# Patient Record
Sex: FEMALE | Race: WHITE | Employment: OTHER | URBAN - METROPOLITAN AREA
[De-identification: names, ages, dates, MRNs, and addresses within clinical notes are randomized per-mention and may not be internally consistent; named-entity substitution may affect disease eponyms.]

---

## 2019-01-24 DIAGNOSIS — R79.89 ELEVATED SERUM CREATININE: ICD-10-CM

## 2019-01-24 RX ORDER — SODIUM CHLORIDE 0.9 % (FLUSH) 0.9 %
10 SYRINGE (ML) INJECTION PRN
Status: CANCELLED
Start: 2019-01-25

## 2019-01-24 RX ORDER — MAGNESIUM SULFATE IN WATER 40 MG/ML
2 INJECTION, SOLUTION INTRAVENOUS ONCE
Status: CANCELLED
Start: 2019-01-25 | End: 2019-01-25

## 2019-01-24 RX ORDER — SODIUM CHLORIDE 9 MG/ML
INJECTION, SOLUTION INTRAVENOUS CONTINUOUS
Status: CANCELLED
Start: 2019-01-25

## 2019-01-25 ENCOUNTER — HOSPITAL ENCOUNTER (OUTPATIENT)
Dept: INFUSION THERAPY | Age: 79
Setting detail: INFUSION SERIES
Discharge: HOME | End: 2019-01-25
Payer: MEDICARE

## 2019-01-25 VITALS
WEIGHT: 185 LBS | HEART RATE: 66 BPM | BODY MASS INDEX: 34.93 KG/M2 | RESPIRATION RATE: 16 BRPM | SYSTOLIC BLOOD PRESSURE: 177 MMHG | OXYGEN SATURATION: 99 % | DIASTOLIC BLOOD PRESSURE: 80 MMHG | HEIGHT: 61 IN | TEMPERATURE: 97.6 F

## 2019-01-25 DIAGNOSIS — R79.89 ELEVATED SERUM CREATININE: ICD-10-CM

## 2019-01-25 DIAGNOSIS — E61.2 MAGNESIUM DEFICIENCY: ICD-10-CM

## 2019-01-25 PROCEDURE — 96367 TX/PROPH/DG ADDL SEQ IV INF: CPT

## 2019-01-25 PROCEDURE — 96365 THER/PROPH/DIAG IV INF INIT: CPT

## 2019-01-25 PROCEDURE — 2580000003 HC RX 258: Performed by: NURSE PRACTITIONER

## 2019-01-25 PROCEDURE — 6360000002 HC RX W HCPCS: Performed by: NURSE PRACTITIONER

## 2019-01-25 PROCEDURE — 96361 HYDRATE IV INFUSION ADD-ON: CPT

## 2019-01-25 RX ORDER — SODIUM CHLORIDE 9 MG/ML
INJECTION, SOLUTION INTRAVENOUS CONTINUOUS
Status: CANCELLED
Start: 2019-01-25

## 2019-01-25 RX ORDER — MAGNESIUM SULFATE IN WATER 40 MG/ML
2 INJECTION, SOLUTION INTRAVENOUS ONCE
Status: CANCELLED
Start: 2019-01-25 | End: 2019-01-25

## 2019-01-25 RX ORDER — SODIUM CHLORIDE 9 MG/ML
INJECTION, SOLUTION INTRAVENOUS CONTINUOUS
Status: DISCONTINUED | OUTPATIENT
Start: 2019-01-25 | End: 2019-01-26 | Stop reason: HOSPADM

## 2019-01-25 RX ORDER — MAGNESIUM SULFATE IN WATER 40 MG/ML
2 INJECTION, SOLUTION INTRAVENOUS ONCE
Status: COMPLETED | OUTPATIENT
Start: 2019-01-25 | End: 2019-01-25

## 2019-01-25 RX ORDER — SODIUM CHLORIDE 0.9 % (FLUSH) 0.9 %
10 SYRINGE (ML) INJECTION PRN
Status: CANCELLED
Start: 2019-01-25

## 2019-01-25 RX ORDER — SODIUM CHLORIDE 0.9 % (FLUSH) 0.9 %
10 SYRINGE (ML) INJECTION PRN
Status: DISCONTINUED | OUTPATIENT
Start: 2019-01-25 | End: 2019-01-26 | Stop reason: HOSPADM

## 2019-01-25 RX ADMIN — Medication 10 ML: at 11:30

## 2019-01-25 RX ADMIN — SODIUM CHLORIDE: 9 INJECTION, SOLUTION INTRAVENOUS at 09:32

## 2019-01-25 RX ADMIN — Medication 10 ML: at 09:28

## 2019-01-25 RX ADMIN — MAGNESIUM SULFATE HEPTAHYDRATE 2 G: 40 INJECTION, SOLUTION INTRAVENOUS at 09:33

## 2019-01-25 NOTE — PROGRESS NOTES
Tolerated infusion well. Therapy plan reviewed with patient. Verbalizes understanding. Reviewed AVS with patient, reviewed medication information, verbalizes good knowledge of current plan, and has no signs or symptoms to report at this time.  Declines copy of AVS. Patient following up with the Dr in [facility], AN AFFILIATE OF [facility]

## 2019-02-08 ENCOUNTER — HOSPITAL ENCOUNTER (OUTPATIENT)
Age: 79
Setting detail: OBSERVATION
Discharge: HOME | End: 2019-02-10
Attending: EMERGENCY MEDICINE | Admitting: INTERNAL MEDICINE
Payer: MEDICARE

## 2019-02-08 ENCOUNTER — APPOINTMENT (OUTPATIENT)
Dept: GENERAL RADIOLOGY | Age: 79
End: 2019-02-08
Payer: MEDICARE

## 2019-02-08 DIAGNOSIS — N17.9 AKI (ACUTE KIDNEY INJURY) (HCC): Primary | ICD-10-CM

## 2019-02-08 DIAGNOSIS — E87.5 HYPERKALEMIA: ICD-10-CM

## 2019-02-08 LAB
ALBUMIN SERPL-MCNC: 3.9 G/DL (ref 3.5–5.2)
ALP BLD-CCNC: 52 U/L (ref 35–104)
ALT SERPL-CCNC: 29 U/L (ref 0–32)
ANION GAP SERPL CALCULATED.3IONS-SCNC: 15 MMOL/L (ref 7–16)
ANISOCYTOSIS: ABNORMAL
AST SERPL-CCNC: 16 U/L (ref 0–31)
BASOPHILS ABSOLUTE: 0 E9/L (ref 0–0.2)
BASOPHILS RELATIVE PERCENT: 0 % (ref 0–2)
BILIRUB SERPL-MCNC: 0.5 MG/DL (ref 0–1.2)
BUN BLDV-MCNC: 68 MG/DL (ref 8–23)
BURR CELLS: ABNORMAL
CALCIUM SERPL-MCNC: 9.1 MG/DL (ref 8.6–10.2)
CHLORIDE BLD-SCNC: 99 MMOL/L (ref 98–107)
CO2: 21 MMOL/L (ref 22–29)
CREAT SERPL-MCNC: 2.1 MG/DL (ref 0.5–1)
EOSINOPHILS ABSOLUTE: 0 E9/L (ref 0.05–0.5)
EOSINOPHILS RELATIVE PERCENT: 0 % (ref 0–6)
GFR AFRICAN AMERICAN: 28
GFR NON-AFRICAN AMERICAN: 23 ML/MIN/1.73
GLUCOSE BLD-MCNC: 147 MG/DL (ref 74–99)
HCT VFR BLD CALC: 31.7 % (ref 34–48)
HEMOGLOBIN: 10.7 G/DL (ref 11.5–15.5)
LACTIC ACID: 2.7 MMOL/L (ref 0.5–2.2)
LYMPHOCYTES ABSOLUTE: 0.42 E9/L (ref 1.5–4)
LYMPHOCYTES RELATIVE PERCENT: 5 % (ref 20–42)
MCH RBC QN AUTO: 32.8 PG (ref 26–35)
MCHC RBC AUTO-ENTMCNC: 33.8 % (ref 32–34.5)
MCV RBC AUTO: 97.2 FL (ref 80–99.9)
METAMYELOCYTES RELATIVE PERCENT: 1 % (ref 0–1)
MONOCYTES ABSOLUTE: 0.25 E9/L (ref 0.1–0.95)
MONOCYTES RELATIVE PERCENT: 3 % (ref 2–12)
NEUTROPHILS ABSOLUTE: 7.64 E9/L (ref 1.8–7.3)
NEUTROPHILS RELATIVE PERCENT: 91 % (ref 43–80)
OVALOCYTES: ABNORMAL
PDW BLD-RTO: 14.3 FL (ref 11.5–15)
PLATELET # BLD: 94 E9/L (ref 130–450)
PLATELET CONFIRMATION: NORMAL
PMV BLD AUTO: 10.3 FL (ref 7–12)
POIKILOCYTES: ABNORMAL
POTASSIUM SERPL-SCNC: 5.6 MMOL/L (ref 3.5–5)
RBC # BLD: 3.26 E12/L (ref 3.5–5.5)
SODIUM BLD-SCNC: 135 MMOL/L (ref 132–146)
TOTAL PROTEIN: 6 G/DL (ref 6.4–8.3)
TROPONIN: <0.01 NG/ML (ref 0–0.03)
WBC # BLD: 8.3 E9/L (ref 4.5–11.5)

## 2019-02-08 PROCEDURE — 6370000000 HC RX 637 (ALT 250 FOR IP): Performed by: EMERGENCY MEDICINE

## 2019-02-08 PROCEDURE — 84484 ASSAY OF TROPONIN QUANT: CPT

## 2019-02-08 PROCEDURE — 85025 COMPLETE CBC W/AUTO DIFF WBC: CPT

## 2019-02-08 PROCEDURE — 99285 EMERGENCY DEPT VISIT HI MDM: CPT

## 2019-02-08 PROCEDURE — 2580000003 HC RX 258: Performed by: INTERNAL MEDICINE

## 2019-02-08 PROCEDURE — 2580000003 HC RX 258: Performed by: STUDENT IN AN ORGANIZED HEALTH CARE EDUCATION/TRAINING PROGRAM

## 2019-02-08 PROCEDURE — 71045 X-RAY EXAM CHEST 1 VIEW: CPT

## 2019-02-08 PROCEDURE — 80053 COMPREHEN METABOLIC PANEL: CPT

## 2019-02-08 PROCEDURE — G0378 HOSPITAL OBSERVATION PER HR: HCPCS

## 2019-02-08 PROCEDURE — 36415 COLL VENOUS BLD VENIPUNCTURE: CPT

## 2019-02-08 PROCEDURE — 83605 ASSAY OF LACTIC ACID: CPT

## 2019-02-08 RX ORDER — HEPARIN SODIUM 10000 [USP'U]/ML
5000 INJECTION, SOLUTION INTRAVENOUS; SUBCUTANEOUS 3 TIMES DAILY
Status: DISCONTINUED | OUTPATIENT
Start: 2019-02-08 | End: 2019-02-10 | Stop reason: HOSPADM

## 2019-02-08 RX ORDER — SODIUM CHLORIDE 0.9 % (FLUSH) 0.9 %
10 SYRINGE (ML) INJECTION 2 TIMES DAILY
Status: DISCONTINUED | OUTPATIENT
Start: 2019-02-08 | End: 2019-02-08 | Stop reason: SDUPTHER

## 2019-02-08 RX ORDER — ONDANSETRON 2 MG/ML
4 INJECTION INTRAMUSCULAR; INTRAVENOUS EVERY 6 HOURS PRN
Status: DISCONTINUED | OUTPATIENT
Start: 2019-02-08 | End: 2019-02-10 | Stop reason: HOSPADM

## 2019-02-08 RX ORDER — LISINOPRIL AND HYDROCHLOROTHIAZIDE 20; 12.5 MG/1; MG/1
1 TABLET ORAL DAILY
Status: ON HOLD | COMMUNITY
End: 2019-02-10 | Stop reason: HOSPADM

## 2019-02-08 RX ORDER — PANTOPRAZOLE SODIUM 40 MG/1
40 TABLET, DELAYED RELEASE ORAL NIGHTLY
COMMUNITY
End: 2020-02-14 | Stop reason: ALTCHOICE

## 2019-02-08 RX ORDER — PREDNISONE 10 MG/1
10 TABLET ORAL SEE ADMIN INSTRUCTIONS
COMMUNITY
Start: 2019-01-22 | End: 2019-02-15 | Stop reason: ALTCHOICE

## 2019-02-08 RX ORDER — PREDNISONE 10 MG/1
30 TABLET ORAL
Status: DISCONTINUED | OUTPATIENT
Start: 2019-02-08 | End: 2019-02-10 | Stop reason: HOSPADM

## 2019-02-08 RX ORDER — LISINOPRIL 20 MG/1
20 TABLET ORAL DAILY
Status: CANCELLED | OUTPATIENT
Start: 2019-02-08

## 2019-02-08 RX ORDER — SODIUM CHLORIDE 9 MG/ML
INJECTION, SOLUTION INTRAVENOUS CONTINUOUS
Status: DISCONTINUED | OUTPATIENT
Start: 2019-02-08 | End: 2019-02-09

## 2019-02-08 RX ORDER — FOLIC ACID 1 MG/1
1 TABLET ORAL DAILY
Status: DISCONTINUED | OUTPATIENT
Start: 2019-02-08 | End: 2019-02-10 | Stop reason: HOSPADM

## 2019-02-08 RX ORDER — SODIUM CHLORIDE 0.9 % (FLUSH) 0.9 %
10 SYRINGE (ML) INJECTION PRN
Status: DISCONTINUED | OUTPATIENT
Start: 2019-02-08 | End: 2019-02-08 | Stop reason: SDUPTHER

## 2019-02-08 RX ORDER — ASPIRIN 81 MG/1
81 TABLET, CHEWABLE ORAL DAILY
Status: DISCONTINUED | OUTPATIENT
Start: 2019-02-08 | End: 2019-02-10 | Stop reason: HOSPADM

## 2019-02-08 RX ORDER — SODIUM CHLORIDE 0.9 % (FLUSH) 0.9 %
10 SYRINGE (ML) INJECTION EVERY 12 HOURS SCHEDULED
Status: DISCONTINUED | OUTPATIENT
Start: 2019-02-08 | End: 2019-02-10 | Stop reason: HOSPADM

## 2019-02-08 RX ORDER — SODIUM POLYSTYRENE SULFONATE 4.1 MEQ/G
15 POWDER, FOR SUSPENSION ORAL; RECTAL ONCE
Status: COMPLETED | OUTPATIENT
Start: 2019-02-08 | End: 2019-02-08

## 2019-02-08 RX ORDER — SODIUM CHLORIDE 0.9 % (FLUSH) 0.9 %
10 SYRINGE (ML) INJECTION PRN
Status: DISCONTINUED | OUTPATIENT
Start: 2019-02-08 | End: 2019-02-10 | Stop reason: HOSPADM

## 2019-02-08 RX ORDER — LANOLIN ALCOHOL/MO/W.PET/CERES
1000 CREAM (GRAM) TOPICAL DAILY
Status: DISCONTINUED | OUTPATIENT
Start: 2019-02-09 | End: 2019-02-10 | Stop reason: HOSPADM

## 2019-02-08 RX ADMIN — SODIUM POLYSTYRENE SULFONATE 15 G: 1 POWDER ORAL; RECTAL at 20:42

## 2019-02-08 RX ADMIN — Medication 10 ML: at 17:21

## 2019-02-08 RX ADMIN — SODIUM CHLORIDE: 9 INJECTION, SOLUTION INTRAVENOUS at 23:16

## 2019-02-08 RX ADMIN — Medication 10 ML: at 23:16

## 2019-02-08 ASSESSMENT — ENCOUNTER SYMPTOMS
ABDOMINAL PAIN: 0
WHEEZING: 0
SHORTNESS OF BREATH: 1
COLOR CHANGE: 0
COUGH: 0
NAUSEA: 0
VOMITING: 0
DIARRHEA: 0
CONSTIPATION: 0

## 2019-02-08 ASSESSMENT — PAIN SCALES - GENERAL: PAINLEVEL_OUTOF10: 0

## 2019-02-09 LAB
ANION GAP SERPL CALCULATED.3IONS-SCNC: 15 MMOL/L (ref 7–16)
BACTERIA: ABNORMAL /HPF
BASOPHILS ABSOLUTE: 0.01 E9/L (ref 0–0.2)
BASOPHILS RELATIVE PERCENT: 0.1 % (ref 0–2)
BILIRUBIN URINE: NEGATIVE
BLOOD, URINE: NEGATIVE
BUN BLDV-MCNC: 58 MG/DL (ref 8–23)
CALCIUM SERPL-MCNC: 8.9 MG/DL (ref 8.6–10.2)
CHLORIDE BLD-SCNC: 104 MMOL/L (ref 98–107)
CHLORIDE URINE RANDOM: 67 MMOL/L
CLARITY: CLEAR
CO2: 21 MMOL/L (ref 22–29)
COLOR: YELLOW
CREAT SERPL-MCNC: 1.8 MG/DL (ref 0.5–1)
CREATININE URINE: 35 MG/DL (ref 29–226)
EOSINOPHILS ABSOLUTE: 0.06 E9/L (ref 0.05–0.5)
EOSINOPHILS RELATIVE PERCENT: 0.7 % (ref 0–6)
GFR AFRICAN AMERICAN: 33
GFR NON-AFRICAN AMERICAN: 27 ML/MIN/1.73
GLUCOSE BLD-MCNC: 106 MG/DL (ref 74–99)
GLUCOSE URINE: 100 MG/DL
HCT VFR BLD CALC: 33.9 % (ref 34–48)
HEMOGLOBIN: 11.3 G/DL (ref 11.5–15.5)
IMMATURE GRANULOCYTES #: 0.21 E9/L
IMMATURE GRANULOCYTES %: 2.6 % (ref 0–5)
KETONES, URINE: NEGATIVE MG/DL
LACTIC ACID: 2.4 MMOL/L (ref 0.5–2.2)
LACTIC ACID: 2.4 MMOL/L (ref 0.5–2.2)
LEUKOCYTE ESTERASE, URINE: NEGATIVE
LYMPHOCYTES ABSOLUTE: 1.68 E9/L (ref 1.5–4)
LYMPHOCYTES RELATIVE PERCENT: 20.9 % (ref 20–42)
MCH RBC QN AUTO: 32.2 PG (ref 26–35)
MCHC RBC AUTO-ENTMCNC: 33.3 % (ref 32–34.5)
MCV RBC AUTO: 96.6 FL (ref 80–99.9)
MONOCYTES ABSOLUTE: 0.43 E9/L (ref 0.1–0.95)
MONOCYTES RELATIVE PERCENT: 5.3 % (ref 2–12)
NEUTROPHILS ABSOLUTE: 5.65 E9/L (ref 1.8–7.3)
NEUTROPHILS RELATIVE PERCENT: 70.4 % (ref 43–80)
NITRITE, URINE: NEGATIVE
OSMOLALITY URINE: 412 MOSM/KG (ref 300–900)
PDW BLD-RTO: 14.3 FL (ref 11.5–15)
PH UA: 5.5 (ref 5–9)
PLATELET # BLD: 102 E9/L (ref 130–450)
PMV BLD AUTO: 10.1 FL (ref 7–12)
POTASSIUM REFLEX MAGNESIUM: 3.9 MMOL/L (ref 3.5–5)
POTASSIUM, UR: 17.5 MMOL/L
PROTEIN UA: NEGATIVE MG/DL
RBC # BLD: 3.51 E12/L (ref 3.5–5.5)
RBC UA: ABNORMAL /HPF (ref 0–2)
SODIUM BLD-SCNC: 140 MMOL/L (ref 132–146)
SODIUM URINE: 90 MMOL/L
SPECIFIC GRAVITY UA: <=1.005 (ref 1–1.03)
UREA NITROGEN, UR: 559 MG/DL (ref 800–1666)
UROBILINOGEN, URINE: 0.2 E.U./DL
WBC # BLD: 8 E9/L (ref 4.5–11.5)
WBC UA: ABNORMAL /HPF (ref 0–5)

## 2019-02-09 PROCEDURE — G0378 HOSPITAL OBSERVATION PER HR: HCPCS

## 2019-02-09 PROCEDURE — 80048 BASIC METABOLIC PNL TOTAL CA: CPT

## 2019-02-09 PROCEDURE — 99219 PR INITIAL OBSERVATION CARE/DAY 50 MINUTES: CPT | Performed by: INTERNAL MEDICINE

## 2019-02-09 PROCEDURE — 2580000003 HC RX 258: Performed by: INTERNAL MEDICINE

## 2019-02-09 PROCEDURE — 96374 THER/PROPH/DIAG INJ IV PUSH: CPT

## 2019-02-09 PROCEDURE — 81001 URINALYSIS AUTO W/SCOPE: CPT

## 2019-02-09 PROCEDURE — 82436 ASSAY OF URINE CHLORIDE: CPT

## 2019-02-09 PROCEDURE — 84300 ASSAY OF URINE SODIUM: CPT

## 2019-02-09 PROCEDURE — 85025 COMPLETE CBC W/AUTO DIFF WBC: CPT

## 2019-02-09 PROCEDURE — 84540 ASSAY OF URINE/UREA-N: CPT

## 2019-02-09 PROCEDURE — 84133 ASSAY OF URINE POTASSIUM: CPT

## 2019-02-09 PROCEDURE — 6360000002 HC RX W HCPCS: Performed by: INTERNAL MEDICINE

## 2019-02-09 PROCEDURE — 96376 TX/PRO/DX INJ SAME DRUG ADON: CPT

## 2019-02-09 PROCEDURE — 6370000000 HC RX 637 (ALT 250 FOR IP): Performed by: INTERNAL MEDICINE

## 2019-02-09 PROCEDURE — 36415 COLL VENOUS BLD VENIPUNCTURE: CPT

## 2019-02-09 PROCEDURE — 83935 ASSAY OF URINE OSMOLALITY: CPT

## 2019-02-09 PROCEDURE — 83605 ASSAY OF LACTIC ACID: CPT

## 2019-02-09 PROCEDURE — 82570 ASSAY OF URINE CREATININE: CPT

## 2019-02-09 RX ADMIN — HEPARIN SODIUM 5000 UNITS: 10000 INJECTION INTRAVENOUS; SUBCUTANEOUS at 02:46

## 2019-02-09 RX ADMIN — HEPARIN SODIUM 5000 UNITS: 10000 INJECTION INTRAVENOUS; SUBCUTANEOUS at 19:19

## 2019-02-09 RX ADMIN — ASPIRIN 81 MG 81 MG: 81 TABLET ORAL at 09:24

## 2019-02-09 RX ADMIN — HEPARIN SODIUM 5000 UNITS: 10000 INJECTION INTRAVENOUS; SUBCUTANEOUS at 12:12

## 2019-02-09 RX ADMIN — SODIUM CHLORIDE: 9 INJECTION, SOLUTION INTRAVENOUS at 09:24

## 2019-02-09 RX ADMIN — Medication 10 ML: at 09:28

## 2019-02-09 ASSESSMENT — PAIN SCALES - GENERAL: PAINLEVEL_OUTOF10: 0

## 2019-02-10 VITALS
OXYGEN SATURATION: 96 % | WEIGHT: 186 LBS | SYSTOLIC BLOOD PRESSURE: 124 MMHG | HEART RATE: 99 BPM | HEIGHT: 61 IN | DIASTOLIC BLOOD PRESSURE: 58 MMHG | TEMPERATURE: 98.3 F | RESPIRATION RATE: 18 BRPM | BODY MASS INDEX: 35.12 KG/M2

## 2019-02-10 LAB
ANION GAP SERPL CALCULATED.3IONS-SCNC: 14 MMOL/L (ref 7–16)
BASOPHILS ABSOLUTE: 0.02 E9/L (ref 0–0.2)
BASOPHILS RELATIVE PERCENT: 0.3 % (ref 0–2)
BUN BLDV-MCNC: 57 MG/DL (ref 8–23)
CALCIUM SERPL-MCNC: 8.5 MG/DL (ref 8.6–10.2)
CHLORIDE BLD-SCNC: 104 MMOL/L (ref 98–107)
CO2: 23 MMOL/L (ref 22–29)
CREAT SERPL-MCNC: 1.9 MG/DL (ref 0.5–1)
EOSINOPHILS ABSOLUTE: 0.04 E9/L (ref 0.05–0.5)
EOSINOPHILS RELATIVE PERCENT: 0.6 % (ref 0–6)
GFR AFRICAN AMERICAN: 31
GFR NON-AFRICAN AMERICAN: 26 ML/MIN/1.73
GLUCOSE BLD-MCNC: 102 MG/DL (ref 74–99)
HCT VFR BLD CALC: 30.7 % (ref 34–48)
HEMOGLOBIN: 10.2 G/DL (ref 11.5–15.5)
IMMATURE GRANULOCYTES #: 0.27 E9/L
IMMATURE GRANULOCYTES %: 3.9 % (ref 0–5)
LACTIC ACID: 1.8 MMOL/L (ref 0.5–2.2)
LYMPHOCYTES ABSOLUTE: 1.18 E9/L (ref 1.5–4)
LYMPHOCYTES RELATIVE PERCENT: 16.9 % (ref 20–42)
MCH RBC QN AUTO: 32.6 PG (ref 26–35)
MCHC RBC AUTO-ENTMCNC: 33.2 % (ref 32–34.5)
MCV RBC AUTO: 98.1 FL (ref 80–99.9)
MONOCYTES ABSOLUTE: 0.44 E9/L (ref 0.1–0.95)
MONOCYTES RELATIVE PERCENT: 6.3 % (ref 2–12)
NEUTROPHILS ABSOLUTE: 5.04 E9/L (ref 1.8–7.3)
NEUTROPHILS RELATIVE PERCENT: 72 % (ref 43–80)
OVALOCYTES: ABNORMAL
PDW BLD-RTO: 14.5 FL (ref 11.5–15)
PLATELET # BLD: 88 E9/L (ref 130–450)
PLATELET CONFIRMATION: NORMAL
PMV BLD AUTO: 10.1 FL (ref 7–12)
POIKILOCYTES: ABNORMAL
POTASSIUM REFLEX MAGNESIUM: 4.2 MMOL/L (ref 3.5–5)
RBC # BLD: 3.13 E12/L (ref 3.5–5.5)
SODIUM BLD-SCNC: 141 MMOL/L (ref 132–146)
WBC # BLD: 7 E9/L (ref 4.5–11.5)

## 2019-02-10 PROCEDURE — 85025 COMPLETE CBC W/AUTO DIFF WBC: CPT

## 2019-02-10 PROCEDURE — 36415 COLL VENOUS BLD VENIPUNCTURE: CPT

## 2019-02-10 PROCEDURE — 83605 ASSAY OF LACTIC ACID: CPT

## 2019-02-10 PROCEDURE — 96376 TX/PRO/DX INJ SAME DRUG ADON: CPT

## 2019-02-10 PROCEDURE — 80048 BASIC METABOLIC PNL TOTAL CA: CPT

## 2019-02-10 PROCEDURE — 6370000000 HC RX 637 (ALT 250 FOR IP): Performed by: INTERNAL MEDICINE

## 2019-02-10 PROCEDURE — 2580000003 HC RX 258: Performed by: INTERNAL MEDICINE

## 2019-02-10 PROCEDURE — 6360000002 HC RX W HCPCS: Performed by: INTERNAL MEDICINE

## 2019-02-10 PROCEDURE — G0378 HOSPITAL OBSERVATION PER HR: HCPCS

## 2019-02-10 PROCEDURE — 99217 PR OBSERVATION CARE DISCHARGE MANAGEMENT: CPT | Performed by: INTERNAL MEDICINE

## 2019-02-10 RX ADMIN — ASPIRIN 81 MG 81 MG: 81 TABLET ORAL at 09:06

## 2019-02-10 RX ADMIN — Medication 1000 MCG: at 09:04

## 2019-02-10 RX ADMIN — FOLIC ACID 1 MG: 1 TABLET ORAL at 09:04

## 2019-02-10 RX ADMIN — Medication 10 ML: at 09:06

## 2019-02-10 RX ADMIN — PREDNISONE 30 MG: 10 TABLET ORAL at 12:18

## 2019-02-10 RX ADMIN — HEPARIN SODIUM 5000 UNITS: 10000 INJECTION INTRAVENOUS; SUBCUTANEOUS at 03:30

## 2019-02-10 ASSESSMENT — PAIN SCALES - GENERAL
PAINLEVEL_OUTOF10: 0

## 2019-02-15 ENCOUNTER — TELEPHONE (OUTPATIENT)
Dept: SURGERY | Age: 79
End: 2019-02-15

## 2019-02-15 RX ORDER — PREDNISONE 10 MG/1
20 TABLET ORAL DAILY
COMMUNITY
Start: 2019-01-24 | End: 2019-09-30

## 2019-02-21 LAB
EKG ATRIAL RATE: 74 BPM
EKG P AXIS: 23 DEGREES
EKG P-R INTERVAL: 162 MS
EKG Q-T INTERVAL: 338 MS
EKG QRS DURATION: 72 MS
EKG QTC CALCULATION (BAZETT): 375 MS
EKG R AXIS: -13 DEGREES
EKG T AXIS: 46 DEGREES
EKG VENTRICULAR RATE: 74 BPM

## 2019-02-22 ENCOUNTER — APPOINTMENT (OUTPATIENT)
Dept: GENERAL RADIOLOGY | Age: 79
End: 2019-02-22
Attending: SURGERY
Payer: MEDICARE

## 2019-02-22 ENCOUNTER — HOSPITAL ENCOUNTER (OUTPATIENT)
Age: 79
Setting detail: OUTPATIENT SURGERY
Discharge: HOME | End: 2019-02-22
Attending: SURGERY | Admitting: SURGERY
Payer: MEDICARE

## 2019-02-22 ENCOUNTER — ANESTHESIA EVENT (OUTPATIENT)
Dept: OPERATING ROOM | Age: 79
End: 2019-02-22
Payer: MEDICARE

## 2019-02-22 ENCOUNTER — ANESTHESIA (OUTPATIENT)
Dept: OPERATING ROOM | Age: 79
End: 2019-02-22
Payer: MEDICARE

## 2019-02-22 VITALS
RESPIRATION RATE: 16 BRPM | HEART RATE: 94 BPM | OXYGEN SATURATION: 98 % | SYSTOLIC BLOOD PRESSURE: 161 MMHG | WEIGHT: 186 LBS | TEMPERATURE: 97.2 F | DIASTOLIC BLOOD PRESSURE: 67 MMHG | BODY MASS INDEX: 35.12 KG/M2 | HEIGHT: 61 IN

## 2019-02-22 VITALS — DIASTOLIC BLOOD PRESSURE: 61 MMHG | SYSTOLIC BLOOD PRESSURE: 165 MMHG | OXYGEN SATURATION: 99 %

## 2019-02-22 DIAGNOSIS — G89.18 POST-OPERATIVE PAIN: Primary | ICD-10-CM

## 2019-02-22 DIAGNOSIS — Z45.2 ENCOUNTER FOR INSERTION OF VENOUS ACCESS PORT: ICD-10-CM

## 2019-02-22 PROCEDURE — 3600000002 HC SURGERY LEVEL 2 BASE: Performed by: SURGERY

## 2019-02-22 PROCEDURE — 3209999900 FLUORO FOR SURGICAL PROCEDURES

## 2019-02-22 PROCEDURE — 2580000003 HC RX 258: Performed by: SURGERY

## 2019-02-22 PROCEDURE — 36561 INSERT TUNNELED CV CATH: CPT | Performed by: SURGERY

## 2019-02-22 PROCEDURE — 2580000003 HC RX 258: Performed by: NURSE ANESTHETIST, CERTIFIED REGISTERED

## 2019-02-22 PROCEDURE — 3700000001 HC ADD 15 MINUTES (ANESTHESIA): Performed by: SURGERY

## 2019-02-22 PROCEDURE — 6360000002 HC RX W HCPCS: Performed by: SURGERY

## 2019-02-22 PROCEDURE — 71045 X-RAY EXAM CHEST 1 VIEW: CPT

## 2019-02-22 PROCEDURE — 3600000012 HC SURGERY LEVEL 2 ADDTL 15MIN: Performed by: SURGERY

## 2019-02-22 PROCEDURE — 3700000000 HC ANESTHESIA ATTENDED CARE: Performed by: SURGERY

## 2019-02-22 PROCEDURE — 2500000003 HC RX 250 WO HCPCS: Performed by: SURGERY

## 2019-02-22 PROCEDURE — C1788 PORT, INDWELLING, IMP: HCPCS | Performed by: SURGERY

## 2019-02-22 PROCEDURE — 7100000010 HC PHASE II RECOVERY - FIRST 15 MIN: Performed by: SURGERY

## 2019-02-22 PROCEDURE — 7100000011 HC PHASE II RECOVERY - ADDTL 15 MIN: Performed by: SURGERY

## 2019-02-22 PROCEDURE — 6360000002 HC RX W HCPCS: Performed by: NURSE ANESTHETIST, CERTIFIED REGISTERED

## 2019-02-22 PROCEDURE — 2709999900 HC NON-CHARGEABLE SUPPLY: Performed by: SURGERY

## 2019-02-22 PROCEDURE — 77001 FLUOROGUIDE FOR VEIN DEVICE: CPT | Performed by: SURGERY

## 2019-02-22 DEVICE — PORT INFUS OD2.7MM ID1.5MM INTRO 8FR TI POLYUR CATH DETACH CT80STPD] ANGIODYNAMICS INC]: Type: IMPLANTABLE DEVICE | Site: CHEST  WALL | Status: FUNCTIONAL

## 2019-02-22 RX ORDER — FENTANYL CITRATE 50 UG/ML
50 INJECTION, SOLUTION INTRAMUSCULAR; INTRAVENOUS EVERY 5 MIN PRN
Status: DISCONTINUED | OUTPATIENT
Start: 2019-02-22 | End: 2019-02-22 | Stop reason: HOSPADM

## 2019-02-22 RX ORDER — HYDROCODONE BITARTRATE AND ACETAMINOPHEN 5; 325 MG/1; MG/1
1 TABLET ORAL EVERY 6 HOURS PRN
Qty: 8 TABLET | Refills: 0 | Status: SHIPPED | OUTPATIENT
Start: 2019-02-22 | End: 2019-02-24

## 2019-02-22 RX ORDER — SODIUM CHLORIDE 9 MG/ML
INJECTION, SOLUTION INTRAVENOUS CONTINUOUS PRN
Status: COMPLETED | OUTPATIENT
Start: 2019-02-22 | End: 2019-02-22

## 2019-02-22 RX ORDER — BUPIVACAINE HYDROCHLORIDE AND EPINEPHRINE 2.5; 5 MG/ML; UG/ML
INJECTION, SOLUTION EPIDURAL; INFILTRATION; INTRACAUDAL; PERINEURAL PRN
Status: DISCONTINUED | OUTPATIENT
Start: 2019-02-22 | End: 2019-02-22 | Stop reason: ALTCHOICE

## 2019-02-22 RX ORDER — HYDROCODONE BITARTRATE AND ACETAMINOPHEN 5; 325 MG/1; MG/1
1 TABLET ORAL PRN
Status: DISCONTINUED | OUTPATIENT
Start: 2019-02-22 | End: 2019-02-22 | Stop reason: HOSPADM

## 2019-02-22 RX ORDER — FENTANYL CITRATE 50 UG/ML
25 INJECTION, SOLUTION INTRAMUSCULAR; INTRAVENOUS EVERY 5 MIN PRN
Status: DISCONTINUED | OUTPATIENT
Start: 2019-02-22 | End: 2019-02-22 | Stop reason: HOSPADM

## 2019-02-22 RX ORDER — CEFAZOLIN SODIUM 2 G/50ML
2 SOLUTION INTRAVENOUS ONCE
Status: COMPLETED | OUTPATIENT
Start: 2019-02-22 | End: 2019-02-22

## 2019-02-22 RX ORDER — PROPOFOL 10 MG/ML
INJECTION, EMULSION INTRAVENOUS CONTINUOUS PRN
Status: DISCONTINUED | OUTPATIENT
Start: 2019-02-22 | End: 2019-02-22 | Stop reason: SDUPTHER

## 2019-02-22 RX ORDER — SODIUM CHLORIDE 9 MG/ML
INJECTION, SOLUTION INTRAVENOUS CONTINUOUS PRN
Status: DISCONTINUED | OUTPATIENT
Start: 2019-02-22 | End: 2019-02-22 | Stop reason: SDUPTHER

## 2019-02-22 RX ORDER — MEPERIDINE HYDROCHLORIDE 50 MG/ML
12.5 INJECTION INTRAMUSCULAR; INTRAVENOUS; SUBCUTANEOUS EVERY 5 MIN PRN
Status: DISCONTINUED | OUTPATIENT
Start: 2019-02-22 | End: 2019-02-22 | Stop reason: HOSPADM

## 2019-02-22 RX ORDER — LIDOCAINE HYDROCHLORIDE 10 MG/ML
INJECTION, SOLUTION EPIDURAL; INFILTRATION; INTRACAUDAL; PERINEURAL PRN
Status: DISCONTINUED | OUTPATIENT
Start: 2019-02-22 | End: 2019-02-22 | Stop reason: ALTCHOICE

## 2019-02-22 RX ORDER — HYDROCODONE BITARTRATE AND ACETAMINOPHEN 5; 325 MG/1; MG/1
2 TABLET ORAL PRN
Status: DISCONTINUED | OUTPATIENT
Start: 2019-02-22 | End: 2019-02-22 | Stop reason: HOSPADM

## 2019-02-22 RX ADMIN — SODIUM CHLORIDE: 9 INJECTION, SOLUTION INTRAVENOUS at 12:40

## 2019-02-22 RX ADMIN — CEFAZOLIN SODIUM 2 G: 2 SOLUTION INTRAVENOUS at 12:41

## 2019-02-22 RX ADMIN — PROPOFOL 50 MCG/KG/MIN: 10 INJECTION, EMULSION INTRAVENOUS at 12:42

## 2019-02-22 ASSESSMENT — PAIN - FUNCTIONAL ASSESSMENT: PAIN_FUNCTIONAL_ASSESSMENT: 0-10

## 2019-02-22 ASSESSMENT — PAIN SCALES - GENERAL
PAINLEVEL_OUTOF10: 0

## 2019-02-22 ASSESSMENT — LIFESTYLE VARIABLES: SMOKING_STATUS: 0

## 2019-04-03 RX ORDER — SODIUM CHLORIDE 0.9 % (FLUSH) 0.9 %
10 SYRINGE (ML) INJECTION PRN
Status: CANCELLED
Start: 2019-04-03

## 2019-04-04 ENCOUNTER — HOSPITAL ENCOUNTER (OUTPATIENT)
Dept: INFUSION THERAPY | Age: 79
Setting detail: INFUSION SERIES
Discharge: HOME | End: 2019-04-04
Payer: MEDICARE

## 2019-04-04 VITALS
DIASTOLIC BLOOD PRESSURE: 67 MMHG | SYSTOLIC BLOOD PRESSURE: 148 MMHG | HEART RATE: 92 BPM | RESPIRATION RATE: 16 BRPM | TEMPERATURE: 97.9 F | BODY MASS INDEX: 35.9 KG/M2 | WEIGHT: 190 LBS

## 2019-04-04 DIAGNOSIS — C54.9 MALIGNANT NEOPLASM OF BODY OF UTERUS, UNSPECIFIED SITE (HCC): Primary | ICD-10-CM

## 2019-04-04 LAB
ALBUMIN SERPL-MCNC: 3.9 G/DL (ref 3.5–5.2)
ALP BLD-CCNC: 72 U/L (ref 35–104)
ALT SERPL-CCNC: 11 U/L (ref 0–32)
ANION GAP SERPL CALCULATED.3IONS-SCNC: 14 MMOL/L (ref 7–16)
AST SERPL-CCNC: 16 U/L (ref 0–31)
BASOPHILS ABSOLUTE: 0.04 E9/L (ref 0–0.2)
BASOPHILS RELATIVE PERCENT: 0.7 % (ref 0–2)
BILIRUB SERPL-MCNC: 0.5 MG/DL (ref 0–1.2)
BUN BLDV-MCNC: 29 MG/DL (ref 8–23)
CALCIUM SERPL-MCNC: 9.7 MG/DL (ref 8.6–10.2)
CHLORIDE BLD-SCNC: 100 MMOL/L (ref 98–107)
CO2: 27 MMOL/L (ref 22–29)
CREAT SERPL-MCNC: 1.3 MG/DL (ref 0.5–1)
EOSINOPHILS ABSOLUTE: 0.08 E9/L (ref 0.05–0.5)
EOSINOPHILS RELATIVE PERCENT: 1.4 % (ref 0–6)
GFR AFRICAN AMERICAN: 48
GFR NON-AFRICAN AMERICAN: 40 ML/MIN/1.73
GLUCOSE BLD-MCNC: 135 MG/DL (ref 74–99)
HCT VFR BLD CALC: 35.1 % (ref 34–48)
HEMOGLOBIN: 11.4 G/DL (ref 11.5–15.5)
IMMATURE GRANULOCYTES #: 0.07 E9/L
IMMATURE GRANULOCYTES %: 1.2 % (ref 0–5)
LYMPHOCYTES ABSOLUTE: 0.56 E9/L (ref 1.5–4)
LYMPHOCYTES RELATIVE PERCENT: 9.8 % (ref 20–42)
MAGNESIUM: 1.3 MG/DL (ref 1.6–2.6)
MCH RBC QN AUTO: 32.6 PG (ref 26–35)
MCHC RBC AUTO-ENTMCNC: 32.5 % (ref 32–34.5)
MCV RBC AUTO: 100.3 FL (ref 80–99.9)
MONOCYTES ABSOLUTE: 0.59 E9/L (ref 0.1–0.95)
MONOCYTES RELATIVE PERCENT: 10.3 % (ref 2–12)
NEUTROPHILS ABSOLUTE: 4.39 E9/L (ref 1.8–7.3)
NEUTROPHILS RELATIVE PERCENT: 76.6 % (ref 43–80)
OVALOCYTES: ABNORMAL
PDW BLD-RTO: 13.3 FL (ref 11.5–15)
PLATELET # BLD: 256 E9/L (ref 130–450)
PMV BLD AUTO: 9.9 FL (ref 7–12)
POIKILOCYTES: ABNORMAL
POLYCHROMASIA: ABNORMAL
POTASSIUM SERPL-SCNC: 3.6 MMOL/L (ref 3.5–5)
RBC # BLD: 3.5 E12/L (ref 3.5–5.5)
SODIUM BLD-SCNC: 141 MMOL/L (ref 132–146)
TEAR DROP CELLS: ABNORMAL
TOTAL PROTEIN: 6.6 G/DL (ref 6.4–8.3)
WBC # BLD: 5.7 E9/L (ref 4.5–11.5)

## 2019-04-04 PROCEDURE — 80053 COMPREHEN METABOLIC PANEL: CPT

## 2019-04-04 PROCEDURE — 36415 COLL VENOUS BLD VENIPUNCTURE: CPT

## 2019-04-04 PROCEDURE — 85025 COMPLETE CBC W/AUTO DIFF WBC: CPT

## 2019-04-04 PROCEDURE — 83735 ASSAY OF MAGNESIUM: CPT

## 2019-04-04 PROCEDURE — 36591 DRAW BLOOD OFF VENOUS DEVICE: CPT

## 2019-04-04 PROCEDURE — 6360000002 HC RX W HCPCS: Performed by: NURSE PRACTITIONER

## 2019-04-04 PROCEDURE — 2580000003 HC RX 258: Performed by: NURSE PRACTITIONER

## 2019-04-04 RX ORDER — SODIUM CHLORIDE 0.9 % (FLUSH) 0.9 %
10 SYRINGE (ML) INJECTION PRN
Status: DISCONTINUED | OUTPATIENT
Start: 2019-04-04 | End: 2019-04-05 | Stop reason: HOSPADM

## 2019-04-04 RX ORDER — SODIUM CHLORIDE 0.9 % (FLUSH) 0.9 %
10 SYRINGE (ML) INJECTION PRN
Status: CANCELLED
Start: 2019-04-04

## 2019-04-04 RX ADMIN — Medication 10 ML: at 10:11

## 2019-04-04 RX ADMIN — Medication 10 ML: at 10:10

## 2019-04-04 RX ADMIN — Medication 500 UNITS: at 10:12

## 2019-04-11 ENCOUNTER — HOSPITAL ENCOUNTER (OUTPATIENT)
Dept: INFUSION THERAPY | Age: 79
Setting detail: INFUSION SERIES
Discharge: HOME | End: 2019-04-11
Payer: MEDICARE

## 2019-04-11 VITALS
DIASTOLIC BLOOD PRESSURE: 62 MMHG | HEART RATE: 92 BPM | WEIGHT: 190 LBS | BODY MASS INDEX: 35.87 KG/M2 | RESPIRATION RATE: 16 BRPM | SYSTOLIC BLOOD PRESSURE: 133 MMHG | HEIGHT: 61 IN | TEMPERATURE: 98.2 F | OXYGEN SATURATION: 97 %

## 2019-04-11 DIAGNOSIS — C54.9 MALIGNANT NEOPLASM OF BODY OF UTERUS, UNSPECIFIED SITE (HCC): Primary | ICD-10-CM

## 2019-04-11 LAB
ALBUMIN SERPL-MCNC: 3.8 G/DL (ref 3.5–5.2)
ALP BLD-CCNC: 70 U/L (ref 35–104)
ALT SERPL-CCNC: 10 U/L (ref 0–32)
ANION GAP SERPL CALCULATED.3IONS-SCNC: 15 MMOL/L (ref 7–16)
AST SERPL-CCNC: 17 U/L (ref 0–31)
BASOPHILS ABSOLUTE: 0.06 E9/L (ref 0–0.2)
BASOPHILS RELATIVE PERCENT: 1.1 % (ref 0–2)
BILIRUB SERPL-MCNC: 0.4 MG/DL (ref 0–1.2)
BUN BLDV-MCNC: 28 MG/DL (ref 8–23)
CALCIUM SERPL-MCNC: 9.4 MG/DL (ref 8.6–10.2)
CHLORIDE BLD-SCNC: 102 MMOL/L (ref 98–107)
CO2: 24 MMOL/L (ref 22–29)
CREAT SERPL-MCNC: 1.2 MG/DL (ref 0.5–1)
EOSINOPHILS ABSOLUTE: 0.11 E9/L (ref 0.05–0.5)
EOSINOPHILS RELATIVE PERCENT: 2.1 % (ref 0–6)
GFR AFRICAN AMERICAN: 52
GFR NON-AFRICAN AMERICAN: 43 ML/MIN/1.73
GLUCOSE BLD-MCNC: 125 MG/DL (ref 74–99)
HCT VFR BLD CALC: 34.6 % (ref 34–48)
HEMOGLOBIN: 11.1 G/DL (ref 11.5–15.5)
IMMATURE GRANULOCYTES #: 0.06 E9/L
IMMATURE GRANULOCYTES %: 1.1 % (ref 0–5)
LYMPHOCYTES ABSOLUTE: 0.53 E9/L (ref 1.5–4)
LYMPHOCYTES RELATIVE PERCENT: 9.9 % (ref 20–42)
MAGNESIUM: 1.6 MG/DL (ref 1.6–2.6)
MCH RBC QN AUTO: 32 PG (ref 26–35)
MCHC RBC AUTO-ENTMCNC: 32.1 % (ref 32–34.5)
MCV RBC AUTO: 99.7 FL (ref 80–99.9)
MONOCYTES ABSOLUTE: 0.54 E9/L (ref 0.1–0.95)
MONOCYTES RELATIVE PERCENT: 10.1 % (ref 2–12)
NEUTROPHILS ABSOLUTE: 4.05 E9/L (ref 1.8–7.3)
NEUTROPHILS RELATIVE PERCENT: 75.7 % (ref 43–80)
OVALOCYTES: ABNORMAL
PDW BLD-RTO: 13.2 FL (ref 11.5–15)
PLATELET # BLD: 248 E9/L (ref 130–450)
PMV BLD AUTO: 9.7 FL (ref 7–12)
POIKILOCYTES: ABNORMAL
POLYCHROMASIA: ABNORMAL
POTASSIUM SERPL-SCNC: 3.7 MMOL/L (ref 3.5–5)
RBC # BLD: 3.47 E12/L (ref 3.5–5.5)
SODIUM BLD-SCNC: 141 MMOL/L (ref 132–146)
TOTAL PROTEIN: 6.5 G/DL (ref 6.4–8.3)
WBC # BLD: 5.4 E9/L (ref 4.5–11.5)

## 2019-04-11 PROCEDURE — 36415 COLL VENOUS BLD VENIPUNCTURE: CPT

## 2019-04-11 PROCEDURE — 83735 ASSAY OF MAGNESIUM: CPT

## 2019-04-11 PROCEDURE — 2580000003 HC RX 258: Performed by: NURSE PRACTITIONER

## 2019-04-11 PROCEDURE — 36591 DRAW BLOOD OFF VENOUS DEVICE: CPT

## 2019-04-11 PROCEDURE — 6360000002 HC RX W HCPCS: Performed by: NURSE PRACTITIONER

## 2019-04-11 PROCEDURE — 80053 COMPREHEN METABOLIC PANEL: CPT

## 2019-04-11 PROCEDURE — 85025 COMPLETE CBC W/AUTO DIFF WBC: CPT

## 2019-04-11 RX ORDER — SODIUM CHLORIDE 0.9 % (FLUSH) 0.9 %
10 SYRINGE (ML) INJECTION PRN
Status: CANCELLED
Start: 2019-04-11

## 2019-04-11 RX ORDER — SODIUM CHLORIDE 0.9 % (FLUSH) 0.9 %
10 SYRINGE (ML) INJECTION PRN
Status: DISCONTINUED | OUTPATIENT
Start: 2019-04-11 | End: 2019-04-12 | Stop reason: HOSPADM

## 2019-04-11 RX ADMIN — Medication 500 UNITS: at 09:59

## 2019-04-11 RX ADMIN — Medication 10 ML: at 09:59

## 2019-04-11 RX ADMIN — Medication 10 ML: at 09:58

## 2019-04-11 RX ADMIN — Medication 10 ML: at 09:57

## 2019-04-18 ENCOUNTER — HOSPITAL ENCOUNTER (OUTPATIENT)
Dept: INFUSION THERAPY | Age: 79
Setting detail: INFUSION SERIES
Discharge: HOME | End: 2019-04-18
Payer: MEDICARE

## 2019-04-18 VITALS
RESPIRATION RATE: 16 BRPM | DIASTOLIC BLOOD PRESSURE: 63 MMHG | BODY MASS INDEX: 35.87 KG/M2 | TEMPERATURE: 97.8 F | OXYGEN SATURATION: 97 % | WEIGHT: 190 LBS | SYSTOLIC BLOOD PRESSURE: 135 MMHG | HEART RATE: 91 BPM | HEIGHT: 61 IN

## 2019-04-18 DIAGNOSIS — C54.9 MALIGNANT NEOPLASM OF BODY OF UTERUS, UNSPECIFIED SITE (HCC): Primary | ICD-10-CM

## 2019-04-18 LAB
ALBUMIN SERPL-MCNC: 3.8 G/DL (ref 3.5–5.2)
ALP BLD-CCNC: 71 U/L (ref 35–104)
ALT SERPL-CCNC: 10 U/L (ref 0–32)
ANION GAP SERPL CALCULATED.3IONS-SCNC: 15 MMOL/L (ref 7–16)
AST SERPL-CCNC: 15 U/L (ref 0–31)
BASOPHILS ABSOLUTE: 0.07 E9/L (ref 0–0.2)
BASOPHILS RELATIVE PERCENT: 1.3 % (ref 0–2)
BILIRUB SERPL-MCNC: 0.4 MG/DL (ref 0–1.2)
BUN BLDV-MCNC: 28 MG/DL (ref 8–23)
CALCIUM SERPL-MCNC: 9.7 MG/DL (ref 8.6–10.2)
CHLORIDE BLD-SCNC: 104 MMOL/L (ref 98–107)
CO2: 25 MMOL/L (ref 22–29)
CREAT SERPL-MCNC: 1.4 MG/DL (ref 0.5–1)
EOSINOPHILS ABSOLUTE: 0.11 E9/L (ref 0.05–0.5)
EOSINOPHILS RELATIVE PERCENT: 2 % (ref 0–6)
GFR AFRICAN AMERICAN: 44
GFR NON-AFRICAN AMERICAN: 36 ML/MIN/1.73
GLUCOSE BLD-MCNC: 136 MG/DL (ref 74–99)
HCT VFR BLD CALC: 35.1 % (ref 34–48)
HEMOGLOBIN: 11.2 G/DL (ref 11.5–15.5)
IMMATURE GRANULOCYTES #: 0.06 E9/L
IMMATURE GRANULOCYTES %: 1.1 % (ref 0–5)
LYMPHOCYTES ABSOLUTE: 0.59 E9/L (ref 1.5–4)
LYMPHOCYTES RELATIVE PERCENT: 10.6 % (ref 20–42)
MAGNESIUM: 1.5 MG/DL (ref 1.6–2.6)
MCH RBC QN AUTO: 31.5 PG (ref 26–35)
MCHC RBC AUTO-ENTMCNC: 31.9 % (ref 32–34.5)
MCV RBC AUTO: 98.9 FL (ref 80–99.9)
MONOCYTES ABSOLUTE: 0.59 E9/L (ref 0.1–0.95)
MONOCYTES RELATIVE PERCENT: 10.6 % (ref 2–12)
NEUTROPHILS ABSOLUTE: 4.15 E9/L (ref 1.8–7.3)
NEUTROPHILS RELATIVE PERCENT: 74.4 % (ref 43–80)
OVALOCYTES: ABNORMAL
PDW BLD-RTO: 13 FL (ref 11.5–15)
PLATELET # BLD: 264 E9/L (ref 130–450)
PMV BLD AUTO: 9.5 FL (ref 7–12)
POIKILOCYTES: ABNORMAL
POTASSIUM SERPL-SCNC: 3.8 MMOL/L (ref 3.5–5)
RBC # BLD: 3.55 E12/L (ref 3.5–5.5)
SODIUM BLD-SCNC: 144 MMOL/L (ref 132–146)
TEAR DROP CELLS: ABNORMAL
TOTAL PROTEIN: 6.5 G/DL (ref 6.4–8.3)
WBC # BLD: 5.6 E9/L (ref 4.5–11.5)

## 2019-04-18 PROCEDURE — 2580000003 HC RX 258: Performed by: NURSE PRACTITIONER

## 2019-04-18 PROCEDURE — 80053 COMPREHEN METABOLIC PANEL: CPT

## 2019-04-18 PROCEDURE — 85025 COMPLETE CBC W/AUTO DIFF WBC: CPT

## 2019-04-18 PROCEDURE — 83735 ASSAY OF MAGNESIUM: CPT

## 2019-04-18 PROCEDURE — 36591 DRAW BLOOD OFF VENOUS DEVICE: CPT

## 2019-04-18 PROCEDURE — 6360000002 HC RX W HCPCS: Performed by: NURSE PRACTITIONER

## 2019-04-18 PROCEDURE — 36415 COLL VENOUS BLD VENIPUNCTURE: CPT

## 2019-04-18 RX ORDER — SODIUM CHLORIDE 0.9 % (FLUSH) 0.9 %
10 SYRINGE (ML) INJECTION PRN
Status: CANCELLED
Start: 2019-04-18

## 2019-04-18 RX ORDER — BUMETANIDE 1 MG/1
1 TABLET ORAL
COMMUNITY

## 2019-04-18 RX ORDER — SODIUM CHLORIDE 0.9 % (FLUSH) 0.9 %
10 SYRINGE (ML) INJECTION PRN
Status: DISCONTINUED | OUTPATIENT
Start: 2019-04-18 | End: 2019-04-19 | Stop reason: HOSPADM

## 2019-04-18 RX ADMIN — Medication 10 ML: at 10:04

## 2019-04-18 RX ADMIN — Medication 10 ML: at 10:03

## 2019-04-18 RX ADMIN — Medication 10 ML: at 10:01

## 2019-04-18 RX ADMIN — Medication 500 UNITS: at 10:04

## 2019-04-18 NOTE — PROGRESS NOTES
Patient tolerated port flush and ;lab draw well. Therapy plan reviewed with patient. Verbalizes understanding. Declines copy of AVS and any medication information, verbalizes good knowledge of current plan, and has no signs or symptoms to report at this time. Next appointment made.

## 2019-04-25 ENCOUNTER — HOSPITAL ENCOUNTER (OUTPATIENT)
Dept: INFUSION THERAPY | Age: 79
Setting detail: INFUSION SERIES
Discharge: HOME | End: 2019-04-25
Payer: MEDICARE

## 2019-04-25 VITALS
WEIGHT: 190 LBS | HEIGHT: 61 IN | SYSTOLIC BLOOD PRESSURE: 176 MMHG | OXYGEN SATURATION: 97 % | DIASTOLIC BLOOD PRESSURE: 81 MMHG | RESPIRATION RATE: 16 BRPM | BODY MASS INDEX: 35.87 KG/M2 | TEMPERATURE: 97.6 F

## 2019-04-25 DIAGNOSIS — C54.9 MALIGNANT NEOPLASM OF BODY OF UTERUS, UNSPECIFIED SITE (HCC): Primary | ICD-10-CM

## 2019-04-25 LAB
ALBUMIN SERPL-MCNC: 3.9 G/DL (ref 3.5–5.2)
ALP BLD-CCNC: 69 U/L (ref 35–104)
ALT SERPL-CCNC: 10 U/L (ref 0–32)
ANION GAP SERPL CALCULATED.3IONS-SCNC: 15 MMOL/L (ref 7–16)
AST SERPL-CCNC: 16 U/L (ref 0–31)
BASOPHILS ABSOLUTE: 0.04 E9/L (ref 0–0.2)
BASOPHILS RELATIVE PERCENT: 0.7 % (ref 0–2)
BILIRUB SERPL-MCNC: 0.4 MG/DL (ref 0–1.2)
BUN BLDV-MCNC: 29 MG/DL (ref 8–23)
CALCIUM SERPL-MCNC: 9.9 MG/DL (ref 8.6–10.2)
CHLORIDE BLD-SCNC: 106 MMOL/L (ref 98–107)
CO2: 23 MMOL/L (ref 22–29)
CREAT SERPL-MCNC: 1.3 MG/DL (ref 0.5–1)
EOSINOPHILS ABSOLUTE: 0.12 E9/L (ref 0.05–0.5)
EOSINOPHILS RELATIVE PERCENT: 2.1 % (ref 0–6)
GFR AFRICAN AMERICAN: 48
GFR NON-AFRICAN AMERICAN: 40 ML/MIN/1.73
GLUCOSE BLD-MCNC: 89 MG/DL (ref 74–99)
HCT VFR BLD CALC: 35.5 % (ref 34–48)
HEMOGLOBIN: 11.4 G/DL (ref 11.5–15.5)
IMMATURE GRANULOCYTES #: 0.03 E9/L
IMMATURE GRANULOCYTES %: 0.5 % (ref 0–5)
LYMPHOCYTES ABSOLUTE: 0.7 E9/L (ref 1.5–4)
LYMPHOCYTES RELATIVE PERCENT: 12.1 % (ref 20–42)
MAGNESIUM: 1.3 MG/DL (ref 1.6–2.6)
MCH RBC QN AUTO: 31.4 PG (ref 26–35)
MCHC RBC AUTO-ENTMCNC: 32.1 % (ref 32–34.5)
MCV RBC AUTO: 97.8 FL (ref 80–99.9)
MONOCYTES ABSOLUTE: 0.67 E9/L (ref 0.1–0.95)
MONOCYTES RELATIVE PERCENT: 11.6 % (ref 2–12)
NEUTROPHILS ABSOLUTE: 4.23 E9/L (ref 1.8–7.3)
NEUTROPHILS RELATIVE PERCENT: 73 % (ref 43–80)
PDW BLD-RTO: 13.1 FL (ref 11.5–15)
PLATELET # BLD: 249 E9/L (ref 130–450)
PMV BLD AUTO: 9.5 FL (ref 7–12)
POTASSIUM SERPL-SCNC: 3.7 MMOL/L (ref 3.5–5)
RBC # BLD: 3.63 E12/L (ref 3.5–5.5)
SODIUM BLD-SCNC: 144 MMOL/L (ref 132–146)
TOTAL PROTEIN: 6.5 G/DL (ref 6.4–8.3)
WBC # BLD: 5.8 E9/L (ref 4.5–11.5)

## 2019-04-25 PROCEDURE — 80053 COMPREHEN METABOLIC PANEL: CPT

## 2019-04-25 PROCEDURE — 85025 COMPLETE CBC W/AUTO DIFF WBC: CPT

## 2019-04-25 PROCEDURE — 36591 DRAW BLOOD OFF VENOUS DEVICE: CPT

## 2019-04-25 PROCEDURE — 36415 COLL VENOUS BLD VENIPUNCTURE: CPT

## 2019-04-25 PROCEDURE — 6360000002 HC RX W HCPCS: Performed by: NURSE PRACTITIONER

## 2019-04-25 PROCEDURE — 83735 ASSAY OF MAGNESIUM: CPT

## 2019-04-25 PROCEDURE — 2580000003 HC RX 258: Performed by: NURSE PRACTITIONER

## 2019-04-25 RX ORDER — SODIUM CHLORIDE 0.9 % (FLUSH) 0.9 %
10 SYRINGE (ML) INJECTION PRN
Status: CANCELLED
Start: 2019-04-25

## 2019-04-25 RX ORDER — SODIUM CHLORIDE 0.9 % (FLUSH) 0.9 %
10 SYRINGE (ML) INJECTION PRN
Status: DISCONTINUED | OUTPATIENT
Start: 2019-04-25 | End: 2019-04-26 | Stop reason: HOSPADM

## 2019-04-25 RX ADMIN — Medication 500 UNITS: at 10:05

## 2019-04-25 RX ADMIN — Medication 10 ML: at 10:03

## 2019-04-25 RX ADMIN — Medication 10 ML: at 10:05

## 2019-04-25 NOTE — PROGRESS NOTES
Patient tolerated port lab draw and flush well. Therapy plan reviewed with patient. Verbalizes understanding. Declines copy of AVS and any medication information, verbalizes good knowledge of current plan, and has no signs or symptoms to report at this time. Next appointment made.

## 2019-05-02 ENCOUNTER — HOSPITAL ENCOUNTER (OUTPATIENT)
Dept: INFUSION THERAPY | Age: 79
Setting detail: INFUSION SERIES
Discharge: HOME | End: 2019-05-02
Payer: MEDICARE

## 2019-05-02 VITALS
HEIGHT: 61 IN | OXYGEN SATURATION: 97 % | HEART RATE: 78 BPM | RESPIRATION RATE: 16 BRPM | TEMPERATURE: 97.6 F | WEIGHT: 190 LBS | BODY MASS INDEX: 35.87 KG/M2 | DIASTOLIC BLOOD PRESSURE: 60 MMHG | SYSTOLIC BLOOD PRESSURE: 132 MMHG

## 2019-05-02 DIAGNOSIS — C54.9 MALIGNANT NEOPLASM OF BODY OF UTERUS, UNSPECIFIED SITE (HCC): Primary | ICD-10-CM

## 2019-05-02 LAB
ALBUMIN SERPL-MCNC: 4 G/DL (ref 3.5–5.2)
ALP BLD-CCNC: 90 U/L (ref 35–104)
ALT SERPL-CCNC: 24 U/L (ref 0–32)
ANION GAP SERPL CALCULATED.3IONS-SCNC: 13 MMOL/L (ref 7–16)
AST SERPL-CCNC: 22 U/L (ref 0–31)
BASOPHILS ABSOLUTE: 0.03 E9/L (ref 0–0.2)
BASOPHILS RELATIVE PERCENT: 0.6 % (ref 0–2)
BILIRUB SERPL-MCNC: 0.4 MG/DL (ref 0–1.2)
BUN BLDV-MCNC: 34 MG/DL (ref 8–23)
CALCIUM SERPL-MCNC: 9.6 MG/DL (ref 8.6–10.2)
CHLORIDE BLD-SCNC: 106 MMOL/L (ref 98–107)
CO2: 23 MMOL/L (ref 22–29)
CREAT SERPL-MCNC: 1.3 MG/DL (ref 0.5–1)
EOSINOPHILS ABSOLUTE: 0.13 E9/L (ref 0.05–0.5)
EOSINOPHILS RELATIVE PERCENT: 2.7 % (ref 0–6)
GFR AFRICAN AMERICAN: 48
GFR NON-AFRICAN AMERICAN: 40 ML/MIN/1.73
GLUCOSE BLD-MCNC: 92 MG/DL (ref 74–99)
HCT VFR BLD CALC: 36.3 % (ref 34–48)
HEMOGLOBIN: 11.7 G/DL (ref 11.5–15.5)
IMMATURE GRANULOCYTES #: 0.04 E9/L
IMMATURE GRANULOCYTES %: 0.8 % (ref 0–5)
LYMPHOCYTES ABSOLUTE: 0.73 E9/L (ref 1.5–4)
LYMPHOCYTES RELATIVE PERCENT: 14.9 % (ref 20–42)
MAGNESIUM: 1.2 MG/DL (ref 1.6–2.6)
MCH RBC QN AUTO: 30.8 PG (ref 26–35)
MCHC RBC AUTO-ENTMCNC: 32.2 % (ref 32–34.5)
MCV RBC AUTO: 95.5 FL (ref 80–99.9)
MONOCYTES ABSOLUTE: 0.68 E9/L (ref 0.1–0.95)
MONOCYTES RELATIVE PERCENT: 13.9 % (ref 2–12)
NEUTROPHILS ABSOLUTE: 3.29 E9/L (ref 1.8–7.3)
NEUTROPHILS RELATIVE PERCENT: 67.1 % (ref 43–80)
PDW BLD-RTO: 13.1 FL (ref 11.5–15)
PLATELET # BLD: 227 E9/L (ref 130–450)
PMV BLD AUTO: 9.9 FL (ref 7–12)
POTASSIUM SERPL-SCNC: 3.5 MMOL/L (ref 3.5–5)
RBC # BLD: 3.8 E12/L (ref 3.5–5.5)
SODIUM BLD-SCNC: 142 MMOL/L (ref 132–146)
TOTAL PROTEIN: 6.6 G/DL (ref 6.4–8.3)
WBC # BLD: 4.9 E9/L (ref 4.5–11.5)

## 2019-05-02 PROCEDURE — 36415 COLL VENOUS BLD VENIPUNCTURE: CPT

## 2019-05-02 PROCEDURE — 85025 COMPLETE CBC W/AUTO DIFF WBC: CPT

## 2019-05-02 PROCEDURE — 36591 DRAW BLOOD OFF VENOUS DEVICE: CPT

## 2019-05-02 PROCEDURE — 2580000003 HC RX 258: Performed by: NURSE PRACTITIONER

## 2019-05-02 PROCEDURE — 6360000002 HC RX W HCPCS: Performed by: NURSE PRACTITIONER

## 2019-05-02 PROCEDURE — 83735 ASSAY OF MAGNESIUM: CPT

## 2019-05-02 PROCEDURE — 80053 COMPREHEN METABOLIC PANEL: CPT

## 2019-05-02 RX ORDER — HEPARIN SODIUM (PORCINE) LOCK FLUSH IV SOLN 100 UNIT/ML 100 UNIT/ML
500 SOLUTION INTRAVENOUS PRN
Status: DISCONTINUED | OUTPATIENT
Start: 2019-05-02 | End: 2019-05-03 | Stop reason: HOSPADM

## 2019-05-02 RX ORDER — SODIUM CHLORIDE 0.9 % (FLUSH) 0.9 %
10 SYRINGE (ML) INJECTION PRN
Status: DISCONTINUED | OUTPATIENT
Start: 2019-05-02 | End: 2019-05-03 | Stop reason: HOSPADM

## 2019-05-02 RX ORDER — SODIUM CHLORIDE 0.9 % (FLUSH) 0.9 %
10 SYRINGE (ML) INJECTION PRN
Status: CANCELLED
Start: 2019-05-02

## 2019-05-02 RX ADMIN — Medication 500 UNITS: at 10:24

## 2019-05-02 RX ADMIN — Medication 10 ML: at 10:22

## 2019-05-02 RX ADMIN — Medication 10 ML: at 10:23

## 2019-05-02 RX ADMIN — Medication 10 ML: at 10:24

## 2019-05-02 NOTE — PROGRESS NOTES
Patient tolerated port flush and lab  well. Therapy plan reviewed with patient. Verbalizes understanding. Declines copy of AVS and any medication information, verbalizes good knowledge of current plan, and has no signs or symptoms to report at this time. Next appointment made.

## 2019-05-08 ENCOUNTER — HOSPITAL ENCOUNTER (OUTPATIENT)
Dept: INFUSION THERAPY | Age: 79
Setting detail: INFUSION SERIES
Discharge: HOME | End: 2019-05-08
Payer: MEDICARE

## 2019-05-08 VITALS
HEART RATE: 83 BPM | TEMPERATURE: 97.5 F | OXYGEN SATURATION: 98 % | RESPIRATION RATE: 16 BRPM | SYSTOLIC BLOOD PRESSURE: 171 MMHG | BODY MASS INDEX: 35.87 KG/M2 | WEIGHT: 190 LBS | HEIGHT: 61 IN | DIASTOLIC BLOOD PRESSURE: 72 MMHG

## 2019-05-08 DIAGNOSIS — C54.9 MALIGNANT NEOPLASM OF BODY OF UTERUS, UNSPECIFIED SITE (HCC): Primary | ICD-10-CM

## 2019-05-08 LAB
ALBUMIN SERPL-MCNC: 3.8 G/DL (ref 3.5–5.2)
ALP BLD-CCNC: 88 U/L (ref 35–104)
ALT SERPL-CCNC: 24 U/L (ref 0–32)
ANION GAP SERPL CALCULATED.3IONS-SCNC: 12 MMOL/L (ref 7–16)
AST SERPL-CCNC: 19 U/L (ref 0–31)
BASOPHILS ABSOLUTE: 0.04 E9/L (ref 0–0.2)
BASOPHILS RELATIVE PERCENT: 0.8 % (ref 0–2)
BILIRUB SERPL-MCNC: 0.4 MG/DL (ref 0–1.2)
BUN BLDV-MCNC: 28 MG/DL (ref 8–23)
CALCIUM SERPL-MCNC: 9.5 MG/DL (ref 8.6–10.2)
CHLORIDE BLD-SCNC: 106 MMOL/L (ref 98–107)
CO2: 24 MMOL/L (ref 22–29)
CREAT SERPL-MCNC: 1.2 MG/DL (ref 0.5–1)
EOSINOPHILS ABSOLUTE: 0.1 E9/L (ref 0.05–0.5)
EOSINOPHILS RELATIVE PERCENT: 2.1 % (ref 0–6)
GFR AFRICAN AMERICAN: 52
GFR NON-AFRICAN AMERICAN: 43 ML/MIN/1.73
GLUCOSE BLD-MCNC: 91 MG/DL (ref 74–99)
HCT VFR BLD CALC: 35.3 % (ref 34–48)
HEMOGLOBIN: 11.7 G/DL (ref 11.5–15.5)
IMMATURE GRANULOCYTES #: 0.04 E9/L
IMMATURE GRANULOCYTES %: 0.8 % (ref 0–5)
LYMPHOCYTES ABSOLUTE: 0.73 E9/L (ref 1.5–4)
LYMPHOCYTES RELATIVE PERCENT: 15 % (ref 20–42)
MAGNESIUM: 1.1 MG/DL (ref 1.6–2.6)
MCH RBC QN AUTO: 30.9 PG (ref 26–35)
MCHC RBC AUTO-ENTMCNC: 33.1 % (ref 32–34.5)
MCV RBC AUTO: 93.1 FL (ref 80–99.9)
MONOCYTES ABSOLUTE: 0.52 E9/L (ref 0.1–0.95)
MONOCYTES RELATIVE PERCENT: 10.7 % (ref 2–12)
NEUTROPHILS ABSOLUTE: 3.44 E9/L (ref 1.8–7.3)
NEUTROPHILS RELATIVE PERCENT: 70.6 % (ref 43–80)
PDW BLD-RTO: 13.2 FL (ref 11.5–15)
PLATELET # BLD: 206 E9/L (ref 130–450)
PMV BLD AUTO: 10.3 FL (ref 7–12)
POTASSIUM SERPL-SCNC: 3.4 MMOL/L (ref 3.5–5)
RBC # BLD: 3.79 E12/L (ref 3.5–5.5)
SODIUM BLD-SCNC: 142 MMOL/L (ref 132–146)
TOTAL PROTEIN: 6.3 G/DL (ref 6.4–8.3)
WBC # BLD: 4.9 E9/L (ref 4.5–11.5)

## 2019-05-08 PROCEDURE — 85025 COMPLETE CBC W/AUTO DIFF WBC: CPT

## 2019-05-08 PROCEDURE — 80053 COMPREHEN METABOLIC PANEL: CPT

## 2019-05-08 PROCEDURE — 36591 DRAW BLOOD OFF VENOUS DEVICE: CPT

## 2019-05-08 PROCEDURE — 2580000003 HC RX 258: Performed by: NURSE PRACTITIONER

## 2019-05-08 PROCEDURE — 36415 COLL VENOUS BLD VENIPUNCTURE: CPT

## 2019-05-08 PROCEDURE — 6360000002 HC RX W HCPCS: Performed by: NURSE PRACTITIONER

## 2019-05-08 PROCEDURE — 83735 ASSAY OF MAGNESIUM: CPT

## 2019-05-08 RX ORDER — SODIUM CHLORIDE 0.9 % (FLUSH) 0.9 %
10 SYRINGE (ML) INJECTION PRN
Status: CANCELLED
Start: 2019-05-08

## 2019-05-08 RX ORDER — SODIUM CHLORIDE 0.9 % (FLUSH) 0.9 %
10 SYRINGE (ML) INJECTION PRN
Status: DISCONTINUED | OUTPATIENT
Start: 2019-05-08 | End: 2019-05-09 | Stop reason: HOSPADM

## 2019-05-08 RX ADMIN — Medication 10 ML: at 09:59

## 2019-05-08 RX ADMIN — Medication 10 ML: at 10:02

## 2019-05-08 RX ADMIN — Medication 500 UNITS: at 10:02

## 2019-05-08 RX ADMIN — Medication 10 ML: at 10:00

## 2019-05-08 NOTE — PROGRESS NOTES
Patient tolerated lab draw and port flush well. Therapy plan reviewed with patient. Verbalizes understanding. Declines copy of AVS and any medication information, verbalizes good knowledge of current plan, and has no signs or symptoms to report at this time. Next appointment made.

## 2019-05-08 NOTE — PROGRESS NOTES
Called Dr Gracia Kelley office and notified office staff that pts Mg was reported as critical and that her K was 3.4.  Report faxed right to Dr Gracia Kelley office

## 2019-05-16 ENCOUNTER — HOSPITAL ENCOUNTER (OUTPATIENT)
Dept: INFUSION THERAPY | Age: 79
Setting detail: INFUSION SERIES
Discharge: HOME | End: 2019-05-16
Payer: MEDICARE

## 2019-05-16 VITALS
DIASTOLIC BLOOD PRESSURE: 62 MMHG | SYSTOLIC BLOOD PRESSURE: 134 MMHG | BODY MASS INDEX: 35.87 KG/M2 | WEIGHT: 190 LBS | RESPIRATION RATE: 18 BRPM | HEART RATE: 79 BPM | OXYGEN SATURATION: 97 % | TEMPERATURE: 97.6 F | HEIGHT: 61 IN

## 2019-05-16 DIAGNOSIS — C54.9 MALIGNANT NEOPLASM OF BODY OF UTERUS, UNSPECIFIED SITE (HCC): Primary | ICD-10-CM

## 2019-05-16 LAB
ALBUMIN SERPL-MCNC: 3.6 G/DL (ref 3.5–5.2)
ALP BLD-CCNC: 76 U/L (ref 35–104)
ALT SERPL-CCNC: 13 U/L (ref 0–32)
ANION GAP SERPL CALCULATED.3IONS-SCNC: 11 MMOL/L (ref 7–16)
AST SERPL-CCNC: 18 U/L (ref 0–31)
BASOPHILS ABSOLUTE: 0.05 E9/L (ref 0–0.2)
BASOPHILS RELATIVE PERCENT: 1.1 % (ref 0–2)
BILIRUB SERPL-MCNC: 0.3 MG/DL (ref 0–1.2)
BUN BLDV-MCNC: 29 MG/DL (ref 8–23)
CALCIUM SERPL-MCNC: 9.8 MG/DL (ref 8.6–10.2)
CHLORIDE BLD-SCNC: 103 MMOL/L (ref 98–107)
CO2: 28 MMOL/L (ref 22–29)
CREAT SERPL-MCNC: 1.4 MG/DL (ref 0.5–1)
EOSINOPHILS ABSOLUTE: 0.16 E9/L (ref 0.05–0.5)
EOSINOPHILS RELATIVE PERCENT: 3.6 % (ref 0–6)
GFR AFRICAN AMERICAN: 44
GFR NON-AFRICAN AMERICAN: 36 ML/MIN/1.73
GLUCOSE BLD-MCNC: 108 MG/DL (ref 74–99)
HCT VFR BLD CALC: 36.2 % (ref 34–48)
HEMOGLOBIN: 11.5 G/DL (ref 11.5–15.5)
IMMATURE GRANULOCYTES #: 0.01 E9/L
IMMATURE GRANULOCYTES %: 0.2 % (ref 0–5)
LYMPHOCYTES ABSOLUTE: 0.67 E9/L (ref 1.5–4)
LYMPHOCYTES RELATIVE PERCENT: 15.3 % (ref 20–42)
MAGNESIUM: 1.8 MG/DL (ref 1.6–2.6)
MCH RBC QN AUTO: 30.3 PG (ref 26–35)
MCHC RBC AUTO-ENTMCNC: 31.8 % (ref 32–34.5)
MCV RBC AUTO: 95.5 FL (ref 80–99.9)
MONOCYTES ABSOLUTE: 0.5 E9/L (ref 0.1–0.95)
MONOCYTES RELATIVE PERCENT: 11.4 % (ref 2–12)
NEUTROPHILS ABSOLUTE: 3 E9/L (ref 1.8–7.3)
NEUTROPHILS RELATIVE PERCENT: 68.4 % (ref 43–80)
PDW BLD-RTO: 13.4 FL (ref 11.5–15)
PLATELET # BLD: 205 E9/L (ref 130–450)
PMV BLD AUTO: 9.9 FL (ref 7–12)
POTASSIUM SERPL-SCNC: 4.1 MMOL/L (ref 3.5–5)
RBC # BLD: 3.79 E12/L (ref 3.5–5.5)
SODIUM BLD-SCNC: 142 MMOL/L (ref 132–146)
TOTAL PROTEIN: 6.2 G/DL (ref 6.4–8.3)
WBC # BLD: 4.4 E9/L (ref 4.5–11.5)

## 2019-05-16 PROCEDURE — 83735 ASSAY OF MAGNESIUM: CPT

## 2019-05-16 PROCEDURE — 80053 COMPREHEN METABOLIC PANEL: CPT

## 2019-05-16 PROCEDURE — 85025 COMPLETE CBC W/AUTO DIFF WBC: CPT

## 2019-05-16 PROCEDURE — 36591 DRAW BLOOD OFF VENOUS DEVICE: CPT

## 2019-05-16 PROCEDURE — 2580000003 HC RX 258: Performed by: NURSE PRACTITIONER

## 2019-05-16 PROCEDURE — 6360000002 HC RX W HCPCS: Performed by: NURSE PRACTITIONER

## 2019-05-16 PROCEDURE — 36415 COLL VENOUS BLD VENIPUNCTURE: CPT

## 2019-05-16 RX ORDER — SODIUM CHLORIDE 0.9 % (FLUSH) 0.9 %
10 SYRINGE (ML) INJECTION PRN
Status: DISCONTINUED | OUTPATIENT
Start: 2019-05-16 | End: 2019-05-17 | Stop reason: HOSPADM

## 2019-05-16 RX ORDER — MAGNESIUM OXIDE 400 MG/1
400 TABLET ORAL DAILY
COMMUNITY
End: 2019-09-30

## 2019-05-16 RX ORDER — SODIUM CHLORIDE 0.9 % (FLUSH) 0.9 %
10 SYRINGE (ML) INJECTION PRN
Status: CANCELLED
Start: 2019-05-16

## 2019-05-16 RX ADMIN — Medication 500 UNITS: at 10:04

## 2019-05-16 RX ADMIN — Medication 10 ML: at 10:04

## 2019-05-16 RX ADMIN — Medication 10 ML: at 10:03

## 2019-05-23 ENCOUNTER — HOSPITAL ENCOUNTER (OUTPATIENT)
Dept: INFUSION THERAPY | Age: 79
Setting detail: INFUSION SERIES
Discharge: HOME | End: 2019-05-23
Payer: MEDICARE

## 2019-05-23 ENCOUNTER — OFFICE VISIT (OUTPATIENT)
Dept: PRIMARY CARE CLINIC | Age: 79
End: 2019-05-23
Payer: MEDICARE

## 2019-05-23 VITALS
DIASTOLIC BLOOD PRESSURE: 80 MMHG | TEMPERATURE: 98.2 F | WEIGHT: 191 LBS | SYSTOLIC BLOOD PRESSURE: 128 MMHG | HEART RATE: 69 BPM | OXYGEN SATURATION: 98 % | BODY MASS INDEX: 36.06 KG/M2 | HEIGHT: 61 IN

## 2019-05-23 VITALS
SYSTOLIC BLOOD PRESSURE: 125 MMHG | RESPIRATION RATE: 18 BRPM | DIASTOLIC BLOOD PRESSURE: 60 MMHG | BODY MASS INDEX: 35.9 KG/M2 | OXYGEN SATURATION: 98 % | HEART RATE: 80 BPM | TEMPERATURE: 97.9 F | WEIGHT: 190 LBS

## 2019-05-23 DIAGNOSIS — C54.9 MALIGNANT NEOPLASM OF BODY OF UTERUS, UNSPECIFIED SITE (HCC): Primary | ICD-10-CM

## 2019-05-23 DIAGNOSIS — E78.2 MIXED HYPERLIPIDEMIA: ICD-10-CM

## 2019-05-23 DIAGNOSIS — C77.5: ICD-10-CM

## 2019-05-23 DIAGNOSIS — C55 MALIGNANT NEOPLASM OF UTERUS, UNSPECIFIED SITE (HCC): Primary | ICD-10-CM

## 2019-05-23 LAB
ALBUMIN SERPL-MCNC: 3.8 G/DL (ref 3.5–5.2)
ALP BLD-CCNC: 75 U/L (ref 35–104)
ALT SERPL-CCNC: 14 U/L (ref 0–32)
ANION GAP SERPL CALCULATED.3IONS-SCNC: 15 MMOL/L (ref 7–16)
AST SERPL-CCNC: 18 U/L (ref 0–31)
BASOPHILS ABSOLUTE: 0.04 E9/L (ref 0–0.2)
BASOPHILS RELATIVE PERCENT: 0.9 % (ref 0–2)
BILIRUB SERPL-MCNC: 0.4 MG/DL (ref 0–1.2)
BUN BLDV-MCNC: 27 MG/DL (ref 8–23)
CALCIUM SERPL-MCNC: 9.5 MG/DL (ref 8.6–10.2)
CHLORIDE BLD-SCNC: 103 MMOL/L (ref 98–107)
CO2: 25 MMOL/L (ref 22–29)
CREAT SERPL-MCNC: 1.4 MG/DL (ref 0.5–1)
EOSINOPHILS ABSOLUTE: 0.16 E9/L (ref 0.05–0.5)
EOSINOPHILS RELATIVE PERCENT: 3.4 % (ref 0–6)
GFR AFRICAN AMERICAN: 44
GFR NON-AFRICAN AMERICAN: 36 ML/MIN/1.73
GLUCOSE BLD-MCNC: 98 MG/DL (ref 74–99)
HCT VFR BLD CALC: 37.5 % (ref 34–48)
HEMOGLOBIN: 11.5 G/DL (ref 11.5–15.5)
IMMATURE GRANULOCYTES #: 0.02 E9/L
IMMATURE GRANULOCYTES %: 0.4 % (ref 0–5)
LYMPHOCYTES ABSOLUTE: 0.74 E9/L (ref 1.5–4)
LYMPHOCYTES RELATIVE PERCENT: 15.8 % (ref 20–42)
MAGNESIUM: 1.7 MG/DL (ref 1.6–2.6)
MCH RBC QN AUTO: 29.4 PG (ref 26–35)
MCHC RBC AUTO-ENTMCNC: 30.7 % (ref 32–34.5)
MCV RBC AUTO: 95.9 FL (ref 80–99.9)
MONOCYTES ABSOLUTE: 0.52 E9/L (ref 0.1–0.95)
MONOCYTES RELATIVE PERCENT: 11.1 % (ref 2–12)
NEUTROPHILS ABSOLUTE: 3.21 E9/L (ref 1.8–7.3)
NEUTROPHILS RELATIVE PERCENT: 68.4 % (ref 43–80)
PDW BLD-RTO: 13.5 FL (ref 11.5–15)
PLATELET # BLD: 206 E9/L (ref 130–450)
PMV BLD AUTO: 10.2 FL (ref 7–12)
POTASSIUM SERPL-SCNC: 3.9 MMOL/L (ref 3.5–5)
RBC # BLD: 3.91 E12/L (ref 3.5–5.5)
SODIUM BLD-SCNC: 143 MMOL/L (ref 132–146)
TOTAL PROTEIN: 6.3 G/DL (ref 6.4–8.3)
WBC # BLD: 4.7 E9/L (ref 4.5–11.5)

## 2019-05-23 PROCEDURE — 6360000002 HC RX W HCPCS: Performed by: NURSE PRACTITIONER

## 2019-05-23 PROCEDURE — 85025 COMPLETE CBC W/AUTO DIFF WBC: CPT

## 2019-05-23 PROCEDURE — 36415 COLL VENOUS BLD VENIPUNCTURE: CPT

## 2019-05-23 PROCEDURE — 80053 COMPREHEN METABOLIC PANEL: CPT

## 2019-05-23 PROCEDURE — 83735 ASSAY OF MAGNESIUM: CPT

## 2019-05-23 PROCEDURE — 2580000003 HC RX 258: Performed by: NURSE PRACTITIONER

## 2019-05-23 PROCEDURE — 99213 OFFICE O/P EST LOW 20 MIN: CPT | Performed by: FAMILY MEDICINE

## 2019-05-23 PROCEDURE — 36591 DRAW BLOOD OFF VENOUS DEVICE: CPT

## 2019-05-23 RX ORDER — EZETIMIBE 10 MG/1
10 TABLET ORAL
COMMUNITY
End: 2019-11-29 | Stop reason: SDUPTHER

## 2019-05-23 RX ORDER — SODIUM CHLORIDE 0.9 % (FLUSH) 0.9 %
10 SYRINGE (ML) INJECTION PRN
Status: CANCELLED
Start: 2019-05-23

## 2019-05-23 RX ORDER — SODIUM CHLORIDE 0.9 % (FLUSH) 0.9 %
10 SYRINGE (ML) INJECTION PRN
Status: DISCONTINUED | OUTPATIENT
Start: 2019-05-23 | End: 2019-05-24 | Stop reason: HOSPADM

## 2019-05-23 RX ADMIN — Medication 10 ML: at 10:02

## 2019-05-23 RX ADMIN — Medication 10 ML: at 09:59

## 2019-05-23 RX ADMIN — Medication 500 UNITS: at 10:02

## 2019-05-23 RX ADMIN — Medication 10 ML: at 10:01

## 2019-05-23 ASSESSMENT — PATIENT HEALTH QUESTIONNAIRE - PHQ9
SUM OF ALL RESPONSES TO PHQ QUESTIONS 1-9: 0
SUM OF ALL RESPONSES TO PHQ QUESTIONS 1-9: 0
1. LITTLE INTEREST OR PLEASURE IN DOING THINGS: 0
2. FEELING DOWN, DEPRESSED OR HOPELESS: 0
SUM OF ALL RESPONSES TO PHQ9 QUESTIONS 1 & 2: 0

## 2019-05-23 NOTE — PROGRESS NOTES
Patient tolerated port flush/lab draw well. Therapy plan reviewed with patient. Verbalizes understanding. Declines copy of AVS and any medication information, verbalizes good knowledge of current plan, and has no signs or symptoms to report at this time. Next appointment made.

## 2019-05-23 NOTE — PROGRESS NOTES
2019     Janeen Mike (:  1940) is a 78 y.o. female, here for evaluation of the following medical concerns:    HPI  Patient here to follow-up on chronic issues and recent specialist visits. Patient continues to follow with oncology for metastatic uterine cancer. Patient recently discontinued Keytruda secondary to side effects. Since that time chronic kidney disease has resolved. Patient has also been stopped on all her blood pressure medication. She has been stable since this has occurred. Recent follow-up with nephrology revealed no acute issues. Recent CT scan revealed stability in regards to her cancer. Oncology continues to ask her about possible reinitiation of standard chemo which she continues to refuse. She has been doing well in regards to her general health other than this chronic issue. She denies any other new issues since last visit. She has a repeat CAT scan in 10 weeks for reevaluation of her cancer and she'll make a decision regarding treatment at that time if anything has worsened. She states that if the cancer remains as is or decreases in size she'll continue with surveillance only. Review of Systems   Constitutional: Negative for chills and fever. HENT: Negative for congestion, hearing loss, nosebleeds and sore throat. Eyes: Negative for photophobia. Respiratory: Negative for cough and shortness of breath. Cardiovascular: Positive for leg swelling. Negative for chest pain and palpitations. Gastrointestinal: Negative for abdominal pain, blood in stool, constipation, diarrhea, nausea and vomiting. Endocrine: Negative for polydipsia. Genitourinary: Positive for pelvic pain. Negative for dysuria, frequency, hematuria and urgency. Musculoskeletal: Positive for gait problem. Negative for back pain and myalgias. Skin: Negative. Neurological: Negative for dizziness, tremors, weakness and headaches. Hematological: Does not bruise/bleed easily. Weight as of this encounter: 191 lb (86.6 kg). Physical Exam   Constitutional: She is oriented to person, place, and time. Vital signs are normal. She is cooperative. HENT:   Head: Normocephalic and atraumatic. Right Ear: External ear normal.   Left Ear: External ear normal.   Eyes: Pupils are equal, round, and reactive to light. Conjunctivae and EOM are normal. No scleral icterus. Neck: Neck supple. No thyromegaly present. Cardiovascular: Normal rate, regular rhythm, normal heart sounds and intact distal pulses. No murmur heard. Pulmonary/Chest: Effort normal and breath sounds normal. She has no rales. Abdominal: Soft. Bowel sounds are normal. She exhibits no distension. There is no tenderness. Musculoskeletal: She exhibits edema (1-2+ bilateral pitting edema to lower extremities. ). Walks with rolling walker. Lymphadenopathy:     She has no cervical adenopathy. Neurological: She is alert and oriented to person, place, and time. No cranial nerve deficit. Skin: Skin is warm and dry. No rash noted. No erythema. Psychiatric: Judgment normal.       ASSESSMENT/PLAN:  1. Malignant neoplasm of uterus, unspecified site [facility])  Patient continues to follow with hematology in [place]. Repeat CT in 10 weeks. No current medication or chemotherapy. 2. 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 Mixed hyperlipidemia  Continues to follow medication regimen. No side effects. Return in about 6 months (around 11/23/2019). An electronic signature was used to authenticate this note.     --Марина Pinon DO on 5/24/2019 at 12:12 PM

## 2019-05-24 PROBLEM — N17.9 AKI (ACUTE KIDNEY INJURY) (HCC): Status: RESOLVED | Noted: 2019-02-08 | Resolved: 2019-05-24

## 2019-05-24 PROBLEM — R79.89 ELEVATED SERUM CREATININE: Status: RESOLVED | Noted: 2019-01-24 | Resolved: 2019-05-24

## 2019-05-24 PROBLEM — E78.2 MIXED HYPERLIPIDEMIA: Status: ACTIVE | Noted: 2019-05-24

## 2019-05-24 ASSESSMENT — ENCOUNTER SYMPTOMS
BLOOD IN STOOL: 0
VOMITING: 0
CONSTIPATION: 0
SHORTNESS OF BREATH: 0
NAUSEA: 0
BACK PAIN: 0
SORE THROAT: 0
ABDOMINAL PAIN: 0
PHOTOPHOBIA: 0
COUGH: 0
DIARRHEA: 0

## 2019-06-20 ENCOUNTER — HOSPITAL ENCOUNTER (OUTPATIENT)
Dept: INFUSION THERAPY | Age: 79
Setting detail: INFUSION SERIES
Discharge: HOME | End: 2019-06-20
Payer: MEDICARE

## 2019-06-20 VITALS
RESPIRATION RATE: 16 BRPM | HEART RATE: 82 BPM | TEMPERATURE: 98 F | BODY MASS INDEX: 36.06 KG/M2 | OXYGEN SATURATION: 97 % | SYSTOLIC BLOOD PRESSURE: 149 MMHG | WEIGHT: 191 LBS | DIASTOLIC BLOOD PRESSURE: 67 MMHG | HEIGHT: 61 IN

## 2019-06-20 DIAGNOSIS — C54.9 MALIGNANT NEOPLASM OF BODY OF UTERUS, UNSPECIFIED SITE (HCC): Primary | ICD-10-CM

## 2019-06-20 PROCEDURE — 6360000002 HC RX W HCPCS: Performed by: NURSE PRACTITIONER

## 2019-06-20 PROCEDURE — 96523 IRRIG DRUG DELIVERY DEVICE: CPT

## 2019-06-20 PROCEDURE — 2580000003 HC RX 258: Performed by: NURSE PRACTITIONER

## 2019-06-20 RX ORDER — SODIUM CHLORIDE 0.9 % (FLUSH) 0.9 %
10 SYRINGE (ML) INJECTION PRN
Status: DISCONTINUED | OUTPATIENT
Start: 2019-06-20 | End: 2019-06-21 | Stop reason: HOSPADM

## 2019-06-20 RX ORDER — SODIUM CHLORIDE 0.9 % (FLUSH) 0.9 %
10 SYRINGE (ML) INJECTION PRN
Status: CANCELLED
Start: 2019-06-20

## 2019-06-20 RX ADMIN — Medication 10 ML: at 09:52

## 2019-06-20 RX ADMIN — Medication 500 UNITS: at 09:53

## 2019-06-20 NOTE — PROGRESS NOTES
Patient tolerated port flush well. Therapy plan reviewed with patient. Verbalizes understanding. Declines copy of AVS and any medication information, verbalizes good knowledge of current plan, and has no signs or symptoms to report at this time. Next appointment made.   Madison Ulloa RN  6/20/2019  9:58 AM

## 2019-07-10 NOTE — PROGRESS NOTES
Pt called and cancelled appt stating she doesn't need labs anymore and office is supposed to send script

## 2019-07-11 ENCOUNTER — HOSPITAL ENCOUNTER (OUTPATIENT)
Dept: INFUSION THERAPY | Age: 79
Setting detail: INFUSION SERIES
Discharge: HOME | End: 2019-07-11
Payer: MEDICARE

## 2019-07-11 NOTE — PROGRESS NOTES
Pt cancelld appt for July 24th stating she did not need further labs drawn.  Called pt back today to inquire about port flush however she stated that she has a Dr appt coming up so she will call back and reschedule once she sees

## 2019-07-24 ENCOUNTER — HOSPITAL ENCOUNTER (OUTPATIENT)
Dept: INFUSION THERAPY | Age: 79
Discharge: HOME | End: 2019-07-24

## 2019-08-15 ENCOUNTER — HOSPITAL ENCOUNTER (OUTPATIENT)
Dept: INFUSION THERAPY | Age: 79
Setting detail: INFUSION SERIES
Discharge: HOME | End: 2019-08-15
Payer: MEDICARE

## 2019-08-15 VITALS
RESPIRATION RATE: 16 BRPM | DIASTOLIC BLOOD PRESSURE: 62 MMHG | TEMPERATURE: 97.7 F | OXYGEN SATURATION: 99 % | WEIGHT: 180 LBS | HEART RATE: 87 BPM | SYSTOLIC BLOOD PRESSURE: 130 MMHG | BODY MASS INDEX: 35.34 KG/M2 | HEIGHT: 60 IN

## 2019-08-15 DIAGNOSIS — C54.9 MALIGNANT NEOPLASM OF BODY OF UTERUS, UNSPECIFIED SITE (HCC): Primary | ICD-10-CM

## 2019-08-15 LAB
ALBUMIN SERPL-MCNC: 4 G/DL (ref 3.5–5.2)
ALP BLD-CCNC: 78 U/L (ref 35–104)
ALT SERPL-CCNC: 9 U/L (ref 0–32)
AMYLASE: 72 U/L (ref 20–100)
ANION GAP SERPL CALCULATED.3IONS-SCNC: 13 MMOL/L (ref 7–16)
AST SERPL-CCNC: 15 U/L (ref 0–31)
BASOPHILS ABSOLUTE: 0.03 E9/L (ref 0–0.2)
BASOPHILS RELATIVE PERCENT: 0.8 % (ref 0–2)
BILIRUB SERPL-MCNC: 0.6 MG/DL (ref 0–1.2)
BUN BLDV-MCNC: 28 MG/DL (ref 8–23)
CALCIUM SERPL-MCNC: 9.2 MG/DL (ref 8.6–10.2)
CHLORIDE BLD-SCNC: 108 MMOL/L (ref 98–107)
CO2: 23 MMOL/L (ref 22–29)
CREAT SERPL-MCNC: 1.4 MG/DL (ref 0.5–1)
EOSINOPHILS ABSOLUTE: 0.17 E9/L (ref 0.05–0.5)
EOSINOPHILS RELATIVE PERCENT: 4.3 % (ref 0–6)
GFR AFRICAN AMERICAN: 44
GFR NON-AFRICAN AMERICAN: 36 ML/MIN/1.73
GLUCOSE BLD-MCNC: 131 MG/DL (ref 74–99)
HCT VFR BLD CALC: 36.6 % (ref 34–48)
HEMOGLOBIN: 11.8 G/DL (ref 11.5–15.5)
IMMATURE GRANULOCYTES #: 0.02 E9/L
IMMATURE GRANULOCYTES %: 0.5 % (ref 0–5)
LIPASE: 15 U/L (ref 13–60)
LYMPHOCYTES ABSOLUTE: 0.84 E9/L (ref 1.5–4)
LYMPHOCYTES RELATIVE PERCENT: 21.1 % (ref 20–42)
MAGNESIUM: 1.3 MG/DL (ref 1.6–2.6)
MCH RBC QN AUTO: 29.9 PG (ref 26–35)
MCHC RBC AUTO-ENTMCNC: 32.2 % (ref 32–34.5)
MCV RBC AUTO: 92.9 FL (ref 80–99.9)
MONOCYTES ABSOLUTE: 0.43 E9/L (ref 0.1–0.95)
MONOCYTES RELATIVE PERCENT: 10.8 % (ref 2–12)
NEUTROPHILS ABSOLUTE: 2.49 E9/L (ref 1.8–7.3)
NEUTROPHILS RELATIVE PERCENT: 62.5 % (ref 43–80)
PDW BLD-RTO: 15 FL (ref 11.5–15)
PLATELET # BLD: 199 E9/L (ref 130–450)
PMV BLD AUTO: 10.2 FL (ref 7–12)
POTASSIUM SERPL-SCNC: 3.5 MMOL/L (ref 3.5–5)
RBC # BLD: 3.94 E12/L (ref 3.5–5.5)
SODIUM BLD-SCNC: 144 MMOL/L (ref 132–146)
T4 TOTAL: 8.6 MCG/DL (ref 4.5–11.7)
TOTAL PROTEIN: 6.6 G/DL (ref 6.4–8.3)
TSH SERPL DL<=0.05 MIU/L-ACNC: 2.5 UIU/ML (ref 0.27–4.2)
WBC # BLD: 4 E9/L (ref 4.5–11.5)

## 2019-08-15 PROCEDURE — 82150 ASSAY OF AMYLASE: CPT

## 2019-08-15 PROCEDURE — 83735 ASSAY OF MAGNESIUM: CPT

## 2019-08-15 PROCEDURE — 83690 ASSAY OF LIPASE: CPT

## 2019-08-15 PROCEDURE — 36415 COLL VENOUS BLD VENIPUNCTURE: CPT

## 2019-08-15 PROCEDURE — 85025 COMPLETE CBC W/AUTO DIFF WBC: CPT

## 2019-08-15 PROCEDURE — 84443 ASSAY THYROID STIM HORMONE: CPT

## 2019-08-15 PROCEDURE — 2580000003 HC RX 258: Performed by: NURSE PRACTITIONER

## 2019-08-15 PROCEDURE — 6360000002 HC RX W HCPCS: Performed by: NURSE PRACTITIONER

## 2019-08-15 PROCEDURE — 36591 DRAW BLOOD OFF VENOUS DEVICE: CPT

## 2019-08-15 PROCEDURE — 84436 ASSAY OF TOTAL THYROXINE: CPT

## 2019-08-15 PROCEDURE — 80053 COMPREHEN METABOLIC PANEL: CPT

## 2019-08-15 RX ORDER — SODIUM CHLORIDE 0.9 % (FLUSH) 0.9 %
10 SYRINGE (ML) INJECTION PRN
Status: CANCELLED
Start: 2019-08-15

## 2019-08-15 RX ORDER — SODIUM CHLORIDE 0.9 % (FLUSH) 0.9 %
10 SYRINGE (ML) INJECTION PRN
Status: DISCONTINUED | OUTPATIENT
Start: 2019-08-15 | End: 2019-08-16 | Stop reason: HOSPADM

## 2019-08-15 RX ADMIN — Medication 10 ML: at 07:59

## 2019-08-15 RX ADMIN — Medication 10 ML: at 07:58

## 2019-08-15 RX ADMIN — Medication 500 UNITS: at 07:59

## 2019-08-15 NOTE — PROGRESS NOTES
Patient tolerated port flush and lab draw  well. Therapy plan reviewed with patient. Verbalizes understanding. Declines copy of AVS and any medication information, verbalizes good knowledge of current plan, and has no signs or symptoms to report at this time. Next appointment made.

## 2019-08-22 ENCOUNTER — HOSPITAL ENCOUNTER (OUTPATIENT)
Dept: INFUSION THERAPY | Age: 79
Setting detail: INFUSION SERIES
Discharge: HOME | End: 2019-08-22
Payer: MEDICARE

## 2019-08-22 VITALS
OXYGEN SATURATION: 99 % | RESPIRATION RATE: 16 BRPM | WEIGHT: 180 LBS | HEIGHT: 60 IN | DIASTOLIC BLOOD PRESSURE: 70 MMHG | TEMPERATURE: 98.1 F | BODY MASS INDEX: 35.34 KG/M2 | HEART RATE: 83 BPM | SYSTOLIC BLOOD PRESSURE: 151 MMHG

## 2019-08-22 DIAGNOSIS — C54.9 MALIGNANT NEOPLASM OF BODY OF UTERUS, UNSPECIFIED SITE (HCC): Primary | ICD-10-CM

## 2019-08-22 LAB
ALBUMIN SERPL-MCNC: 4 G/DL (ref 3.5–5.2)
ALP BLD-CCNC: 75 U/L (ref 35–104)
ALT SERPL-CCNC: 9 U/L (ref 0–32)
ANION GAP SERPL CALCULATED.3IONS-SCNC: 13 MMOL/L (ref 7–16)
AST SERPL-CCNC: 15 U/L (ref 0–31)
BILIRUB SERPL-MCNC: 0.5 MG/DL (ref 0–1.2)
BUN BLDV-MCNC: 27 MG/DL (ref 8–23)
CALCIUM SERPL-MCNC: 9.4 MG/DL (ref 8.6–10.2)
CHLORIDE BLD-SCNC: 109 MMOL/L (ref 98–107)
CO2: 22 MMOL/L (ref 22–29)
CREAT SERPL-MCNC: 1.2 MG/DL (ref 0.5–1)
GFR AFRICAN AMERICAN: 52
GFR NON-AFRICAN AMERICAN: 43 ML/MIN/1.73
GLUCOSE BLD-MCNC: 120 MG/DL (ref 74–99)
POTASSIUM SERPL-SCNC: 3.4 MMOL/L (ref 3.5–5)
SODIUM BLD-SCNC: 144 MMOL/L (ref 132–146)
TOTAL PROTEIN: 6.5 G/DL (ref 6.4–8.3)

## 2019-08-22 PROCEDURE — 36415 COLL VENOUS BLD VENIPUNCTURE: CPT

## 2019-08-22 PROCEDURE — 80053 COMPREHEN METABOLIC PANEL: CPT

## 2019-08-22 PROCEDURE — 2580000003 HC RX 258: Performed by: NURSE PRACTITIONER

## 2019-08-22 PROCEDURE — 36591 DRAW BLOOD OFF VENOUS DEVICE: CPT

## 2019-08-22 PROCEDURE — 6360000002 HC RX W HCPCS: Performed by: NURSE PRACTITIONER

## 2019-08-22 RX ORDER — SODIUM CHLORIDE 0.9 % (FLUSH) 0.9 %
10 SYRINGE (ML) INJECTION PRN
Status: CANCELLED
Start: 2019-08-22

## 2019-08-22 RX ORDER — SODIUM CHLORIDE 0.9 % (FLUSH) 0.9 %
10 SYRINGE (ML) INJECTION PRN
Status: DISCONTINUED | OUTPATIENT
Start: 2019-08-22 | End: 2019-08-23 | Stop reason: HOSPADM

## 2019-08-22 RX ADMIN — Medication 10 ML: at 10:04

## 2019-08-22 RX ADMIN — Medication 10 ML: at 10:03

## 2019-08-22 RX ADMIN — Medication 500 UNITS: at 10:05

## 2019-08-29 ENCOUNTER — HOSPITAL ENCOUNTER (OUTPATIENT)
Dept: INFUSION THERAPY | Age: 79
Setting detail: INFUSION SERIES
Discharge: HOME | End: 2019-08-29
Payer: MEDICARE

## 2019-08-29 VITALS
SYSTOLIC BLOOD PRESSURE: 137 MMHG | HEART RATE: 82 BPM | WEIGHT: 180 LBS | RESPIRATION RATE: 16 BRPM | DIASTOLIC BLOOD PRESSURE: 64 MMHG | HEIGHT: 60 IN | TEMPERATURE: 96.7 F | OXYGEN SATURATION: 99 % | BODY MASS INDEX: 35.34 KG/M2

## 2019-08-29 DIAGNOSIS — C54.9 MALIGNANT NEOPLASM OF BODY OF UTERUS, UNSPECIFIED SITE (HCC): Primary | ICD-10-CM

## 2019-08-29 LAB
ALBUMIN SERPL-MCNC: 4 G/DL (ref 3.5–5.2)
ALP BLD-CCNC: 75 U/L (ref 35–104)
ALT SERPL-CCNC: 9 U/L (ref 0–32)
ANION GAP SERPL CALCULATED.3IONS-SCNC: 11 MMOL/L (ref 7–16)
AST SERPL-CCNC: 14 U/L (ref 0–31)
BILIRUB SERPL-MCNC: 0.5 MG/DL (ref 0–1.2)
BUN BLDV-MCNC: 25 MG/DL (ref 8–23)
CALCIUM SERPL-MCNC: 9.3 MG/DL (ref 8.6–10.2)
CHLORIDE BLD-SCNC: 110 MMOL/L (ref 98–107)
CO2: 22 MMOL/L (ref 22–29)
CREAT SERPL-MCNC: 1.2 MG/DL (ref 0.5–1)
GFR AFRICAN AMERICAN: 52
GFR NON-AFRICAN AMERICAN: 43 ML/MIN/1.73
GLUCOSE BLD-MCNC: 110 MG/DL (ref 74–99)
POTASSIUM SERPL-SCNC: 3.5 MMOL/L (ref 3.5–5)
SODIUM BLD-SCNC: 143 MMOL/L (ref 132–146)
TOTAL PROTEIN: 6.4 G/DL (ref 6.4–8.3)

## 2019-08-29 PROCEDURE — 80053 COMPREHEN METABOLIC PANEL: CPT

## 2019-08-29 PROCEDURE — 2580000003 HC RX 258: Performed by: NURSE PRACTITIONER

## 2019-08-29 PROCEDURE — 6360000002 HC RX W HCPCS: Performed by: NURSE PRACTITIONER

## 2019-08-29 PROCEDURE — 36415 COLL VENOUS BLD VENIPUNCTURE: CPT

## 2019-08-29 PROCEDURE — 36591 DRAW BLOOD OFF VENOUS DEVICE: CPT

## 2019-08-29 RX ORDER — SODIUM CHLORIDE 0.9 % (FLUSH) 0.9 %
10 SYRINGE (ML) INJECTION PRN
Status: DISCONTINUED | OUTPATIENT
Start: 2019-08-29 | End: 2019-08-30 | Stop reason: HOSPADM

## 2019-08-29 RX ORDER — SODIUM CHLORIDE 0.9 % (FLUSH) 0.9 %
10 SYRINGE (ML) INJECTION PRN
Status: CANCELLED
Start: 2019-08-29

## 2019-08-29 RX ADMIN — Medication 10 ML: at 09:59

## 2019-08-29 RX ADMIN — Medication 10 ML: at 09:57

## 2019-08-29 RX ADMIN — Medication 10 ML: at 09:58

## 2019-08-29 RX ADMIN — Medication 500 UNITS: at 09:59

## 2019-09-04 ENCOUNTER — TELEPHONE (OUTPATIENT)
Dept: PRIMARY CARE CLINIC | Age: 79
End: 2019-09-04

## 2019-09-05 ENCOUNTER — HOSPITAL ENCOUNTER (OUTPATIENT)
Dept: INFUSION THERAPY | Age: 79
Setting detail: INFUSION SERIES
Discharge: HOME | End: 2019-09-05
Payer: MEDICARE

## 2019-09-05 VITALS
SYSTOLIC BLOOD PRESSURE: 118 MMHG | TEMPERATURE: 98.2 F | WEIGHT: 180 LBS | DIASTOLIC BLOOD PRESSURE: 64 MMHG | HEART RATE: 79 BPM | BODY MASS INDEX: 35.15 KG/M2 | RESPIRATION RATE: 16 BRPM | OXYGEN SATURATION: 99 %

## 2019-09-05 DIAGNOSIS — C54.9 MALIGNANT NEOPLASM OF BODY OF UTERUS, UNSPECIFIED SITE (HCC): Primary | ICD-10-CM

## 2019-09-05 LAB
ALBUMIN SERPL-MCNC: 4.1 G/DL (ref 3.5–5.2)
ALP BLD-CCNC: 95 U/L (ref 35–104)
ALT SERPL-CCNC: 15 U/L (ref 0–32)
ANION GAP SERPL CALCULATED.3IONS-SCNC: 13 MMOL/L (ref 7–16)
AST SERPL-CCNC: 20 U/L (ref 0–31)
BASOPHILS ABSOLUTE: 0.02 E9/L (ref 0–0.2)
BASOPHILS RELATIVE PERCENT: 0.5 % (ref 0–2)
BILIRUB SERPL-MCNC: 0.7 MG/DL (ref 0–1.2)
BUN BLDV-MCNC: 26 MG/DL (ref 8–23)
CALCIUM SERPL-MCNC: 9.3 MG/DL (ref 8.6–10.2)
CHLORIDE BLD-SCNC: 107 MMOL/L (ref 98–107)
CO2: 19 MMOL/L (ref 22–29)
CREAT SERPL-MCNC: 1.2 MG/DL (ref 0.5–1)
EOSINOPHILS ABSOLUTE: 0.09 E9/L (ref 0.05–0.5)
EOSINOPHILS RELATIVE PERCENT: 2.3 % (ref 0–6)
GFR AFRICAN AMERICAN: 52
GFR NON-AFRICAN AMERICAN: 43 ML/MIN/1.73
GLUCOSE BLD-MCNC: 122 MG/DL (ref 74–99)
HCT VFR BLD CALC: 33.5 % (ref 34–48)
HEMOGLOBIN: 11.2 G/DL (ref 11.5–15.5)
IMMATURE GRANULOCYTES #: 0.02 E9/L
IMMATURE GRANULOCYTES %: 0.5 % (ref 0–5)
LYMPHOCYTES ABSOLUTE: 0.66 E9/L (ref 1.5–4)
LYMPHOCYTES RELATIVE PERCENT: 16.5 % (ref 20–42)
MAGNESIUM: 1.2 MG/DL (ref 1.6–2.6)
MCH RBC QN AUTO: 31 PG (ref 26–35)
MCHC RBC AUTO-ENTMCNC: 33.4 % (ref 32–34.5)
MCV RBC AUTO: 92.8 FL (ref 80–99.9)
MONOCYTES ABSOLUTE: 0.45 E9/L (ref 0.1–0.95)
MONOCYTES RELATIVE PERCENT: 11.3 % (ref 2–12)
NEUTROPHILS ABSOLUTE: 2.75 E9/L (ref 1.8–7.3)
NEUTROPHILS RELATIVE PERCENT: 68.9 % (ref 43–80)
PDW BLD-RTO: 14.4 FL (ref 11.5–15)
PLATELET # BLD: 182 E9/L (ref 130–450)
PMV BLD AUTO: 10.1 FL (ref 7–12)
POTASSIUM SERPL-SCNC: 2.7 MMOL/L (ref 3.5–5)
RBC # BLD: 3.61 E12/L (ref 3.5–5.5)
SODIUM BLD-SCNC: 139 MMOL/L (ref 132–146)
TOTAL PROTEIN: 6.5 G/DL (ref 6.4–8.3)
WBC # BLD: 4 E9/L (ref 4.5–11.5)

## 2019-09-05 PROCEDURE — 87329 GIARDIA AG IA: CPT

## 2019-09-05 PROCEDURE — 80053 COMPREHEN METABOLIC PANEL: CPT

## 2019-09-05 PROCEDURE — 36591 DRAW BLOOD OFF VENOUS DEVICE: CPT

## 2019-09-05 PROCEDURE — 83735 ASSAY OF MAGNESIUM: CPT

## 2019-09-05 PROCEDURE — 87324 CLOSTRIDIUM AG IA: CPT

## 2019-09-05 PROCEDURE — 36415 COLL VENOUS BLD VENIPUNCTURE: CPT

## 2019-09-05 PROCEDURE — 87449 NOS EACH ORGANISM AG IA: CPT

## 2019-09-05 PROCEDURE — 87506 IADNA-DNA/RNA PROBE TQ 6-11: CPT

## 2019-09-05 PROCEDURE — 6360000002 HC RX W HCPCS: Performed by: NURSE PRACTITIONER

## 2019-09-05 PROCEDURE — 2580000003 HC RX 258: Performed by: NURSE PRACTITIONER

## 2019-09-05 PROCEDURE — 85025 COMPLETE CBC W/AUTO DIFF WBC: CPT

## 2019-09-05 RX ORDER — SODIUM CHLORIDE 0.9 % (FLUSH) 0.9 %
10 SYRINGE (ML) INJECTION PRN
Status: DISCONTINUED | OUTPATIENT
Start: 2019-09-05 | End: 2019-09-06 | Stop reason: HOSPADM

## 2019-09-05 RX ORDER — SODIUM CHLORIDE 0.9 % (FLUSH) 0.9 %
10 SYRINGE (ML) INJECTION PRN
Status: CANCELLED
Start: 2019-09-05

## 2019-09-05 RX ORDER — LOPERAMIDE HYDROCHLORIDE 2 MG/1
2 CAPSULE ORAL 4 TIMES DAILY PRN
Status: ON HOLD | COMMUNITY
End: 2019-10-02 | Stop reason: SDUPTHER

## 2019-09-05 RX ADMIN — Medication 10 ML: at 10:12

## 2019-09-05 RX ADMIN — Medication 10 ML: at 10:11

## 2019-09-05 RX ADMIN — Medication 10 ML: at 10:13

## 2019-09-05 RX ADMIN — Medication 500 UNITS: at 10:13

## 2019-09-05 NOTE — PROGRESS NOTES
Ports accessed to obtain lab work de accessed after flushed. Therapy plan reviewed next appointment scheduled.   Declines copy of avs

## 2019-09-05 NOTE — PROGRESS NOTES
Ngozi Hernandez RN at Dr Obed Negro office notified of potassium level of 2.74, magnesium level ordered off cmp

## 2019-09-06 LAB
C DIFF TOXIN/ANTIGEN: NORMAL
GIARDIA ANTIGEN STOOL: NORMAL

## 2019-09-09 LAB
Lab: NORMAL
REPORT: NORMAL
THIS TEST SENT TO: NORMAL

## 2019-09-11 ENCOUNTER — HOSPITAL ENCOUNTER (OUTPATIENT)
Dept: INFUSION THERAPY | Age: 79
Setting detail: INFUSION SERIES
Discharge: HOME | End: 2019-09-11
Payer: MEDICARE

## 2019-09-11 VITALS
SYSTOLIC BLOOD PRESSURE: 134 MMHG | WEIGHT: 180 LBS | HEART RATE: 82 BPM | OXYGEN SATURATION: 99 % | HEIGHT: 60 IN | BODY MASS INDEX: 35.34 KG/M2 | RESPIRATION RATE: 16 BRPM | TEMPERATURE: 97.6 F | DIASTOLIC BLOOD PRESSURE: 64 MMHG

## 2019-09-11 DIAGNOSIS — C54.9 MALIGNANT NEOPLASM OF BODY OF UTERUS, UNSPECIFIED SITE (HCC): Primary | ICD-10-CM

## 2019-09-11 LAB
ALBUMIN SERPL-MCNC: 4 G/DL (ref 3.5–5.2)
ALP BLD-CCNC: 92 U/L (ref 35–104)
ALT SERPL-CCNC: 11 U/L (ref 0–32)
ANION GAP SERPL CALCULATED.3IONS-SCNC: 12 MMOL/L (ref 7–16)
AST SERPL-CCNC: 14 U/L (ref 0–31)
BILIRUB SERPL-MCNC: 0.5 MG/DL (ref 0–1.2)
BUN BLDV-MCNC: 27 MG/DL (ref 8–23)
CALCIUM SERPL-MCNC: 9.4 MG/DL (ref 8.6–10.2)
CHLORIDE BLD-SCNC: 107 MMOL/L (ref 98–107)
CO2: 22 MMOL/L (ref 22–29)
CREAT SERPL-MCNC: 1.1 MG/DL (ref 0.5–1)
GFR AFRICAN AMERICAN: 58
GFR NON-AFRICAN AMERICAN: 48 ML/MIN/1.73
GLUCOSE BLD-MCNC: 111 MG/DL (ref 74–99)
MAGNESIUM: 1.2 MG/DL (ref 1.6–2.6)
POTASSIUM SERPL-SCNC: 3.4 MMOL/L (ref 3.5–5)
SODIUM BLD-SCNC: 141 MMOL/L (ref 132–146)
TOTAL PROTEIN: 6.4 G/DL (ref 6.4–8.3)

## 2019-09-11 PROCEDURE — 36591 DRAW BLOOD OFF VENOUS DEVICE: CPT

## 2019-09-11 PROCEDURE — 83735 ASSAY OF MAGNESIUM: CPT

## 2019-09-11 PROCEDURE — 80053 COMPREHEN METABOLIC PANEL: CPT

## 2019-09-11 PROCEDURE — 36415 COLL VENOUS BLD VENIPUNCTURE: CPT

## 2019-09-11 PROCEDURE — 6360000002 HC RX W HCPCS: Performed by: NURSE PRACTITIONER

## 2019-09-11 PROCEDURE — 2580000003 HC RX 258: Performed by: NURSE PRACTITIONER

## 2019-09-11 RX ORDER — SODIUM CHLORIDE 0.9 % (FLUSH) 0.9 %
10 SYRINGE (ML) INJECTION PRN
Status: CANCELLED
Start: 2019-09-11

## 2019-09-11 RX ORDER — SODIUM CHLORIDE 0.9 % (FLUSH) 0.9 %
10 SYRINGE (ML) INJECTION PRN
Status: DISCONTINUED | OUTPATIENT
Start: 2019-09-11 | End: 2019-09-12 | Stop reason: HOSPADM

## 2019-09-11 RX ADMIN — Medication 500 UNITS: at 09:54

## 2019-09-11 RX ADMIN — Medication 10 ML: at 09:54

## 2019-09-11 RX ADMIN — Medication 10 ML: at 09:52

## 2019-09-11 NOTE — PROGRESS NOTES
Patient tolerated lab draw and port flush well. Therapy plan reviewed with patient. Verbalizes understanding. Declines copy of AVS and any medication information, verbalizes good knowledge of current plan, and has no signs or symptoms to report at this time. Next appointment made.   Sabrina Crockett RN  9/11/2019  [id number]

## 2019-09-12 NOTE — PROGRESS NOTES
Faxed stool specimen results to Dr. Poncho Wick office for review and made note to ask if any other labs next week

## 2019-09-17 RX ORDER — LIDOCAINE AND PRILOCAINE 25; 25 MG/G; MG/G
CREAM TOPICAL
Refills: 3 | COMMUNITY
Start: 2019-08-07

## 2019-09-17 RX ORDER — ACETAMINOPHEN 325 MG/1
325 TABLET ORAL
COMMUNITY
End: 2019-09-30

## 2019-09-17 RX ORDER — POTASSIUM CHLORIDE 20 MEQ/1
TABLET, EXTENDED RELEASE ORAL PRN
Refills: 0 | COMMUNITY
Start: 2019-09-05 | End: 2021-01-06

## 2019-09-17 RX ORDER — MAGNESIUM GLUCONATE 27 MG(500)
TABLET ORAL PRN
Refills: 0 | COMMUNITY
Start: 2019-09-06 | End: 2021-01-06

## 2019-09-18 ENCOUNTER — HOSPITAL ENCOUNTER (OUTPATIENT)
Dept: INFUSION THERAPY | Age: 79
Setting detail: INFUSION SERIES
Discharge: HOME | End: 2019-09-18
Payer: MEDICARE

## 2019-09-18 VITALS
HEIGHT: 60 IN | RESPIRATION RATE: 16 BRPM | DIASTOLIC BLOOD PRESSURE: 71 MMHG | BODY MASS INDEX: 35.34 KG/M2 | SYSTOLIC BLOOD PRESSURE: 171 MMHG | TEMPERATURE: 98.2 F | HEART RATE: 83 BPM | OXYGEN SATURATION: 99 % | WEIGHT: 180 LBS

## 2019-09-18 DIAGNOSIS — C54.9 MALIGNANT NEOPLASM OF BODY OF UTERUS, UNSPECIFIED SITE (HCC): Primary | ICD-10-CM

## 2019-09-18 LAB
ALBUMIN SERPL-MCNC: 4.1 G/DL (ref 3.5–5.2)
ALP BLD-CCNC: 85 U/L (ref 35–104)
ALT SERPL-CCNC: 10 U/L (ref 0–32)
ANION GAP SERPL CALCULATED.3IONS-SCNC: 14 MMOL/L (ref 7–16)
AST SERPL-CCNC: 14 U/L (ref 0–31)
BILIRUB SERPL-MCNC: 0.6 MG/DL (ref 0–1.2)
BUN BLDV-MCNC: 30 MG/DL (ref 8–23)
CALCIUM SERPL-MCNC: 9.4 MG/DL (ref 8.6–10.2)
CHLORIDE BLD-SCNC: 107 MMOL/L (ref 98–107)
CO2: 22 MMOL/L (ref 22–29)
CREAT SERPL-MCNC: 1.2 MG/DL (ref 0.5–1)
GFR AFRICAN AMERICAN: 52
GFR NON-AFRICAN AMERICAN: 43 ML/MIN/1.73
GLUCOSE BLD-MCNC: 104 MG/DL (ref 74–99)
MAGNESIUM: 1.2 MG/DL (ref 1.6–2.6)
POTASSIUM SERPL-SCNC: 3.6 MMOL/L (ref 3.5–5)
SODIUM BLD-SCNC: 143 MMOL/L (ref 132–146)
TOTAL PROTEIN: 6.6 G/DL (ref 6.4–8.3)

## 2019-09-18 PROCEDURE — 80053 COMPREHEN METABOLIC PANEL: CPT

## 2019-09-18 PROCEDURE — 6360000002 HC RX W HCPCS: Performed by: NURSE PRACTITIONER

## 2019-09-18 PROCEDURE — 36591 DRAW BLOOD OFF VENOUS DEVICE: CPT

## 2019-09-18 PROCEDURE — 2580000003 HC RX 258: Performed by: NURSE PRACTITIONER

## 2019-09-18 PROCEDURE — 83735 ASSAY OF MAGNESIUM: CPT

## 2019-09-18 PROCEDURE — 36415 COLL VENOUS BLD VENIPUNCTURE: CPT

## 2019-09-18 RX ORDER — SODIUM CHLORIDE 0.9 % (FLUSH) 0.9 %
10 SYRINGE (ML) INJECTION PRN
Status: CANCELLED
Start: 2019-09-18

## 2019-09-18 RX ORDER — SODIUM CHLORIDE 0.9 % (FLUSH) 0.9 %
10 SYRINGE (ML) INJECTION PRN
Status: DISCONTINUED | OUTPATIENT
Start: 2019-09-18 | End: 2019-09-19 | Stop reason: HOSPADM

## 2019-09-18 RX ADMIN — Medication 10 ML: at 09:57

## 2019-09-18 RX ADMIN — Medication 10 ML: at 09:55

## 2019-09-18 RX ADMIN — Medication 10 ML: at 09:54

## 2019-09-18 RX ADMIN — Medication 500 UNITS: at 09:57

## 2019-09-18 RX ADMIN — Medication 10 ML: at 09:56

## 2019-09-25 ENCOUNTER — HOSPITAL ENCOUNTER (OUTPATIENT)
Dept: INFUSION THERAPY | Age: 79
Setting detail: INFUSION SERIES
Discharge: HOME | End: 2019-09-25
Payer: MEDICARE

## 2019-09-25 VITALS
RESPIRATION RATE: 16 BRPM | HEART RATE: 85 BPM | SYSTOLIC BLOOD PRESSURE: 132 MMHG | WEIGHT: 180 LBS | DIASTOLIC BLOOD PRESSURE: 64 MMHG | OXYGEN SATURATION: 99 % | TEMPERATURE: 98 F | HEIGHT: 60 IN | BODY MASS INDEX: 35.34 KG/M2

## 2019-09-25 DIAGNOSIS — C54.9 MALIGNANT NEOPLASM OF BODY OF UTERUS, UNSPECIFIED SITE (HCC): Primary | ICD-10-CM

## 2019-09-25 LAB
ALBUMIN SERPL-MCNC: 4.4 G/DL (ref 3.5–5.2)
ALP BLD-CCNC: 88 U/L (ref 35–104)
ALT SERPL-CCNC: 11 U/L (ref 0–32)
AMYLASE: 72 U/L (ref 20–100)
ANION GAP SERPL CALCULATED.3IONS-SCNC: 16 MMOL/L (ref 7–16)
AST SERPL-CCNC: 16 U/L (ref 0–31)
BASOPHILS ABSOLUTE: 0.04 E9/L (ref 0–0.2)
BASOPHILS RELATIVE PERCENT: 0.6 % (ref 0–2)
BILIRUB SERPL-MCNC: 0.8 MG/DL (ref 0–1.2)
BUN BLDV-MCNC: 32 MG/DL (ref 8–23)
CALCIUM SERPL-MCNC: 9.6 MG/DL (ref 8.6–10.2)
CHLORIDE BLD-SCNC: 107 MMOL/L (ref 98–107)
CO2: 19 MMOL/L (ref 22–29)
CREAT SERPL-MCNC: 1.4 MG/DL (ref 0.5–1)
EOSINOPHILS ABSOLUTE: 0.06 E9/L (ref 0.05–0.5)
EOSINOPHILS RELATIVE PERCENT: 0.9 % (ref 0–6)
GFR AFRICAN AMERICAN: 44
GFR NON-AFRICAN AMERICAN: 36 ML/MIN/1.73
GLUCOSE BLD-MCNC: 99 MG/DL (ref 74–99)
HCT VFR BLD CALC: 38.3 % (ref 34–48)
HEMOGLOBIN: 12.7 G/DL (ref 11.5–15.5)
IMMATURE GRANULOCYTES #: 0.05 E9/L
IMMATURE GRANULOCYTES %: 0.7 % (ref 0–5)
LIPASE: 13 U/L (ref 13–60)
LYMPHOCYTES ABSOLUTE: 0.73 E9/L (ref 1.5–4)
LYMPHOCYTES RELATIVE PERCENT: 10.9 % (ref 20–42)
MAGNESIUM: 1.3 MG/DL (ref 1.6–2.6)
MCH RBC QN AUTO: 31.4 PG (ref 26–35)
MCHC RBC AUTO-ENTMCNC: 33.2 % (ref 32–34.5)
MCV RBC AUTO: 94.6 FL (ref 80–99.9)
MONOCYTES ABSOLUTE: 0.69 E9/L (ref 0.1–0.95)
MONOCYTES RELATIVE PERCENT: 10.3 % (ref 2–12)
NEUTROPHILS ABSOLUTE: 5.13 E9/L (ref 1.8–7.3)
NEUTROPHILS RELATIVE PERCENT: 76.6 % (ref 43–80)
PDW BLD-RTO: 14.1 FL (ref 11.5–15)
PLATELET # BLD: 210 E9/L (ref 130–450)
PMV BLD AUTO: 9.9 FL (ref 7–12)
POTASSIUM SERPL-SCNC: 3.3 MMOL/L (ref 3.5–5)
RBC # BLD: 4.05 E12/L (ref 3.5–5.5)
SODIUM BLD-SCNC: 142 MMOL/L (ref 132–146)
T4 TOTAL: 9.3 MCG/DL (ref 4.5–11.7)
TOTAL PROTEIN: 7.1 G/DL (ref 6.4–8.3)
TSH SERPL DL<=0.05 MIU/L-ACNC: 2.89 UIU/ML (ref 0.27–4.2)
WBC # BLD: 6.7 E9/L (ref 4.5–11.5)

## 2019-09-25 PROCEDURE — 2580000003 HC RX 258: Performed by: NURSE PRACTITIONER

## 2019-09-25 PROCEDURE — 84443 ASSAY THYROID STIM HORMONE: CPT

## 2019-09-25 PROCEDURE — 80053 COMPREHEN METABOLIC PANEL: CPT

## 2019-09-25 PROCEDURE — 84436 ASSAY OF TOTAL THYROXINE: CPT

## 2019-09-25 PROCEDURE — 6360000002 HC RX W HCPCS: Performed by: NURSE PRACTITIONER

## 2019-09-25 PROCEDURE — 83690 ASSAY OF LIPASE: CPT

## 2019-09-25 PROCEDURE — 36591 DRAW BLOOD OFF VENOUS DEVICE: CPT

## 2019-09-25 PROCEDURE — 36415 COLL VENOUS BLD VENIPUNCTURE: CPT

## 2019-09-25 PROCEDURE — 82150 ASSAY OF AMYLASE: CPT

## 2019-09-25 PROCEDURE — 85025 COMPLETE CBC W/AUTO DIFF WBC: CPT

## 2019-09-25 PROCEDURE — 83735 ASSAY OF MAGNESIUM: CPT

## 2019-09-25 RX ORDER — SODIUM CHLORIDE 0.9 % (FLUSH) 0.9 %
10 SYRINGE (ML) INJECTION PRN
Status: CANCELLED
Start: 2019-09-25

## 2019-09-25 RX ORDER — SODIUM CHLORIDE 0.9 % (FLUSH) 0.9 %
10 SYRINGE (ML) INJECTION PRN
Status: DISCONTINUED | OUTPATIENT
Start: 2019-09-25 | End: 2019-09-26 | Stop reason: HOSPADM

## 2019-09-25 RX ADMIN — Medication 500 UNITS: at 10:00

## 2019-09-25 RX ADMIN — Medication 10 ML: at 09:59

## 2019-09-25 RX ADMIN — Medication 10 ML: at 09:55

## 2019-09-30 ENCOUNTER — HOSPITAL ENCOUNTER (INPATIENT)
Age: 79
LOS: 2 days | Discharge: HOME | DRG: 641 | End: 2019-10-02
Attending: EMERGENCY MEDICINE | Admitting: INTERNAL MEDICINE
Payer: MEDICARE

## 2019-09-30 ENCOUNTER — APPOINTMENT (OUTPATIENT)
Dept: CT IMAGING | Age: 79
DRG: 641 | End: 2019-09-30
Payer: MEDICARE

## 2019-09-30 ENCOUNTER — APPOINTMENT (OUTPATIENT)
Dept: GENERAL RADIOLOGY | Age: 79
DRG: 641 | End: 2019-09-30
Payer: MEDICARE

## 2019-09-30 DIAGNOSIS — R10.84 GENERALIZED ABDOMINAL PAIN: ICD-10-CM

## 2019-09-30 DIAGNOSIS — E87.6 HYPOKALEMIA: Primary | ICD-10-CM

## 2019-09-30 DIAGNOSIS — E83.42 HYPOMAGNESEMIA: ICD-10-CM

## 2019-09-30 DIAGNOSIS — R19.7 DIARRHEA, UNSPECIFIED TYPE: ICD-10-CM

## 2019-09-30 LAB
ALBUMIN SERPL-MCNC: 4 G/DL (ref 3.5–5.2)
ALP BLD-CCNC: 107 U/L (ref 35–104)
ALT SERPL-CCNC: 25 U/L (ref 0–32)
ANION GAP SERPL CALCULATED.3IONS-SCNC: 14 MMOL/L (ref 7–16)
AST SERPL-CCNC: 17 U/L (ref 0–31)
BASOPHILS ABSOLUTE: 0.03 E9/L (ref 0–0.2)
BASOPHILS RELATIVE PERCENT: 0.6 % (ref 0–2)
BILIRUB SERPL-MCNC: 0.6 MG/DL (ref 0–1.2)
BUN BLDV-MCNC: 31 MG/DL (ref 8–23)
CALCIUM SERPL-MCNC: 9.5 MG/DL (ref 8.6–10.2)
CHLORIDE BLD-SCNC: 109 MMOL/L (ref 98–107)
CO2: 17 MMOL/L (ref 22–29)
CREAT SERPL-MCNC: 1.4 MG/DL (ref 0.5–1)
EOSINOPHILS ABSOLUTE: 0.06 E9/L (ref 0.05–0.5)
EOSINOPHILS RELATIVE PERCENT: 1.2 % (ref 0–6)
GFR AFRICAN AMERICAN: 44
GFR NON-AFRICAN AMERICAN: 36 ML/MIN/1.73
GLUCOSE BLD-MCNC: 114 MG/DL (ref 74–99)
HCT VFR BLD CALC: 34.2 % (ref 34–48)
HEMOGLOBIN: 11.8 G/DL (ref 11.5–15.5)
IMMATURE GRANULOCYTES #: 0.04 E9/L
IMMATURE GRANULOCYTES %: 0.8 % (ref 0–5)
LACTIC ACID: 1.2 MMOL/L (ref 0.5–2.2)
LIPASE: 13 U/L (ref 13–60)
LYMPHOCYTES ABSOLUTE: 0.85 E9/L (ref 1.5–4)
LYMPHOCYTES RELATIVE PERCENT: 16.9 % (ref 20–42)
MAGNESIUM: 1.3 MG/DL (ref 1.6–2.6)
MCH RBC QN AUTO: 31.5 PG (ref 26–35)
MCHC RBC AUTO-ENTMCNC: 34.5 % (ref 32–34.5)
MCV RBC AUTO: 91.2 FL (ref 80–99.9)
MONOCYTES ABSOLUTE: 0.65 E9/L (ref 0.1–0.95)
MONOCYTES RELATIVE PERCENT: 12.9 % (ref 2–12)
NEUTROPHILS ABSOLUTE: 3.39 E9/L (ref 1.8–7.3)
NEUTROPHILS RELATIVE PERCENT: 67.6 % (ref 43–80)
PDW BLD-RTO: 14.3 FL (ref 11.5–15)
PLATELET # BLD: 210 E9/L (ref 130–450)
PMV BLD AUTO: 10.2 FL (ref 7–12)
POTASSIUM SERPL-SCNC: 2.2 MMOL/L (ref 3.5–5)
PRO-BNP: 290 PG/ML (ref 0–450)
RBC # BLD: 3.75 E12/L (ref 3.5–5.5)
SODIUM BLD-SCNC: 140 MMOL/L (ref 132–146)
TOTAL PROTEIN: 6.6 G/DL (ref 6.4–8.3)
TROPONIN: <0.01 NG/ML (ref 0–0.03)
WBC # BLD: 5 E9/L (ref 4.5–11.5)

## 2019-09-30 PROCEDURE — 6360000002 HC RX W HCPCS: Performed by: PHYSICIAN ASSISTANT

## 2019-09-30 PROCEDURE — 87045 FECES CULTURE AEROBIC BACT: CPT

## 2019-09-30 PROCEDURE — G0328 FECAL BLOOD SCRN IMMUNOASSAY: HCPCS

## 2019-09-30 PROCEDURE — 6370000000 HC RX 637 (ALT 250 FOR IP): Performed by: PHYSICIAN ASSISTANT

## 2019-09-30 PROCEDURE — 85025 COMPLETE CBC W/AUTO DIFF WBC: CPT

## 2019-09-30 PROCEDURE — 1200000000 HC SEMI PRIVATE

## 2019-09-30 PROCEDURE — 6370000000 HC RX 637 (ALT 250 FOR IP): Performed by: INTERNAL MEDICINE

## 2019-09-30 PROCEDURE — 83605 ASSAY OF LACTIC ACID: CPT

## 2019-09-30 PROCEDURE — 89055 LEUKOCYTE ASSESSMENT FECAL: CPT

## 2019-09-30 PROCEDURE — 83735 ASSAY OF MAGNESIUM: CPT

## 2019-09-30 PROCEDURE — 71046 X-RAY EXAM CHEST 2 VIEWS: CPT

## 2019-09-30 PROCEDURE — 2580000003 HC RX 258: Performed by: INTERNAL MEDICINE

## 2019-09-30 PROCEDURE — 83690 ASSAY OF LIPASE: CPT

## 2019-09-30 PROCEDURE — 84484 ASSAY OF TROPONIN QUANT: CPT

## 2019-09-30 PROCEDURE — 6360000002 HC RX W HCPCS: Performed by: INTERNAL MEDICINE

## 2019-09-30 PROCEDURE — 93005 ELECTROCARDIOGRAM TRACING: CPT | Performed by: PHYSICIAN ASSISTANT

## 2019-09-30 PROCEDURE — 87329 GIARDIA AG IA: CPT

## 2019-09-30 PROCEDURE — 87425 ROTAVIRUS AG IA: CPT

## 2019-09-30 PROCEDURE — 2580000003 HC RX 258: Performed by: PHYSICIAN ASSISTANT

## 2019-09-30 PROCEDURE — 80053 COMPREHEN METABOLIC PANEL: CPT

## 2019-09-30 PROCEDURE — 87324 CLOSTRIDIUM AG IA: CPT

## 2019-09-30 PROCEDURE — 99285 EMERGENCY DEPT VISIT HI MDM: CPT

## 2019-09-30 PROCEDURE — 87449 NOS EACH ORGANISM AG IA: CPT

## 2019-09-30 PROCEDURE — 74176 CT ABD & PELVIS W/O CONTRAST: CPT

## 2019-09-30 PROCEDURE — 82705 FATS/LIPIDS FECES QUAL: CPT

## 2019-09-30 PROCEDURE — 83880 ASSAY OF NATRIURETIC PEPTIDE: CPT

## 2019-09-30 RX ORDER — ACETAMINOPHEN 325 MG/1
650 TABLET ORAL EVERY 4 HOURS PRN
Status: DISCONTINUED | OUTPATIENT
Start: 2019-09-30 | End: 2019-10-02 | Stop reason: HOSPADM

## 2019-09-30 RX ORDER — SODIUM CHLORIDE 0.9 % (FLUSH) 0.9 %
10 SYRINGE (ML) INJECTION PRN
Status: DISCONTINUED | OUTPATIENT
Start: 2019-09-30 | End: 2019-10-02 | Stop reason: HOSPADM

## 2019-09-30 RX ORDER — ONDANSETRON 2 MG/ML
4 INJECTION INTRAMUSCULAR; INTRAVENOUS EVERY 6 HOURS PRN
Status: DISCONTINUED | OUTPATIENT
Start: 2019-09-30 | End: 2019-09-30 | Stop reason: SDUPTHER

## 2019-09-30 RX ORDER — 0.9 % SODIUM CHLORIDE 0.9 %
1000 INTRAVENOUS SOLUTION INTRAVENOUS ONCE
Status: COMPLETED | OUTPATIENT
Start: 2019-09-30 | End: 2019-09-30

## 2019-09-30 RX ORDER — ONDANSETRON 2 MG/ML
4 INJECTION INTRAMUSCULAR; INTRAVENOUS EVERY 6 HOURS PRN
Status: DISCONTINUED | OUTPATIENT
Start: 2019-09-30 | End: 2019-10-02 | Stop reason: HOSPADM

## 2019-09-30 RX ORDER — SODIUM CHLORIDE 0.9 % (FLUSH) 0.9 %
10 SYRINGE (ML) INJECTION PRN
Status: DISCONTINUED | OUTPATIENT
Start: 2019-09-30 | End: 2019-10-01 | Stop reason: SDUPTHER

## 2019-09-30 RX ORDER — POTASSIUM CHLORIDE 20 MEQ/1
40 TABLET, EXTENDED RELEASE ORAL ONCE
Status: COMPLETED | OUTPATIENT
Start: 2019-09-30 | End: 2019-09-30

## 2019-09-30 RX ORDER — CHOLESTYRAMINE 4 G/9G
1 POWDER, FOR SUSPENSION ORAL 2 TIMES DAILY WITH MEALS
Status: DISCONTINUED | OUTPATIENT
Start: 2019-09-30 | End: 2019-10-02 | Stop reason: HOSPADM

## 2019-09-30 RX ORDER — SODIUM CHLORIDE 0.9 % (FLUSH) 0.9 %
10 SYRINGE (ML) INJECTION EVERY 12 HOURS SCHEDULED
Status: DISCONTINUED | OUTPATIENT
Start: 2019-09-30 | End: 2019-10-01 | Stop reason: SDUPTHER

## 2019-09-30 RX ORDER — SODIUM CHLORIDE 0.9 % (FLUSH) 0.9 %
10 SYRINGE (ML) INJECTION EVERY 12 HOURS SCHEDULED
Status: DISCONTINUED | OUTPATIENT
Start: 2019-09-30 | End: 2019-10-02 | Stop reason: HOSPADM

## 2019-09-30 RX ORDER — POTASSIUM CHLORIDE 7.45 MG/ML
10 INJECTION INTRAVENOUS ONCE
Status: COMPLETED | OUTPATIENT
Start: 2019-09-30 | End: 2019-09-30

## 2019-09-30 RX ORDER — POTASSIUM CHLORIDE 20 MEQ/1
20 TABLET, EXTENDED RELEASE ORAL
Status: DISCONTINUED | OUTPATIENT
Start: 2019-10-01 | End: 2019-10-01

## 2019-09-30 RX ORDER — MAGNESIUM SULFATE IN WATER 40 MG/ML
2 INJECTION, SOLUTION INTRAVENOUS ONCE
Status: COMPLETED | OUTPATIENT
Start: 2019-09-30 | End: 2019-09-30

## 2019-09-30 RX ORDER — SODIUM CHLORIDE AND POTASSIUM CHLORIDE .9; .15 G/100ML; G/100ML
SOLUTION INTRAVENOUS CONTINUOUS
Status: DISCONTINUED | OUTPATIENT
Start: 2019-09-30 | End: 2019-10-02

## 2019-09-30 RX ORDER — EZETIMIBE 10 MG/1
10 TABLET ORAL
Status: DISCONTINUED | OUTPATIENT
Start: 2019-09-30 | End: 2019-09-30

## 2019-09-30 RX ADMIN — Medication 10 ML: at 20:41

## 2019-09-30 RX ADMIN — POTASSIUM CHLORIDE 10 MEQ: 10 INJECTION, SOLUTION INTRAVENOUS at 14:30

## 2019-09-30 RX ADMIN — MAGNESIUM SULFATE HEPTAHYDRATE 2 G: 40 INJECTION, SOLUTION INTRAVENOUS at 16:56

## 2019-09-30 RX ADMIN — POTASSIUM CHLORIDE 40 MEQ: 20 TABLET, EXTENDED RELEASE ORAL at 14:30

## 2019-09-30 RX ADMIN — ENOXAPARIN SODIUM 40 MG: 40 INJECTION SUBCUTANEOUS at 20:39

## 2019-09-30 RX ADMIN — SODIUM CHLORIDE 1000 ML: 9 INJECTION, SOLUTION INTRAVENOUS at 14:30

## 2019-09-30 RX ADMIN — POTASSIUM CHLORIDE AND SODIUM CHLORIDE: 900; 150 INJECTION, SOLUTION INTRAVENOUS at 20:40

## 2019-09-30 RX ADMIN — CHOLESTYRAMINE 4 G: 4 POWDER, FOR SUSPENSION ORAL at 20:38

## 2019-09-30 RX ADMIN — Medication 10 ML: at 20:44

## 2019-09-30 ASSESSMENT — PAIN SCALES - GENERAL: PAINLEVEL_OUTOF10: 0

## 2019-10-01 PROBLEM — R19.7 DIARRHEA: Status: ACTIVE | Noted: 2019-10-01

## 2019-10-01 LAB
ALBUMIN SERPL-MCNC: 3.4 G/DL (ref 3.5–5.2)
ALP BLD-CCNC: 91 U/L (ref 35–104)
ALT SERPL-CCNC: 20 U/L (ref 0–32)
ANION GAP SERPL CALCULATED.3IONS-SCNC: 10 MMOL/L (ref 7–16)
AST SERPL-CCNC: 15 U/L (ref 0–31)
BACTERIA: ABNORMAL /HPF
BASOPHILS ABSOLUTE: 0.02 E9/L (ref 0–0.2)
BASOPHILS RELATIVE PERCENT: 0.5 % (ref 0–2)
BILIRUB SERPL-MCNC: 0.5 MG/DL (ref 0–1.2)
BILIRUBIN URINE: NEGATIVE
BLOOD, URINE: ABNORMAL
BUN BLDV-MCNC: 21 MG/DL (ref 8–23)
C DIFF TOXIN/ANTIGEN: NORMAL
CALCIUM SERPL-MCNC: 8.5 MG/DL (ref 8.6–10.2)
CHLORIDE BLD-SCNC: 115 MMOL/L (ref 98–107)
CLARITY: CLEAR
CO2: 16 MMOL/L (ref 22–29)
COLOR: YELLOW
CREAT SERPL-MCNC: 1.2 MG/DL (ref 0.5–1)
EKG ATRIAL RATE: 71 BPM
EKG P AXIS: -4 DEGREES
EKG P-R INTERVAL: 170 MS
EKG Q-T INTERVAL: 384 MS
EKG QRS DURATION: 86 MS
EKG QTC CALCULATION (BAZETT): 417 MS
EKG R AXIS: -22 DEGREES
EKG T AXIS: -3 DEGREES
EKG VENTRICULAR RATE: 71 BPM
EOSINOPHILS ABSOLUTE: 0.15 E9/L (ref 0.05–0.5)
EOSINOPHILS RELATIVE PERCENT: 3.8 % (ref 0–6)
EPITHELIAL CELLS, UA: ABNORMAL /HPF
GFR AFRICAN AMERICAN: 52
GFR NON-AFRICAN AMERICAN: 43 ML/MIN/1.73
GIARDIA ANTIGEN STOOL: NORMAL
GLUCOSE BLD-MCNC: 86 MG/DL (ref 74–99)
GLUCOSE URINE: NEGATIVE MG/DL
HCT VFR BLD CALC: 29.3 % (ref 34–48)
HEMOGLOBIN: 10.1 G/DL (ref 11.5–15.5)
IMMATURE GRANULOCYTES #: 0.03 E9/L
IMMATURE GRANULOCYTES %: 0.8 % (ref 0–5)
KETONES, URINE: NEGATIVE MG/DL
LEUKOCYTE ESTERASE, URINE: ABNORMAL
LYMPHOCYTES ABSOLUTE: 1.02 E9/L (ref 1.5–4)
LYMPHOCYTES RELATIVE PERCENT: 25.6 % (ref 20–42)
MAGNESIUM: 1.6 MG/DL (ref 1.6–2.6)
MCH RBC QN AUTO: 31.8 PG (ref 26–35)
MCHC RBC AUTO-ENTMCNC: 34.5 % (ref 32–34.5)
MCV RBC AUTO: 92.1 FL (ref 80–99.9)
MONOCYTES ABSOLUTE: 0.57 E9/L (ref 0.1–0.95)
MONOCYTES RELATIVE PERCENT: 14.3 % (ref 2–12)
NEUTROPHILS ABSOLUTE: 2.2 E9/L (ref 1.8–7.3)
NEUTROPHILS RELATIVE PERCENT: 55 % (ref 43–80)
NITRITE, URINE: NEGATIVE
OCCULT BLOOD SCREENING: NORMAL
PDW BLD-RTO: 14.6 FL (ref 11.5–15)
PH UA: 5 (ref 5–9)
PLATELET # BLD: 174 E9/L (ref 130–450)
PMV BLD AUTO: 10.1 FL (ref 7–12)
POTASSIUM REFLEX MAGNESIUM: 2.9 MMOL/L (ref 3.5–5)
PROTEIN UA: NEGATIVE MG/DL
RBC # BLD: 3.18 E12/L (ref 3.5–5.5)
RBC UA: ABNORMAL /HPF (ref 0–2)
ROTAVIRUS ANTIGEN: NORMAL
SODIUM BLD-SCNC: 141 MMOL/L (ref 132–146)
SPECIFIC GRAVITY UA: 1.01 (ref 1–1.03)
TOTAL PROTEIN: 5.5 G/DL (ref 6.4–8.3)
UROBILINOGEN, URINE: 0.2 E.U./DL
WBC # BLD: 4 E9/L (ref 4.5–11.5)
WBC UA: ABNORMAL /HPF (ref 0–5)
WHITE BLOOD CELLS (WBC), STOOL: NORMAL

## 2019-10-01 PROCEDURE — 1200000000 HC SEMI PRIVATE

## 2019-10-01 PROCEDURE — 83735 ASSAY OF MAGNESIUM: CPT

## 2019-10-01 PROCEDURE — 81001 URINALYSIS AUTO W/SCOPE: CPT

## 2019-10-01 PROCEDURE — 85025 COMPLETE CBC W/AUTO DIFF WBC: CPT

## 2019-10-01 PROCEDURE — 93010 ELECTROCARDIOGRAM REPORT: CPT | Performed by: INTERNAL MEDICINE

## 2019-10-01 PROCEDURE — 6360000002 HC RX W HCPCS: Performed by: INTERNAL MEDICINE

## 2019-10-01 PROCEDURE — 6370000000 HC RX 637 (ALT 250 FOR IP): Performed by: INTERNAL MEDICINE

## 2019-10-01 PROCEDURE — 97165 OT EVAL LOW COMPLEX 30 MIN: CPT

## 2019-10-01 PROCEDURE — 80053 COMPREHEN METABOLIC PANEL: CPT

## 2019-10-01 PROCEDURE — 97161 PT EVAL LOW COMPLEX 20 MIN: CPT

## 2019-10-01 PROCEDURE — 36415 COLL VENOUS BLD VENIPUNCTURE: CPT

## 2019-10-01 PROCEDURE — 87088 URINE BACTERIA CULTURE: CPT

## 2019-10-01 PROCEDURE — 87077 CULTURE AEROBIC IDENTIFY: CPT

## 2019-10-01 PROCEDURE — 87186 SC STD MICRODIL/AGAR DIL: CPT

## 2019-10-01 RX ORDER — POTASSIUM CHLORIDE 20 MEQ/1
40 TABLET, EXTENDED RELEASE ORAL 2 TIMES DAILY WITH MEALS
Status: COMPLETED | OUTPATIENT
Start: 2019-10-01 | End: 2019-10-01

## 2019-10-01 RX ORDER — POTASSIUM CHLORIDE 20 MEQ/1
20 TABLET, EXTENDED RELEASE ORAL
Status: DISCONTINUED | OUTPATIENT
Start: 2019-10-02 | End: 2019-10-02 | Stop reason: HOSPADM

## 2019-10-01 RX ORDER — LOPERAMIDE HYDROCHLORIDE 2 MG/1
2 CAPSULE ORAL 4 TIMES DAILY PRN
Status: DISCONTINUED | OUTPATIENT
Start: 2019-10-01 | End: 2019-10-02 | Stop reason: HOSPADM

## 2019-10-01 RX ADMIN — CHOLESTYRAMINE 4 G: 4 POWDER, FOR SUSPENSION ORAL at 17:30

## 2019-10-01 RX ADMIN — CHOLESTYRAMINE 4 G: 4 POWDER, FOR SUSPENSION ORAL at 08:09

## 2019-10-01 RX ADMIN — POTASSIUM CHLORIDE AND SODIUM CHLORIDE: 900; 150 INJECTION, SOLUTION INTRAVENOUS at 16:27

## 2019-10-01 RX ADMIN — POTASSIUM CHLORIDE 40 MEQ: 20 TABLET, EXTENDED RELEASE ORAL at 18:06

## 2019-10-01 RX ADMIN — POTASSIUM CHLORIDE 40 MEQ: 20 TABLET, EXTENDED RELEASE ORAL at 08:09

## 2019-10-01 RX ADMIN — POTASSIUM CHLORIDE AND SODIUM CHLORIDE: 900; 150 INJECTION, SOLUTION INTRAVENOUS at 06:04

## 2019-10-01 RX ADMIN — ENOXAPARIN SODIUM 40 MG: 40 INJECTION SUBCUTANEOUS at 21:00

## 2019-10-01 ASSESSMENT — PAIN SCALES - GENERAL
PAINLEVEL_OUTOF10: 0

## 2019-10-02 VITALS
HEIGHT: 60 IN | OXYGEN SATURATION: 98 % | RESPIRATION RATE: 16 BRPM | SYSTOLIC BLOOD PRESSURE: 162 MMHG | WEIGHT: 181.66 LBS | TEMPERATURE: 97.8 F | DIASTOLIC BLOOD PRESSURE: 72 MMHG | BODY MASS INDEX: 35.66 KG/M2 | HEART RATE: 73 BPM

## 2019-10-02 LAB
ALBUMIN SERPL-MCNC: 3.2 G/DL (ref 3.5–5.2)
ALP BLD-CCNC: 80 U/L (ref 35–104)
ALT SERPL-CCNC: 16 U/L (ref 0–32)
ANION GAP SERPL CALCULATED.3IONS-SCNC: 7 MMOL/L (ref 7–16)
AST SERPL-CCNC: 13 U/L (ref 0–31)
BASOPHILS ABSOLUTE: 0.02 E9/L (ref 0–0.2)
BASOPHILS RELATIVE PERCENT: 0.6 % (ref 0–2)
BILIRUB SERPL-MCNC: 0.4 MG/DL (ref 0–1.2)
BUN BLDV-MCNC: 19 MG/DL (ref 8–23)
C-REACTIVE PROTEIN: 0.3 MG/DL (ref 0–0.4)
CALCIUM SERPL-MCNC: 8.5 MG/DL (ref 8.6–10.2)
CHLORIDE BLD-SCNC: 118 MMOL/L (ref 98–107)
CO2: 16 MMOL/L (ref 22–29)
CREAT SERPL-MCNC: 1.2 MG/DL (ref 0.5–1)
CULTURE, STOOL: NORMAL
EOSINOPHILS ABSOLUTE: 0.15 E9/L (ref 0.05–0.5)
EOSINOPHILS RELATIVE PERCENT: 4.2 % (ref 0–6)
FECAL NEUTRAL FAT: ABNORMAL
FECAL SPLIT FATS: ABNORMAL
GFR AFRICAN AMERICAN: 52
GFR NON-AFRICAN AMERICAN: 43 ML/MIN/1.73
GLUCOSE BLD-MCNC: 96 MG/DL (ref 74–99)
HCT VFR BLD CALC: 27.8 % (ref 34–48)
HEMOGLOBIN: 9.3 G/DL (ref 11.5–15.5)
IMMATURE GRANULOCYTES #: 0.03 E9/L
IMMATURE GRANULOCYTES %: 0.8 % (ref 0–5)
LYMPHOCYTES ABSOLUTE: 1.05 E9/L (ref 1.5–4)
LYMPHOCYTES RELATIVE PERCENT: 29.1 % (ref 20–42)
MCH RBC QN AUTO: 31.2 PG (ref 26–35)
MCHC RBC AUTO-ENTMCNC: 33.5 % (ref 32–34.5)
MCV RBC AUTO: 93.3 FL (ref 80–99.9)
MONOCYTES ABSOLUTE: 0.5 E9/L (ref 0.1–0.95)
MONOCYTES RELATIVE PERCENT: 13.9 % (ref 2–12)
NEUTROPHILS ABSOLUTE: 1.86 E9/L (ref 1.8–7.3)
NEUTROPHILS RELATIVE PERCENT: 51.4 % (ref 43–80)
PDW BLD-RTO: 15.1 FL (ref 11.5–15)
PLATELET # BLD: 167 E9/L (ref 130–450)
PMV BLD AUTO: 9.9 FL (ref 7–12)
POTASSIUM REFLEX MAGNESIUM: 4.5 MMOL/L (ref 3.5–5)
RBC # BLD: 2.98 E12/L (ref 3.5–5.5)
ROTAVIRUS ANTIGEN: NORMAL
SEDIMENTATION RATE, ERYTHROCYTE: 18 MM/HR (ref 0–20)
SODIUM BLD-SCNC: 141 MMOL/L (ref 132–146)
TOTAL PROTEIN: 5.1 G/DL (ref 6.4–8.3)
WBC # BLD: 3.6 E9/L (ref 4.5–11.5)

## 2019-10-02 PROCEDURE — 90653 IIV ADJUVANT VACCINE IM: CPT | Performed by: INTERNAL MEDICINE

## 2019-10-02 PROCEDURE — 83993 ASSAY FOR CALPROTECTIN FECAL: CPT

## 2019-10-02 PROCEDURE — 80053 COMPREHEN METABOLIC PANEL: CPT

## 2019-10-02 PROCEDURE — 36415 COLL VENOUS BLD VENIPUNCTURE: CPT

## 2019-10-02 PROCEDURE — 6360000002 HC RX W HCPCS: Performed by: INTERNAL MEDICINE

## 2019-10-02 PROCEDURE — 87045 FECES CULTURE AEROBIC BACT: CPT

## 2019-10-02 PROCEDURE — 2580000003 HC RX 258: Performed by: INTERNAL MEDICINE

## 2019-10-02 PROCEDURE — 6370000000 HC RX 637 (ALT 250 FOR IP): Performed by: INTERNAL MEDICINE

## 2019-10-02 PROCEDURE — G0008 ADMIN INFLUENZA VIRUS VAC: HCPCS | Performed by: INTERNAL MEDICINE

## 2019-10-02 PROCEDURE — 85651 RBC SED RATE NONAUTOMATED: CPT

## 2019-10-02 PROCEDURE — 86140 C-REACTIVE PROTEIN: CPT

## 2019-10-02 PROCEDURE — 83520 IMMUNOASSAY QUANT NOS NONAB: CPT

## 2019-10-02 PROCEDURE — 87425 ROTAVIRUS AG IA: CPT

## 2019-10-02 PROCEDURE — 2500000003 HC RX 250 WO HCPCS: Performed by: INTERNAL MEDICINE

## 2019-10-02 PROCEDURE — 85025 COMPLETE CBC W/AUTO DIFF WBC: CPT

## 2019-10-02 RX ORDER — LACTOBACILLUS RHAMNOSUS GG 10B CELL
1 CAPSULE ORAL DAILY
Status: DISCONTINUED | OUTPATIENT
Start: 2019-10-02 | End: 2019-10-02 | Stop reason: HOSPADM

## 2019-10-02 RX ORDER — BUMETANIDE 0.25 MG/ML
0.5 INJECTION, SOLUTION INTRAMUSCULAR; INTRAVENOUS ONCE
Status: COMPLETED | OUTPATIENT
Start: 2019-10-02 | End: 2019-10-02

## 2019-10-02 RX ORDER — LOPERAMIDE HYDROCHLORIDE 2 MG/1
2 CAPSULE ORAL 4 TIMES DAILY PRN
Qty: 42 CAPSULE | Refills: 0 | Status: SHIPPED | OUTPATIENT
Start: 2019-10-02 | End: 2019-10-13

## 2019-10-02 RX ORDER — SODIUM CHLORIDE 9 MG/ML
INJECTION, SOLUTION INTRAVENOUS CONTINUOUS
Status: DISCONTINUED | OUTPATIENT
Start: 2019-10-02 | End: 2019-10-02

## 2019-10-02 RX ORDER — CHOLESTYRAMINE 4 G/9G
1 POWDER, FOR SUSPENSION ORAL 2 TIMES DAILY WITH MEALS
Qty: 60 PACKET | Refills: 0 | Status: SHIPPED | OUTPATIENT
Start: 2019-10-02 | End: 2020-02-24

## 2019-10-02 RX ORDER — LACTOBACILLUS RHAMNOSUS GG 10B CELL
1 CAPSULE ORAL DAILY
Qty: 30 CAPSULE | Refills: 0 | Status: SHIPPED | OUTPATIENT
Start: 2019-10-02

## 2019-10-02 RX ORDER — HEPARIN SODIUM (PORCINE) LOCK FLUSH IV SOLN 100 UNIT/ML 100 UNIT/ML
300 SOLUTION INTRAVENOUS PRN
Status: DISCONTINUED | OUTPATIENT
Start: 2019-10-02 | End: 2019-10-02 | Stop reason: HOSPADM

## 2019-10-02 RX ADMIN — CHOLESTYRAMINE 4 G: 4 POWDER, FOR SUSPENSION ORAL at 09:28

## 2019-10-02 RX ADMIN — BUMETANIDE 0.5 MG: 0.25 INJECTION INTRAMUSCULAR; INTRAVENOUS at 12:50

## 2019-10-02 RX ADMIN — Medication 300 UNITS: at 16:35

## 2019-10-02 RX ADMIN — POTASSIUM CHLORIDE 20 MEQ: 20 TABLET, EXTENDED RELEASE ORAL at 09:28

## 2019-10-02 RX ADMIN — SODIUM CHLORIDE: 9 INJECTION, SOLUTION INTRAVENOUS at 09:29

## 2019-10-02 RX ADMIN — POTASSIUM CHLORIDE AND SODIUM CHLORIDE: 900; 150 INJECTION, SOLUTION INTRAVENOUS at 02:24

## 2019-10-02 RX ADMIN — INFLUENZA VACCINE, ADJUVANTED 0.5 ML: 15; 15; 15 INJECTION, SUSPENSION INTRAMUSCULAR at 16:26

## 2019-10-02 RX ADMIN — Medication 10 ML: at 16:27

## 2019-10-02 ASSESSMENT — PAIN SCALES - GENERAL
PAINLEVEL_OUTOF10: 0
PAINLEVEL_OUTOF10: 0

## 2019-10-03 LAB
GIARDIA ANTIGEN STOOL: NORMAL
ORGANISM: ABNORMAL
URINE CULTURE, ROUTINE: ABNORMAL
URINE CULTURE, ROUTINE: ABNORMAL

## 2019-10-03 NOTE — PROGRESS NOTES
patient showed up here at the infusion center said she was told she needed labs today. We have no lab orders for her for today. Called to Dr. Wood Mon office spoke with Vasile Slater she said they do not need any labs today patient had labs yesterday they can see in Epic she said she will call the patient and they are going to put her on a medication and see her in the office next week and then will fax us new lab orders after they see the patient.

## 2019-10-04 ENCOUNTER — HOSPITAL ENCOUNTER (OUTPATIENT)
Age: 79
Discharge: HOME | End: 2019-10-06

## 2019-10-04 LAB — CULTURE, STOOL: NORMAL

## 2019-10-04 PROCEDURE — 88305 TISSUE EXAM BY PATHOLOGIST: CPT

## 2019-10-04 PROCEDURE — 87081 CULTURE SCREEN ONLY: CPT

## 2019-10-05 LAB — CLOTEST: NORMAL

## 2019-10-07 LAB
Lab: NORMAL
Lab: NORMAL
REPORT: NORMAL
REPORT: NORMAL
THIS TEST SENT TO: NORMAL
THIS TEST SENT TO: NORMAL

## 2019-10-17 ENCOUNTER — HOSPITAL ENCOUNTER (OUTPATIENT)
Age: 79
Discharge: HOME | End: 2019-10-19

## 2019-10-17 PROCEDURE — 88305 TISSUE EXAM BY PATHOLOGIST: CPT

## 2019-10-21 ENCOUNTER — HOSPITAL ENCOUNTER (OUTPATIENT)
Dept: INFUSION THERAPY | Age: 79
Setting detail: INFUSION SERIES
Discharge: HOME | End: 2019-10-21
Payer: MEDICARE

## 2019-10-21 VITALS
OXYGEN SATURATION: 100 % | TEMPERATURE: 97.8 F | HEIGHT: 60 IN | RESPIRATION RATE: 16 BRPM | HEART RATE: 85 BPM | BODY MASS INDEX: 35.53 KG/M2 | WEIGHT: 181 LBS

## 2019-10-21 DIAGNOSIS — C54.9 MALIGNANT NEOPLASM OF BODY OF UTERUS, UNSPECIFIED SITE (HCC): Primary | ICD-10-CM

## 2019-10-21 LAB
BUN BLDV-MCNC: 19 MG/DL (ref 8–23)
CA 125: 19.5 U/ML (ref 0–35)
CREAT SERPL-MCNC: 1.1 MG/DL (ref 0.5–1)
CREATININE URINE: 33 MG/DL (ref 29–226)
GFR AFRICAN AMERICAN: 58
GFR NON-AFRICAN AMERICAN: 48 ML/MIN/1.73
PROTEIN PROTEIN: <4 MG/DL (ref 0–12)
PROTEIN/CREAT RATIO: 0.1
PROTEIN/CREAT RATIO: 0.1 (ref 0–0.2)

## 2019-10-21 PROCEDURE — 86304 IMMUNOASSAY TUMOR CA 125: CPT

## 2019-10-21 PROCEDURE — 84156 ASSAY OF PROTEIN URINE: CPT

## 2019-10-21 PROCEDURE — 36415 COLL VENOUS BLD VENIPUNCTURE: CPT

## 2019-10-21 PROCEDURE — 2580000003 HC RX 258: Performed by: NURSE PRACTITIONER

## 2019-10-21 PROCEDURE — 36591 DRAW BLOOD OFF VENOUS DEVICE: CPT

## 2019-10-21 PROCEDURE — 84520 ASSAY OF UREA NITROGEN: CPT

## 2019-10-21 PROCEDURE — 82570 ASSAY OF URINE CREATININE: CPT

## 2019-10-21 PROCEDURE — 82565 ASSAY OF CREATININE: CPT

## 2019-10-21 PROCEDURE — 6360000002 HC RX W HCPCS: Performed by: NURSE PRACTITIONER

## 2019-10-21 RX ORDER — SODIUM CHLORIDE 0.9 % (FLUSH) 0.9 %
10 SYRINGE (ML) INJECTION PRN
Status: CANCELLED
Start: 2019-10-21

## 2019-10-21 RX ORDER — SODIUM CHLORIDE 0.9 % (FLUSH) 0.9 %
10 SYRINGE (ML) INJECTION PRN
Status: DISCONTINUED | OUTPATIENT
Start: 2019-10-21 | End: 2019-10-22 | Stop reason: HOSPADM

## 2019-10-21 RX ADMIN — Medication 10 ML: at 10:55

## 2019-10-21 RX ADMIN — Medication 10 ML: at 10:56

## 2019-10-21 RX ADMIN — Medication 10 ML: at 10:58

## 2019-10-21 RX ADMIN — Medication 10 ML: at 12:23

## 2019-10-21 RX ADMIN — Medication 500 UNITS: at 11:00

## 2019-10-21 RX ADMIN — Medication 10 ML: at 12:26

## 2019-10-21 RX ADMIN — Medication 10 ML: at 10:59

## 2019-10-21 RX ADMIN — Medication 500 UNITS: at 12:26

## 2019-10-21 RX ADMIN — Medication 10 ML: at 11:20

## 2019-10-21 RX ADMIN — Medication 10 ML: at 10:57

## 2019-10-21 RX ADMIN — Medication 10 ML: at 11:00

## 2019-10-21 RX ADMIN — Medication 10 ML: at 12:25

## 2019-10-21 ASSESSMENT — PAIN SCALES - GENERAL: PAINLEVEL_OUTOF10: 0

## 2019-10-21 NOTE — PROGRESS NOTES
reattempted to obtain labs but no blood return still.  Will call office for further instructions and possible cathflow

## 2019-10-21 NOTE — PROGRESS NOTES
Pt came in for port access and lab draw. Port accessed and initially got small blood return and then none at all. Flushed with saline numerous times. Flushes easily but no return.

## 2019-10-21 NOTE — PROGRESS NOTES
Patient tolerated port flush well. Therapy plan reviewed with patient. Verbalizes understanding. Declines copy of AVS and any medication information, verbalizes good knowledge of current plan, and has no signs or symptoms to report at this time. Next appointment made. Line flushed easily but no blood return called for cath liu orders after attempting to reposition patient make her cough and flushed with heparin , Received order for the cath flow and before mixing tried once more flushing and  got blood return labs obtained and line was flushed.  Cath liu not used

## 2019-11-06 ENCOUNTER — HOSPITAL ENCOUNTER (OUTPATIENT)
Dept: INFUSION THERAPY | Age: 79
Discharge: HOME | End: 2019-11-06

## 2019-11-13 ENCOUNTER — HOSPITAL ENCOUNTER (OUTPATIENT)
Dept: INFUSION THERAPY | Age: 79
Setting detail: INFUSION SERIES
Discharge: HOME | End: 2019-11-13
Payer: MEDICARE

## 2019-11-13 VITALS
WEIGHT: 181 LBS | BODY MASS INDEX: 35.35 KG/M2 | TEMPERATURE: 97.7 F | DIASTOLIC BLOOD PRESSURE: 65 MMHG | OXYGEN SATURATION: 99 % | RESPIRATION RATE: 16 BRPM | HEART RATE: 82 BPM | SYSTOLIC BLOOD PRESSURE: 141 MMHG

## 2019-11-13 DIAGNOSIS — C54.9 MALIGNANT NEOPLASM OF BODY OF UTERUS, UNSPECIFIED SITE (HCC): Primary | ICD-10-CM

## 2019-11-13 LAB
BUN BLDV-MCNC: 27 MG/DL (ref 8–23)
CA 125: 17 U/ML (ref 0–35)
CREAT SERPL-MCNC: 1.1 MG/DL (ref 0.5–1)
CREATININE URINE: 103 MG/DL (ref 29–226)
GFR AFRICAN AMERICAN: 58
GFR NON-AFRICAN AMERICAN: 48 ML/MIN/1.73
PROTEIN PROTEIN: 10 MG/DL (ref 0–12)
PROTEIN/CREAT RATIO: 0.1
PROTEIN/CREAT RATIO: 0.1 (ref 0–0.2)

## 2019-11-13 PROCEDURE — 86304 IMMUNOASSAY TUMOR CA 125: CPT

## 2019-11-13 PROCEDURE — 2580000003 HC RX 258: Performed by: NURSE PRACTITIONER

## 2019-11-13 PROCEDURE — 82565 ASSAY OF CREATININE: CPT

## 2019-11-13 PROCEDURE — 82570 ASSAY OF URINE CREATININE: CPT

## 2019-11-13 PROCEDURE — 6360000002 HC RX W HCPCS: Performed by: NURSE PRACTITIONER

## 2019-11-13 PROCEDURE — 36415 COLL VENOUS BLD VENIPUNCTURE: CPT

## 2019-11-13 PROCEDURE — 84520 ASSAY OF UREA NITROGEN: CPT

## 2019-11-13 PROCEDURE — 84156 ASSAY OF PROTEIN URINE: CPT

## 2019-11-13 PROCEDURE — 36591 DRAW BLOOD OFF VENOUS DEVICE: CPT

## 2019-11-13 RX ORDER — SODIUM CHLORIDE 0.9 % (FLUSH) 0.9 %
10 SYRINGE (ML) INJECTION PRN
Status: DISCONTINUED | OUTPATIENT
Start: 2019-11-13 | End: 2019-11-14 | Stop reason: HOSPADM

## 2019-11-13 RX ORDER — SODIUM CHLORIDE 0.9 % (FLUSH) 0.9 %
10 SYRINGE (ML) INJECTION PRN
Status: CANCELLED
Start: 2019-11-13

## 2019-11-13 RX ADMIN — Medication 10 ML: at 10:06

## 2019-11-13 RX ADMIN — Medication 10 ML: at 10:09

## 2019-11-13 RX ADMIN — Medication 10 ML: at 10:08

## 2019-11-13 RX ADMIN — Medication 500 UNITS: at 10:10

## 2019-11-13 ASSESSMENT — PAIN DESCRIPTION - FREQUENCY: FREQUENCY: INTERMITTENT

## 2019-11-13 ASSESSMENT — PAIN DESCRIPTION - LOCATION: LOCATION: FOOT;LEG;KNEE

## 2019-11-13 ASSESSMENT — PAIN DESCRIPTION - ORIENTATION: ORIENTATION: RIGHT;LEFT

## 2019-11-13 ASSESSMENT — PAIN SCALES - GENERAL: PAINLEVEL_OUTOF10: 0

## 2019-11-13 ASSESSMENT — PAIN DESCRIPTION - DESCRIPTORS: DESCRIPTORS: NUMBNESS

## 2019-11-29 ENCOUNTER — OFFICE VISIT (OUTPATIENT)
Dept: PRIMARY CARE CLINIC | Age: 79
End: 2019-11-29
Payer: MEDICARE

## 2019-11-29 VITALS
SYSTOLIC BLOOD PRESSURE: 112 MMHG | OXYGEN SATURATION: 98 % | WEIGHT: 176 LBS | HEIGHT: 60 IN | BODY MASS INDEX: 34.55 KG/M2 | TEMPERATURE: 98.1 F | HEART RATE: 79 BPM | RESPIRATION RATE: 18 BRPM | DIASTOLIC BLOOD PRESSURE: 68 MMHG

## 2019-11-29 DIAGNOSIS — Z23 NEED FOR PNEUMOCOCCAL VACCINE: ICD-10-CM

## 2019-11-29 DIAGNOSIS — E78.2 MIXED HYPERLIPIDEMIA: ICD-10-CM

## 2019-11-29 DIAGNOSIS — C55 MALIGNANT NEOPLASM OF UTERUS, UNSPECIFIED SITE (HCC): Primary | ICD-10-CM

## 2019-11-29 DIAGNOSIS — N18.30 CHRONIC KIDNEY DISEASE, STAGE 3 (MODERATE): ICD-10-CM

## 2019-11-29 DIAGNOSIS — Z78.0 ASYMPTOMATIC MENOPAUSAL STATE: ICD-10-CM

## 2019-11-29 PROCEDURE — 99213 OFFICE O/P EST LOW 20 MIN: CPT | Performed by: FAMILY MEDICINE

## 2019-11-29 PROCEDURE — G8400 PT W/DXA NO RESULTS DOC: HCPCS | Performed by: FAMILY MEDICINE

## 2019-11-29 PROCEDURE — G8482 FLU IMMUNIZE ORDER/ADMIN: HCPCS | Performed by: FAMILY MEDICINE

## 2019-11-29 PROCEDURE — G0009 ADMIN PNEUMOCOCCAL VACCINE: HCPCS | Performed by: FAMILY MEDICINE

## 2019-11-29 PROCEDURE — 1123F ACP DISCUSS/DSCN MKR DOCD: CPT | Performed by: FAMILY MEDICINE

## 2019-11-29 PROCEDURE — 1036F TOBACCO NON-USER: CPT | Performed by: FAMILY MEDICINE

## 2019-11-29 PROCEDURE — G8427 DOCREV CUR MEDS BY ELIG CLIN: HCPCS | Performed by: FAMILY MEDICINE

## 2019-11-29 PROCEDURE — 90732 PPSV23 VACC 2 YRS+ SUBQ/IM: CPT | Performed by: FAMILY MEDICINE

## 2019-11-29 PROCEDURE — G8417 CALC BMI ABV UP PARAM F/U: HCPCS | Performed by: FAMILY MEDICINE

## 2019-11-29 PROCEDURE — 1090F PRES/ABSN URINE INCON ASSESS: CPT | Performed by: FAMILY MEDICINE

## 2019-11-29 PROCEDURE — 4040F PNEUMOC VAC/ADMIN/RCVD: CPT | Performed by: FAMILY MEDICINE

## 2019-11-29 RX ORDER — FOLIC ACID 1 MG/1
1 TABLET ORAL DAILY
Qty: 90 TABLET | Refills: 1 | Status: SHIPPED | OUTPATIENT
Start: 2019-11-29 | End: 2019-11-29 | Stop reason: SDUPTHER

## 2019-11-29 RX ORDER — EZETIMIBE 10 MG/1
10 TABLET ORAL
Qty: 90 TABLET | Refills: 1 | Status: SHIPPED | OUTPATIENT
Start: 2019-11-29 | End: 2019-11-29 | Stop reason: SDUPTHER

## 2019-11-29 RX ORDER — EZETIMIBE 10 MG/1
10 TABLET ORAL
Qty: 90 TABLET | Refills: 1 | Status: SHIPPED | OUTPATIENT
Start: 2019-11-29 | End: 2020-04-14 | Stop reason: SDUPTHER

## 2019-11-29 RX ORDER — FOLIC ACID 1 MG/1
1 TABLET ORAL DAILY
Qty: 90 TABLET | Refills: 1 | Status: SHIPPED | OUTPATIENT
Start: 2019-11-29 | End: 2020-05-22 | Stop reason: SDUPTHER

## 2019-11-29 RX ORDER — FOLIC ACID 1 MG/1
1 TABLET ORAL DAILY
Qty: 90 TABLET | Refills: 1 | Status: CANCELLED | OUTPATIENT
Start: 2019-11-29

## 2019-11-29 RX ORDER — HYOSCYAMINE SULFATE 0.125 MG
0.12 TABLET ORAL DAILY
COMMUNITY
End: 2022-02-11 | Stop reason: ALTCHOICE

## 2019-11-29 RX ORDER — EZETIMIBE 10 MG/1
10 TABLET ORAL
Qty: 90 TABLET | Refills: 1 | Status: CANCELLED | OUTPATIENT
Start: 2019-11-29

## 2019-11-29 ASSESSMENT — ENCOUNTER SYMPTOMS
PHOTOPHOBIA: 0
NAUSEA: 0
DIARRHEA: 0
BLOOD IN STOOL: 0
ABDOMINAL PAIN: 0
BACK PAIN: 0
SHORTNESS OF BREATH: 0
CONSTIPATION: 0
SORE THROAT: 0
VOMITING: 0
COUGH: 0

## 2019-12-05 ENCOUNTER — HOSPITAL ENCOUNTER (OUTPATIENT)
Dept: INFUSION THERAPY | Age: 79
Setting detail: INFUSION SERIES
Discharge: HOME | End: 2019-12-05
Payer: MEDICARE

## 2019-12-05 VITALS
TEMPERATURE: 97.6 F | WEIGHT: 176 LBS | HEIGHT: 60 IN | RESPIRATION RATE: 16 BRPM | DIASTOLIC BLOOD PRESSURE: 63 MMHG | OXYGEN SATURATION: 98 % | BODY MASS INDEX: 34.55 KG/M2 | HEART RATE: 85 BPM | SYSTOLIC BLOOD PRESSURE: 148 MMHG

## 2019-12-05 DIAGNOSIS — C55 MALIGNANT NEOPLASM OF UTERUS, UNSPECIFIED SITE (HCC): Primary | ICD-10-CM

## 2019-12-05 LAB
BUN BLDV-MCNC: 30 MG/DL (ref 8–23)
CA 125: 15.2 U/ML (ref 0–35)
CREAT SERPL-MCNC: 1.2 MG/DL (ref 0.5–1)
CREATININE URINE: 76 MG/DL (ref 29–226)
GFR AFRICAN AMERICAN: 52
GFR NON-AFRICAN AMERICAN: 43 ML/MIN/1.73
PROTEIN PROTEIN: 8 MG/DL (ref 0–12)
PROTEIN/CREAT RATIO: 0.1
PROTEIN/CREAT RATIO: 0.1 (ref 0–0.2)

## 2019-12-05 PROCEDURE — 36415 COLL VENOUS BLD VENIPUNCTURE: CPT

## 2019-12-05 PROCEDURE — 36591 DRAW BLOOD OFF VENOUS DEVICE: CPT

## 2019-12-05 PROCEDURE — 86304 IMMUNOASSAY TUMOR CA 125: CPT

## 2019-12-05 PROCEDURE — 84520 ASSAY OF UREA NITROGEN: CPT

## 2019-12-05 PROCEDURE — 2580000003 HC RX 258: Performed by: NURSE PRACTITIONER

## 2019-12-05 PROCEDURE — 6360000002 HC RX W HCPCS: Performed by: NURSE PRACTITIONER

## 2019-12-05 PROCEDURE — 82570 ASSAY OF URINE CREATININE: CPT

## 2019-12-05 PROCEDURE — 82565 ASSAY OF CREATININE: CPT

## 2019-12-05 PROCEDURE — 84156 ASSAY OF PROTEIN URINE: CPT

## 2019-12-05 RX ORDER — SODIUM CHLORIDE 0.9 % (FLUSH) 0.9 %
10 SYRINGE (ML) INJECTION PRN
Status: CANCELLED
Start: 2019-12-05

## 2019-12-05 RX ORDER — SODIUM CHLORIDE 0.9 % (FLUSH) 0.9 %
10 SYRINGE (ML) INJECTION PRN
Status: DISCONTINUED | OUTPATIENT
Start: 2019-12-05 | End: 2019-12-06 | Stop reason: HOSPADM

## 2019-12-05 RX ADMIN — Medication 10 ML: at 09:07

## 2019-12-05 RX ADMIN — Medication 10 ML: at 09:06

## 2019-12-05 RX ADMIN — Medication 500 UNITS: at 09:08

## 2019-12-05 RX ADMIN — Medication 10 ML: at 09:08

## 2019-12-05 NOTE — PROGRESS NOTES
Patient tolerated port access and flush well. Therapy plan reviewed with patient. Verbalizes understanding. Declines copy of AVS and any medication information, verbalizes good knowledge of current plan, and has no signs or symptoms to report at this time. Next appointment made.

## 2019-12-19 DIAGNOSIS — Z78.0 ASYMPTOMATIC MENOPAUSAL STATE: ICD-10-CM

## 2019-12-26 ENCOUNTER — HOSPITAL ENCOUNTER (OUTPATIENT)
Dept: INFUSION THERAPY | Age: 79
Setting detail: INFUSION SERIES
Discharge: HOME | End: 2019-12-26
Payer: MEDICARE

## 2019-12-26 VITALS
HEART RATE: 84 BPM | OXYGEN SATURATION: 99 % | SYSTOLIC BLOOD PRESSURE: 132 MMHG | TEMPERATURE: 97.9 F | DIASTOLIC BLOOD PRESSURE: 60 MMHG | HEIGHT: 60 IN | BODY MASS INDEX: 34.55 KG/M2 | WEIGHT: 176 LBS

## 2019-12-26 DIAGNOSIS — C55 MALIGNANT NEOPLASM OF UTERUS, UNSPECIFIED SITE (HCC): Primary | ICD-10-CM

## 2019-12-26 LAB
BUN BLDV-MCNC: 41 MG/DL (ref 8–23)
CA 125: 15.2 U/ML (ref 0–35)
CREAT SERPL-MCNC: 1.4 MG/DL (ref 0.5–1)
CREATININE URINE: 132 MG/DL (ref 29–226)
GFR AFRICAN AMERICAN: 44
GFR NON-AFRICAN AMERICAN: 36 ML/MIN/1.73
PROTEIN PROTEIN: 9 MG/DL (ref 0–12)
PROTEIN/CREAT RATIO: 0.1
PROTEIN/CREAT RATIO: 0.1 (ref 0–0.2)

## 2019-12-26 PROCEDURE — 36415 COLL VENOUS BLD VENIPUNCTURE: CPT

## 2019-12-26 PROCEDURE — 84520 ASSAY OF UREA NITROGEN: CPT

## 2019-12-26 PROCEDURE — 82570 ASSAY OF URINE CREATININE: CPT

## 2019-12-26 PROCEDURE — 6360000002 HC RX W HCPCS: Performed by: NURSE PRACTITIONER

## 2019-12-26 PROCEDURE — 2580000003 HC RX 258: Performed by: NURSE PRACTITIONER

## 2019-12-26 PROCEDURE — 84156 ASSAY OF PROTEIN URINE: CPT

## 2019-12-26 PROCEDURE — 86304 IMMUNOASSAY TUMOR CA 125: CPT

## 2019-12-26 PROCEDURE — 82565 ASSAY OF CREATININE: CPT

## 2019-12-26 PROCEDURE — 36591 DRAW BLOOD OFF VENOUS DEVICE: CPT

## 2019-12-26 RX ORDER — SODIUM CHLORIDE 0.9 % (FLUSH) 0.9 %
10 SYRINGE (ML) INJECTION PRN
Status: DISCONTINUED | OUTPATIENT
Start: 2019-12-26 | End: 2019-12-27 | Stop reason: HOSPADM

## 2019-12-26 RX ORDER — SODIUM CHLORIDE 0.9 % (FLUSH) 0.9 %
10 SYRINGE (ML) INJECTION PRN
Status: CANCELLED
Start: 2019-12-26

## 2019-12-26 RX ADMIN — SODIUM CHLORIDE, PRESERVATIVE FREE 10 ML: 5 INJECTION INTRAVENOUS at 09:00

## 2019-12-26 RX ADMIN — SODIUM CHLORIDE, PRESERVATIVE FREE 10 ML: 5 INJECTION INTRAVENOUS at 09:01

## 2019-12-26 RX ADMIN — SODIUM CHLORIDE, PRESERVATIVE FREE 10 ML: 5 INJECTION INTRAVENOUS at 08:58

## 2019-12-26 RX ADMIN — Medication 500 UNITS: at 09:01

## 2019-12-26 ASSESSMENT — PAIN SCALES - GENERAL: PAINLEVEL_OUTOF10: 0

## 2020-01-17 ENCOUNTER — HOSPITAL ENCOUNTER (OUTPATIENT)
Dept: INFUSION THERAPY | Age: 80
Setting detail: INFUSION SERIES
Discharge: HOME | End: 2020-01-17
Payer: MEDICARE

## 2020-01-17 VITALS
TEMPERATURE: 97.7 F | OXYGEN SATURATION: 98 % | RESPIRATION RATE: 16 BRPM | WEIGHT: 176 LBS | HEIGHT: 60 IN | HEART RATE: 87 BPM | BODY MASS INDEX: 34.55 KG/M2 | DIASTOLIC BLOOD PRESSURE: 70 MMHG | SYSTOLIC BLOOD PRESSURE: 152 MMHG

## 2020-01-17 DIAGNOSIS — C55 MALIGNANT NEOPLASM OF UTERUS, UNSPECIFIED SITE (HCC): Primary | ICD-10-CM

## 2020-01-17 LAB
BUN BLDV-MCNC: 28 MG/DL (ref 8–23)
CA 125: 14.7 U/ML (ref 0–35)
CREAT SERPL-MCNC: 1.2 MG/DL (ref 0.5–1)
CREATININE URINE: 113 MG/DL (ref 29–226)
GFR AFRICAN AMERICAN: 52
GFR NON-AFRICAN AMERICAN: 43 ML/MIN/1.73
PROTEIN PROTEIN: 10 MG/DL (ref 0–12)
PROTEIN/CREAT RATIO: 0.1
PROTEIN/CREAT RATIO: 0.1 (ref 0–0.2)

## 2020-01-17 PROCEDURE — 6360000002 HC RX W HCPCS: Performed by: NURSE PRACTITIONER

## 2020-01-17 PROCEDURE — 84520 ASSAY OF UREA NITROGEN: CPT

## 2020-01-17 PROCEDURE — 82570 ASSAY OF URINE CREATININE: CPT

## 2020-01-17 PROCEDURE — 84156 ASSAY OF PROTEIN URINE: CPT

## 2020-01-17 PROCEDURE — 86304 IMMUNOASSAY TUMOR CA 125: CPT

## 2020-01-17 PROCEDURE — 36415 COLL VENOUS BLD VENIPUNCTURE: CPT

## 2020-01-17 PROCEDURE — 2580000003 HC RX 258: Performed by: NURSE PRACTITIONER

## 2020-01-17 PROCEDURE — 36591 DRAW BLOOD OFF VENOUS DEVICE: CPT

## 2020-01-17 PROCEDURE — 82565 ASSAY OF CREATININE: CPT

## 2020-01-17 RX ORDER — SODIUM CHLORIDE 0.9 % (FLUSH) 0.9 %
10 SYRINGE (ML) INJECTION PRN
Status: DISCONTINUED | OUTPATIENT
Start: 2020-01-17 | End: 2020-01-18 | Stop reason: HOSPADM

## 2020-01-17 RX ORDER — SODIUM CHLORIDE 0.9 % (FLUSH) 0.9 %
10 SYRINGE (ML) INJECTION PRN
Status: CANCELLED
Start: 2020-01-17

## 2020-01-17 RX ADMIN — Medication 10 ML: at 10:16

## 2020-01-17 RX ADMIN — Medication 10 ML: at 10:45

## 2020-01-17 RX ADMIN — Medication 10 ML: at 10:50

## 2020-01-17 RX ADMIN — Medication 10 ML: at 10:47

## 2020-01-17 RX ADMIN — Medication 10 ML: at 10:15

## 2020-01-17 RX ADMIN — Medication 10 ML: at 10:46

## 2020-01-17 RX ADMIN — Medication 500 UNITS: at 10:55

## 2020-01-17 RX ADMIN — Medication 10 ML: at 10:17

## 2020-01-31 ENCOUNTER — TELEPHONE (OUTPATIENT)
Dept: PRIMARY CARE CLINIC | Age: 80
End: 2020-01-31

## 2020-01-31 NOTE — TELEPHONE ENCOUNTER
Patient calling requesting a referral to Dr. Rubi Sanabria at [facility] for bilateral knees

## 2020-02-06 ENCOUNTER — HOSPITAL ENCOUNTER (OUTPATIENT)
Dept: INFUSION THERAPY | Age: 80
Setting detail: INFUSION SERIES
Discharge: HOME | End: 2020-02-06
Payer: MEDICARE

## 2020-02-06 VITALS
OXYGEN SATURATION: 99 % | SYSTOLIC BLOOD PRESSURE: 148 MMHG | RESPIRATION RATE: 16 BRPM | HEART RATE: 78 BPM | WEIGHT: 176 LBS | DIASTOLIC BLOOD PRESSURE: 67 MMHG | TEMPERATURE: 98.1 F | HEIGHT: 60 IN | BODY MASS INDEX: 34.55 KG/M2

## 2020-02-06 DIAGNOSIS — C55 MALIGNANT NEOPLASM OF UTERUS, UNSPECIFIED SITE (HCC): Primary | ICD-10-CM

## 2020-02-06 LAB
BACTERIA: NORMAL /HPF
BILIRUBIN URINE: NEGATIVE
BLOOD, URINE: NEGATIVE
BUN BLDV-MCNC: 34 MG/DL (ref 8–23)
CA 125: 15.6 U/ML (ref 0–35)
CLARITY: CLEAR
COLOR: YELLOW
CREAT SERPL-MCNC: 1.2 MG/DL (ref 0.5–1)
EPITHELIAL CELLS, UA: NORMAL /HPF
GFR AFRICAN AMERICAN: 52
GFR NON-AFRICAN AMERICAN: 43 ML/MIN/1.73
GLUCOSE URINE: NEGATIVE MG/DL
KETONES, URINE: NEGATIVE MG/DL
LEUKOCYTE ESTERASE, URINE: ABNORMAL
NITRITE, URINE: NEGATIVE
PH UA: 5.5 (ref 5–9)
PROTEIN UA: NEGATIVE MG/DL
RBC UA: NORMAL /HPF (ref 0–2)
SPECIFIC GRAVITY UA: 1.02 (ref 1–1.03)
UROBILINOGEN, URINE: 0.2 E.U./DL
WBC UA: NORMAL /HPF (ref 0–5)

## 2020-02-06 PROCEDURE — 82565 ASSAY OF CREATININE: CPT

## 2020-02-06 PROCEDURE — 36415 COLL VENOUS BLD VENIPUNCTURE: CPT

## 2020-02-06 PROCEDURE — 2580000003 HC RX 258: Performed by: NURSE PRACTITIONER

## 2020-02-06 PROCEDURE — 86304 IMMUNOASSAY TUMOR CA 125: CPT

## 2020-02-06 PROCEDURE — 81001 URINALYSIS AUTO W/SCOPE: CPT

## 2020-02-06 PROCEDURE — 36591 DRAW BLOOD OFF VENOUS DEVICE: CPT

## 2020-02-06 PROCEDURE — 84520 ASSAY OF UREA NITROGEN: CPT

## 2020-02-06 PROCEDURE — 6360000002 HC RX W HCPCS: Performed by: OBSTETRICS & GYNECOLOGY

## 2020-02-06 RX ORDER — SODIUM CHLORIDE 0.9 % (FLUSH) 0.9 %
10 SYRINGE (ML) INJECTION PRN
Status: DISCONTINUED | OUTPATIENT
Start: 2020-02-06 | End: 2020-02-07 | Stop reason: HOSPADM

## 2020-02-06 RX ORDER — SODIUM CHLORIDE 0.9 % (FLUSH) 0.9 %
10 SYRINGE (ML) INJECTION PRN
Status: CANCELLED
Start: 2020-02-06

## 2020-02-06 RX ORDER — HEPARIN SODIUM (PORCINE) LOCK FLUSH IV SOLN 100 UNIT/ML 100 UNIT/ML
500 SOLUTION INTRAVENOUS PRN
Status: DISCONTINUED | OUTPATIENT
Start: 2020-02-06 | End: 2020-02-07 | Stop reason: HOSPADM

## 2020-02-06 RX ADMIN — Medication 10 ML: at 10:11

## 2020-02-06 RX ADMIN — Medication 10 ML: at 10:10

## 2020-02-06 RX ADMIN — Medication 10 ML: at 10:12

## 2020-02-06 RX ADMIN — Medication 10 ML: at 10:14

## 2020-02-06 RX ADMIN — Medication 500 UNITS: at 10:14

## 2020-02-14 ENCOUNTER — OFFICE VISIT (OUTPATIENT)
Dept: PRIMARY CARE CLINIC | Age: 80
End: 2020-02-14
Payer: MEDICARE

## 2020-02-14 VITALS
HEART RATE: 81 BPM | BODY MASS INDEX: 33.28 KG/M2 | WEIGHT: 170.4 LBS | TEMPERATURE: 97.5 F | DIASTOLIC BLOOD PRESSURE: 76 MMHG | SYSTOLIC BLOOD PRESSURE: 124 MMHG

## 2020-02-14 PROCEDURE — G8417 CALC BMI ABV UP PARAM F/U: HCPCS | Performed by: FAMILY MEDICINE

## 2020-02-14 PROCEDURE — 1090F PRES/ABSN URINE INCON ASSESS: CPT | Performed by: FAMILY MEDICINE

## 2020-02-14 PROCEDURE — 1123F ACP DISCUSS/DSCN MKR DOCD: CPT | Performed by: FAMILY MEDICINE

## 2020-02-14 PROCEDURE — G8399 PT W/DXA RESULTS DOCUMENT: HCPCS | Performed by: FAMILY MEDICINE

## 2020-02-14 PROCEDURE — 99213 OFFICE O/P EST LOW 20 MIN: CPT | Performed by: FAMILY MEDICINE

## 2020-02-14 PROCEDURE — G8427 DOCREV CUR MEDS BY ELIG CLIN: HCPCS | Performed by: FAMILY MEDICINE

## 2020-02-14 PROCEDURE — 4040F PNEUMOC VAC/ADMIN/RCVD: CPT | Performed by: FAMILY MEDICINE

## 2020-02-14 PROCEDURE — 1036F TOBACCO NON-USER: CPT | Performed by: FAMILY MEDICINE

## 2020-02-14 PROCEDURE — G8482 FLU IMMUNIZE ORDER/ADMIN: HCPCS | Performed by: FAMILY MEDICINE

## 2020-02-14 ASSESSMENT — ENCOUNTER SYMPTOMS
PHOTOPHOBIA: 0
SORE THROAT: 0
SHORTNESS OF BREATH: 0
NAUSEA: 0
BACK PAIN: 0
ABDOMINAL PAIN: 0
BLOOD IN STOOL: 0
CONSTIPATION: 0
VOMITING: 0
DIARRHEA: 0
COUGH: 0

## 2020-02-14 ASSESSMENT — PATIENT HEALTH QUESTIONNAIRE - PHQ9
SUM OF ALL RESPONSES TO PHQ QUESTIONS 1-9: 0
SUM OF ALL RESPONSES TO PHQ QUESTIONS 1-9: 0
1. LITTLE INTEREST OR PLEASURE IN DOING THINGS: 0
SUM OF ALL RESPONSES TO PHQ9 QUESTIONS 1 & 2: 0
2. FEELING DOWN, DEPRESSED OR HOPELESS: 0

## 2020-02-14 NOTE — PROGRESS NOTES
2020     Partha Estrada (:  1940) is a 78 y.o. female, here for evaluation of the following medical concerns:    HPI  Patient here to follow-up on chronic issues and recent specialist visits. Patient continues to follow with oncology for metastatic uterine cancer. Patient was recently seen by oncology in [facility] and was told that some of her tumors have actually grown in size. They initiated Avastin therapy and she is currently on her third dose of Avastin upcoming with a repeat CT and oncology visit on December 10, 2019. Further treatment will be based on those results. Patient states she is doing well in regards to the side effects from the medication. She states that she gets fatigued and visual change for 1 to 2 days post treatment which resolves to her baseline. She has been doing well otherwise with no side effects or adverse reactions to her other medications. She denies any other current issues. Update 2020  She states that she has been stable in regards to her cancer. Most recent scans show response to treatment. She is here today for preoperative clearance regarding right knee total arthroplasty. This is scheduled on 3/2/2020 with Dr. Bree Whitfield. She states she is doing well and denies any issues with chest pain or shortness of breath at this time. Blood pressure has been under control for several months without fluctuation. Patient denies any other issues currently. Review of Systems   Constitutional: Negative for chills and fever. HENT: Negative for congestion, hearing loss, nosebleeds and sore throat. Eyes: Negative for photophobia. Respiratory: Negative for cough and shortness of breath. Cardiovascular: Positive for leg swelling. Negative for chest pain and palpitations. Gastrointestinal: Negative for abdominal pain, blood in stool, constipation, diarrhea, nausea and vomiting. Endocrine: Negative for polydipsia.    Genitourinary: Positive for pelvic 4 times daily as needed  Yes Historical Provider, MD   vitamin B-12 (CYANOCOBALAMIN) 1000 MCG tablet Take 1,000 mcg by mouth daily OK TO TAKE Yes Historical Provider, MD   fish oil-omega-3 fatty acids 1000 MG capsule Take 1 g by mouth 2 times daily  Yes Historical Provider, MD   pantoprazole (PROTONIX) 40 MG tablet Take 40 mg by mouth nightly   Historical Provider, MD        Social History     Tobacco Use    Smoking status: Never Smoker    Smokeless tobacco: Never Used   Substance Use Topics    Alcohol use: No        Vitals:    02/14/20 1356   BP: 124/76   Site: Right Upper Arm   Position: Sitting   Pulse: 81   Temp: 97.5 °F (36.4 °C)   TempSrc: Temporal   Weight: 170 lb 6.4 oz (77.3 kg)     Estimated body mass index is 33.28 kg/m² as calculated from the following:    Height as of 2/6/20: 5' (1.524 m). Weight as of this encounter: 170 lb 6.4 oz (77.3 kg). Physical Exam  HENT:      Head: Normocephalic and atraumatic. Right Ear: External ear normal.      Left Ear: External ear normal.   Eyes:      General: No scleral icterus. Conjunctiva/sclera: Conjunctivae normal.      Pupils: Pupils are equal, round, and reactive to light. Neck:      Musculoskeletal: Neck supple. Thyroid: No thyromegaly. Cardiovascular:      Rate and Rhythm: Normal rate and regular rhythm. Heart sounds: Normal heart sounds. No murmur. Pulmonary:      Effort: Pulmonary effort is normal.      Breath sounds: Normal breath sounds. No rales. Abdominal:      General: Bowel sounds are normal. There is no distension. Palpations: Abdomen is soft. Tenderness: There is no abdominal tenderness. Musculoskeletal:      Comments: Walks with rolling walker. Lymphadenopathy:      Cervical: No cervical adenopathy. Skin:     General: Skin is warm and dry. Findings: No erythema or rash. Neurological:      Mental Status: She is alert and oriented to person, place, and time.       Cranial Nerves: No cranial

## 2020-02-21 ENCOUNTER — HOSPITAL ENCOUNTER (OUTPATIENT)
Dept: CT IMAGING | Age: 80
Discharge: HOME | End: 2020-02-23
Payer: MEDICARE

## 2020-02-21 PROCEDURE — 73700 CT LOWER EXTREMITY W/O DYE: CPT

## 2020-02-24 ENCOUNTER — ANESTHESIA EVENT (OUTPATIENT)
Dept: OPERATING ROOM | Age: 80
DRG: 470 | End: 2020-02-24
Payer: MEDICARE

## 2020-02-24 ENCOUNTER — HOSPITAL ENCOUNTER (OUTPATIENT)
Dept: INFUSION THERAPY | Age: 80
Setting detail: INFUSION SERIES
Discharge: HOME | End: 2020-02-24
Payer: MEDICARE

## 2020-02-24 ENCOUNTER — HOSPITAL ENCOUNTER (OUTPATIENT)
Dept: PREADMISSION TESTING | Age: 80
Discharge: HOME | End: 2020-02-24
Payer: MEDICARE

## 2020-02-24 VITALS
DIASTOLIC BLOOD PRESSURE: 62 MMHG | HEART RATE: 82 BPM | SYSTOLIC BLOOD PRESSURE: 132 MMHG | HEIGHT: 60 IN | RESPIRATION RATE: 16 BRPM | OXYGEN SATURATION: 98 % | BODY MASS INDEX: 32.98 KG/M2 | TEMPERATURE: 97.3 F | WEIGHT: 168 LBS

## 2020-02-24 DIAGNOSIS — C55 MALIGNANT NEOPLASM OF UTERUS, UNSPECIFIED SITE (HCC): Primary | ICD-10-CM

## 2020-02-24 LAB
ANION GAP SERPL CALCULATED.3IONS-SCNC: 14 MMOL/L (ref 7–16)
BUN BLDV-MCNC: 37 MG/DL (ref 8–23)
CALCIUM SERPL-MCNC: 9.4 MG/DL (ref 8.6–10.2)
CHLORIDE BLD-SCNC: 106 MMOL/L (ref 98–107)
CO2: 21 MMOL/L (ref 22–29)
CREAT SERPL-MCNC: 1.3 MG/DL (ref 0.5–1)
EKG ATRIAL RATE: 74 BPM
EKG P AXIS: 2 DEGREES
EKG P-R INTERVAL: 176 MS
EKG Q-T INTERVAL: 372 MS
EKG QRS DURATION: 76 MS
EKG QTC CALCULATION (BAZETT): 412 MS
EKG R AXIS: -25 DEGREES
EKG T AXIS: 35 DEGREES
EKG VENTRICULAR RATE: 74 BPM
GFR AFRICAN AMERICAN: 48
GFR NON-AFRICAN AMERICAN: 39 ML/MIN/1.73
GLUCOSE BLD-MCNC: 108 MG/DL (ref 74–99)
HCT VFR BLD CALC: 38.8 % (ref 34–48)
HEMOGLOBIN: 12.7 G/DL (ref 11.5–15.5)
MCH RBC QN AUTO: 31 PG (ref 26–35)
MCHC RBC AUTO-ENTMCNC: 32.7 % (ref 32–34.5)
MCV RBC AUTO: 94.6 FL (ref 80–99.9)
PDW BLD-RTO: 13.4 FL (ref 11.5–15)
PLATELET # BLD: 197 E9/L (ref 130–450)
PMV BLD AUTO: 9.7 FL (ref 7–12)
POTASSIUM SERPL-SCNC: 3.6 MMOL/L (ref 3.5–5)
RBC # BLD: 4.1 E12/L (ref 3.5–5.5)
SODIUM BLD-SCNC: 141 MMOL/L (ref 132–146)
WBC # BLD: 4.8 E9/L (ref 4.5–11.5)

## 2020-02-24 PROCEDURE — 36591 DRAW BLOOD OFF VENOUS DEVICE: CPT

## 2020-02-24 PROCEDURE — 80048 BASIC METABOLIC PNL TOTAL CA: CPT

## 2020-02-24 PROCEDURE — 85027 COMPLETE CBC AUTOMATED: CPT

## 2020-02-24 PROCEDURE — 2580000003 HC RX 258: Performed by: NURSE PRACTITIONER

## 2020-02-24 PROCEDURE — 93005 ELECTROCARDIOGRAM TRACING: CPT | Performed by: ANESTHESIOLOGY

## 2020-02-24 PROCEDURE — 36415 COLL VENOUS BLD VENIPUNCTURE: CPT

## 2020-02-24 PROCEDURE — 87081 CULTURE SCREEN ONLY: CPT

## 2020-02-24 PROCEDURE — 6360000002 HC RX W HCPCS: Performed by: OBSTETRICS & GYNECOLOGY

## 2020-02-24 PROCEDURE — 93010 ELECTROCARDIOGRAM REPORT: CPT | Performed by: INTERNAL MEDICINE

## 2020-02-24 RX ORDER — ACETAMINOPHEN 325 MG/1
325 TABLET ORAL EVERY 6 HOURS PRN
Status: ON HOLD | COMMUNITY
End: 2020-03-05 | Stop reason: HOSPADM

## 2020-02-24 RX ORDER — SODIUM CHLORIDE 0.9 % (FLUSH) 0.9 %
10 SYRINGE (ML) INJECTION PRN
Status: DISCONTINUED | OUTPATIENT
Start: 2020-02-24 | End: 2020-02-25 | Stop reason: HOSPADM

## 2020-02-24 RX ORDER — HEPARIN SODIUM (PORCINE) LOCK FLUSH IV SOLN 100 UNIT/ML 100 UNIT/ML
500 SOLUTION INTRAVENOUS PRN
Status: DISCONTINUED | OUTPATIENT
Start: 2020-02-24 | End: 2020-02-25 | Stop reason: HOSPADM

## 2020-02-24 RX ORDER — MIDAZOLAM HYDROCHLORIDE 1 MG/ML
1 INJECTION INTRAMUSCULAR; INTRAVENOUS PRN
Status: CANCELLED | OUTPATIENT
Start: 2020-03-04

## 2020-02-24 RX ORDER — ROPIVACAINE HYDROCHLORIDE 5 MG/ML
30 INJECTION, SOLUTION EPIDURAL; INFILTRATION; PERINEURAL ONCE
Status: CANCELLED | OUTPATIENT
Start: 2020-03-04

## 2020-02-24 RX ORDER — SODIUM CHLORIDE 0.9 % (FLUSH) 0.9 %
10 SYRINGE (ML) INJECTION PRN
Status: CANCELLED
Start: 2020-02-24

## 2020-02-24 RX ADMIN — Medication 10 ML: at 08:37

## 2020-02-24 RX ADMIN — Medication 500 UNITS: at 08:37

## 2020-02-24 RX ADMIN — Medication 10 ML: at 08:36

## 2020-02-24 RX ADMIN — Medication 10 ML: at 08:34

## 2020-02-24 ASSESSMENT — KOOS JR
RISING FROM SITTING: 3
STANDING UPRIGHT: 3
GOING UP OR DOWN STAIRS: 3
HOW SEVERE IS YOUR KNEE STIFFNESS AFTER FIRST WAKING IN MORNING: 2
STRAIGHTENING KNEE FULLY: 2
TWISING OR PIVOTING ON KNEE: 3
BENDING TO THE FLOOR TO PICK UP OBJECT: 3

## 2020-02-24 ASSESSMENT — PAIN DESCRIPTION - LOCATION: LOCATION: KNEE

## 2020-02-24 ASSESSMENT — PAIN DESCRIPTION - PROGRESSION: CLINICAL_PROGRESSION: GRADUALLY WORSENING

## 2020-02-24 ASSESSMENT — PAIN DESCRIPTION - ORIENTATION: ORIENTATION: RIGHT

## 2020-02-24 ASSESSMENT — PAIN DESCRIPTION - DESCRIPTORS: DESCRIPTORS: THROBBING

## 2020-02-24 ASSESSMENT — PAIN DESCRIPTION - ONSET: ONSET: ON-GOING

## 2020-02-24 ASSESSMENT — PAIN DESCRIPTION - PAIN TYPE: TYPE: CHRONIC PAIN

## 2020-02-24 ASSESSMENT — PAIN - FUNCTIONAL ASSESSMENT: PAIN_FUNCTIONAL_ASSESSMENT: PREVENTS OR INTERFERES WITH ALL ACTIVE AND SOME PASSIVE ACTIVITIES

## 2020-02-24 ASSESSMENT — PAIN DESCRIPTION - FREQUENCY: FREQUENCY: CONTINUOUS

## 2020-02-24 ASSESSMENT — PAIN SCALES - GENERAL: PAINLEVEL_OUTOF10: 2

## 2020-02-24 NOTE — PROGRESS NOTES
Faxed results of Cbc,Bmp, Ekg done today to Dr. Namita Brice office,also faxed Bmp results to Dr. Fabienne Mendoza office.

## 2020-02-24 NOTE — ANESTHESIA PRE PROCEDURE
1000 MCG tablet Take 1,000 mcg by mouth daily OK TO TAKE    Historical Provider, MD   fish oil-omega-3 fatty acids 1000 MG capsule Take 1 g by mouth 2 times daily     Historical Provider, MD       Current medications:    Current Outpatient Medications   Medication Sig Dispense Refill    Sod Fluoride-Potassium Nitrate (PREVIDENT 5000 SENSITIVE) 1.1-5 % PSTE Place onto teeth      hyoscyamine (ANASPAZ;LEVSIN) 125 MCG tablet Take 0.125 mg by mouth      folic acid (FOLVITE) 1 MG tablet Take 1 tablet by mouth daily OK TO TAKE 90 tablet 1    ezetimibe (ZETIA) 10 MG tablet Take 1 tablet by mouth Daily with supper 90 tablet 1    Bevacizumab (AVASTIN IV) Infuse intravenously      cholestyramine (QUESTRAN) 4 g packet Take 1 packet by mouth 2 times daily (with meals) 60 packet 0    lactobacillus (CULTURELLE) CAPS capsule Take 1 capsule by mouth daily 30 capsule 0    vitamin D (CHOLECALCIFEROL) 1000 UNIT TABS tablet Take 1,000 Units by mouth daily      lidocaine-prilocaine (EMLA) 2.5-2.5 % cream SANGEETA TOPICALLY AA PRN  3    magnesium gluconate (MAGONATE) 500 MG tablet as needed   0    potassium chloride (KLOR-CON M) 20 MEQ extended release tablet as needed   0    bumetanide (BUMEX) 1 MG tablet Take 1 mg by mouth three times a week mon wed fri      Biotin 5000 MCG CAPS Take 1 capsule by mouth daily       polyethyl glycol-propyl glycol 0.4-0.3 % (SYSTANE) 0.4-0.3 % ophthalmic solution Place into both eyes 4 times daily as needed       vitamin B-12 (CYANOCOBALAMIN) 1000 MCG tablet Take 1,000 mcg by mouth daily OK TO TAKE      fish oil-omega-3 fatty acids 1000 MG capsule Take 1 g by mouth 2 times daily        No current facility-administered medications for this encounter. 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

## 2020-02-25 LAB — MRSA CULTURE ONLY: NORMAL

## 2020-02-27 ENCOUNTER — APPOINTMENT (OUTPATIENT)
Dept: INFUSION THERAPY | Age: 80
End: 2020-02-27
Payer: MEDICARE

## 2020-03-03 NOTE — H&P
History and Physical  ART Comer MD        Chief Complaint     Patient is s/p injection bilateral knees 1/30/18. Patient states injections helped 2 weeks. Patient rates the pain 7/10. Patient states she would like to discuss surgery. xray today        Pain: anterior knee, medial knee  Symptoms: swelling, instability, night pain  Previous Treatment: cortisone injection, NSAIDS, physical therapy, home exercise program  Additional Comments: Cristian Quintanilla returns to the office for follow up. She has continued to have severe pain in both knees. She says the right is worse than the left. Currently pain is 7/10. Pain is worse with activity, walking, stairs and pain improves with rest. She has been taking  tylenol and motrin for pain. She continues to be treated for her uterine cancer.   She says it is controlled and had discussed surgery with her PCP and oncolgist.      Physical Exam   General Appearance:   Awake, alert, oriented x3  Well developed, well nourished  Obese  No acute distress  Eyes appear Normal    Respiratory:       Respiratory effort: non-labored    Cardiovascular:   Edema: absent      Varicosities: absent    Lymphatic/Skin:       Skin Appearance: Normal              Right Knee Exam   Skin Exam:   skin intact  Effusion:   none  Alignment:   varus  ROM:   0-100  Tenderness:   anterior knee, medial joint line  Laxity with varus/valgus stress:   <5 degrees  Gait:   antalgic  Comments:   Sensation intact to light touch L1-S1  TA/EHL/GS intact  Palpable DP, W/WP  Calf soft, non tender       Past Medical History - reviewed  Arthritis  Cancer  Bruise easily    Family History - reviewed  No pertinent family history    Surgical History - reviewed  No pertinent surgical history    Social History - reviewed  Hand dominance: right  Occupation: Retired    Risk Factors - reviewed  Patient had a flu shot in the last 12 months? yes  Date of last shot: 10/06/2019  The patient is 72 years or older and has had a pneumonia vomiting, loss of appetite, diarrhea, nausea. Genitourinary:  Patient denies urinary retention, urinary frequency, frequent UTI, urinary urgency, pain. Musculoskeletal: Positive for arthritis, joint pain. Skin:  Patient denies dryness, unusual hair distribution, suspicious lesions, psoriasis, changes in color of skin, changes in nail beds, poor wound healing. Neurologic:  Patient denies headaches, tingling, visual disturbances, weakness, seizures, memory loss, fainting, tremors, poor balance, disturbances in coordination, numbness, falling down. Psychiatric:  Patient denies anxiety, depression. Heme/Lymphatic:  Patient denies abnormal bruising. Allergic/Immunologic:  Patient denies seasonal allergies, persistent infections. Impression   1. Right knee osteoarthritis: Unchanged  2. Varus deformity, not elsewhere classified, right knee: Unchanged  3. Pain in right knee: Unchanged    Plan of Care:   I had a long discussion regarding degenerative joint disease. Operative and nonoperative treatment was discussed. Xrays show severe degenerative changes, nonoperative treatment was failed and pain has become a quality of life issue. Kendall  would like to proceed with Right Ajay Total Knee Arthroplasty. The surgery, implants, postoperative course and risks and benefits of surgery were discussed and all questions were answered. Kendall Thompson understands that wound healing may be an issue for her due to her cancer medications.   She will require medical clearance from her PCP and oncocologist.

## 2020-03-04 ENCOUNTER — APPOINTMENT (OUTPATIENT)
Dept: GENERAL RADIOLOGY | Age: 80
DRG: 470 | End: 2020-03-04
Attending: ORTHOPAEDIC SURGERY
Payer: MEDICARE

## 2020-03-04 ENCOUNTER — ANESTHESIA (OUTPATIENT)
Dept: OPERATING ROOM | Age: 80
DRG: 470 | End: 2020-03-04
Payer: MEDICARE

## 2020-03-04 ENCOUNTER — HOSPITAL ENCOUNTER (INPATIENT)
Age: 80
LOS: 1 days | Discharge: HOME HEALTH | DRG: 470 | End: 2020-03-05
Attending: ORTHOPAEDIC SURGERY | Admitting: ORTHOPAEDIC SURGERY
Payer: MEDICARE

## 2020-03-04 VITALS — TEMPERATURE: 96.4 F | OXYGEN SATURATION: 100 % | DIASTOLIC BLOOD PRESSURE: 79 MMHG | SYSTOLIC BLOOD PRESSURE: 164 MMHG

## 2020-03-04 PROBLEM — M17.11 PRIMARY OSTEOARTHRITIS OF RIGHT KNEE: Status: ACTIVE | Noted: 2020-03-04

## 2020-03-04 PROCEDURE — 3700000000 HC ANESTHESIA ATTENDED CARE: Performed by: ORTHOPAEDIC SURGERY

## 2020-03-04 PROCEDURE — 2500000003 HC RX 250 WO HCPCS: Performed by: ORTHOPAEDIC SURGERY

## 2020-03-04 PROCEDURE — 97530 THERAPEUTIC ACTIVITIES: CPT

## 2020-03-04 PROCEDURE — 2709999900 HC NON-CHARGEABLE SUPPLY: Performed by: ORTHOPAEDIC SURGERY

## 2020-03-04 PROCEDURE — 2580000003 HC RX 258: Performed by: ORTHOPAEDIC SURGERY

## 2020-03-04 PROCEDURE — 6360000002 HC RX W HCPCS: Performed by: ANESTHESIOLOGY

## 2020-03-04 PROCEDURE — 73560 X-RAY EXAM OF KNEE 1 OR 2: CPT

## 2020-03-04 PROCEDURE — 3600000015 HC SURGERY LEVEL 5 ADDTL 15MIN: Performed by: ORTHOPAEDIC SURGERY

## 2020-03-04 PROCEDURE — 6360000002 HC RX W HCPCS: Performed by: NURSE ANESTHETIST, CERTIFIED REGISTERED

## 2020-03-04 PROCEDURE — C1776 JOINT DEVICE (IMPLANTABLE): HCPCS | Performed by: ORTHOPAEDIC SURGERY

## 2020-03-04 PROCEDURE — 97165 OT EVAL LOW COMPLEX 30 MIN: CPT

## 2020-03-04 PROCEDURE — 3700000001 HC ADD 15 MINUTES (ANESTHESIA): Performed by: ORTHOPAEDIC SURGERY

## 2020-03-04 PROCEDURE — 6370000000 HC RX 637 (ALT 250 FOR IP): Performed by: ORTHOPAEDIC SURGERY

## 2020-03-04 PROCEDURE — 2500000003 HC RX 250 WO HCPCS: Performed by: NURSE ANESTHETIST, CERTIFIED REGISTERED

## 2020-03-04 PROCEDURE — 88311 DECALCIFY TISSUE: CPT

## 2020-03-04 PROCEDURE — 6360000002 HC RX W HCPCS: Performed by: ORTHOPAEDIC SURGERY

## 2020-03-04 PROCEDURE — 88305 TISSUE EXAM BY PATHOLOGIST: CPT

## 2020-03-04 PROCEDURE — 97161 PT EVAL LOW COMPLEX 20 MIN: CPT

## 2020-03-04 PROCEDURE — 97535 SELF CARE MNGMENT TRAINING: CPT

## 2020-03-04 PROCEDURE — 3E0T3BZ INTRODUCTION OF ANESTHETIC AGENT INTO PERIPHERAL NERVES AND PLEXI, PERCUTANEOUS APPROACH: ICD-10-PCS | Performed by: ANESTHESIOLOGY

## 2020-03-04 PROCEDURE — 3600000005 HC SURGERY LEVEL 5 BASE: Performed by: ORTHOPAEDIC SURGERY

## 2020-03-04 PROCEDURE — C1713 ANCHOR/SCREW BN/BN,TIS/BN: HCPCS | Performed by: ORTHOPAEDIC SURGERY

## 2020-03-04 PROCEDURE — 64447 NJX AA&/STRD FEMORAL NRV IMG: CPT | Performed by: ANESTHESIOLOGY

## 2020-03-04 PROCEDURE — 1200000000 HC SEMI PRIVATE

## 2020-03-04 PROCEDURE — 7100000001 HC PACU RECOVERY - ADDTL 15 MIN: Performed by: ORTHOPAEDIC SURGERY

## 2020-03-04 PROCEDURE — 2720000010 HC SURG SUPPLY STERILE: Performed by: ORTHOPAEDIC SURGERY

## 2020-03-04 PROCEDURE — 0SRC0J9 REPLACEMENT OF RIGHT KNEE JOINT WITH SYNTHETIC SUBSTITUTE, CEMENTED, OPEN APPROACH: ICD-10-PCS | Performed by: ORTHOPAEDIC SURGERY

## 2020-03-04 PROCEDURE — 7100000000 HC PACU RECOVERY - FIRST 15 MIN: Performed by: ORTHOPAEDIC SURGERY

## 2020-03-04 DEVICE — CEMENT BNE 20ML 41GM FULL DOSE PMMA W/ TOBRA M VISC RADPQ: Type: IMPLANTABLE DEVICE | Site: KNEE | Status: FUNCTIONAL

## 2020-03-04 DEVICE — BASEPLATE TIB SZ 3 KNEE TRITANIUM CEM PRI LO PROF TRIATHLON: Type: IMPLANTABLE DEVICE | Site: KNEE | Status: FUNCTIONAL

## 2020-03-04 DEVICE — CEMENT BNE 40 GM RADIOPAQUE BA SIMPLEX P: Type: IMPLANTABLE DEVICE | Site: KNEE | Status: FUNCTIONAL

## 2020-03-04 DEVICE — COMPONENT PAT DIA31MM THK8MM KNEE X3 SYMMETRIC TRIATHLON: Type: IMPLANTABLE DEVICE | Site: PATELLA | Status: FUNCTIONAL

## 2020-03-04 DEVICE — IMPLANTABLE DEVICE: Type: IMPLANTABLE DEVICE | Site: KNEE | Status: FUNCTIONAL

## 2020-03-04 RX ORDER — CEFAZOLIN SODIUM 2 G/50ML
2 SOLUTION INTRAVENOUS
Status: COMPLETED | OUTPATIENT
Start: 2020-03-04 | End: 2020-03-04

## 2020-03-04 RX ORDER — NEOSTIGMINE METHYLSULFATE 1 MG/ML
INJECTION, SOLUTION INTRAVENOUS PRN
Status: DISCONTINUED | OUTPATIENT
Start: 2020-03-04 | End: 2020-03-04 | Stop reason: SDUPTHER

## 2020-03-04 RX ORDER — ROPIVACAINE HYDROCHLORIDE 5 MG/ML
30 INJECTION, SOLUTION EPIDURAL; INFILTRATION; PERINEURAL ONCE
Status: COMPLETED | OUTPATIENT
Start: 2020-03-04 | End: 2020-03-04

## 2020-03-04 RX ORDER — SENNA AND DOCUSATE SODIUM 50; 8.6 MG/1; MG/1
1 TABLET, FILM COATED ORAL 2 TIMES DAILY
Status: DISCONTINUED | OUTPATIENT
Start: 2020-03-04 | End: 2020-03-05 | Stop reason: HOSPADM

## 2020-03-04 RX ORDER — BUMETANIDE 1 MG/1
1 TABLET ORAL
Status: DISCONTINUED | OUTPATIENT
Start: 2020-03-04 | End: 2020-03-05 | Stop reason: HOSPADM

## 2020-03-04 RX ORDER — SODIUM CHLORIDE 0.9 % (FLUSH) 0.9 %
10 SYRINGE (ML) INJECTION PRN
Status: DISCONTINUED | OUTPATIENT
Start: 2020-03-04 | End: 2020-03-05 | Stop reason: HOSPADM

## 2020-03-04 RX ORDER — ONDANSETRON 2 MG/ML
INJECTION INTRAMUSCULAR; INTRAVENOUS PRN
Status: DISCONTINUED | OUTPATIENT
Start: 2020-03-04 | End: 2020-03-04 | Stop reason: SDUPTHER

## 2020-03-04 RX ORDER — ROCURONIUM BROMIDE 10 MG/ML
INJECTION, SOLUTION INTRAVENOUS PRN
Status: DISCONTINUED | OUTPATIENT
Start: 2020-03-04 | End: 2020-03-04 | Stop reason: SDUPTHER

## 2020-03-04 RX ORDER — GLYCOPYRROLATE 1 MG/5 ML
SYRINGE (ML) INTRAVENOUS PRN
Status: DISCONTINUED | OUTPATIENT
Start: 2020-03-04 | End: 2020-03-04 | Stop reason: SDUPTHER

## 2020-03-04 RX ORDER — CELECOXIB 100 MG/1
100 CAPSULE ORAL ONCE
Status: COMPLETED | OUTPATIENT
Start: 2020-03-04 | End: 2020-03-04

## 2020-03-04 RX ORDER — CEFAZOLIN SODIUM 2 G/50ML
2 SOLUTION INTRAVENOUS EVERY 8 HOURS
Status: COMPLETED | OUTPATIENT
Start: 2020-03-04 | End: 2020-03-05

## 2020-03-04 RX ORDER — FOLIC ACID 1 MG/1
1 TABLET ORAL DAILY
Status: DISCONTINUED | OUTPATIENT
Start: 2020-03-04 | End: 2020-03-05 | Stop reason: HOSPADM

## 2020-03-04 RX ORDER — KETOROLAC TROMETHAMINE 30 MG/ML
15 INJECTION, SOLUTION INTRAMUSCULAR; INTRAVENOUS EVERY 6 HOURS
Status: DISCONTINUED | OUTPATIENT
Start: 2020-03-04 | End: 2020-03-05 | Stop reason: HOSPADM

## 2020-03-04 RX ORDER — ONDANSETRON 2 MG/ML
4 INJECTION INTRAMUSCULAR; INTRAVENOUS EVERY 6 HOURS PRN
Status: DISCONTINUED | OUTPATIENT
Start: 2020-03-04 | End: 2020-03-05 | Stop reason: HOSPADM

## 2020-03-04 RX ORDER — DEXAMETHASONE SODIUM PHOSPHATE 10 MG/ML
8 INJECTION, SOLUTION INTRAMUSCULAR; INTRAVENOUS ONCE
Status: DISCONTINUED | OUTPATIENT
Start: 2020-03-04 | End: 2020-03-04 | Stop reason: HOSPADM

## 2020-03-04 RX ORDER — OXYCODONE HYDROCHLORIDE 5 MG/1
5 TABLET ORAL EVERY 4 HOURS PRN
Status: DISCONTINUED | OUTPATIENT
Start: 2020-03-04 | End: 2020-03-05 | Stop reason: HOSPADM

## 2020-03-04 RX ORDER — SODIUM CHLORIDE 9 MG/ML
INJECTION, SOLUTION INTRAVENOUS CONTINUOUS
Status: ACTIVE | OUTPATIENT
Start: 2020-03-04 | End: 2020-03-05

## 2020-03-04 RX ORDER — ACETAMINOPHEN 325 MG/1
650 TABLET ORAL EVERY 6 HOURS
Status: DISCONTINUED | OUTPATIENT
Start: 2020-03-04 | End: 2020-03-05 | Stop reason: HOSPADM

## 2020-03-04 RX ORDER — VANCOMYCIN HYDROCHLORIDE 1 G/20ML
INJECTION, POWDER, LYOPHILIZED, FOR SOLUTION INTRAVENOUS PRN
Status: DISCONTINUED | OUTPATIENT
Start: 2020-03-04 | End: 2020-03-04 | Stop reason: ALTCHOICE

## 2020-03-04 RX ORDER — FENTANYL CITRATE 50 UG/ML
INJECTION, SOLUTION INTRAMUSCULAR; INTRAVENOUS PRN
Status: DISCONTINUED | OUTPATIENT
Start: 2020-03-04 | End: 2020-03-04 | Stop reason: SDUPTHER

## 2020-03-04 RX ORDER — FENTANYL CITRATE 50 UG/ML
25 INJECTION, SOLUTION INTRAMUSCULAR; INTRAVENOUS EVERY 5 MIN PRN
Status: DISCONTINUED | OUTPATIENT
Start: 2020-03-04 | End: 2020-03-04 | Stop reason: HOSPADM

## 2020-03-04 RX ORDER — LABETALOL HYDROCHLORIDE 5 MG/ML
INJECTION, SOLUTION INTRAVENOUS PRN
Status: DISCONTINUED | OUTPATIENT
Start: 2020-03-04 | End: 2020-03-04 | Stop reason: SDUPTHER

## 2020-03-04 RX ORDER — POTASSIUM CHLORIDE 750 MG/1
10 TABLET, EXTENDED RELEASE ORAL ONCE
Status: COMPLETED | OUTPATIENT
Start: 2020-03-04 | End: 2020-03-04

## 2020-03-04 RX ORDER — DEXAMETHASONE SODIUM PHOSPHATE 4 MG/ML
INJECTION, SOLUTION INTRA-ARTICULAR; INTRALESIONAL; INTRAMUSCULAR; INTRAVENOUS; SOFT TISSUE PRN
Status: DISCONTINUED | OUTPATIENT
Start: 2020-03-04 | End: 2020-03-04 | Stop reason: SDUPTHER

## 2020-03-04 RX ORDER — GABAPENTIN 300 MG/1
300 CAPSULE ORAL ONCE
Status: COMPLETED | OUTPATIENT
Start: 2020-03-04 | End: 2020-03-04

## 2020-03-04 RX ORDER — MIDAZOLAM HYDROCHLORIDE 1 MG/ML
1 INJECTION INTRAMUSCULAR; INTRAVENOUS PRN
Status: DISCONTINUED | OUTPATIENT
Start: 2020-03-04 | End: 2020-03-04 | Stop reason: HOSPADM

## 2020-03-04 RX ORDER — SODIUM CHLORIDE 9 MG/ML
INJECTION, SOLUTION INTRAVENOUS CONTINUOUS
Status: DISCONTINUED | OUTPATIENT
Start: 2020-03-04 | End: 2020-03-04

## 2020-03-04 RX ORDER — EZETIMIBE 10 MG/1
10 TABLET ORAL
Status: DISCONTINUED | OUTPATIENT
Start: 2020-03-04 | End: 2020-03-05 | Stop reason: HOSPADM

## 2020-03-04 RX ORDER — SODIUM CHLORIDE 0.9 % (FLUSH) 0.9 %
10 SYRINGE (ML) INJECTION EVERY 12 HOURS SCHEDULED
Status: DISCONTINUED | OUTPATIENT
Start: 2020-03-04 | End: 2020-03-04 | Stop reason: HOSPADM

## 2020-03-04 RX ORDER — PROPOFOL 10 MG/ML
INJECTION, EMULSION INTRAVENOUS PRN
Status: DISCONTINUED | OUTPATIENT
Start: 2020-03-04 | End: 2020-03-04 | Stop reason: SDUPTHER

## 2020-03-04 RX ORDER — LIDOCAINE HYDROCHLORIDE 20 MG/ML
INJECTION, SOLUTION EPIDURAL; INFILTRATION; INTRACAUDAL; PERINEURAL PRN
Status: DISCONTINUED | OUTPATIENT
Start: 2020-03-04 | End: 2020-03-04 | Stop reason: SDUPTHER

## 2020-03-04 RX ORDER — DEXAMETHASONE SODIUM PHOSPHATE 10 MG/ML
10 INJECTION INTRAMUSCULAR; INTRAVENOUS ONCE
Status: COMPLETED | OUTPATIENT
Start: 2020-03-05 | End: 2020-03-05

## 2020-03-04 RX ORDER — MAGNESIUM GLUCONATE 27 MG(500)
500 TABLET ORAL DAILY
Status: DISCONTINUED | OUTPATIENT
Start: 2020-03-04 | End: 2020-03-05 | Stop reason: HOSPADM

## 2020-03-04 RX ORDER — ROPIVACAINE HYDROCHLORIDE 5 MG/ML
INJECTION, SOLUTION EPIDURAL; INFILTRATION; PERINEURAL
Status: COMPLETED | OUTPATIENT
Start: 2020-03-04 | End: 2020-03-04

## 2020-03-04 RX ORDER — ACETAMINOPHEN 500 MG
1000 TABLET ORAL ONCE
Status: COMPLETED | OUTPATIENT
Start: 2020-03-04 | End: 2020-03-04

## 2020-03-04 RX ORDER — OXYCODONE HYDROCHLORIDE 5 MG/1
10 TABLET ORAL EVERY 4 HOURS PRN
Status: DISCONTINUED | OUTPATIENT
Start: 2020-03-04 | End: 2020-03-05 | Stop reason: HOSPADM

## 2020-03-04 RX ORDER — SODIUM CHLORIDE 0.9 % (FLUSH) 0.9 %
10 SYRINGE (ML) INJECTION EVERY 12 HOURS SCHEDULED
Status: DISCONTINUED | OUTPATIENT
Start: 2020-03-04 | End: 2020-03-05 | Stop reason: HOSPADM

## 2020-03-04 RX ORDER — TRAMADOL HYDROCHLORIDE 50 MG/1
50 TABLET ORAL EVERY 6 HOURS
Status: DISCONTINUED | OUTPATIENT
Start: 2020-03-04 | End: 2020-03-05 | Stop reason: HOSPADM

## 2020-03-04 RX ORDER — SODIUM CHLORIDE 0.9 % (FLUSH) 0.9 %
10 SYRINGE (ML) INJECTION PRN
Status: DISCONTINUED | OUTPATIENT
Start: 2020-03-04 | End: 2020-03-04 | Stop reason: HOSPADM

## 2020-03-04 RX ORDER — HYOSCYAMINE SULFATE 0.125 MG
0.12 TABLET ORAL 2 TIMES DAILY
Status: DISCONTINUED | OUTPATIENT
Start: 2020-03-04 | End: 2020-03-05 | Stop reason: HOSPADM

## 2020-03-04 RX ADMIN — DEXAMETHASONE SODIUM PHOSPHATE 10 MG: 4 INJECTION, SOLUTION INTRAMUSCULAR; INTRAVENOUS at 12:51

## 2020-03-04 RX ADMIN — FENTANYL CITRATE 25 MCG: 50 INJECTION, SOLUTION INTRAMUSCULAR; INTRAVENOUS at 15:24

## 2020-03-04 RX ADMIN — TRANEXAMIC ACID 1000 MG: 1 INJECTION, SOLUTION INTRAVENOUS at 12:48

## 2020-03-04 RX ADMIN — SODIUM CHLORIDE, PRESERVATIVE FREE 10 ML: 5 INJECTION INTRAVENOUS at 10:47

## 2020-03-04 RX ADMIN — ROPIVACAINE HYDROCHLORIDE 30 ML: 5 INJECTION, SOLUTION EPIDURAL; INFILTRATION; PERINEURAL at 11:43

## 2020-03-04 RX ADMIN — LABETALOL HYDROCHLORIDE 2.5 MG: 5 INJECTION INTRAVENOUS at 13:46

## 2020-03-04 RX ADMIN — POTASSIUM CHLORIDE 10 MEQ: 750 TABLET, EXTENDED RELEASE ORAL at 18:44

## 2020-03-04 RX ADMIN — SODIUM CHLORIDE: 9 INJECTION, SOLUTION INTRAVENOUS at 18:35

## 2020-03-04 RX ADMIN — MIDAZOLAM HYDROCHLORIDE 1.5 MG: 1 INJECTION, SOLUTION INTRAMUSCULAR; INTRAVENOUS at 11:40

## 2020-03-04 RX ADMIN — LIDOCAINE HYDROCHLORIDE 100 MG: 20 INJECTION, SOLUTION EPIDURAL; INFILTRATION; INTRACAUDAL; PERINEURAL at 12:40

## 2020-03-04 RX ADMIN — ACETAMINOPHEN 650 MG: 325 TABLET ORAL at 23:58

## 2020-03-04 RX ADMIN — TRAMADOL HYDROCHLORIDE 50 MG: 50 TABLET, FILM COATED ORAL at 23:58

## 2020-03-04 RX ADMIN — PROPOFOL 100 MG: 10 INJECTION, EMULSION INTRAVENOUS at 12:40

## 2020-03-04 RX ADMIN — FENTANYL CITRATE 75 MCG: 50 INJECTION, SOLUTION INTRAMUSCULAR; INTRAVENOUS at 11:40

## 2020-03-04 RX ADMIN — ACETAMINOPHEN 1000 MG: 500 TABLET ORAL at 10:30

## 2020-03-04 RX ADMIN — CELECOXIB 100 MG: 100 CAPSULE ORAL at 10:30

## 2020-03-04 RX ADMIN — ONDANSETRON HYDROCHLORIDE 4 MG: 2 INJECTION, SOLUTION INTRAMUSCULAR; INTRAVENOUS at 14:19

## 2020-03-04 RX ADMIN — TRAMADOL HYDROCHLORIDE 50 MG: 50 TABLET, FILM COATED ORAL at 18:45

## 2020-03-04 RX ADMIN — Medication 0.6 MG: at 14:27

## 2020-03-04 RX ADMIN — CEFAZOLIN SODIUM 2 G: 2 SOLUTION INTRAVENOUS at 21:06

## 2020-03-04 RX ADMIN — FENTANYL CITRATE 25 MCG: 50 INJECTION, SOLUTION INTRAMUSCULAR; INTRAVENOUS at 15:09

## 2020-03-04 RX ADMIN — KETOROLAC TROMETHAMINE 15 MG: 30 INJECTION, SOLUTION INTRAMUSCULAR; INTRAVENOUS at 21:07

## 2020-03-04 RX ADMIN — LABETALOL HYDROCHLORIDE 2.5 MG: 5 INJECTION INTRAVENOUS at 14:35

## 2020-03-04 RX ADMIN — ROPIVACAINE HYDROCHLORIDE 30 ML: 5 INJECTION, SOLUTION EPIDURAL; INFILTRATION; PERINEURAL at 11:49

## 2020-03-04 RX ADMIN — FENTANYL CITRATE 50 MCG: 50 INJECTION, SOLUTION INTRAMUSCULAR; INTRAVENOUS at 13:59

## 2020-03-04 RX ADMIN — GABAPENTIN 300 MG: 300 CAPSULE ORAL at 10:30

## 2020-03-04 RX ADMIN — SENNOSIDES AND DOCUSATE SODIUM 1 TABLET: 8.6; 5 TABLET ORAL at 21:07

## 2020-03-04 RX ADMIN — HYOSCYAMINE SULFATE 0.12 MG: 0.12 TABLET ORAL at 18:44

## 2020-03-04 RX ADMIN — FENTANYL CITRATE 50 MCG: 50 INJECTION, SOLUTION INTRAMUSCULAR; INTRAVENOUS at 13:08

## 2020-03-04 RX ADMIN — SODIUM CHLORIDE: 9 INJECTION, SOLUTION INTRAVENOUS at 12:15

## 2020-03-04 RX ADMIN — LABETALOL HYDROCHLORIDE 5 MG: 5 INJECTION INTRAVENOUS at 13:27

## 2020-03-04 RX ADMIN — ROCURONIUM BROMIDE 40 MG: 10 SOLUTION INTRAVENOUS at 12:41

## 2020-03-04 RX ADMIN — CEFAZOLIN SODIUM 2 G: 2 SOLUTION INTRAVENOUS at 12:44

## 2020-03-04 RX ADMIN — PROPOFOL 40 MG: 10 INJECTION, EMULSION INTRAVENOUS at 14:33

## 2020-03-04 RX ADMIN — TRANEXAMIC ACID 1000 MG: 1 INJECTION, SOLUTION INTRAVENOUS at 15:35

## 2020-03-04 RX ADMIN — EZETIMIBE 10 MG: 10 TABLET ORAL at 18:44

## 2020-03-04 RX ADMIN — Medication 3 MG: at 14:27

## 2020-03-04 RX ADMIN — ACETAMINOPHEN 650 MG: 325 TABLET ORAL at 18:44

## 2020-03-04 ASSESSMENT — PULMONARY FUNCTION TESTS
PIF_VALUE: 17
PIF_VALUE: 2
PIF_VALUE: 0
PIF_VALUE: 2
PIF_VALUE: 18
PIF_VALUE: 19
PIF_VALUE: 12
PIF_VALUE: 17
PIF_VALUE: 2
PIF_VALUE: 18
PIF_VALUE: 18
PIF_VALUE: 2
PIF_VALUE: 18
PIF_VALUE: 17
PIF_VALUE: 0
PIF_VALUE: 19
PIF_VALUE: 14
PIF_VALUE: 3
PIF_VALUE: 7
PIF_VALUE: 18
PIF_VALUE: 17
PIF_VALUE: 18
PIF_VALUE: 18
PIF_VALUE: 17
PIF_VALUE: 19
PIF_VALUE: 8
PIF_VALUE: 2
PIF_VALUE: 20
PIF_VALUE: 21
PIF_VALUE: 0
PIF_VALUE: 0
PIF_VALUE: 18
PIF_VALUE: 2
PIF_VALUE: 19
PIF_VALUE: 0
PIF_VALUE: 0
PIF_VALUE: 8
PIF_VALUE: 0
PIF_VALUE: 18
PIF_VALUE: 18
PIF_VALUE: 0
PIF_VALUE: 10
PIF_VALUE: 18
PIF_VALUE: 0
PIF_VALUE: 18
PIF_VALUE: 0
PIF_VALUE: 0
PIF_VALUE: 19
PIF_VALUE: 10
PIF_VALUE: 18
PIF_VALUE: 18
PIF_VALUE: 2
PIF_VALUE: 5
PIF_VALUE: 18
PIF_VALUE: 18
PIF_VALUE: 2
PIF_VALUE: 18
PIF_VALUE: 18
PIF_VALUE: 0
PIF_VALUE: 18
PIF_VALUE: 17
PIF_VALUE: 0
PIF_VALUE: 18
PIF_VALUE: 0
PIF_VALUE: 19
PIF_VALUE: 0
PIF_VALUE: 14
PIF_VALUE: 0
PIF_VALUE: 16
PIF_VALUE: 2
PIF_VALUE: 18
PIF_VALUE: 18
PIF_VALUE: 17
PIF_VALUE: 0
PIF_VALUE: 18
PIF_VALUE: 18
PIF_VALUE: 0
PIF_VALUE: 18
PIF_VALUE: 2
PIF_VALUE: 17
PIF_VALUE: 18
PIF_VALUE: 2
PIF_VALUE: 18
PIF_VALUE: 3
PIF_VALUE: 18
PIF_VALUE: 14
PIF_VALUE: 19
PIF_VALUE: 4
PIF_VALUE: 0
PIF_VALUE: 18
PIF_VALUE: 16
PIF_VALUE: 0
PIF_VALUE: 18
PIF_VALUE: 20
PIF_VALUE: 18
PIF_VALUE: 17
PIF_VALUE: 17
PIF_VALUE: 18
PIF_VALUE: 19
PIF_VALUE: 18
PIF_VALUE: 17
PIF_VALUE: 18
PIF_VALUE: 18
PIF_VALUE: 17
PIF_VALUE: 0
PIF_VALUE: 17
PIF_VALUE: 18
PIF_VALUE: 3
PIF_VALUE: 3
PIF_VALUE: 0
PIF_VALUE: 17
PIF_VALUE: 18
PIF_VALUE: 1
PIF_VALUE: 17
PIF_VALUE: 18
PIF_VALUE: 0
PIF_VALUE: 15
PIF_VALUE: 3
PIF_VALUE: 17
PIF_VALUE: 18
PIF_VALUE: 1
PIF_VALUE: 0
PIF_VALUE: 2
PIF_VALUE: 0
PIF_VALUE: 21
PIF_VALUE: 0
PIF_VALUE: 12
PIF_VALUE: 17
PIF_VALUE: 18
PIF_VALUE: 18
PIF_VALUE: 14
PIF_VALUE: 18
PIF_VALUE: 17
PIF_VALUE: 18
PIF_VALUE: 14
PIF_VALUE: 0
PIF_VALUE: 0
PIF_VALUE: 18
PIF_VALUE: 0
PIF_VALUE: 8
PIF_VALUE: 17
PIF_VALUE: 17
PIF_VALUE: 18
PIF_VALUE: 19
PIF_VALUE: 18
PIF_VALUE: 18
PIF_VALUE: 17
PIF_VALUE: 0
PIF_VALUE: 18
PIF_VALUE: 3
PIF_VALUE: 2
PIF_VALUE: 18
PIF_VALUE: 19
PIF_VALUE: 0
PIF_VALUE: 3
PIF_VALUE: 18

## 2020-03-04 ASSESSMENT — PAIN DESCRIPTION - PAIN TYPE
TYPE: SURGICAL PAIN

## 2020-03-04 ASSESSMENT — PAIN DESCRIPTION - FREQUENCY
FREQUENCY: CONTINUOUS
FREQUENCY: INTERMITTENT

## 2020-03-04 ASSESSMENT — PAIN - FUNCTIONAL ASSESSMENT
PAIN_FUNCTIONAL_ASSESSMENT: PREVENTS OR INTERFERES SOME ACTIVE ACTIVITIES AND ADLS
PAIN_FUNCTIONAL_ASSESSMENT: 0-10
PAIN_FUNCTIONAL_ASSESSMENT: PREVENTS OR INTERFERES SOME ACTIVE ACTIVITIES AND ADLS
PAIN_FUNCTIONAL_ASSESSMENT: ACTIVITIES ARE NOT PREVENTED
PAIN_FUNCTIONAL_ASSESSMENT: ACTIVITIES ARE NOT PREVENTED

## 2020-03-04 ASSESSMENT — PAIN SCALES - GENERAL
PAINLEVEL_OUTOF10: 2
PAINLEVEL_OUTOF10: 5
PAINLEVEL_OUTOF10: 4
PAINLEVEL_OUTOF10: 4
PAINLEVEL_OUTOF10: 5
PAINLEVEL_OUTOF10: 5
PAINLEVEL_OUTOF10: 4
PAINLEVEL_OUTOF10: 5
PAINLEVEL_OUTOF10: 6
PAINLEVEL_OUTOF10: 0
PAINLEVEL_OUTOF10: 2

## 2020-03-04 ASSESSMENT — PAIN DESCRIPTION - ONSET
ONSET: ON-GOING
ONSET: GRADUAL
ONSET: ON-GOING

## 2020-03-04 ASSESSMENT — PAIN DESCRIPTION - ORIENTATION
ORIENTATION: RIGHT

## 2020-03-04 ASSESSMENT — PAIN DESCRIPTION - LOCATION
LOCATION: KNEE

## 2020-03-04 ASSESSMENT — PAIN DESCRIPTION - DESCRIPTORS
DESCRIPTORS: ACHING;DISCOMFORT
DESCRIPTORS: DISCOMFORT
DESCRIPTORS: DISCOMFORT
DESCRIPTORS: ACHING;DISCOMFORT
DESCRIPTORS: ACHING;DISCOMFORT

## 2020-03-04 ASSESSMENT — LIFESTYLE VARIABLES: SMOKING_STATUS: 0

## 2020-03-04 ASSESSMENT — ENCOUNTER SYMPTOMS: SHORTNESS OF BREATH: 0

## 2020-03-04 ASSESSMENT — PAIN DESCRIPTION - PROGRESSION
CLINICAL_PROGRESSION: GRADUALLY WORSENING
CLINICAL_PROGRESSION: GRADUALLY WORSENING
CLINICAL_PROGRESSION: GRADUALLY IMPROVING
CLINICAL_PROGRESSION: GRADUALLY WORSENING

## 2020-03-04 NOTE — INTERVAL H&P NOTE
H&P Update    H&P reviewed. No changes.     Electronically signed by Claire Guadalupe MD on 3/4/20 at 11:28 AM

## 2020-03-04 NOTE — OP NOTE
Operative Report  [facility]  [id number]  3/4/2020    Pre-operative diagnosis: Right knee degenerative joint disease    Post-operative diagnosis: Right knee degenerative joint disease    Procedure: Ajay Right Total Knee Arthroplasty    Surgeon: Nicanor Calix MD    Assistant:  Rustam Richardson RN    Anesthesia:  General     Operative Indication: Olegario Hernandez is a 79 y/o female who presents with right knee osteoarthritis. The pain has become a quality of life issue and the patient has failed nonoperative treatment. She presents for a right total knee arthroplasty. The risks and benefits of surgery were discussed and all questions were answered. The risks for surgery that were discussed include bleeding, infection, damage to blood vessels, damage to nerves, risk of further surgery, restricted range of motion, risk of continued discomfort, risk of need for further reconstructive procedures, risk of need for altered activities and altered gait, risk of blood clots, pulmonary embolism, myocardial infarction, and risk of death were discussed. The patient understood these risks and consented for surgery. The typical post-operative recovery process was also discussed. EBL: 100 ml    Tourniquet time: 87 min    Complications: None    Components: 1. Fanny triatholon femur, size 3, CR, cemented                         2. Iron Belt triatholon tibia, size 3, cemented    3. Poly 11 mm     4. Patella, size 31, press fit    5. Styker Cement       Operative Procedure: The patient was taken to the operating room and was given general anaesthesia. The patient was properly positioned on the table and a tourniquet was applied to the upper thigh. The leg was then prepped and draped in the usual sterile fashion. A timeout was called identifying the patient, prodedure and the adminstration of IV Ancef and TXA. A straight midline incision was made through the skin and subcutaneous tissue. A medial parapatellar arthrotomy was used.  The anterior synovium and fat pad were excised and the proximal tibia was exposed. The ACL and medial and lateral meniscus were transected. 2 Kim pins were then placed in the distal femur. A Ajay satellite array was then affixed. 2 additional small skin incisions were made over the proximal tibia and 2 Kim pins were placed. A Ajay satellite array was then affixed. At this point, 2 check points were placed on the femur and tibia. Anatomical landmarks on the ankle were registered. The femur and tibia were then registered and verified. The patient was noted to have a 7° varus deformity 9° flexion contracture. Osteophytes were then removed with a rongeur. With valgus and varus stresses applied, poses were captured at 0° 90°. Small modifications were made to implant position using the [facility] software to create balanced flexion and extension gaps. The Proficiency robot was then used to make cuts in the distal femur, anterior and poster chamfer, anterior femur, posterior condyles and proximal tibia. The medial and lateral meniscus was then excised. The knee was then trialed with a size 3 femur and tibia and an 11 mm poly. The knee felt balanced in flexion and extension and this was confirmed with the [address] A patellar osteotomy was performed with the patellar reamer and cleaned up with a saw. A size 31 patella was then selected. All trial components were removed and the tibia and femur were thoroughly irrigated with normal saline. Using modern cement techniques, all components were cemented in position. Once the cement had hardened, a size 11 trial poly was used and the knee was again found to be balanced in flexion and extension. The poly was then inserted. The knee was then thoroughly irrigated with normal saline. The Ranawat cocktail was then injected into the soft tissues prior to closure. The wound was then closed with 1, 2-0, 3-0 Stratofix, dermabond and steri strips.  A sterile dressing was

## 2020-03-04 NOTE — ANESTHESIA PROCEDURE NOTES
Peripheral Block    Patient location during procedure: PACU  Staffing  Anesthesiologist: Hunter Carlson DO  Performed: anesthesiologist   Preanesthetic Checklist  Completed: patient identified, site marked, surgical consent, pre-op evaluation, timeout performed, IV checked, risks and benefits discussed, monitors and equipment checked, anesthesia consent given, oxygen available and patient being monitored  Peripheral Block  Patient position: supine  Prep: ChloraPrep  Patient monitoring: continuous pulse ox, frequent blood pressure checks, IV access and cardiac monitor  Block type: Femoral  Laterality: right  Injection technique: single-shot  Procedures: ultrasound guided  Local infiltration: ropivacaine  Infiltration strength: 0.5 %  Dose: 30 mL  Adductor canal  Provider prep: mask, sterile gloves and sterile gown  Local infiltration: ropivacaine  Needle  Needle type: combined needle/nerve stimulator   Needle gauge: 22 G  Needle length: 10 cm  Needle localization: ultrasound guidance  Assessment  Injection assessment: negative aspiration for heme, no paresthesia on injection and local visualized surrounding nerve on ultrasound  Paresthesia pain: none  Slow fractionated injection: yes  Hemodynamics: stable  Reason for block: post-op pain management and at surgeon's request

## 2020-03-04 NOTE — PROGRESS NOTES
Occupational Therapy  OCCUPATIONAL THERAPY INITIAL EVALUATION      Date:3/4/2020  Patient Name: Anthony Lynch  MRN: [id number]  : 1940  Room: [address] [place], [place] [address]A    Referring Provider: Juancho Beebe MD  Evaluating OT: Tahis Madrigal. Bethanie [email or web address]    AM-PAC Daily Activity Raw Score: 1624      Recommended Adaptive Equipment: Continue to assess. Diagnosis: Primary osteoarthritis of right knee [M17.11]   Surgery: Patient underwent R TKA on 3/4/2020. Pertinent Medical History: HTN, arthritis, cancer      Precautions: falls, FWBAT    Home Living: Patient lives with her  in a one-floor home (one step to enter - no handrail); patient's bedroom and bathroom are on the main living level. Equipment Owned: rollator    Prior Level of Function (PLOF): Per patient, she was independent with ADLs, independent with most IADLs, and independent with functional mobility (with rollator) prior to this hospitalization. Driving: No - patient's  assists with transportation    Pain Level: Patient denied experiencing pain. Cognition: Patient alert and oriented x3. Fair+ command follow demonstrated. Memory: Fair   Sequencing: Fair   Problem Solving: Fair   Judgement/Safety: Fair    Functional Assessment:   Initial Eval Status  Date: 3/4/2020 Treatment Status  Date:  Short Term Goals  Treatment Frequency/Duration: 2-5x/week for 3-7 days PRN   Feeding Setup  Independent   Grooming Min A  Mod I  (standing at sink)   UB Dressing SBA  Independent   LB Dressing Max A to thread bilateral feet into disposable brief; Min A to pull up and arrange brief while standing. Cues given to maximize safety. Min A - with use of AE, as needed/appropriate   Bathing Mod A  Mod I - with use of AE/DME, as needed/appropriate   Toileting Min A for [facility] transfer; cues given to maximize safety. Mod I   Bed Mobility  Not assessed.      Functional Transfers Sit-to-Stand: Min A   from [facility]  Independent   Functional Mobility Min A   (with walker) within patient's room and hallway  Mod I - with use of device, as needed/appropriate   Balance Sitting: Good  (at edge of [facility])  Standing: Fair-  (with walker)  Fair+ dynamic standing balance during completion of ADLs and other functional tasks. Activity Tolerance Fair+  Patient will demonstrate Good understanding and consistent implementation of energy conservation techniques and work simplification techniques into ADL/IADL routines. Visual/  Perceptual WFL  Patient wears glasses, as needed. N/A     Long-Term Goal: Patient will increase functional independence to PLOF in order to allow patient to live in least restrictive environment. Strength: ROM: Additional Information:    R UE  4-/5  WFL    L UE 4-/5  WFL      Hearing: Fair  Sensation: No complaints of numbness/tingling in B UEs. Tone: WFL  Edema: No    Comments: RN approved patient's participation in [address] [place] [address] activities. Upon arrival, patient seated on [facility] with nursing present. At end of session, patient seated in bedside chair with call light and phone within reach, patient's  present, and all lines and tubes intact. Patient would benefit from continued skilled OT to increase safety and independence with completion of ADL/IADL tasks for functional independence and quality of life. Treatment: Patient education provided regardin) safe transfer techniques, 2) importance of having assistance with functional transfers/mobility to prevent falls, 3) call light use. Patient verbalized understanding.     Eval Complexity: Low    Assessment of Current Deficits:   Functional Mobility [x]  ADLs [x] Strength [x]  Cognition []  Functional Transfers  [x] IADLs [x] Safety Awareness [x]  Endurance [x]  Fine Motor Coordination [] Balance [x] Vision/Perception [] Sensation []   Gross Motor Coordination [] ROM [] Delirium []                  Motor Control []    Plan of Care:   OT treatment to be provided 2-5x/week for 3-7 days to address the following, as needed, during hospitalization:  ADL Retraining [x]   Equipment Needs [x]   Neuromuscular Re-Education [x] Energy Conservation Techniques [x]  Functional Transfer Training [x] Patient and/or Family Education [x]  Functional Mobility Training [x] Environmental Modifications [x]  Cognitive Re-Training []   Compensatory Techniques for ADLs [x]  Splinting Needs []   Positioning to Improve Overall Function [x]   Therapeutic Activity [x]  Therapeutic Exercise  [x]  Visual/Perceptual: []    Delirium Prevention/Treatment  []  Other:  []    Rehab Potential: Good for established goals. Patient / Family Goal: Patient noted that she anticipates returning home. Patient and/or family were instructed on functional diagnosis, prognosis/goals, and OT plan of care. Demonstrated Good understanding. Low complexity evaluation + 10 timed treatment minutes  Treatment Time In: 1650  Treatment Time Out: 1700    Treatment Charges: Minutes: Units: Ther Ex  15603     Manual Therapy 41914     Thera Activities 30501     ADL/Home Mgt 34073 10 1   Neuro Re-ed 23033     Group Therapy      Orthotic manage/training  38055     Total Timed Treatment 10 1     Evaluation time includes thorough review of current medical information, gathering information on past medical history/social history and prior level of function, completion of standardized testing/informal observation of tasks, assessment of data, and development of POC/Goals. RITU Saleem/L  License Number: [id number]

## 2020-03-04 NOTE — PROGRESS NOTES
Physical Therapy    Facility/Department: [facility] SURGERY  Initial Assessment    NAME: Mehreen Shepherd  :   MRN: [id number]    Date of Service: 3/4/2020       REQUIRES PT FOLLOW UP: Yes       Patient Diagnosis(es): There were no encounter diagnoses. has a past medical history of Acute kidney injury ([facility] [address] Arthritis, Dry eye, GERD (gastroesophageal reflux disease), Hypertension, Hypomagnesemia, Mixed hyperlipidemia, SOB (shortness of breath) on exertion, Uterine cancer ([facility] [address] and Vitamin D deficiency. has a past surgical history that includes Dilation and curettage of uterus (3/20/12); Hysterectomy (12); Breast surgery (4370,388, ); lymph node biopsy (); INSERTION / REMOVAL / REPLACEMENT VENOUS ACCESS CATHETER (N/A, 2019); Appendectomy (); and Tonsillectomy (child). Evaluating Therapist: Ricky Faria, PT     rec ww    Referring Provider:  May Soni MD    Room #: 529   DIAGNOSIS: OA s/p R TKA 3/4/2020  PRECAUTIONS: falls, FBWAT     Social:  Pt lives with  Spouse  in a  1  floor plan  1 step to enter. Prior to admission pt walked with  rollator      Initial Evaluation  Date: 3/4/2020 Treatment      Short Term/ Long Term   Goals   Was pt agreeable to Eval/treatment? Yes      Does pt have pain?  Denies      Bed Mobility  Rolling:  NT   Supine to sit:  NT  Sit to supine: NT  Scooting: SBA in sit    SBA    Transfers Sit to stand:  Min assist   Stand to sit: min assist   Stand pivot: min assist    SBA    Ambulation     100 feet with ww  with  Min assist    200 feet with  ww  with  SBA        Stair negotiation: ascended and descended NT    4   steps with  1-2  rail with  SBA    LE ROM  AAROM R knee 10-75 degrees  AAROM R knee 5-80 degrees    LE strength  3-/ 5 R knee      AM- PAC RAW score               Pt is alert and Oriented x  3      Balance:  Min assist   Endurance: WFL   Bed/Chair alarm:  no     ASSESSMENT  Pt displays functional ability as noted in the objective portion of this evaluation. Treatment/Education:     Pt on BSC upon arrival. CGA/min assist for static stand while pt performed hygiene. Cues for proper ww use and no twisting on knees with turning. Pt educated on fall risk, hand placement with transfers, LE exercises        Patient response to education:   Pt verbalized understanding Pt demonstrated skill Pt requires further education in this area   x With cues   x       Comments:  Pt left in chair  after session, with call light in reach. Rehab potential is Good for reaching above PT goals. Pts/ family goals   1. Home     Patient and or family understand(s) diagnosis, prognosis, and plan of care. -  Yes     PLAN  PT care will be provided in accordance with the objectives noted above. Whenever appropriate, clear delegation orders will be provided for nursing staff. Exercises and functional mobility practice will be used as well as appropriate assistive devices or modalities to obtain goals. Patient and family education will also be administered as needed. Frequency of treatments will be BID x 2 days. Time in: 1645   Time out:  1705      Evaluation Time includes thorough review of current medical information, gathering information on past medical history/social history and prior level of function, completion of standardized testing/informal observation of tasks, assessment of data and education on plan of care and goals.     CPT codes:  [x] Low Complexity PT evaluation 89567  [] Moderate Complexity PT evaluation 97117  [] High Complexity PT evaluation 48269  [] PT Re-evaluation 27572  [] Gait training 40770  minutes  [] Therapeutic activities 46718 10 minutes  [] Therapeutic exercises 32271  minutes  [] Neuromuscular reeducation 78768  minutes       Jannette 18 number:  PT 2021

## 2020-03-05 VITALS
TEMPERATURE: 98.3 F | OXYGEN SATURATION: 98 % | SYSTOLIC BLOOD PRESSURE: 131 MMHG | HEIGHT: 60 IN | WEIGHT: 168 LBS | HEART RATE: 71 BPM | DIASTOLIC BLOOD PRESSURE: 59 MMHG | RESPIRATION RATE: 16 BRPM | BODY MASS INDEX: 32.98 KG/M2

## 2020-03-05 PROBLEM — R19.7 DIARRHEA: Status: RESOLVED | Noted: 2019-10-01 | Resolved: 2020-03-05

## 2020-03-05 PROBLEM — E87.6 HYPOKALEMIA: Status: RESOLVED | Noted: 2019-09-30 | Resolved: 2020-03-05

## 2020-03-05 PROBLEM — Z96.651 S/P TOTAL KNEE ARTHROPLASTY, RIGHT: Status: ACTIVE | Noted: 2020-03-05

## 2020-03-05 LAB
ANION GAP SERPL CALCULATED.3IONS-SCNC: 17 MMOL/L (ref 7–16)
BUN BLDV-MCNC: 43 MG/DL (ref 8–23)
CALCIUM SERPL-MCNC: 9.2 MG/DL (ref 8.6–10.2)
CHLORIDE BLD-SCNC: 103 MMOL/L (ref 98–107)
CO2: 18 MMOL/L (ref 22–29)
CREAT SERPL-MCNC: 1.7 MG/DL (ref 0.5–1)
GFR AFRICAN AMERICAN: 35
GFR NON-AFRICAN AMERICAN: 29 ML/MIN/1.73
GLUCOSE BLD-MCNC: 113 MG/DL (ref 74–99)
HCT VFR BLD CALC: 38.9 % (ref 34–48)
HEMOGLOBIN: 12.6 G/DL (ref 11.5–15.5)
MCH RBC QN AUTO: 30.8 PG (ref 26–35)
MCHC RBC AUTO-ENTMCNC: 32.4 % (ref 32–34.5)
MCV RBC AUTO: 95.1 FL (ref 80–99.9)
PDW BLD-RTO: 13.6 FL (ref 11.5–15)
PLATELET # BLD: 193 E9/L (ref 130–450)
PMV BLD AUTO: 9.8 FL (ref 7–12)
POTASSIUM REFLEX MAGNESIUM: 4.7 MMOL/L (ref 3.5–5)
RBC # BLD: 4.09 E12/L (ref 3.5–5.5)
SODIUM BLD-SCNC: 138 MMOL/L (ref 132–146)
WBC # BLD: 9.9 E9/L (ref 4.5–11.5)

## 2020-03-05 PROCEDURE — 97110 THERAPEUTIC EXERCISES: CPT

## 2020-03-05 PROCEDURE — 36415 COLL VENOUS BLD VENIPUNCTURE: CPT

## 2020-03-05 PROCEDURE — 96375 TX/PRO/DX INJ NEW DRUG ADDON: CPT

## 2020-03-05 PROCEDURE — 97530 THERAPEUTIC ACTIVITIES: CPT

## 2020-03-05 PROCEDURE — 6360000002 HC RX W HCPCS: Performed by: ORTHOPAEDIC SURGERY

## 2020-03-05 PROCEDURE — 6370000000 HC RX 637 (ALT 250 FOR IP): Performed by: ORTHOPAEDIC SURGERY

## 2020-03-05 PROCEDURE — 96374 THER/PROPH/DIAG INJ IV PUSH: CPT

## 2020-03-05 PROCEDURE — 97535 SELF CARE MNGMENT TRAINING: CPT

## 2020-03-05 PROCEDURE — 85027 COMPLETE CBC AUTOMATED: CPT

## 2020-03-05 PROCEDURE — 80048 BASIC METABOLIC PNL TOTAL CA: CPT

## 2020-03-05 RX ORDER — OXYCODONE HYDROCHLORIDE 5 MG/1
5 TABLET ORAL EVERY 6 HOURS PRN
Qty: 28 TABLET | Refills: 0 | Status: SHIPPED | OUTPATIENT
Start: 2020-03-05 | End: 2020-03-12

## 2020-03-05 RX ORDER — DOCUSATE SODIUM 100 MG/1
100 CAPSULE, LIQUID FILLED ORAL 2 TIMES DAILY
Qty: 30 CAPSULE | Refills: 0 | Status: ON HOLD | OUTPATIENT
Start: 2020-03-05 | End: 2021-04-28 | Stop reason: HOSPADM

## 2020-03-05 RX ORDER — TRAMADOL HYDROCHLORIDE 50 MG/1
50 TABLET ORAL EVERY 6 HOURS
Qty: 28 TABLET | Refills: 0 | Status: SHIPPED | OUTPATIENT
Start: 2020-03-05 | End: 2020-03-12

## 2020-03-05 RX ORDER — ACETAMINOPHEN 325 MG/1
650 TABLET ORAL EVERY 6 HOURS
Qty: 120 TABLET | Refills: 3 | Status: ON HOLD | OUTPATIENT
Start: 2020-03-05 | End: 2021-04-28 | Stop reason: HOSPADM

## 2020-03-05 RX ADMIN — TRAMADOL HYDROCHLORIDE 50 MG: 50 TABLET, FILM COATED ORAL at 12:22

## 2020-03-05 RX ADMIN — SENNOSIDES AND DOCUSATE SODIUM 1 TABLET: 8.6; 5 TABLET ORAL at 08:39

## 2020-03-05 RX ADMIN — CEFAZOLIN SODIUM 2 G: 2 SOLUTION INTRAVENOUS at 04:06

## 2020-03-05 RX ADMIN — ASPIRIN 325 MG: 325 TABLET, DELAYED RELEASE ORAL at 08:40

## 2020-03-05 RX ADMIN — ACETAMINOPHEN 650 MG: 325 TABLET ORAL at 12:22

## 2020-03-05 RX ADMIN — TRAMADOL HYDROCHLORIDE 50 MG: 50 TABLET, FILM COATED ORAL at 06:36

## 2020-03-05 RX ADMIN — ACETAMINOPHEN 650 MG: 325 TABLET ORAL at 06:36

## 2020-03-05 RX ADMIN — KETOROLAC TROMETHAMINE 15 MG: 30 INJECTION, SOLUTION INTRAMUSCULAR; INTRAVENOUS at 03:42

## 2020-03-05 RX ADMIN — DEXAMETHASONE SODIUM PHOSPHATE 10 MG: 10 INJECTION INTRAMUSCULAR; INTRAVENOUS at 12:22

## 2020-03-05 RX ADMIN — OXYCODONE HYDROCHLORIDE 10 MG: 5 TABLET ORAL at 03:42

## 2020-03-05 RX ADMIN — FOLIC ACID 1 MG: 1 TABLET ORAL at 08:40

## 2020-03-05 ASSESSMENT — PAIN DESCRIPTION - LOCATION
LOCATION: KNEE
LOCATION: KNEE

## 2020-03-05 ASSESSMENT — PAIN DESCRIPTION - ONSET
ONSET: ON-GOING
ONSET: ON-GOING

## 2020-03-05 ASSESSMENT — PAIN DESCRIPTION - PAIN TYPE
TYPE: SURGICAL PAIN

## 2020-03-05 ASSESSMENT — PAIN DESCRIPTION - PROGRESSION
CLINICAL_PROGRESSION: GRADUALLY WORSENING
CLINICAL_PROGRESSION: GRADUALLY WORSENING

## 2020-03-05 ASSESSMENT — PAIN SCALES - GENERAL
PAINLEVEL_OUTOF10: 6
PAINLEVEL_OUTOF10: 0
PAINLEVEL_OUTOF10: 3
PAINLEVEL_OUTOF10: 8
PAINLEVEL_OUTOF10: 4
PAINLEVEL_OUTOF10: 4
PAINLEVEL_OUTOF10: 5

## 2020-03-05 ASSESSMENT — PAIN DESCRIPTION - DESCRIPTORS
DESCRIPTORS: ACHING;DISCOMFORT
DESCRIPTORS: ACHING;DISCOMFORT;DULL

## 2020-03-05 ASSESSMENT — PAIN DESCRIPTION - ORIENTATION
ORIENTATION: RIGHT
ORIENTATION: RIGHT

## 2020-03-05 ASSESSMENT — PAIN DESCRIPTION - FREQUENCY
FREQUENCY: CONTINUOUS
FREQUENCY: CONTINUOUS

## 2020-03-05 ASSESSMENT — PAIN - FUNCTIONAL ASSESSMENT
PAIN_FUNCTIONAL_ASSESSMENT: ACTIVITIES ARE NOT PREVENTED
PAIN_FUNCTIONAL_ASSESSMENT: PREVENTS OR INTERFERES SOME ACTIVE ACTIVITIES AND ADLS

## 2020-03-05 NOTE — CARE COORDINATION
Lori Bashir was provided with a choice of provider and agrees   with the discharge plan. [x] Yes [] No    Freedom of choice list was provided with basic dialogue that supports the patient's individualized plan of care/goals, treatment preferences and shares the quality data associated with the providers.  [x] Yes [] No      Electronically signed by JAMESON Thomas on 3/5/20 at 10:06 AM

## 2020-03-05 NOTE — PROGRESS NOTES
Physical Therapy    Facility/Department: [address] ORTHO SURGERY  Treatment note    NAME: Katie Grider  : [birth year]  MRN: [id number]    Date of Service: 3/5/2020               Patient Diagnosis(es): The encounter diagnosis was Primary osteoarthritis of right knee. has a past medical history of Acute kidney injury ([facility] [address] Arthritis, Chronic kidney disease, stage 3 (moderate) ([address] Dry eye, GERD (gastroesophageal reflux disease), Hypertension, Hypomagnesemia, Mixed hyperlipidemia, SOB (shortness of breath) on exertion, Uterine cancer ([facility] [address] and Vitamin D deficiency. has a past surgical history that includes Dilation and curettage of uterus (3/20/12); Hysterectomy (12); Breast surgery (,, ); lymph node biopsy (); INSERTION / REMOVAL / REPLACEMENT VENOUS ACCESS CATHETER (N/A, 2019); Appendectomy (1950); and Tonsillectomy (child). Evaluating Therapist: Tru Painter PT     rec ww    Referring Provider:  Shanel Alonzo MD    Room #: 053   DIAGNOSIS: OA s/p R TKA 3/4/2020  PRECAUTIONS: falls, FBWAT     Social:  Pt lives with  Spouse  in a  1  floor plan  1 step to enter. Prior to admission pt walked with  rollator      Initial Evaluation  Date: 3/4/2020 Treatment  3/5/2020 PM    Short Term/ Long Term   Goals   Was pt agreeable to Eval/treatment? Yes  yes    Does pt have pain?  Denies  Mild R knee pain    Bed Mobility  Rolling:  NT   Supine to sit:  NT  Sit to supine: NT  Scooting: SBA in sit  Rolling: NT  Supine to sit: SBA  Sit to supine: SBA  Scooting: SBA to EOB  SBA    Transfers Sit to stand:  Min assist   Stand to sit: min assist   Stand pivot: min assist  Sit to stand: SBA  Stand to sit: SBA  Stand Pivot: NT  SBA    Ambulation     100 feet with ww  with  Min assist  140 feet and 160 feet x 1 each using WW for support SBA for balance  200 feet with  ww  with  SBA        Stair negotiation: ascended and descended NT  3 stairs with B rails, 6\" platform step using [employer] for support SBA for balance  4   steps with  1-2  rail with  SBA    LE ROM  AAROM R knee 10-75 degrees 90 degrees AROM R knee flexion during towel slide motions AAROM R knee 5-80 degrees    LE strength  3-/ 5 R knee      AM- PAC RAW score   17/ 24 18/24          Balance: fair dynamic using WW for support    Pt performed therapeutic exercise of the following as per written HEP: supine B ankle pumps, B LE quad/glut sets AROM, R LE heel slides, SAQ's AAROM x 20; R LE SLR x 10 AAROM    Patient education  Pt and  was educated on HEP, transfers, gait, stair negotiation, car transfer    Patient response to education:   Pt verbalized understanding Pt demonstrated skill Pt requires further education in this area   yes With prompt for transfers yes     ASSESSMENT:   Comments: Yasmeen remains slow and consistent, step through reciprocal pattern used, no loss of balance or shortness of breath noted. Much time devoted to performing and reviewing written HEP. Pt and  both states has no further questions about home safety. Treatment: Pt practiced and was instructed in the following treatment: HEP promoting circulation, ROM and strengthening, UE usage and backing to a chair using [employer] for support with transfers, gait promoting posture and sequence, stair negotiation promoting sequence, car transfer to back into the front passenger seat. Pt was left in the bathroom with staff after Rx    Time in 1305   Time out 1343   Total Treatment Time 38 minutes   CPT codes:     Therapeutic activities 08189 18 minutes   Therapeutic exercises 19035 20 minutes       Pt is making good progress toward established Physical Therapy goals as per increased functional mobility performed and exercise participation. Pt displays functional mobility needed to go home with family assistance. Continue with physical therapy current plan of care.     Jillian Abbott [facility]   License Number: [id number]

## 2020-03-05 NOTE — PROGRESS NOTES
Occupational Therapy  OT BEDSIDE TREATMENT NOTE      Date:3/5/2020  Patient Name: Candelario Henry  MRN: [id number]  : 1940  Room: [address] [place] [address]-A     Referring Provider: Rodolfo Fraga MD  Evaluating OT: Alicja Mckenna. Junior, OTR/L - [id number]     AM-PAC Daily Activity Raw Score:       Recommended Adaptive Equipment: Continue to assess.      Diagnosis: Primary osteoarthritis of right knee [M17.11]   Surgery: Patient underwent R TKA on 3/4/2020. Pertinent Medical History: HTN, arthritis, cancer      Precautions: falls, FWBAT     Home Living: Patient lives with her  in a one-floor home (one step to enter - no handrail); patient's bedroom and bathroom are on the main living level. Equipment Owned: rollator     Prior Level of Function (PLOF): Per patient, she was independent with ADLs, independent with most IADLs, and independent with functional mobility (with rollator) prior to this hospitalization. Driving: No - patient's  assists with transportation     Pain Level: R knee- Mild  Cognition: Patient alert and oriented 4/4 with fair+ ability to follow multi- step commands.     Functional Assessment:    Initial Eval Status  Date: 3/4/2020 Treatment Status  Date: 3/5/20 Short Term Goals  Treatment Frequency/Duration: 2-5x/week for 3-7 days PRN   Feeding Setup   Independent   Grooming Min A   Mod I  (standing at sink)   UB Dressing SBA   Independent   LB Dressing Max A to thread bilateral feet into disposable brief; Min A to pull up and arrange brief while standing. Cues given to maximize safety. SBA to manage underwear, pants, socks and shoes without AD.  Min A - with use of AE, as needed/appropriate   Bathing Mod A UE: SBA standing    LE: Min A seated/ standing  Mod I - with use of AE/DME, as needed/appropriate   Toileting Min A for [facility] transfer; cues given to maximize safety. SBA during all aspects of toileting Mod I   Bed Mobility  Not assessed.  Sit to supine: SBA      Functional Transfers Sit-to-Stand: Min A   from [facility] STS: SBA from commode with 3:1 placed over top and from arm chair 2xs     Shower transfer: SBA Independent   Functional Mobility Min A   (with walker) within patient's room and hallway  SBA using ww household plus distance. Mod I - with use of device, as needed/appropriate   Balance Sitting: Good  (at edge of BSC)  Standing: Fair-  (with walker) Sitting: Independent    Standing: SBA  Fair+ dynamic standing balance during completion of ADLs and other functional tasks. Activity Tolerance Fair+  Fair+ Patient will demonstrate Good understanding and consistent implementation of energy conservation techniques and work simplification techniques into ADL/IADL routines. B UE were WFL during tasks. Comments: Upon arrival pt was sitting in arm chair with  present. At end of session pt was transferred to bed with alarm on, all lines and tubes intact and call light within reach. Treatment: Instructed pt on compensatory LE dressing techniques. Pt declined need for 3:1 commode due to owning raised toilet seat and LHS. Instructed pt on safe toilet transfer training corresponding with home s/u. Encouraged pt to trial shower transfer with home health therapist prior to partaking in ADL to prevent falls. Pt noted owning shower chair. Pt and  declined additional questions/ concerns regarding ADLs/ transfers. Education: Safe toilet and shower transfer training and compensatory LE dressing techniques. · Pt has made good progress towards set goals.    · Continue with current plan of care      Treatment Charges: Mins Units   Ther Ex  80285     Manual Therapy 63293     Thera Activities 70961 15 1   ADL/Home Mgt 38021 24 2   Neuro Re-ed 09275     Group Therapy      Orthotic manage/training  27514     Non-Billable Time     Total Timed Treatment 39 3       Kellie Craft GEIGER/L [id number]

## 2020-03-05 NOTE — CONSULTS
Internal Medicine Consultation    Chief Complaint: Elective right knee replacment  Primary Care Physician: Chacho Zhu DO  Reason for consultation: Post-op medical management    History of Present Illness  Danny is a 78 y.o. female who presents for a right knee replacement. The surgery was 3/4/20. It well and there were no reported complications. The patient pain is controlled. The patient has worked with physical therapy. The plan is for the patient to go home with [facility] today. The patient is using the incentive spirometer. She has no other complaints at this time including no abdominal pain, nausea, vomiting, chest pain, dyspnea. She states that she is feeling very well without any complaints or issues. She is very excited to go home today. Her chemo medications for uterine cancer are on hold.  at bedside. The history is provided by the patient. She is felt to be a good historian.     Past Medical History:   Diagnosis Date    Acute kidney injury ([address]     hx of    Arthritis     Chronic kidney disease, stage 3 (moderate) ([address] 11/29/2019    Dry eye     GERD (gastroesophageal reflux disease)     Hypertension     was removed off medication in 3/2019 dt effected her kidneys    Hypomagnesemia     hx of    Mixed hyperlipidemia 5/24/2019    SOB (shortness of breath) on exertion     Uterine cancer ([address] 2012    metatstaic, treated with surgery, chemo x2 , radiation x 2, [place] therapy x 2,andAvastin therapy    Vitamin D deficiency        Past Surgical History:   Procedure Laterality Date    APPENDECTOMY  1950    BREAST SURGERY  [id number],[id number], 2002    benign  lumps    DILATION AND CURETTAGE OF UTERUS  3/20/12    HYSTERECTOMY  4/12/12    INSERTION / REMOVAL / REPLACEMENT VENOUS ACCESS CATHETER N/A 2/22/2019    MEDI PORT INSERTION performed by Laura Cano MD at [facility]  2012    pelvic nodes    TONSILLECTOMY  child       Family History  Family History   Family history unknown: Yes       Social History  Patient lives at home with her . Illicit drug use- denies   TOBACCO:   reports that she has never smoked. She has never used smokeless tobacco.  ETOH:   reports no history of alcohol use. Home Medications  Prior to Admission medications    Medication Sig Start Date End Date Taking? Authorizing Provider   oxyCODONE (ROXICODONE) 5 MG immediate release tablet Take 1 tablet by mouth every 6 hours as needed for Pain for up to 7 days. 3/5/20 3/12/20 Yes Marley Matthews MD   traMADol (ULTRAM) 50 MG tablet Take 1 tablet by mouth every 6 hours for 7 days.  3/5/20 3/12/20 Yes Marley Matthews MD   aspirin 325 MG EC tablet Take 1 tablet by mouth daily 3/5/20 4/16/20 Yes Marley Matthews MD   acetaminophen (TYLENOL) 325 MG tablet Take 2 tablets by mouth every 6 hours 3/5/20  Yes Marley Matthews MD   docusate sodium (COLACE) 100 MG capsule Take 1 capsule by mouth 2 times daily 3/5/20  Yes Marley Matthews MD   hyoscyamine (ANASPAZ;LEVSIN) 125 MCG tablet Take 0.125 mg by mouth 2 times daily Takes prior to meals   Yes Historical Provider, MD   folic acid (FOLVITE) 1 MG tablet Take 1 tablet by mouth daily OK TO TAKE  Patient taking differently: Take 1 mg by mouth daily  11/29/19  Yes Khanh Pinon,    ezetimibe (ZETIA) 10 MG tablet Take 1 tablet by mouth Daily with supper 11/29/19  Yes Khanh Pinon DO   lactobacillus (CULTURELLE) CAPS capsule Take 1 capsule by mouth daily 10/2/19  Yes Rebel Stafford,    magnesium gluconate (MAGONATE) 500 MG tablet as needed  9/6/19  Yes Historical Provider, MD   potassium chloride (KLOR-CON M) 20 MEQ extended release tablet as needed  9/5/19  Yes Historical Provider, MD   bumetanide (BUMEX) 1 MG tablet Take 1 mg by mouth three times a week mon wed fri   Yes Historical Provider, MD   polyethyl glycol-propyl glycol 0.4-0.3 % (SYSTANE) 0.4-0.3 % ophthalmic solution Place into both eyes 2 times daily as needed    Yes Historical Provider, MD   Sod Fluoride-Potassium Nitrate (PREVIDENT 5000 SENSITIVE) 1.1-5 % PSTE Place onto teeth 2 times daily     Historical Provider, MD   Bevacizumab (AVASTIN IV) Infuse intravenously Last infusion 1/10/2020    Historical Provider, MD   vitamin D (CHOLECALCIFEROL) 1000 UNIT TABS tablet Take 1,000 Units by mouth daily LD 2/25/2020    Historical Provider, MD   lidocaine-prilocaine (EMLA) 2.5-2.5 % cream SANGEETA TOPICALLY AA PRN 8/7/19   Historical Provider, MD   Biotin 5000 MCG CAPS Take 1 capsule by mouth daily LD 2/25/2020    Historical Provider, MD   vitamin B-12 (CYANOCOBALAMIN) 1000 MCG tablet Take 1,000 mcg by mouth daily LD 2/25/2020    Historical Provider, MD   fish oil-omega-3 fatty acids 1000 MG capsule Take 1 g by mouth LD 2/25/2020    Historical Provider, MD       Allergies  Allergies   Allergen Reactions    Asa [Aspirin]      affected kidneys    Lisinopril      cough       Review of Systems  Please see HPI above. All bolded are positive. All un-bolded are negative.   Constitutional Symptoms: fever, chills, fatigue, generalized weakness, diaphoresis, increase in thirst, loss of appetite  Eyes: vision change   Ears, Nose, Mouth, Throat: hearing loss, nasal congestion, sores in the mouth  Cardiovascular: chest pain, chest heaviness, palpitations  Respiratory: shortness of breath, wheezing, coughing  Gastrointestinal: abdominal pain, nausea, vomiting, diarrhea, constipation, melena, hematochezia, hematemesis  Genitourinary: dysuria, hematuria, or increase in frequency  Musculoskeletal: lower extremity edema, myalgias, arthralgias, back pain  Integumentary: rashes, itching   Neurological: headache, lightheadedness, dizziness, confusion, syncope, numbness, tingling, weakness  Psychiatric: depression, suicidal ideation, or anxiety  Endocrine: unintentional weight change  Hematologic/Lymphatic: lymphadenopathy, easy bruising, easy bleeding   Allergic/Immunologic: recurrent infections      Objective  VITALS:  BP (!) 136/58 There has been arthroplastic replacement of the right knee with prosthetic components in satisfactory configuration. Subcutaneous emphysema. Osteotomy defects of the distal femur. Right knee replacement prosthesis appears to be a probably configured. Ct Knee Right Wo Contrast    Result Date: 2/21/2020  This examination and all examinations utilizing ionizing radiation at this facility are done so according to the ALARA (as low as reasonably achievable) principal for radiation dose reduction. Clinical indications: Osteoarthritis. Knee surgery. TECHNIQUE: Axial, sagittal, and coronal computed tomography of the right hip, femur, the, foreleg and ankle. There is loss of joint space, osteophytosis and eburnation of the articulating surfaces of the hip, knee and ankle. There is vacuum phenomenon within the medial meniscus and to a lesser extent lateral meniscus. There is no evidence of fracture or dislocation. Tiny amount of joint fluid is present. Otherwise the regional soft tissue and osseous structures are unremarkable. Severe osteoarthritis of the knee. 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   Please see orders for further

## 2020-03-05 NOTE — DISCHARGE INSTR - COC
Continuity of Care Form    Patient Name: Nicholas Jacobsen   :    MRN:  [id number]    [facility] date:  3/4/2020  Discharge date:  ***    Code Status Order: Full Code   Advance Directives:   Advance Care Flowsheet Documentation     Date/Time Healthcare Directive Type of Healthcare Directive Copy in [address] Agent's Name Healthcare Agent's Phone Number    20 [phone]  Yes, patient has an advance directive for healthcare treatment  Durable power of  for health care;Living will  --  --  --  --    20 1017  Yes, patient has an advance directive for healthcare treatment  --  Yes, copy in chart  --  --  --          Admitting Physician:  Irma Magdaleno MD  PCP: Yasmine Carbajal DO    Discharging Nurse: [facility] Unit/Room#: [address]  Discharging Unit Phone Number: ***    Emergency Contact:   Extended Emergency Contact Information  Primary Emergency Contact: Ronel Precise  Address: [address] [place] [address] Phone: [phone]  Mobile Phone: [phone]  Relation: Spouse  Secondary Emergency Contact: [address] Phone: [phone]  Mobile Phone: [phone]  Relation: Child    Past Surgical History:  Past Surgical History:   Procedure Laterality Date    APPENDECTOMY  1950    BREAST SURGERY  4352,8381, 2002    benign  lumps    DILATION AND CURETTAGE OF UTERUS  3/20/12    HYSTERECTOMY  12    INSERTION / REMOVAL / REPLACEMENT VENOUS ACCESS CATHETER N/A 2019    MEDI PORT INSERTION performed by Troy Wong MD at [facility]  2012    pelvic nodes    TONSILLECTOMY  child       Immunization History:   Immunization History   Administered Date(s) Administered    Influenza Vaccine, unspecified formulation 10/02/2013, 09/10/2014, 10/18/2018    Influenza, High Dose (Fluzone 65 yrs and older) 2015, 2017, 10/01/2018    Influenza, Triv, inactivated, subunit, adjuvanted, IM (Fluad 65 yrs and older) 10/02/2019    Pneumococcal Conjugate Vaccine 2016    Pneumococcal Polysaccharide ([id number]) 2019    Tdap (Boostrix, Adacel) 2017       Active Problems:  Patient Active Problem List   Diagnosis Code    Malignant neoplasm of uterus ([address] C55    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 (HCC) C77.5    Mixed hyperlipidemia E78.2    Hypertension I10    Arthritis M19.90    Chronic kidney disease, stage 3 (moderate) (HCC) N18.3    Primary osteoarthritis of right knee M17.11    Vitamin D deficiency E55.9    Uterine cancer (HCC) C55    GERD (gastroesophageal reflux disease) K21.9    S/P total knee arthroplasty, right Z96.651       Isolation/Infection:   Isolation          No Isolation        Patient Infection Status     None to display          Nurse Assessment:  Last Vital Signs: BP (!) 131/59   Pulse 71   Temp 98.3 °F (36.8 °C) (Oral)   Resp 16   Ht 5' (1.524 m)   Wt 168 lb (76.2 kg)   LMP  (LMP Unknown)   SpO2 98%   BMI 32.81 kg/m²     Last documented pain score (0-10 scale): Pain Level: 0  Last Weight:   Wt Readings from Last 1 Encounters:   20 168 lb (76.2 kg)     Mental Status:  {IP PT MENTAL STATUS:41874}    IV Access:  { ROBI IV ACCESS:031466256}    Nursing Mobility/ADLs:  Walking   {[facility] DME SXGB:068352529}  Transfer  {[facility] DME DLXX:184369206}  Bathing  {[facility] DME QHXB:970954714}  Dressing  {[facility] DME BMBK:582612620}  Toileting  {[facility] DME LQAV:824835288}  Feeding  {[facility] DME IKCU:460178479}  Med Admin  {[facility] DME CWPL:509898294}  Med Delivery   { ROBI MED Delivery:051902486}    Wound Care Documentation and Therapy:        Elimination:  Continence:   · Bowel: {YES / W}  · Bladder: {YES / IM:10039}  Urinary Catheter: {Urinary Catheter:566916632}   Colostomy/Ileostomy/Ileal Conduit: {YES / PP:47205}       Date of Last BM: ***    Intake/Output Summary (Last 24 hours) at 3/5/2020

## 2020-03-05 NOTE — PROGRESS NOTES
Orthopaedic Surgery Progress Note  ART Nur MD      SUBJECTIVE:  No acute events over night. Pain controlled. Denies chest pain or shortness of breath. OBJECTIVE:   AAOx3, NAD    Right lower extremity    Dressings C/D/I  SILT L1-S1  TA/EHL/GS intact  Calf soft, NT  +2 DP, W/WP     Data:  CBC:   Lab Results   Component Value Date    WBC 4.8 02/24/2020    RBC 4.10 02/24/2020    HGB 12.7 02/24/2020    HCT 38.8 02/24/2020    MCV 94.6 02/24/2020    MCH 31.0 02/24/2020    MCHC 32.7 02/24/2020    RDW 13.4 02/24/2020     02/24/2020    MPV 9.7 02/24/2020     BMP:    Lab Results   Component Value Date     02/24/2020    K 3.6 02/24/2020    K 4.5 10/02/2019     02/24/2020    CO2 21 02/24/2020    BUN 37 02/24/2020    LABALBU 3.2 10/02/2019    CREATININE 1.3 02/24/2020    CALCIUM 9.4 02/24/2020    GFRAA 48 02/24/2020    LABGLOM 39 02/24/2020    GLUCOSE 108 02/24/2020         A/P: POD 1 right TKA  - WBAT  - Pain control  - PT/OT  - DVT prophylaxis  - D/C planning for home today    ART Nur MD

## 2020-03-06 ENCOUNTER — TELEPHONE (OUTPATIENT)
Dept: PHARMACY | Facility: CLINIC | Age: 80
End: 2020-03-06

## 2020-03-06 NOTE — TELEPHONE ENCOUNTER
CLINICAL PHARMACY CONSULTATION: Transition of Care ([facility])  -----------------------------------------------------------------------------------------------  Partha Estrada is a 78 y.o. female. Patient was discharged from [facility] on 3/5/2020 with a diagnosis of s/p right TKA. Attempt made to contact pt at home number. Spoke with pt's  (pt was in background) - pt is currently resting. States she has what she needs medication-wise and no questions at this time. Agreeable to follow up call on Monday for medication review if desired. Will continue to attempt to contact pt as appropriate.     Dominique Perez, PharmD, [address] Pharmacist  Dept: [phone] (toll free [phone], option 7)     For Pharmacy Admin Tracking Only    TCM Call Made?: Yes

## 2020-03-19 ENCOUNTER — HOSPITAL ENCOUNTER (OUTPATIENT)
Dept: INFUSION THERAPY | Age: 80
Setting detail: INFUSION SERIES
Discharge: HOME | End: 2020-03-19
Payer: MEDICARE

## 2020-03-19 VITALS
OXYGEN SATURATION: 99 % | RESPIRATION RATE: 16 BRPM | BODY MASS INDEX: 32.98 KG/M2 | WEIGHT: 168 LBS | HEART RATE: 89 BPM | DIASTOLIC BLOOD PRESSURE: 65 MMHG | HEIGHT: 60 IN | TEMPERATURE: 97.8 F | SYSTOLIC BLOOD PRESSURE: 142 MMHG

## 2020-03-19 DIAGNOSIS — C55 MALIGNANT NEOPLASM OF UTERUS, UNSPECIFIED SITE (HCC): Primary | ICD-10-CM

## 2020-03-19 LAB
BILIRUBIN URINE: NEGATIVE
BLOOD, URINE: NEGATIVE
BUN BLDV-MCNC: 27 MG/DL (ref 8–23)
CA 125: 18.4 U/ML (ref 0–35)
CLARITY: CLEAR
COLOR: YELLOW
CREAT SERPL-MCNC: 1.2 MG/DL (ref 0.5–1)
GFR AFRICAN AMERICAN: 52
GFR NON-AFRICAN AMERICAN: 43 ML/MIN/1.73
GLUCOSE URINE: NEGATIVE MG/DL
KETONES, URINE: NEGATIVE MG/DL
LEUKOCYTE ESTERASE, URINE: NEGATIVE
NITRITE, URINE: NEGATIVE
PH UA: 5 (ref 5–9)
PROTEIN UA: NEGATIVE MG/DL
SPECIFIC GRAVITY UA: 1.02 (ref 1–1.03)
UROBILINOGEN, URINE: 0.2 E.U./DL

## 2020-03-19 PROCEDURE — 81003 URINALYSIS AUTO W/O SCOPE: CPT

## 2020-03-19 PROCEDURE — 2580000003 HC RX 258: Performed by: NURSE PRACTITIONER

## 2020-03-19 PROCEDURE — 6360000002 HC RX W HCPCS: Performed by: OBSTETRICS & GYNECOLOGY

## 2020-03-19 PROCEDURE — 36415 COLL VENOUS BLD VENIPUNCTURE: CPT

## 2020-03-19 PROCEDURE — 82565 ASSAY OF CREATININE: CPT

## 2020-03-19 PROCEDURE — 86304 IMMUNOASSAY TUMOR CA 125: CPT

## 2020-03-19 PROCEDURE — 84520 ASSAY OF UREA NITROGEN: CPT

## 2020-03-19 PROCEDURE — 36591 DRAW BLOOD OFF VENOUS DEVICE: CPT

## 2020-03-19 RX ORDER — TRAMADOL HYDROCHLORIDE 50 MG/1
50 TABLET ORAL EVERY 6 HOURS PRN
COMMUNITY
End: 2020-05-22

## 2020-03-19 RX ORDER — HEPARIN SODIUM (PORCINE) LOCK FLUSH IV SOLN 100 UNIT/ML 100 UNIT/ML
300 SOLUTION INTRAVENOUS ONCE
Status: COMPLETED | OUTPATIENT
Start: 2020-03-19 | End: 2020-03-19

## 2020-03-19 RX ORDER — SODIUM CHLORIDE 0.9 % (FLUSH) 0.9 %
10 SYRINGE (ML) INJECTION PRN
Status: DISCONTINUED | OUTPATIENT
Start: 2020-03-19 | End: 2020-03-20 | Stop reason: HOSPADM

## 2020-03-19 RX ORDER — SODIUM CHLORIDE 0.9 % (FLUSH) 0.9 %
10 SYRINGE (ML) INJECTION PRN
Status: CANCELLED
Start: 2020-03-19

## 2020-03-19 RX ORDER — OXYCODONE HYDROCHLORIDE 5 MG/1
5 TABLET ORAL EVERY 4 HOURS PRN
COMMUNITY
End: 2020-04-30 | Stop reason: ALTCHOICE

## 2020-03-19 RX ADMIN — SODIUM CHLORIDE, PRESERVATIVE FREE 10 ML: 5 INJECTION INTRAVENOUS at 08:57

## 2020-03-19 RX ADMIN — Medication 500 UNITS: at 08:58

## 2020-03-19 RX ADMIN — SODIUM CHLORIDE, PRESERVATIVE FREE 10 ML: 5 INJECTION INTRAVENOUS at 08:58

## 2020-03-19 ASSESSMENT — PAIN DESCRIPTION - PAIN TYPE: TYPE: SURGICAL PAIN

## 2020-03-19 ASSESSMENT — PAIN DESCRIPTION - DESCRIPTORS: DESCRIPTORS: ACHING;CONSTANT

## 2020-03-19 ASSESSMENT — PAIN DESCRIPTION - LOCATION: LOCATION: KNEE

## 2020-03-19 ASSESSMENT — PAIN DESCRIPTION - ONSET: ONSET: ON-GOING

## 2020-03-19 ASSESSMENT — PAIN DESCRIPTION - FREQUENCY: FREQUENCY: CONTINUOUS

## 2020-03-19 ASSESSMENT — PAIN DESCRIPTION - ORIENTATION: ORIENTATION: RIGHT

## 2020-03-19 ASSESSMENT — PAIN SCALES - GENERAL: PAINLEVEL_OUTOF10: 5

## 2020-03-19 ASSESSMENT — PAIN DESCRIPTION - PROGRESSION: CLINICAL_PROGRESSION: NOT CHANGED

## 2020-04-10 ENCOUNTER — HOSPITAL ENCOUNTER (OUTPATIENT)
Dept: INFUSION THERAPY | Age: 80
Setting detail: INFUSION SERIES
Discharge: HOME | End: 2020-04-10
Payer: MEDICARE

## 2020-04-10 VITALS
DIASTOLIC BLOOD PRESSURE: 65 MMHG | OXYGEN SATURATION: 100 % | RESPIRATION RATE: 16 BRPM | HEART RATE: 85 BPM | TEMPERATURE: 97.8 F | WEIGHT: 168 LBS | HEIGHT: 60 IN | SYSTOLIC BLOOD PRESSURE: 153 MMHG | BODY MASS INDEX: 32.98 KG/M2

## 2020-04-10 LAB
BILIRUBIN URINE: NEGATIVE
BLOOD, URINE: NEGATIVE
BUN BLDV-MCNC: 32 MG/DL (ref 8–23)
CA 125: 16.9 U/ML (ref 0–35)
CLARITY: CLEAR
COLOR: NORMAL
CREAT SERPL-MCNC: 1.1 MG/DL (ref 0.5–1)
GFR AFRICAN AMERICAN: 58
GFR NON-AFRICAN AMERICAN: 48 ML/MIN/1.73
GLUCOSE URINE: NEGATIVE MG/DL
KETONES, URINE: NEGATIVE MG/DL
LEUKOCYTE ESTERASE, URINE: NEGATIVE
NITRITE, URINE: NEGATIVE
PH UA: 6 (ref 5–9)
PROTEIN UA: NEGATIVE MG/DL
SPECIFIC GRAVITY UA: 1.01 (ref 1–1.03)
UROBILINOGEN, URINE: 0.2 E.U./DL

## 2020-04-10 PROCEDURE — 36415 COLL VENOUS BLD VENIPUNCTURE: CPT

## 2020-04-10 PROCEDURE — 81003 URINALYSIS AUTO W/O SCOPE: CPT

## 2020-04-10 PROCEDURE — 86304 IMMUNOASSAY TUMOR CA 125: CPT

## 2020-04-10 PROCEDURE — 36591 DRAW BLOOD OFF VENOUS DEVICE: CPT

## 2020-04-10 PROCEDURE — 82565 ASSAY OF CREATININE: CPT

## 2020-04-10 PROCEDURE — 6360000002 HC RX W HCPCS: Performed by: OBSTETRICS & GYNECOLOGY

## 2020-04-10 PROCEDURE — 6360000002 HC RX W HCPCS: Performed by: NURSE PRACTITIONER

## 2020-04-10 PROCEDURE — 2580000003 HC RX 258: Performed by: OBSTETRICS & GYNECOLOGY

## 2020-04-10 PROCEDURE — 84520 ASSAY OF UREA NITROGEN: CPT

## 2020-04-10 PROCEDURE — 36593 DECLOT VASCULAR DEVICE: CPT

## 2020-04-10 RX ORDER — SODIUM CHLORIDE 0.9 % (FLUSH) 0.9 %
10 SYRINGE (ML) INJECTION PRN
Status: DISCONTINUED | OUTPATIENT
Start: 2020-04-10 | End: 2020-04-11 | Stop reason: HOSPADM

## 2020-04-10 RX ORDER — HEPARIN SODIUM (PORCINE) LOCK FLUSH IV SOLN 100 UNIT/ML 100 UNIT/ML
500 SOLUTION INTRAVENOUS PRN
Status: DISCONTINUED | OUTPATIENT
Start: 2020-04-10 | End: 2020-04-11 | Stop reason: HOSPADM

## 2020-04-10 RX ADMIN — Medication 10 ML: at 10:29

## 2020-04-10 RX ADMIN — Medication 10 ML: at 10:30

## 2020-04-10 RX ADMIN — Medication 500 UNITS: at 10:57

## 2020-04-10 RX ADMIN — Medication 10 ML: at 09:55

## 2020-04-10 RX ADMIN — Medication 10 ML: at 11:40

## 2020-04-10 RX ADMIN — ALTEPLASE 1 MG: 2.2 INJECTION, POWDER, LYOPHILIZED, FOR SOLUTION INTRAVENOUS at 11:00

## 2020-04-10 RX ADMIN — Medication 10 ML: at 11:39

## 2020-04-10 RX ADMIN — ALTEPLASE 1 MG: 2.2 INJECTION, POWDER, LYOPHILIZED, FOR SOLUTION INTRAVENOUS at 09:55

## 2020-04-10 RX ADMIN — Medication 10 ML: at 09:53

## 2020-04-10 RX ADMIN — Medication 500 UNITS: at 11:41

## 2020-04-10 RX ADMIN — Medication 10 ML: at 09:54

## 2020-04-14 RX ORDER — EZETIMIBE 10 MG/1
10 TABLET ORAL
Qty: 90 TABLET | Refills: 1 | Status: SHIPPED
Start: 2020-04-14 | End: 2020-05-22 | Stop reason: SDUPTHER

## 2020-04-30 ENCOUNTER — HOSPITAL ENCOUNTER (OUTPATIENT)
Dept: INFUSION THERAPY | Age: 80
Setting detail: INFUSION SERIES
Discharge: HOME | End: 2020-04-30
Payer: MEDICARE

## 2020-04-30 VITALS
WEIGHT: 168 LBS | RESPIRATION RATE: 16 BRPM | HEART RATE: 80 BPM | TEMPERATURE: 98.3 F | DIASTOLIC BLOOD PRESSURE: 65 MMHG | BODY MASS INDEX: 32.81 KG/M2 | OXYGEN SATURATION: 99 % | SYSTOLIC BLOOD PRESSURE: 137 MMHG

## 2020-04-30 DIAGNOSIS — C55 MALIGNANT NEOPLASM OF UTERUS, UNSPECIFIED SITE (HCC): Primary | ICD-10-CM

## 2020-04-30 LAB
BACTERIA: NORMAL /HPF
BILIRUBIN URINE: NEGATIVE
BLOOD, URINE: NEGATIVE
BUN BLDV-MCNC: 38 MG/DL (ref 8–23)
CA 125: 15.9 U/ML (ref 0–35)
CLARITY: CLEAR
COLOR: YELLOW
CREAT SERPL-MCNC: 1.2 MG/DL (ref 0.5–1)
EPITHELIAL CELLS, UA: NORMAL /HPF
GFR AFRICAN AMERICAN: 52
GFR NON-AFRICAN AMERICAN: 43 ML/MIN/1.73
GLUCOSE URINE: NEGATIVE MG/DL
KETONES, URINE: NEGATIVE MG/DL
LEUKOCYTE ESTERASE, URINE: ABNORMAL
NITRITE, URINE: NEGATIVE
PH UA: 5.5 (ref 5–9)
PROTEIN UA: NEGATIVE MG/DL
RBC UA: NORMAL /HPF (ref 0–2)
SPECIFIC GRAVITY UA: 1.02 (ref 1–1.03)
UROBILINOGEN, URINE: 0.2 E.U./DL
WBC UA: NORMAL /HPF (ref 0–5)

## 2020-04-30 PROCEDURE — 36591 DRAW BLOOD OFF VENOUS DEVICE: CPT

## 2020-04-30 PROCEDURE — 84520 ASSAY OF UREA NITROGEN: CPT

## 2020-04-30 PROCEDURE — 6360000002 HC RX W HCPCS: Performed by: NURSE PRACTITIONER

## 2020-04-30 PROCEDURE — 86304 IMMUNOASSAY TUMOR CA 125: CPT

## 2020-04-30 PROCEDURE — 82565 ASSAY OF CREATININE: CPT

## 2020-04-30 PROCEDURE — 36415 COLL VENOUS BLD VENIPUNCTURE: CPT

## 2020-04-30 PROCEDURE — 2580000003 HC RX 258: Performed by: NURSE PRACTITIONER

## 2020-04-30 PROCEDURE — 81001 URINALYSIS AUTO W/SCOPE: CPT

## 2020-04-30 RX ORDER — HEPARIN SODIUM (PORCINE) LOCK FLUSH IV SOLN 100 UNIT/ML 100 UNIT/ML
500 SOLUTION INTRAVENOUS PRN
Status: DISCONTINUED | OUTPATIENT
Start: 2020-04-30 | End: 2020-05-01 | Stop reason: HOSPADM

## 2020-04-30 RX ORDER — SODIUM CHLORIDE 0.9 % (FLUSH) 0.9 %
10 SYRINGE (ML) INJECTION PRN
Status: CANCELLED | OUTPATIENT
Start: 2020-04-30

## 2020-04-30 RX ORDER — SODIUM CHLORIDE 0.9 % (FLUSH) 0.9 %
10 SYRINGE (ML) INJECTION PRN
Status: DISCONTINUED | OUTPATIENT
Start: 2020-04-30 | End: 2020-05-01 | Stop reason: HOSPADM

## 2020-04-30 RX ORDER — SODIUM CHLORIDE 0.9 % (FLUSH) 0.9 %
20 SYRINGE (ML) INJECTION PRN
Status: CANCELLED | OUTPATIENT
Start: 2020-04-30

## 2020-04-30 RX ORDER — HEPARIN SODIUM (PORCINE) LOCK FLUSH IV SOLN 100 UNIT/ML 100 UNIT/ML
500 SOLUTION INTRAVENOUS PRN
Status: CANCELLED | OUTPATIENT
Start: 2020-04-30

## 2020-04-30 RX ADMIN — Medication 10 ML: at 09:10

## 2020-04-30 RX ADMIN — Medication 10 ML: at 09:13

## 2020-04-30 RX ADMIN — Medication 10 ML: at 09:12

## 2020-04-30 RX ADMIN — Medication 500 UNITS: at 09:15

## 2020-04-30 ASSESSMENT — PAIN DESCRIPTION - ONSET: ONSET: ON-GOING

## 2020-04-30 ASSESSMENT — PAIN SCALES - GENERAL: PAINLEVEL_OUTOF10: 0

## 2020-04-30 ASSESSMENT — PAIN DESCRIPTION - LOCATION: LOCATION: KNEE

## 2020-04-30 ASSESSMENT — PAIN DESCRIPTION - PAIN TYPE: TYPE: ACUTE PAIN;SURGICAL PAIN

## 2020-04-30 ASSESSMENT — PAIN DESCRIPTION - FREQUENCY: FREQUENCY: INTERMITTENT

## 2020-04-30 NOTE — PROGRESS NOTES
Patient tolerated port flush well. Therapy plan reviewed with patient. Verbalizes understanding. Declines copy of AVS and any medication information, verbalizes good knowledge of current plan, and has no signs or symptoms to report at this time. Next appointment made.     Bun  Creat gfr    Urinalysis  And ca 125  sent

## 2020-05-21 ENCOUNTER — HOSPITAL ENCOUNTER (OUTPATIENT)
Dept: INFUSION THERAPY | Age: 80
Setting detail: INFUSION SERIES
Discharge: HOME | End: 2020-05-21
Payer: MEDICARE

## 2020-05-21 VITALS
WEIGHT: 168 LBS | RESPIRATION RATE: 16 BRPM | SYSTOLIC BLOOD PRESSURE: 178 MMHG | DIASTOLIC BLOOD PRESSURE: 73 MMHG | TEMPERATURE: 97.4 F | HEART RATE: 77 BPM | OXYGEN SATURATION: 98 % | BODY MASS INDEX: 32.81 KG/M2

## 2020-05-21 DIAGNOSIS — C55 MALIGNANT NEOPLASM OF UTERUS, UNSPECIFIED SITE (HCC): Primary | ICD-10-CM

## 2020-05-21 LAB
BILIRUBIN URINE: NEGATIVE
BLOOD, URINE: NEGATIVE
BUN BLDV-MCNC: 41 MG/DL (ref 8–23)
CA 125: 15.9 U/ML (ref 0–35)
CLARITY: CLEAR
COLOR: YELLOW
GLUCOSE URINE: NEGATIVE MG/DL
KETONES, URINE: NEGATIVE MG/DL
LEUKOCYTE ESTERASE, URINE: NEGATIVE
NITRITE, URINE: NEGATIVE
PH UA: 5 (ref 5–9)
PROTEIN UA: NEGATIVE MG/DL
SPECIFIC GRAVITY UA: 1.01 (ref 1–1.03)
UROBILINOGEN, URINE: 0.2 E.U./DL

## 2020-05-21 PROCEDURE — 6360000002 HC RX W HCPCS: Performed by: NURSE PRACTITIONER

## 2020-05-21 PROCEDURE — 81003 URINALYSIS AUTO W/O SCOPE: CPT

## 2020-05-21 PROCEDURE — 36415 COLL VENOUS BLD VENIPUNCTURE: CPT

## 2020-05-21 PROCEDURE — 2580000003 HC RX 258: Performed by: NURSE PRACTITIONER

## 2020-05-21 PROCEDURE — 86304 IMMUNOASSAY TUMOR CA 125: CPT

## 2020-05-21 PROCEDURE — 84520 ASSAY OF UREA NITROGEN: CPT

## 2020-05-21 PROCEDURE — 36591 DRAW BLOOD OFF VENOUS DEVICE: CPT

## 2020-05-21 RX ORDER — SODIUM CHLORIDE 0.9 % (FLUSH) 0.9 %
20 SYRINGE (ML) INJECTION PRN
Status: CANCELLED | OUTPATIENT
Start: 2020-05-21

## 2020-05-21 RX ORDER — SODIUM CHLORIDE 0.9 % (FLUSH) 0.9 %
10 SYRINGE (ML) INJECTION PRN
Status: CANCELLED | OUTPATIENT
Start: 2020-05-21

## 2020-05-21 RX ORDER — HEPARIN SODIUM (PORCINE) LOCK FLUSH IV SOLN 100 UNIT/ML 100 UNIT/ML
500 SOLUTION INTRAVENOUS PRN
Status: CANCELLED | OUTPATIENT
Start: 2020-05-21

## 2020-05-21 RX ORDER — SODIUM CHLORIDE 0.9 % (FLUSH) 0.9 %
20 SYRINGE (ML) INJECTION PRN
Status: DISCONTINUED | OUTPATIENT
Start: 2020-05-21 | End: 2020-05-22 | Stop reason: HOSPADM

## 2020-05-21 RX ORDER — HEPARIN SODIUM (PORCINE) LOCK FLUSH IV SOLN 100 UNIT/ML 100 UNIT/ML
500 SOLUTION INTRAVENOUS PRN
Status: DISCONTINUED | OUTPATIENT
Start: 2020-05-21 | End: 2020-05-22 | Stop reason: HOSPADM

## 2020-05-21 RX ADMIN — Medication 500 UNITS: at 09:13

## 2020-05-21 RX ADMIN — Medication 10 ML: at 09:11

## 2020-05-21 RX ADMIN — Medication 10 ML: at 09:12

## 2020-05-21 RX ADMIN — Medication 10 ML: at 09:10

## 2020-05-21 NOTE — PROGRESS NOTES
Patient tolerated port flush well. Therapy plan reviewed with patient. Verbalizes understanding. Declines copy of AVS and any medication information, verbalizes good knowledge of current plan, and has no signs or symptoms to report at this time. Next appointment made.     Labs  And urine obtained as oredered  Ca 125  Bun  Creat  gfr

## 2020-05-22 ENCOUNTER — OFFICE VISIT (OUTPATIENT)
Dept: PRIMARY CARE CLINIC | Age: 80
End: 2020-05-22
Payer: MEDICARE

## 2020-05-22 VITALS
SYSTOLIC BLOOD PRESSURE: 122 MMHG | WEIGHT: 169 LBS | HEART RATE: 83 BPM | OXYGEN SATURATION: 97 % | TEMPERATURE: 97.7 F | DIASTOLIC BLOOD PRESSURE: 78 MMHG | RESPIRATION RATE: 16 BRPM | BODY MASS INDEX: 33.18 KG/M2 | HEIGHT: 60 IN

## 2020-05-22 PROCEDURE — 1036F TOBACCO NON-USER: CPT | Performed by: FAMILY MEDICINE

## 2020-05-22 PROCEDURE — 1123F ACP DISCUSS/DSCN MKR DOCD: CPT | Performed by: FAMILY MEDICINE

## 2020-05-22 PROCEDURE — G8417 CALC BMI ABV UP PARAM F/U: HCPCS | Performed by: FAMILY MEDICINE

## 2020-05-22 PROCEDURE — G8399 PT W/DXA RESULTS DOCUMENT: HCPCS | Performed by: FAMILY MEDICINE

## 2020-05-22 PROCEDURE — 1090F PRES/ABSN URINE INCON ASSESS: CPT | Performed by: FAMILY MEDICINE

## 2020-05-22 PROCEDURE — 4040F PNEUMOC VAC/ADMIN/RCVD: CPT | Performed by: FAMILY MEDICINE

## 2020-05-22 PROCEDURE — G8427 DOCREV CUR MEDS BY ELIG CLIN: HCPCS | Performed by: FAMILY MEDICINE

## 2020-05-22 PROCEDURE — 99213 OFFICE O/P EST LOW 20 MIN: CPT | Performed by: FAMILY MEDICINE

## 2020-05-22 RX ORDER — EZETIMIBE 10 MG/1
10 TABLET ORAL
Qty: 90 TABLET | Refills: 1 | Status: SHIPPED
Start: 2020-05-22 | End: 2020-11-02

## 2020-05-22 RX ORDER — FOLIC ACID 1 MG/1
1 TABLET ORAL DAILY
Qty: 90 TABLET | Refills: 5 | Status: SHIPPED | OUTPATIENT
Start: 2020-05-22 | End: 2020-11-20 | Stop reason: SDUPTHER

## 2020-05-22 ASSESSMENT — ENCOUNTER SYMPTOMS
ABDOMINAL PAIN: 0
PHOTOPHOBIA: 0
SORE THROAT: 0
VOMITING: 0
BLOOD IN STOOL: 0
SHORTNESS OF BREATH: 0
CONSTIPATION: 0
COUGH: 0
NAUSEA: 0
DIARRHEA: 0
BACK PAIN: 0

## 2020-05-22 NOTE — PROGRESS NOTES
2020     Wesley Soares (:  1940) is a [de-identified] y.o. female, here for evaluation of the following medical concerns:    HPI  Patient here to follow-up on chronic issues and recent specialist visits. Patient continues to follow with oncology for metastatic uterine cancer. Patient was recently seen by oncology in [place] and was told that some of her tumors have actually grown in size. They initiated Avastin therapy and she is currently on her third dose of Avastin upcoming with a repeat CT and oncology visit on December 10, 2019. Further treatment will be based on those results. Patient states she is doing well in regards to the side effects from the medication. She states that she gets fatigued and visual change for 1 to 2 days post treatment which resolves to her baseline. She has been doing well otherwise with no side effects or adverse reactions to her other medications. She denies any other current issues. Update 2020  She states that she has been stable in regards to her cancer. Most recent scans show response to treatment. She is here today for preoperative clearance regarding right knee total arthroplasty. This is scheduled on 3/2/2020 with Dr. Sherrie Hernandez. She states she is doing well and denies any issues with chest pain or shortness of breath at this time. Blood pressure has been under control for several months without fluctuation. Patient denies any other issues currently. Update 2020  Patient here for follow-up after recent knee surgery. Patient had right TKA in March. Patient states she is doing much better with the pain level and functionality after the surgery. Continues with physical therapy at least twice a week. States that it feels fantastic and her pain is almost completely gone. Is able to drive and walk with a cane. She states that she uses a cane because the left knee does give out continuously. Reviewed recent blood work and everything looks well.

## 2020-06-11 ENCOUNTER — HOSPITAL ENCOUNTER (OUTPATIENT)
Dept: INFUSION THERAPY | Age: 80
Setting detail: INFUSION SERIES
Discharge: HOME | End: 2020-06-11
Payer: MEDICARE

## 2020-06-11 VITALS
RESPIRATION RATE: 16 BRPM | TEMPERATURE: 97.8 F | BODY MASS INDEX: 33.18 KG/M2 | SYSTOLIC BLOOD PRESSURE: 171 MMHG | HEART RATE: 86 BPM | HEIGHT: 60 IN | OXYGEN SATURATION: 100 % | WEIGHT: 169 LBS | DIASTOLIC BLOOD PRESSURE: 68 MMHG

## 2020-06-11 DIAGNOSIS — C55 MALIGNANT NEOPLASM OF UTERUS, UNSPECIFIED SITE (HCC): Primary | ICD-10-CM

## 2020-06-11 LAB
BACTERIA: NORMAL /HPF
BILIRUBIN URINE: NEGATIVE
BLOOD, URINE: NEGATIVE
BUN BLDV-MCNC: 37 MG/DL (ref 8–23)
CLARITY: CLEAR
COLOR: ABNORMAL
CREAT SERPL-MCNC: 1.2 MG/DL (ref 0.5–1)
GFR AFRICAN AMERICAN: 52
GFR NON-AFRICAN AMERICAN: 43 ML/MIN/1.73
GLUCOSE URINE: NEGATIVE MG/DL
KETONES, URINE: NEGATIVE MG/DL
LEUKOCYTE ESTERASE, URINE: ABNORMAL
NITRITE, URINE: NEGATIVE
PH UA: 5.5 (ref 5–9)
PROTEIN UA: NEGATIVE MG/DL
RBC UA: NORMAL /HPF (ref 0–2)
SPECIFIC GRAVITY UA: 1.01 (ref 1–1.03)
UROBILINOGEN, URINE: 0.2 E.U./DL
WBC UA: NORMAL /HPF (ref 0–5)

## 2020-06-11 PROCEDURE — 36591 DRAW BLOOD OFF VENOUS DEVICE: CPT

## 2020-06-11 PROCEDURE — 82565 ASSAY OF CREATININE: CPT

## 2020-06-11 PROCEDURE — 2580000003 HC RX 258: Performed by: NURSE PRACTITIONER

## 2020-06-11 PROCEDURE — 84520 ASSAY OF UREA NITROGEN: CPT

## 2020-06-11 PROCEDURE — 81001 URINALYSIS AUTO W/SCOPE: CPT

## 2020-06-11 PROCEDURE — 36415 COLL VENOUS BLD VENIPUNCTURE: CPT

## 2020-06-11 PROCEDURE — 6360000002 HC RX W HCPCS: Performed by: NURSE PRACTITIONER

## 2020-06-11 RX ORDER — HEPARIN SODIUM (PORCINE) LOCK FLUSH IV SOLN 100 UNIT/ML 100 UNIT/ML
500 SOLUTION INTRAVENOUS PRN
Status: DISCONTINUED | OUTPATIENT
Start: 2020-06-11 | End: 2020-06-12 | Stop reason: HOSPADM

## 2020-06-11 RX ORDER — SODIUM CHLORIDE 0.9 % (FLUSH) 0.9 %
10 SYRINGE (ML) INJECTION PRN
Status: DISCONTINUED | OUTPATIENT
Start: 2020-06-11 | End: 2020-06-12 | Stop reason: HOSPADM

## 2020-06-11 RX ORDER — HEPARIN SODIUM (PORCINE) LOCK FLUSH IV SOLN 100 UNIT/ML 100 UNIT/ML
500 SOLUTION INTRAVENOUS PRN
Status: CANCELLED | OUTPATIENT
Start: 2020-06-11

## 2020-06-11 RX ORDER — SODIUM CHLORIDE 0.9 % (FLUSH) 0.9 %
20 SYRINGE (ML) INJECTION PRN
Status: DISCONTINUED | OUTPATIENT
Start: 2020-06-11 | End: 2020-06-12 | Stop reason: HOSPADM

## 2020-06-11 RX ORDER — SODIUM CHLORIDE 0.9 % (FLUSH) 0.9 %
10 SYRINGE (ML) INJECTION PRN
Status: CANCELLED | OUTPATIENT
Start: 2020-06-11

## 2020-06-11 RX ORDER — SODIUM CHLORIDE 0.9 % (FLUSH) 0.9 %
20 SYRINGE (ML) INJECTION PRN
Status: CANCELLED | OUTPATIENT
Start: 2020-06-11

## 2020-06-11 RX ADMIN — SODIUM CHLORIDE, PRESERVATIVE FREE 10 ML: 5 INJECTION INTRAVENOUS at 09:04

## 2020-06-11 RX ADMIN — SODIUM CHLORIDE, PRESERVATIVE FREE 10 ML: 5 INJECTION INTRAVENOUS at 09:02

## 2020-06-11 RX ADMIN — SODIUM CHLORIDE, PRESERVATIVE FREE 10 ML: 5 INJECTION INTRAVENOUS at 09:03

## 2020-06-11 RX ADMIN — Medication 500 UNITS: at 09:05

## 2020-06-11 ASSESSMENT — PAIN SCALES - GENERAL: PAINLEVEL_OUTOF10: 0

## 2020-07-02 ENCOUNTER — HOSPITAL ENCOUNTER (OUTPATIENT)
Dept: INFUSION THERAPY | Age: 80
Setting detail: INFUSION SERIES
Discharge: HOME | End: 2020-07-02
Payer: MEDICARE

## 2020-07-02 VITALS
RESPIRATION RATE: 16 BRPM | HEART RATE: 88 BPM | SYSTOLIC BLOOD PRESSURE: 170 MMHG | OXYGEN SATURATION: 97 % | DIASTOLIC BLOOD PRESSURE: 74 MMHG | WEIGHT: 169 LBS | BODY MASS INDEX: 33.01 KG/M2 | TEMPERATURE: 97.8 F

## 2020-07-02 DIAGNOSIS — C55 MALIGNANT NEOPLASM OF UTERUS, UNSPECIFIED SITE (HCC): Primary | ICD-10-CM

## 2020-07-02 LAB
BACTERIA: ABNORMAL /HPF
BILIRUBIN URINE: NEGATIVE
BLOOD, URINE: ABNORMAL
BUN BLDV-MCNC: 38 MG/DL (ref 8–23)
CLARITY: CLEAR
COLOR: YELLOW
CREAT SERPL-MCNC: 1.3 MG/DL (ref 0.5–1)
GFR AFRICAN AMERICAN: 48
GFR NON-AFRICAN AMERICAN: 39 ML/MIN/1.73
GLUCOSE URINE: NEGATIVE MG/DL
KETONES, URINE: NEGATIVE MG/DL
LEUKOCYTE ESTERASE, URINE: NEGATIVE
NITRITE, URINE: NEGATIVE
PH UA: 6.5 (ref 5–9)
PROTEIN UA: NEGATIVE MG/DL
RBC UA: ABNORMAL /HPF (ref 0–2)
SPECIFIC GRAVITY UA: 1.01 (ref 1–1.03)
UROBILINOGEN, URINE: 2 E.U./DL
WBC UA: ABNORMAL /HPF (ref 0–5)

## 2020-07-02 PROCEDURE — 82565 ASSAY OF CREATININE: CPT

## 2020-07-02 PROCEDURE — 36591 DRAW BLOOD OFF VENOUS DEVICE: CPT

## 2020-07-02 PROCEDURE — 84520 ASSAY OF UREA NITROGEN: CPT

## 2020-07-02 PROCEDURE — 36415 COLL VENOUS BLD VENIPUNCTURE: CPT

## 2020-07-02 PROCEDURE — 81001 URINALYSIS AUTO W/SCOPE: CPT

## 2020-07-02 PROCEDURE — 6360000002 HC RX W HCPCS: Performed by: NURSE PRACTITIONER

## 2020-07-02 PROCEDURE — 2580000003 HC RX 258: Performed by: NURSE PRACTITIONER

## 2020-07-02 RX ORDER — HEPARIN SODIUM (PORCINE) LOCK FLUSH IV SOLN 100 UNIT/ML 100 UNIT/ML
500 SOLUTION INTRAVENOUS PRN
Status: DISCONTINUED | OUTPATIENT
Start: 2020-07-02 | End: 2020-07-03 | Stop reason: HOSPADM

## 2020-07-02 RX ORDER — SODIUM CHLORIDE 0.9 % (FLUSH) 0.9 %
20 SYRINGE (ML) INJECTION PRN
Status: CANCELLED | OUTPATIENT
Start: 2020-07-02

## 2020-07-02 RX ORDER — SODIUM CHLORIDE 0.9 % (FLUSH) 0.9 %
10 SYRINGE (ML) INJECTION PRN
Status: CANCELLED | OUTPATIENT
Start: 2020-07-02

## 2020-07-02 RX ORDER — SODIUM CHLORIDE 0.9 % (FLUSH) 0.9 %
10 SYRINGE (ML) INJECTION PRN
Status: DISCONTINUED | OUTPATIENT
Start: 2020-07-02 | End: 2020-07-03 | Stop reason: HOSPADM

## 2020-07-02 RX ORDER — SODIUM CHLORIDE 0.9 % (FLUSH) 0.9 %
20 SYRINGE (ML) INJECTION PRN
Status: DISCONTINUED | OUTPATIENT
Start: 2020-07-02 | End: 2020-07-03 | Stop reason: HOSPADM

## 2020-07-02 RX ORDER — HEPARIN SODIUM (PORCINE) LOCK FLUSH IV SOLN 100 UNIT/ML 100 UNIT/ML
500 SOLUTION INTRAVENOUS PRN
Status: CANCELLED | OUTPATIENT
Start: 2020-07-02

## 2020-07-02 RX ADMIN — SODIUM CHLORIDE, PRESERVATIVE FREE 20 ML: 5 INJECTION INTRAVENOUS at 09:02

## 2020-07-02 RX ADMIN — Medication 500 UNITS: at 09:03

## 2020-07-02 RX ADMIN — SODIUM CHLORIDE, PRESERVATIVE FREE 10 ML: 5 INJECTION INTRAVENOUS at 08:59

## 2020-07-02 ASSESSMENT — PAIN DESCRIPTION - PROGRESSION: CLINICAL_PROGRESSION: NOT CHANGED

## 2020-07-02 ASSESSMENT — PAIN SCALES - GENERAL: PAINLEVEL_OUTOF10: 0

## 2020-07-02 ASSESSMENT — PAIN DESCRIPTION - ORIENTATION: ORIENTATION: RIGHT

## 2020-07-02 ASSESSMENT — PAIN DESCRIPTION - ONSET: ONSET: ON-GOING

## 2020-07-02 ASSESSMENT — PAIN DESCRIPTION - LOCATION: LOCATION: KNEE

## 2020-07-02 ASSESSMENT — PAIN DESCRIPTION - DESCRIPTORS: DESCRIPTORS: ACHING;CONSTANT

## 2020-07-02 ASSESSMENT — PAIN DESCRIPTION - PAIN TYPE: TYPE: ACUTE PAIN

## 2020-07-23 ENCOUNTER — HOSPITAL ENCOUNTER (OUTPATIENT)
Dept: INFUSION THERAPY | Age: 80
Setting detail: INFUSION SERIES
Discharge: HOME | End: 2020-07-23
Payer: MEDICARE

## 2020-07-23 VITALS
SYSTOLIC BLOOD PRESSURE: 152 MMHG | RESPIRATION RATE: 16 BRPM | BODY MASS INDEX: 33.38 KG/M2 | TEMPERATURE: 97.9 F | WEIGHT: 170 LBS | HEART RATE: 78 BPM | DIASTOLIC BLOOD PRESSURE: 70 MMHG | HEIGHT: 60 IN | OXYGEN SATURATION: 98 %

## 2020-07-23 DIAGNOSIS — C55 MALIGNANT NEOPLASM OF UTERUS, UNSPECIFIED SITE (HCC): Primary | ICD-10-CM

## 2020-07-23 LAB
BILIRUBIN URINE: NEGATIVE
BLOOD, URINE: NEGATIVE
BUN BLDV-MCNC: 44 MG/DL (ref 8–23)
CA 125: 14.2 U/ML (ref 0–35)
CLARITY: CLEAR
COLOR: YELLOW
CREAT SERPL-MCNC: 1.3 MG/DL (ref 0.5–1)
GFR AFRICAN AMERICAN: 48
GFR NON-AFRICAN AMERICAN: 39 ML/MIN/1.73
GLUCOSE URINE: NEGATIVE MG/DL
KETONES, URINE: NEGATIVE MG/DL
LEUKOCYTE ESTERASE, URINE: NEGATIVE
NITRITE, URINE: NEGATIVE
PH UA: 5.5 (ref 5–9)
PROTEIN UA: NEGATIVE MG/DL
SPECIFIC GRAVITY UA: 1.01 (ref 1–1.03)
UROBILINOGEN, URINE: 0.2 E.U./DL

## 2020-07-23 PROCEDURE — 86304 IMMUNOASSAY TUMOR CA 125: CPT

## 2020-07-23 PROCEDURE — 81003 URINALYSIS AUTO W/O SCOPE: CPT

## 2020-07-23 PROCEDURE — 82565 ASSAY OF CREATININE: CPT

## 2020-07-23 PROCEDURE — 36415 COLL VENOUS BLD VENIPUNCTURE: CPT

## 2020-07-23 PROCEDURE — 6360000002 HC RX W HCPCS: Performed by: NURSE PRACTITIONER

## 2020-07-23 PROCEDURE — 84520 ASSAY OF UREA NITROGEN: CPT

## 2020-07-23 PROCEDURE — 2580000003 HC RX 258: Performed by: NURSE PRACTITIONER

## 2020-07-23 PROCEDURE — 36591 DRAW BLOOD OFF VENOUS DEVICE: CPT

## 2020-07-23 RX ORDER — DOXAZOSIN 2 MG/1
2 TABLET ORAL NIGHTLY
COMMUNITY

## 2020-07-23 RX ORDER — HEPARIN SODIUM (PORCINE) LOCK FLUSH IV SOLN 100 UNIT/ML 100 UNIT/ML
500 SOLUTION INTRAVENOUS PRN
Status: DISCONTINUED | OUTPATIENT
Start: 2020-07-23 | End: 2020-07-24 | Stop reason: HOSPADM

## 2020-07-23 RX ORDER — SODIUM CHLORIDE 0.9 % (FLUSH) 0.9 %
10 SYRINGE (ML) INJECTION PRN
Status: DISCONTINUED | OUTPATIENT
Start: 2020-07-23 | End: 2020-07-24 | Stop reason: HOSPADM

## 2020-07-23 RX ORDER — SODIUM CHLORIDE 0.9 % (FLUSH) 0.9 %
10 SYRINGE (ML) INJECTION PRN
Status: CANCELLED | OUTPATIENT
Start: 2020-07-23

## 2020-07-23 RX ORDER — SODIUM CHLORIDE 0.9 % (FLUSH) 0.9 %
20 SYRINGE (ML) INJECTION PRN
Status: CANCELLED | OUTPATIENT
Start: 2020-07-23

## 2020-07-23 RX ORDER — SODIUM CHLORIDE 0.9 % (FLUSH) 0.9 %
20 SYRINGE (ML) INJECTION PRN
Status: DISCONTINUED | OUTPATIENT
Start: 2020-07-23 | End: 2020-07-24 | Stop reason: HOSPADM

## 2020-07-23 RX ORDER — HEPARIN SODIUM (PORCINE) LOCK FLUSH IV SOLN 100 UNIT/ML 100 UNIT/ML
500 SOLUTION INTRAVENOUS PRN
Status: CANCELLED | OUTPATIENT
Start: 2020-07-23

## 2020-07-23 RX ADMIN — Medication 500 UNITS: at 09:17

## 2020-07-23 RX ADMIN — SODIUM CHLORIDE, PRESERVATIVE FREE 10 ML: 5 INJECTION INTRAVENOUS at 09:14

## 2020-07-23 RX ADMIN — SODIUM CHLORIDE, PRESERVATIVE FREE 20 ML: 5 INJECTION INTRAVENOUS at 09:16

## 2020-08-13 ENCOUNTER — HOSPITAL ENCOUNTER (OUTPATIENT)
Dept: INFUSION THERAPY | Age: 80
Setting detail: INFUSION SERIES
Discharge: HOME | End: 2020-08-13
Payer: MEDICARE

## 2020-08-13 VITALS
OXYGEN SATURATION: 98 % | HEIGHT: 60 IN | BODY MASS INDEX: 33.38 KG/M2 | TEMPERATURE: 97.8 F | RESPIRATION RATE: 16 BRPM | HEART RATE: 79 BPM | WEIGHT: 170 LBS | SYSTOLIC BLOOD PRESSURE: 139 MMHG | DIASTOLIC BLOOD PRESSURE: 66 MMHG

## 2020-08-13 DIAGNOSIS — C55 MALIGNANT NEOPLASM OF UTERUS, UNSPECIFIED SITE (HCC): Primary | ICD-10-CM

## 2020-08-13 LAB
BILIRUBIN URINE: NEGATIVE
BLOOD, URINE: NEGATIVE
BUN BLDV-MCNC: 50 MG/DL (ref 8–23)
CA 125: 13.5 U/ML (ref 0–35)
CLARITY: CLEAR
COLOR: YELLOW
CREAT SERPL-MCNC: 1.2 MG/DL (ref 0.5–1)
GFR AFRICAN AMERICAN: 52
GFR NON-AFRICAN AMERICAN: 43 ML/MIN/1.73
GLUCOSE URINE: NEGATIVE MG/DL
KETONES, URINE: NEGATIVE MG/DL
LEUKOCYTE ESTERASE, URINE: NEGATIVE
NITRITE, URINE: NEGATIVE
PH UA: 6 (ref 5–9)
PROTEIN UA: NEGATIVE MG/DL
SPECIFIC GRAVITY UA: 1.02 (ref 1–1.03)
UROBILINOGEN, URINE: 0.2 E.U./DL

## 2020-08-13 PROCEDURE — 82565 ASSAY OF CREATININE: CPT

## 2020-08-13 PROCEDURE — 86304 IMMUNOASSAY TUMOR CA 125: CPT

## 2020-08-13 PROCEDURE — 36415 COLL VENOUS BLD VENIPUNCTURE: CPT

## 2020-08-13 PROCEDURE — 36591 DRAW BLOOD OFF VENOUS DEVICE: CPT

## 2020-08-13 PROCEDURE — 84520 ASSAY OF UREA NITROGEN: CPT

## 2020-08-13 PROCEDURE — 81003 URINALYSIS AUTO W/O SCOPE: CPT

## 2020-08-13 PROCEDURE — 2580000003 HC RX 258: Performed by: NURSE PRACTITIONER

## 2020-08-13 PROCEDURE — 6360000002 HC RX W HCPCS: Performed by: NURSE PRACTITIONER

## 2020-08-13 PROCEDURE — 36593 DECLOT VASCULAR DEVICE: CPT

## 2020-08-13 RX ORDER — SODIUM CHLORIDE 0.9 % (FLUSH) 0.9 %
20 SYRINGE (ML) INJECTION PRN
Status: CANCELLED | OUTPATIENT
Start: 2020-08-13

## 2020-08-13 RX ORDER — SODIUM CHLORIDE 0.9 % (FLUSH) 0.9 %
10 SYRINGE (ML) INJECTION PRN
Status: CANCELLED | OUTPATIENT
Start: 2020-08-13

## 2020-08-13 RX ORDER — HEPARIN SODIUM (PORCINE) LOCK FLUSH IV SOLN 100 UNIT/ML 100 UNIT/ML
500 SOLUTION INTRAVENOUS PRN
Status: CANCELLED | OUTPATIENT
Start: 2020-08-13

## 2020-08-13 RX ORDER — SODIUM CHLORIDE 0.9 % (FLUSH) 0.9 %
10 SYRINGE (ML) INJECTION PRN
Status: DISCONTINUED | OUTPATIENT
Start: 2020-08-13 | End: 2020-08-14 | Stop reason: HOSPADM

## 2020-08-13 RX ORDER — SODIUM CHLORIDE 0.9 % (FLUSH) 0.9 %
20 SYRINGE (ML) INJECTION PRN
Status: DISCONTINUED | OUTPATIENT
Start: 2020-08-13 | End: 2020-08-14 | Stop reason: HOSPADM

## 2020-08-13 RX ORDER — HEPARIN SODIUM (PORCINE) LOCK FLUSH IV SOLN 100 UNIT/ML 100 UNIT/ML
500 SOLUTION INTRAVENOUS PRN
Status: DISCONTINUED | OUTPATIENT
Start: 2020-08-13 | End: 2020-08-14 | Stop reason: HOSPADM

## 2020-08-13 RX ADMIN — SODIUM CHLORIDE, PRESERVATIVE FREE 20 ML: 5 INJECTION INTRAVENOUS at 10:59

## 2020-08-13 RX ADMIN — SODIUM CHLORIDE, PRESERVATIVE FREE 10 ML: 5 INJECTION INTRAVENOUS at 09:20

## 2020-08-13 RX ADMIN — SODIUM CHLORIDE, PRESERVATIVE FREE 10 ML: 5 INJECTION INTRAVENOUS at 09:26

## 2020-08-13 RX ADMIN — SODIUM CHLORIDE, PRESERVATIVE FREE 10 ML: 5 INJECTION INTRAVENOUS at 09:22

## 2020-08-13 RX ADMIN — Medication 500 UNITS: at 10:59

## 2020-08-13 RX ADMIN — SODIUM CHLORIDE, PRESERVATIVE FREE 10 ML: 5 INJECTION INTRAVENOUS at 10:57

## 2020-08-13 RX ADMIN — Medication 500 UNITS: at 09:27

## 2020-08-13 RX ADMIN — SODIUM CHLORIDE, PRESERVATIVE FREE 10 ML: 5 INJECTION INTRAVENOUS at 09:25

## 2020-08-13 RX ADMIN — ALTEPLASE 2 MG: 2.2 INJECTION, POWDER, LYOPHILIZED, FOR SOLUTION INTRAVENOUS at 09:50

## 2020-08-13 NOTE — PROGRESS NOTES
CATH CELESTINO  DWELLED FOR 30  MINUTES. CHRISTINE BACK AND NO BLOOD CAME BACK. CONTINUED TO LET PORT DWELL FOR ANOTHER 20 MINUTES AND LINE RETURNED BLOOD. FLUSHED PER PROTOCOL, SPECIMEN OBTAINED.  LINE WAS FLUSHED WITH SALINE AND HEPARIN PT TOLERATED WELL

## 2020-08-13 NOTE — PROGRESS NOTES
Port accessed with minimal sluggish blood return. Flushed with some resistance. Port re accessed flushes easily unable to obtain blood return.  Cath liu ordered

## 2020-08-13 NOTE — PROGRESS NOTES
PROCEDURE WAS EXPLAINED TO THE PATIENT AND GAVE THE PATIENT CATH CELESTINO INFORMATION SHEET FROM [facility]. PATIENT DENIES ANY QUESTIONS. CATH CELESTINO PREPARED BY DRAWING UP 2.2ML OF STERILE WATER,  ADDED TO CATH CELESTINO VIAL ALLOWED TO DISSOLVE AND CHRISTINE UP 2ML OF THE SOLUTION.  CATH CELESTINO WAS INSTILLED INTO THE PORT

## 2020-09-04 ENCOUNTER — HOSPITAL ENCOUNTER (OUTPATIENT)
Dept: INFUSION THERAPY | Age: 80
Setting detail: INFUSION SERIES
Discharge: HOME | End: 2020-09-04
Payer: MEDICARE

## 2020-09-04 VITALS
WEIGHT: 170 LBS | SYSTOLIC BLOOD PRESSURE: 136 MMHG | OXYGEN SATURATION: 98 % | BODY MASS INDEX: 33.2 KG/M2 | DIASTOLIC BLOOD PRESSURE: 63 MMHG | HEART RATE: 71 BPM | RESPIRATION RATE: 16 BRPM

## 2020-09-04 DIAGNOSIS — C55 MALIGNANT NEOPLASM OF UTERUS, UNSPECIFIED SITE (HCC): Primary | ICD-10-CM

## 2020-09-04 LAB
BILIRUBIN URINE: NEGATIVE
BLOOD, URINE: NEGATIVE
BUN BLDV-MCNC: 51 MG/DL (ref 8–23)
CA 125: 13 U/ML (ref 0–35)
CLARITY: CLEAR
COLOR: YELLOW
CREAT SERPL-MCNC: 1.3 MG/DL (ref 0.5–1)
GFR AFRICAN AMERICAN: 48
GFR NON-AFRICAN AMERICAN: 39 ML/MIN/1.73
GLUCOSE URINE: NEGATIVE MG/DL
KETONES, URINE: NEGATIVE MG/DL
LEUKOCYTE ESTERASE, URINE: NEGATIVE
NITRITE, URINE: NEGATIVE
PH UA: 6 (ref 5–9)
PROTEIN UA: NEGATIVE MG/DL
SPECIFIC GRAVITY UA: 1.01 (ref 1–1.03)
UROBILINOGEN, URINE: 0.2 E.U./DL

## 2020-09-04 PROCEDURE — 84520 ASSAY OF UREA NITROGEN: CPT

## 2020-09-04 PROCEDURE — 86304 IMMUNOASSAY TUMOR CA 125: CPT

## 2020-09-04 PROCEDURE — 6360000002 HC RX W HCPCS: Performed by: NURSE PRACTITIONER

## 2020-09-04 PROCEDURE — 2580000003 HC RX 258: Performed by: NURSE PRACTITIONER

## 2020-09-04 PROCEDURE — 36591 DRAW BLOOD OFF VENOUS DEVICE: CPT

## 2020-09-04 PROCEDURE — 36415 COLL VENOUS BLD VENIPUNCTURE: CPT

## 2020-09-04 PROCEDURE — 81003 URINALYSIS AUTO W/O SCOPE: CPT

## 2020-09-04 PROCEDURE — 82565 ASSAY OF CREATININE: CPT

## 2020-09-04 RX ORDER — SODIUM CHLORIDE 0.9 % (FLUSH) 0.9 %
20 SYRINGE (ML) INJECTION PRN
Status: DISCONTINUED | OUTPATIENT
Start: 2020-09-04 | End: 2020-09-05 | Stop reason: HOSPADM

## 2020-09-04 RX ORDER — HEPARIN SODIUM (PORCINE) LOCK FLUSH IV SOLN 100 UNIT/ML 100 UNIT/ML
500 SOLUTION INTRAVENOUS PRN
Status: DISCONTINUED | OUTPATIENT
Start: 2020-09-04 | End: 2020-09-05 | Stop reason: HOSPADM

## 2020-09-04 RX ORDER — SODIUM CHLORIDE 0.9 % (FLUSH) 0.9 %
10 SYRINGE (ML) INJECTION PRN
Status: CANCELLED | OUTPATIENT
Start: 2020-09-04

## 2020-09-04 RX ORDER — SODIUM CHLORIDE 0.9 % (FLUSH) 0.9 %
20 SYRINGE (ML) INJECTION PRN
Status: CANCELLED | OUTPATIENT
Start: 2020-09-04

## 2020-09-04 RX ORDER — HEPARIN SODIUM (PORCINE) LOCK FLUSH IV SOLN 100 UNIT/ML 100 UNIT/ML
500 SOLUTION INTRAVENOUS PRN
Status: CANCELLED | OUTPATIENT
Start: 2020-09-04

## 2020-09-04 RX ADMIN — SODIUM CHLORIDE, PRESERVATIVE FREE 10 ML: 5 INJECTION INTRAVENOUS at 09:29

## 2020-09-04 RX ADMIN — Medication 500 UNITS: at 09:32

## 2020-09-04 RX ADMIN — SODIUM CHLORIDE, PRESERVATIVE FREE 20 ML: 5 INJECTION INTRAVENOUS at 09:31

## 2020-09-25 ENCOUNTER — HOSPITAL ENCOUNTER (OUTPATIENT)
Dept: INFUSION THERAPY | Age: 80
Setting detail: INFUSION SERIES
Discharge: HOME | End: 2020-09-25
Payer: MEDICARE

## 2020-09-25 VITALS
HEART RATE: 80 BPM | OXYGEN SATURATION: 98 % | WEIGHT: 170 LBS | SYSTOLIC BLOOD PRESSURE: 146 MMHG | RESPIRATION RATE: 16 BRPM | BODY MASS INDEX: 33.2 KG/M2 | DIASTOLIC BLOOD PRESSURE: 67 MMHG | TEMPERATURE: 97.1 F

## 2020-09-25 DIAGNOSIS — C55 MALIGNANT NEOPLASM OF UTERUS, UNSPECIFIED SITE (HCC): Primary | ICD-10-CM

## 2020-09-25 LAB
BILIRUBIN URINE: NEGATIVE
BLOOD, URINE: NEGATIVE
BUN BLDV-MCNC: 40 MG/DL (ref 8–23)
CA 125: 15.1 U/ML (ref 0–35)
CLARITY: CLEAR
COLOR: NORMAL
CREAT SERPL-MCNC: 1.3 MG/DL (ref 0.5–1)
GFR AFRICAN AMERICAN: 48
GFR NON-AFRICAN AMERICAN: 39 ML/MIN/1.73
GLUCOSE URINE: NEGATIVE MG/DL
KETONES, URINE: NEGATIVE MG/DL
LEUKOCYTE ESTERASE, URINE: NEGATIVE
NITRITE, URINE: NEGATIVE
PH UA: 6 (ref 5–9)
PROTEIN UA: NEGATIVE MG/DL
SPECIFIC GRAVITY UA: 1.01 (ref 1–1.03)
UROBILINOGEN, URINE: 0.2 E.U./DL

## 2020-09-25 PROCEDURE — 2580000003 HC RX 258: Performed by: NURSE PRACTITIONER

## 2020-09-25 PROCEDURE — 6360000002 HC RX W HCPCS: Performed by: NURSE PRACTITIONER

## 2020-09-25 PROCEDURE — 81003 URINALYSIS AUTO W/O SCOPE: CPT

## 2020-09-25 PROCEDURE — 84520 ASSAY OF UREA NITROGEN: CPT

## 2020-09-25 PROCEDURE — 82565 ASSAY OF CREATININE: CPT

## 2020-09-25 PROCEDURE — 86304 IMMUNOASSAY TUMOR CA 125: CPT

## 2020-09-25 PROCEDURE — 36591 DRAW BLOOD OFF VENOUS DEVICE: CPT

## 2020-09-25 PROCEDURE — 36415 COLL VENOUS BLD VENIPUNCTURE: CPT

## 2020-09-25 RX ORDER — HEPARIN SODIUM (PORCINE) LOCK FLUSH IV SOLN 100 UNIT/ML 100 UNIT/ML
500 SOLUTION INTRAVENOUS PRN
Status: DISCONTINUED | OUTPATIENT
Start: 2020-09-25 | End: 2020-09-26 | Stop reason: HOSPADM

## 2020-09-25 RX ORDER — SODIUM CHLORIDE 0.9 % (FLUSH) 0.9 %
20 SYRINGE (ML) INJECTION PRN
Status: CANCELLED | OUTPATIENT
Start: 2020-09-25

## 2020-09-25 RX ORDER — SODIUM CHLORIDE 0.9 % (FLUSH) 0.9 %
10 SYRINGE (ML) INJECTION PRN
Status: DISCONTINUED | OUTPATIENT
Start: 2020-09-25 | End: 2020-09-26 | Stop reason: HOSPADM

## 2020-09-25 RX ORDER — HEPARIN SODIUM (PORCINE) LOCK FLUSH IV SOLN 100 UNIT/ML 100 UNIT/ML
500 SOLUTION INTRAVENOUS PRN
Status: CANCELLED | OUTPATIENT
Start: 2020-09-25

## 2020-09-25 RX ORDER — SODIUM CHLORIDE 0.9 % (FLUSH) 0.9 %
10 SYRINGE (ML) INJECTION PRN
Status: CANCELLED | OUTPATIENT
Start: 2020-09-25

## 2020-09-25 RX ORDER — SODIUM CHLORIDE 0.9 % (FLUSH) 0.9 %
20 SYRINGE (ML) INJECTION PRN
Status: DISCONTINUED | OUTPATIENT
Start: 2020-09-25 | End: 2020-09-26 | Stop reason: HOSPADM

## 2020-09-25 RX ADMIN — SODIUM CHLORIDE, PRESERVATIVE FREE 10 ML: 5 INJECTION INTRAVENOUS at 09:03

## 2020-09-25 RX ADMIN — SODIUM CHLORIDE, PRESERVATIVE FREE 10 ML: 5 INJECTION INTRAVENOUS at 09:00

## 2020-09-25 RX ADMIN — Medication 500 UNITS: at 09:05

## 2020-09-25 RX ADMIN — SODIUM CHLORIDE, PRESERVATIVE FREE 10 ML: 5 INJECTION INTRAVENOUS at 09:04

## 2020-09-25 NOTE — PROGRESS NOTES
Patient tolerated port flush well. Therapy plan reviewed with patient. Verbalizes understanding. Declines copy of AVS and any medication information, verbalizes good knowledge of current plan, and has no signs or symptoms to report at this time. Next appointment made.     Urine specimen and bun creat  Ca 125 obtained   Pending  Results  To  Dr Jorge A Neely

## 2020-10-16 ENCOUNTER — HOSPITAL ENCOUNTER (OUTPATIENT)
Dept: INFUSION THERAPY | Age: 80
Setting detail: INFUSION SERIES
Discharge: HOME | End: 2020-10-16
Payer: MEDICARE

## 2020-10-16 VITALS
HEART RATE: 80 BPM | SYSTOLIC BLOOD PRESSURE: 156 MMHG | BODY MASS INDEX: 33.2 KG/M2 | RESPIRATION RATE: 16 BRPM | DIASTOLIC BLOOD PRESSURE: 70 MMHG | TEMPERATURE: 97.7 F | OXYGEN SATURATION: 99 % | WEIGHT: 170 LBS

## 2020-10-16 DIAGNOSIS — C55 MALIGNANT NEOPLASM OF UTERUS, UNSPECIFIED SITE (HCC): Primary | ICD-10-CM

## 2020-10-16 LAB
BACTERIA: NORMAL /HPF
BILIRUBIN URINE: NEGATIVE
BLOOD, URINE: NEGATIVE
BUN BLDV-MCNC: 45 MG/DL (ref 8–23)
CA 125: 16.3 U/ML (ref 0–35)
CLARITY: CLEAR
COLOR: YELLOW
CREAT SERPL-MCNC: 1.3 MG/DL (ref 0.5–1)
GFR AFRICAN AMERICAN: 48
GFR NON-AFRICAN AMERICAN: 39 ML/MIN/1.73
GLUCOSE URINE: NEGATIVE MG/DL
KETONES, URINE: NEGATIVE MG/DL
LEUKOCYTE ESTERASE, URINE: NEGATIVE
NITRITE, URINE: NEGATIVE
PH UA: 5.5 (ref 5–9)
PROTEIN UA: NORMAL MG/DL
RBC UA: NORMAL /HPF (ref 0–2)
SPECIFIC GRAVITY UA: 1.01 (ref 1–1.03)
UROBILINOGEN, URINE: 0.2 E.U./DL
WBC UA: NORMAL /HPF (ref 0–5)

## 2020-10-16 PROCEDURE — 36415 COLL VENOUS BLD VENIPUNCTURE: CPT

## 2020-10-16 PROCEDURE — 36591 DRAW BLOOD OFF VENOUS DEVICE: CPT

## 2020-10-16 PROCEDURE — 84520 ASSAY OF UREA NITROGEN: CPT

## 2020-10-16 PROCEDURE — 86304 IMMUNOASSAY TUMOR CA 125: CPT

## 2020-10-16 PROCEDURE — 81001 URINALYSIS AUTO W/SCOPE: CPT

## 2020-10-16 PROCEDURE — 2580000003 HC RX 258: Performed by: NURSE PRACTITIONER

## 2020-10-16 PROCEDURE — 82565 ASSAY OF CREATININE: CPT

## 2020-10-16 PROCEDURE — 6360000002 HC RX W HCPCS: Performed by: NURSE PRACTITIONER

## 2020-10-16 RX ORDER — SODIUM CHLORIDE 0.9 % (FLUSH) 0.9 %
20 SYRINGE (ML) INJECTION PRN
Status: DISCONTINUED | OUTPATIENT
Start: 2020-10-16 | End: 2020-10-17 | Stop reason: HOSPADM

## 2020-10-16 RX ORDER — SODIUM CHLORIDE 0.9 % (FLUSH) 0.9 %
20 SYRINGE (ML) INJECTION PRN
Status: CANCELLED | OUTPATIENT
Start: 2020-10-16

## 2020-10-16 RX ORDER — SODIUM CHLORIDE 0.9 % (FLUSH) 0.9 %
10 SYRINGE (ML) INJECTION PRN
Status: CANCELLED | OUTPATIENT
Start: 2020-10-16

## 2020-10-16 RX ORDER — HEPARIN SODIUM (PORCINE) LOCK FLUSH IV SOLN 100 UNIT/ML 100 UNIT/ML
500 SOLUTION INTRAVENOUS PRN
Status: DISCONTINUED | OUTPATIENT
Start: 2020-10-16 | End: 2020-10-17 | Stop reason: HOSPADM

## 2020-10-16 RX ORDER — HEPARIN SODIUM (PORCINE) LOCK FLUSH IV SOLN 100 UNIT/ML 100 UNIT/ML
500 SOLUTION INTRAVENOUS PRN
Status: CANCELLED | OUTPATIENT
Start: 2020-10-16

## 2020-10-16 RX ADMIN — SODIUM CHLORIDE, PRESERVATIVE FREE 10 ML: 5 INJECTION INTRAVENOUS at 09:09

## 2020-10-16 RX ADMIN — SODIUM CHLORIDE, PRESERVATIVE FREE 10 ML: 5 INJECTION INTRAVENOUS at 09:11

## 2020-10-16 RX ADMIN — Medication 500 UNITS: at 09:12

## 2020-10-16 ASSESSMENT — PAIN SCALES - GENERAL: PAINLEVEL_OUTOF10: 0

## 2020-10-16 NOTE — PROGRESS NOTES
Patient tolerated port flush well and blood work obtained. Therapy plan reviewed with patient. Verbalizes understanding. Declines copy of AVS and any medication information, verbalizes good knowledge of current plan, and has no signs or symptoms to report at this time. Next appointment made.

## 2020-11-02 RX ORDER — EZETIMIBE 10 MG/1
10 TABLET ORAL
Qty: 90 TABLET | Refills: 3 | Status: SHIPPED
Start: 2020-11-02 | End: 2020-11-20 | Stop reason: SDUPTHER

## 2020-11-06 ENCOUNTER — HOSPITAL ENCOUNTER (OUTPATIENT)
Dept: INFUSION THERAPY | Age: 80
Setting detail: INFUSION SERIES
Discharge: HOME | End: 2020-11-06
Payer: MEDICARE

## 2020-11-06 VITALS
BODY MASS INDEX: 33.2 KG/M2 | OXYGEN SATURATION: 99 % | HEART RATE: 81 BPM | RESPIRATION RATE: 16 BRPM | DIASTOLIC BLOOD PRESSURE: 72 MMHG | TEMPERATURE: 97.7 F | SYSTOLIC BLOOD PRESSURE: 163 MMHG | WEIGHT: 170 LBS

## 2020-11-06 DIAGNOSIS — C55 MALIGNANT NEOPLASM OF UTERUS, UNSPECIFIED SITE (HCC): Primary | ICD-10-CM

## 2020-11-06 LAB
BACTERIA: NORMAL /HPF
BILIRUBIN URINE: NEGATIVE
BLOOD, URINE: NEGATIVE
BUN BLDV-MCNC: 47 MG/DL (ref 8–23)
CA 125: 12.7 U/ML (ref 0–35)
CLARITY: CLEAR
COLOR: YELLOW
CREAT SERPL-MCNC: 1.3 MG/DL (ref 0.5–1)
GFR AFRICAN AMERICAN: 48
GFR NON-AFRICAN AMERICAN: 39 ML/MIN/1.73
GLUCOSE URINE: NEGATIVE MG/DL
KETONES, URINE: NEGATIVE MG/DL
LEUKOCYTE ESTERASE, URINE: NEGATIVE
NITRITE, URINE: NEGATIVE
PH UA: 5.5 (ref 5–9)
PROTEIN UA: NORMAL MG/DL
RBC UA: NORMAL /HPF (ref 0–2)
SPECIFIC GRAVITY UA: 1.01 (ref 1–1.03)
UROBILINOGEN, URINE: 0.2 E.U./DL
WBC UA: NORMAL /HPF (ref 0–5)

## 2020-11-06 PROCEDURE — 84520 ASSAY OF UREA NITROGEN: CPT

## 2020-11-06 PROCEDURE — 2580000003 HC RX 258: Performed by: NURSE PRACTITIONER

## 2020-11-06 PROCEDURE — 81001 URINALYSIS AUTO W/SCOPE: CPT

## 2020-11-06 PROCEDURE — 36591 DRAW BLOOD OFF VENOUS DEVICE: CPT

## 2020-11-06 PROCEDURE — 36593 DECLOT VASCULAR DEVICE: CPT

## 2020-11-06 PROCEDURE — 36415 COLL VENOUS BLD VENIPUNCTURE: CPT

## 2020-11-06 PROCEDURE — 6360000002 HC RX W HCPCS: Performed by: NURSE PRACTITIONER

## 2020-11-06 PROCEDURE — 86304 IMMUNOASSAY TUMOR CA 125: CPT

## 2020-11-06 PROCEDURE — 82565 ASSAY OF CREATININE: CPT

## 2020-11-06 RX ORDER — HEPARIN SODIUM (PORCINE) LOCK FLUSH IV SOLN 100 UNIT/ML 100 UNIT/ML
500 SOLUTION INTRAVENOUS PRN
Status: CANCELLED | OUTPATIENT
Start: 2020-11-06

## 2020-11-06 RX ORDER — SODIUM CHLORIDE 0.9 % (FLUSH) 0.9 %
20 SYRINGE (ML) INJECTION PRN
Status: DISCONTINUED | OUTPATIENT
Start: 2020-11-06 | End: 2020-11-07 | Stop reason: HOSPADM

## 2020-11-06 RX ORDER — HEPARIN SODIUM (PORCINE) LOCK FLUSH IV SOLN 100 UNIT/ML 100 UNIT/ML
500 SOLUTION INTRAVENOUS PRN
Status: DISCONTINUED | OUTPATIENT
Start: 2020-11-06 | End: 2020-11-07 | Stop reason: HOSPADM

## 2020-11-06 RX ORDER — SODIUM CHLORIDE 0.9 % (FLUSH) 0.9 %
10 SYRINGE (ML) INJECTION PRN
Status: DISCONTINUED | OUTPATIENT
Start: 2020-11-06 | End: 2020-11-07 | Stop reason: HOSPADM

## 2020-11-06 RX ORDER — SODIUM CHLORIDE 0.9 % (FLUSH) 0.9 %
10 SYRINGE (ML) INJECTION PRN
Status: CANCELLED | OUTPATIENT
Start: 2020-11-06

## 2020-11-06 RX ORDER — SODIUM CHLORIDE 0.9 % (FLUSH) 0.9 %
20 SYRINGE (ML) INJECTION PRN
Status: CANCELLED | OUTPATIENT
Start: 2020-11-06

## 2020-11-06 RX ADMIN — SODIUM CHLORIDE, PRESERVATIVE FREE 20 ML: 5 INJECTION INTRAVENOUS at 10:59

## 2020-11-06 RX ADMIN — ALTEPLASE 2 MG: 2.2 INJECTION, POWDER, LYOPHILIZED, FOR SOLUTION INTRAVENOUS at 10:24

## 2020-11-06 RX ADMIN — SODIUM CHLORIDE, PRESERVATIVE FREE 10 ML: 5 INJECTION INTRAVENOUS at 09:10

## 2020-11-06 RX ADMIN — Medication 500 UNITS: at 10:59

## 2020-11-06 RX ADMIN — SODIUM CHLORIDE, PRESERVATIVE FREE 10 ML: 5 INJECTION INTRAVENOUS at 09:30

## 2020-11-06 ASSESSMENT — PAIN DESCRIPTION - FREQUENCY: FREQUENCY: INTERMITTENT

## 2020-11-06 NOTE — PROGRESS NOTES
Patient tolerated port flush/ cath flow/lab draw well. Therapy plan reviewed with patient. Verbalizes understanding. Declines copy of AVS and any medication information, verbalizes good knowledge of current plan, and has no signs or symptoms to report at this time.  Patient did not reschedule she will see doctor on Tuesday and see when they want labs before her next treatment and then call and reschedule next appointment

## 2020-11-06 NOTE — PROGRESS NOTES
PROCEDURE WAS EXPLAINED TO THE PATIENT AND GAVE THE PATIENT CATH CELESTINO INFORMATION SHEET FROM [facility]. PATIENT DENIES ANY QUESTIONS. CATH CELESTINO PREPARED BY DRAWING UP 2.2ML OF STERILE WATER,  ADDED TO CATH CELESTINO VIAL ALLOWED TO DISSOLVE AND CHRISTINE UP 2ML OF THE SOLUTION. CATH CELESTINO WAS INSTILLED INTO THE PORTAND DWELLED FOR 35 MINUTES. CHRISTINE BACK AND DISCARDED 10CC BLOOD THEN LABS DRAWN. LINE WAS FLUSHED WITH SALINE AND HEPARIN.

## 2020-11-20 ENCOUNTER — OFFICE VISIT (OUTPATIENT)
Dept: PRIMARY CARE CLINIC | Age: 80
End: 2020-11-20
Payer: MEDICARE

## 2020-11-20 VITALS
HEIGHT: 60 IN | RESPIRATION RATE: 16 BRPM | OXYGEN SATURATION: 98 % | SYSTOLIC BLOOD PRESSURE: 130 MMHG | BODY MASS INDEX: 34.36 KG/M2 | TEMPERATURE: 97.2 F | HEART RATE: 74 BPM | DIASTOLIC BLOOD PRESSURE: 82 MMHG | WEIGHT: 175 LBS

## 2020-11-20 PROBLEM — E83.52 HYPERCALCEMIA: Status: ACTIVE | Noted: 2020-07-09

## 2020-11-20 PROBLEM — M17.11 PRIMARY OSTEOARTHRITIS OF RIGHT KNEE: Status: RESOLVED | Noted: 2020-03-04 | Resolved: 2020-11-20

## 2020-11-20 PROBLEM — R60.0 EDEMA OF LOWER EXTREMITY: Status: ACTIVE | Noted: 2020-07-09

## 2020-11-20 PROBLEM — N17.9 ACUTE RENAL FAILURE SYNDROME (HCC): Status: ACTIVE | Noted: 2020-07-09

## 2020-11-20 PROBLEM — E83.42 HYPOMAGNESEMIA: Status: ACTIVE | Noted: 2020-07-09

## 2020-11-20 PROBLEM — N17.9 ACUTE RENAL FAILURE SYNDROME (HCC): Status: RESOLVED | Noted: 2020-07-09 | Resolved: 2020-11-20

## 2020-11-20 PROCEDURE — G8484 FLU IMMUNIZE NO ADMIN: HCPCS | Performed by: FAMILY MEDICINE

## 2020-11-20 PROCEDURE — G8417 CALC BMI ABV UP PARAM F/U: HCPCS | Performed by: FAMILY MEDICINE

## 2020-11-20 PROCEDURE — 1036F TOBACCO NON-USER: CPT | Performed by: FAMILY MEDICINE

## 2020-11-20 PROCEDURE — 4040F PNEUMOC VAC/ADMIN/RCVD: CPT | Performed by: FAMILY MEDICINE

## 2020-11-20 PROCEDURE — 1090F PRES/ABSN URINE INCON ASSESS: CPT | Performed by: FAMILY MEDICINE

## 2020-11-20 PROCEDURE — G8399 PT W/DXA RESULTS DOCUMENT: HCPCS | Performed by: FAMILY MEDICINE

## 2020-11-20 PROCEDURE — G8427 DOCREV CUR MEDS BY ELIG CLIN: HCPCS | Performed by: FAMILY MEDICINE

## 2020-11-20 PROCEDURE — 1123F ACP DISCUSS/DSCN MKR DOCD: CPT | Performed by: FAMILY MEDICINE

## 2020-11-20 PROCEDURE — 99213 OFFICE O/P EST LOW 20 MIN: CPT | Performed by: FAMILY MEDICINE

## 2020-11-20 RX ORDER — EZETIMIBE 10 MG/1
10 TABLET ORAL
Qty: 90 TABLET | Refills: 3 | Status: SHIPPED
Start: 2020-11-20 | End: 2021-04-08 | Stop reason: SDUPTHER

## 2020-11-20 RX ORDER — VITAMIN E (DL,TOCOPHERYL ACET) 180 MG
1 CAPSULE ORAL DAILY
COMMUNITY
End: 2022-02-11 | Stop reason: SDUPTHER

## 2020-11-20 RX ORDER — CYANOCOBALAMIN/SALCAPROZAT SOD 1 MG-100MG
1 TABLET ORAL DAILY
COMMUNITY
End: 2021-01-06

## 2020-11-20 RX ORDER — FOLIC ACID 1 MG/1
1 TABLET ORAL DAILY
Qty: 90 TABLET | Refills: 5 | Status: SHIPPED | OUTPATIENT
Start: 2020-11-20 | End: 2021-04-08 | Stop reason: SDUPTHER

## 2020-11-20 ASSESSMENT — ENCOUNTER SYMPTOMS
SORE THROAT: 0
COUGH: 0
VOMITING: 0
SHORTNESS OF BREATH: 0
BLOOD IN STOOL: 0
DIARRHEA: 0
CONSTIPATION: 0
NAUSEA: 0
BACK PAIN: 0
PHOTOPHOBIA: 0
ABDOMINAL PAIN: 0

## 2020-11-20 NOTE — PROGRESS NOTES
2020     Celso Cotto (:  1940) is a [de-identified] y.o. female, here for evaluation of the following medical concerns:    HPI  Update 2020  Patient here for follow-up after recent knee surgery. Patient had right TKA in March. Patient states she is doing much better with the pain level and functionality after the surgery. Continues with physical therapy at least twice a week. States that it feels fantastic and her pain is almost completely gone. Is able to drive and walk with a cane. She states that she uses a cane because the left knee does give out continuously. Reviewed recent blood work and everything looks well. Oncology visit in [facility] [employer] stated for cancer was stable. She is going to start cycle 6 of her chemotherapy in the next several weeks after the recovery time for her recent TKA. Her kidney function is also stable by the nephrologist.    Update 2020  Patient is here to follow-up on chronic issues and for medication refills. Patient states she is doing well in regards to her cancer. Continues with treatments without any side effects recently. She states her only this time has been persistent left knee pain. She was scheduled to have the left knee replaced however this was placed on hold secondary to coronavirus pandemic shutdown all elective surgeries. Patient states that she is only taking Tylenol and states that this takes care of her pain for the most part. No other issues at this time. Review of Systems   Constitutional: Negative for chills and fever. HENT: Negative for congestion, hearing loss, nosebleeds and sore throat. Eyes: Negative for photophobia. Respiratory: Negative for cough and shortness of breath. Cardiovascular: Positive for leg swelling. Negative for chest pain and palpitations. Gastrointestinal: Negative for abdominal pain, blood in stool, constipation, diarrhea, nausea and vomiting. Endocrine: Negative for polydipsia.    Genitourinary: Positive for pelvic pain. Negative for dysuria, frequency, hematuria and urgency. Musculoskeletal: Positive for gait problem. Negative for back pain and myalgias. Skin: Negative. Neurological: Negative for dizziness, tremors, weakness and headaches. Hematological: Does not bruise/bleed easily. Psychiatric/Behavioral: Positive for sleep disturbance. Negative for hallucinations, self-injury and suicidal ideas. The patient is nervous/anxious. All other systems reviewed and are negative. Prior to Visit Medications    Medication Sig Taking?  Authorizing Provider   Cyanocobalamin-Salcaprozate (ELIGEN B12) 1000-100 MCG-MG TABS 1 tablet daily Yes Historical Provider, MD   Sod Fluoride-Potassium Nitrate (PREVIDENT 5000 SENSITIVE) 1.1-5 % PSTE 2 times daily Yes Historical Provider, MD   Magnesium Oxide 500 MG CAPS 1 capsule 2 times daily Yes Historical Provider, MD   folic acid (FOLVITE) 1 MG tablet Take 1 tablet by mouth daily OK TO TAKE Yes Khanh Pinon,    ezetimibe (ZETIA) 10 MG tablet Take 1 tablet by mouth Daily with supper Yes Khanh Pinon,    doxazosin (CARDURA) 2 MG tablet Take 2 mg by mouth nightly Yes Historical Provider, MD   magnesium hydroxide (MILK OF MAGNESIA) 400 MG/5ML suspension Take 30 mLs by mouth Yes Historical Provider, MD   acetaminophen (TYLENOL) 325 MG tablet Take 2 tablets by mouth every 6 hours Yes Hortencia Guallpa MD   docusate sodium (COLACE) 100 MG capsule Take 1 capsule by mouth 2 times daily Yes Hortencia Guallpa MD   hyoscyamine (ANASPAZ;LEVSIN) 125 MCG tablet Take 0.125 mg by mouth 2 times daily Takes prior to meals Yes Historical Provider, MD   Bevacizumab (AVASTIN IV) Infuse intravenously Last infusion 1/10/2020 Yes Historical Provider, MD   lactobacillus (CULTURELLE) CAPS capsule Take 1 capsule by mouth daily Yes Rebel Stafford,    vitamin D (CHOLECALCIFEROL) 1000 UNIT TABS tablet Take 1,000 Units by mouth daily LD 2/25/2020 Yes Historical Provider, MD lidocaine-prilocaine (EMLA) 2.5-2.5 % cream SANGEETA TOPICALLY AA PRN Yes Historical Provider, MD   magnesium gluconate (MAGONATE) 500 MG tablet as needed  Yes Historical Provider, MD   potassium chloride (KLOR-CON M) 20 MEQ extended release tablet as needed  Yes Historical Provider, MD   bumetanide (BUMEX) 1 MG tablet Take 1 mg by mouth three times a week mon wed fri Yes Historical Provider, MD   Biotin 5000 MCG CAPS Take 1 capsule by mouth daily LD 2/25/2020 Yes Historical Provider, MD   vitamin B-12 (CYANOCOBALAMIN) 1000 MCG tablet Take 1,000 mcg by mouth daily LD 2/25/2020 Yes Historical Provider, MD   fish oil-omega-3 fatty acids 1000 MG capsule Take 1 g by mouth LD 2/25/2020 Yes Historical Provider, MD        Social History     Tobacco Use    Smoking status: Never Smoker    Smokeless tobacco: Never Used   Substance Use Topics    Alcohol use: No        Vitals:    11/20/20 1255   BP: 130/82   Pulse: 74   Resp: 16   Temp: 97.2 °F (36.2 °C)   SpO2: 98%   Weight: 175 lb (79.4 kg)   Height: 5' (1.524 m)     Estimated body mass index is 34.18 kg/m² as calculated from the following:    Height as of this encounter: 5' (1.524 m). Weight as of this encounter: 175 lb (79.4 kg). Physical Exam  HENT:      Head: Normocephalic and atraumatic. Right Ear: External ear normal.      Left Ear: External ear normal.   Eyes:      General: No scleral icterus. Conjunctiva/sclera: Conjunctivae normal.      Pupils: Pupils are equal, round, and reactive to light. Neck:      Musculoskeletal: Neck supple. Thyroid: No thyromegaly. Cardiovascular:      Rate and Rhythm: Normal rate and regular rhythm. Heart sounds: Normal heart sounds. No murmur. Pulmonary:      Effort: Pulmonary effort is normal.      Breath sounds: Normal breath sounds. No rales. Abdominal:      General: Bowel sounds are normal. There is no distension. Palpations: Abdomen is soft. Tenderness: There is no abdominal tenderness. Musculoskeletal:      Comments: Walks with single point cane on the left. Lymphadenopathy:      Cervical: No cervical adenopathy. Skin:     General: Skin is warm and dry. Findings: No erythema or rash. Neurological:      Mental Status: She is alert and oriented to person, place, and time. Cranial Nerves: No cranial nerve deficit. Psychiatric:         Behavior: Behavior is cooperative. Judgment: Judgment normal.         Assessment/Plan:   Diagnosis Orders   1. Hypomagnesemia     2. Malignant neoplasm of uterus, unspecified site ([facility] [address]     3. Mixed hyperlipidemia     4. S/P total knee arthroplasty, right       Patient currently doing well in regards to her chronic issues. No recent issues with blood pressure on current treatment regimen. She states that her left knee pain is currently stable. Denies any other issues at this time. Needs medication refills. See her back in 6 months or sooner if needed. Connor Easley D.O.   2:32 PM  11/20/2020       This document may have been prepared at least partially through the use of voice recognition software. Although effort is taken to assure the accuracy of this document, it is possible that grammatical, syntax,  or spelling errors may occur.

## 2020-11-25 ENCOUNTER — APPOINTMENT (OUTPATIENT)
Dept: INFUSION THERAPY | Age: 80
End: 2020-11-25
Payer: MEDICARE

## 2020-12-07 ENCOUNTER — HOSPITAL ENCOUNTER (OUTPATIENT)
Dept: INFUSION THERAPY | Age: 80
Setting detail: INFUSION SERIES
Discharge: HOME | End: 2020-12-07
Payer: MEDICARE

## 2020-12-07 VITALS
BODY MASS INDEX: 34.36 KG/M2 | RESPIRATION RATE: 16 BRPM | OXYGEN SATURATION: 99 % | HEIGHT: 60 IN | WEIGHT: 175 LBS | HEART RATE: 81 BPM | DIASTOLIC BLOOD PRESSURE: 66 MMHG | TEMPERATURE: 97.6 F | SYSTOLIC BLOOD PRESSURE: 142 MMHG

## 2020-12-07 DIAGNOSIS — C55 MALIGNANT NEOPLASM OF UTERUS, UNSPECIFIED SITE (HCC): Primary | ICD-10-CM

## 2020-12-07 LAB
BACTERIA: ABNORMAL /HPF
BILIRUBIN URINE: NEGATIVE
BLOOD, URINE: NEGATIVE
BUN BLDV-MCNC: 52 MG/DL (ref 8–23)
CA 125: 15.9 U/ML (ref 0–35)
CLARITY: CLEAR
COLOR: YELLOW
CREAT SERPL-MCNC: 1.4 MG/DL (ref 0.5–1)
EPITHELIAL CELLS, UA: ABNORMAL /HPF
GFR AFRICAN AMERICAN: 44
GFR NON-AFRICAN AMERICAN: 36 ML/MIN/1.73
GLUCOSE URINE: NEGATIVE MG/DL
KETONES, URINE: NEGATIVE MG/DL
LEUKOCYTE ESTERASE, URINE: NEGATIVE
NITRITE, URINE: NEGATIVE
PH UA: 5.5 (ref 5–9)
PROTEIN UA: 100 MG/DL
RBC UA: ABNORMAL /HPF (ref 0–2)
SPECIFIC GRAVITY UA: 1.02 (ref 1–1.03)
UROBILINOGEN, URINE: 0.2 E.U./DL
WBC UA: ABNORMAL /HPF (ref 0–5)

## 2020-12-07 PROCEDURE — 81001 URINALYSIS AUTO W/SCOPE: CPT

## 2020-12-07 PROCEDURE — 36415 COLL VENOUS BLD VENIPUNCTURE: CPT

## 2020-12-07 PROCEDURE — 86304 IMMUNOASSAY TUMOR CA 125: CPT

## 2020-12-07 PROCEDURE — 2580000003 HC RX 258: Performed by: NURSE PRACTITIONER

## 2020-12-07 PROCEDURE — 84520 ASSAY OF UREA NITROGEN: CPT

## 2020-12-07 PROCEDURE — 82565 ASSAY OF CREATININE: CPT

## 2020-12-07 PROCEDURE — 6360000002 HC RX W HCPCS: Performed by: NURSE PRACTITIONER

## 2020-12-07 RX ORDER — SODIUM CHLORIDE 0.9 % (FLUSH) 0.9 %
20 SYRINGE (ML) INJECTION PRN
Status: CANCELLED | OUTPATIENT
Start: 2020-12-07

## 2020-12-07 RX ORDER — HEPARIN SODIUM (PORCINE) LOCK FLUSH IV SOLN 100 UNIT/ML 100 UNIT/ML
500 SOLUTION INTRAVENOUS PRN
Status: CANCELLED | OUTPATIENT
Start: 2020-12-07

## 2020-12-07 RX ORDER — SODIUM CHLORIDE 0.9 % (FLUSH) 0.9 %
10 SYRINGE (ML) INJECTION PRN
Status: CANCELLED | OUTPATIENT
Start: 2020-12-07

## 2020-12-07 RX ORDER — SODIUM CHLORIDE 0.9 % (FLUSH) 0.9 %
10 SYRINGE (ML) INJECTION PRN
Status: DISCONTINUED | OUTPATIENT
Start: 2020-12-07 | End: 2020-12-08 | Stop reason: HOSPADM

## 2020-12-07 RX ORDER — HEPARIN SODIUM (PORCINE) LOCK FLUSH IV SOLN 100 UNIT/ML 100 UNIT/ML
500 SOLUTION INTRAVENOUS PRN
Status: DISCONTINUED | OUTPATIENT
Start: 2020-12-07 | End: 2020-12-08 | Stop reason: HOSPADM

## 2020-12-07 RX ADMIN — SODIUM CHLORIDE, PRESERVATIVE FREE 20 ML: 5 INJECTION INTRAVENOUS at 13:46

## 2020-12-07 RX ADMIN — SODIUM CHLORIDE, PRESERVATIVE FREE 10 ML: 5 INJECTION INTRAVENOUS at 14:35

## 2020-12-07 RX ADMIN — Medication 500 UNITS: at 14:10

## 2020-12-07 RX ADMIN — SODIUM CHLORIDE, PRESERVATIVE FREE 20 ML: 5 INJECTION INTRAVENOUS at 13:57

## 2020-12-07 NOTE — PROGRESS NOTES
Pts port accessed, flushed easily but did not get a blood return. Attempted to reposition without success. Port reaccessed again flushed easily but did not return any blood. Heparin left to dwell. Checked port for blood return, none noted. Pt here for labs prior to chemo, pt draw peripherally. Call out to family doctor and to [facility] regarding pts port being unable to give blood.  To call pt when we get a return call

## 2020-12-09 ENCOUNTER — TELEPHONE (OUTPATIENT)
Dept: SURGERY | Age: 80
End: 2020-12-09

## 2020-12-09 NOTE — PROGRESS NOTES
Patient called in she did go to [facility] [employer] on Tuesday they put 2 doses cathflo in her line and they did use it for her chemo. Patient wanted to know if Dr. Jack Roland office ever called back and they had not she will call their office today and I also called to office. Spoke with Angie Berg who had me leave a message for Dr. Jack Roland nurse explained to Huseyin Junior and the voicemail message that port has had cath liu in [place] , and then December wanted to se if Dr. Jack Roland wanted to maybe order dye study to have port checked await return call.

## 2020-12-09 NOTE — TELEPHONE ENCOUNTER
Patient called in stating Dr. Ayse Mota placed her medi-port 2/2019. She states over the past 4 months she has been having a lot of issues with the ports functionality. She states it takes about 2-3 hours for the infusion center to draw her blood when she goes in and sometimes they are not able to unclog it. Patient states she was advised to call our office to have testing ordered to check functionality of the port. MA explained Dr. Ayse Mota is on leave and her request would be routed to Dr. Zay Santos for advisement.     Electronically signed by Blank Lowe on 12/9/20 at 1:55 PM EST

## 2020-12-18 DIAGNOSIS — Z01.818 PRE-OP EXAM: Primary | ICD-10-CM

## 2020-12-28 ENCOUNTER — HOSPITAL ENCOUNTER (OUTPATIENT)
Dept: INFUSION THERAPY | Age: 80
Setting detail: INFUSION SERIES
Discharge: HOME | End: 2020-12-28
Payer: MEDICARE

## 2020-12-28 VITALS
RESPIRATION RATE: 16 BRPM | OXYGEN SATURATION: 99 % | TEMPERATURE: 97.8 F | WEIGHT: 175 LBS | SYSTOLIC BLOOD PRESSURE: 139 MMHG | HEART RATE: 75 BPM | HEIGHT: 60 IN | DIASTOLIC BLOOD PRESSURE: 64 MMHG | BODY MASS INDEX: 34.36 KG/M2

## 2020-12-28 DIAGNOSIS — C55 MALIGNANT NEOPLASM OF UTERUS, UNSPECIFIED SITE (HCC): Primary | ICD-10-CM

## 2020-12-28 LAB
BACTERIA: NORMAL /HPF
BILIRUBIN URINE: NEGATIVE
BLOOD, URINE: ABNORMAL
BUN BLDV-MCNC: 56 MG/DL (ref 8–23)
CA 125: 17.7 U/ML (ref 0–35)
CLARITY: CLEAR
COLOR: YELLOW
CREAT SERPL-MCNC: 1.5 MG/DL (ref 0.5–1)
GFR AFRICAN AMERICAN: 40
GFR NON-AFRICAN AMERICAN: 33 ML/MIN/1.73
GLUCOSE URINE: NEGATIVE MG/DL
KETONES, URINE: NEGATIVE MG/DL
LEUKOCYTE ESTERASE, URINE: NEGATIVE
NITRITE, URINE: NEGATIVE
PH UA: 5.5 (ref 5–9)
PROTEIN UA: 100 MG/DL
RBC UA: NORMAL /HPF (ref 0–2)
RENAL EPITHELIAL, UA: NORMAL /HPF
SPECIFIC GRAVITY UA: 1.02 (ref 1–1.03)
UROBILINOGEN, URINE: 0.2 E.U./DL
WBC UA: NORMAL /HPF (ref 0–5)

## 2020-12-28 PROCEDURE — 86304 IMMUNOASSAY TUMOR CA 125: CPT

## 2020-12-28 PROCEDURE — 84520 ASSAY OF UREA NITROGEN: CPT

## 2020-12-28 PROCEDURE — 36415 COLL VENOUS BLD VENIPUNCTURE: CPT

## 2020-12-28 PROCEDURE — 82565 ASSAY OF CREATININE: CPT

## 2020-12-28 PROCEDURE — 81001 URINALYSIS AUTO W/SCOPE: CPT

## 2020-12-28 RX ORDER — HEPARIN SODIUM (PORCINE) LOCK FLUSH IV SOLN 100 UNIT/ML 100 UNIT/ML
500 SOLUTION INTRAVENOUS PRN
Status: DISCONTINUED | OUTPATIENT
Start: 2020-12-28 | End: 2020-12-29 | Stop reason: HOSPADM

## 2020-12-28 RX ORDER — SODIUM CHLORIDE 0.9 % (FLUSH) 0.9 %
10 SYRINGE (ML) INJECTION PRN
Status: DISCONTINUED | OUTPATIENT
Start: 2020-12-28 | End: 2020-12-29 | Stop reason: HOSPADM

## 2020-12-28 RX ORDER — SODIUM CHLORIDE 0.9 % (FLUSH) 0.9 %
20 SYRINGE (ML) INJECTION PRN
Status: DISCONTINUED | OUTPATIENT
Start: 2020-12-28 | End: 2020-12-29 | Stop reason: HOSPADM

## 2020-12-28 RX ORDER — SODIUM CHLORIDE 0.9 % (FLUSH) 0.9 %
20 SYRINGE (ML) INJECTION PRN
Status: CANCELLED | OUTPATIENT
Start: 2020-12-28

## 2020-12-28 RX ORDER — SODIUM CHLORIDE 0.9 % (FLUSH) 0.9 %
10 SYRINGE (ML) INJECTION PRN
Status: CANCELLED | OUTPATIENT
Start: 2020-12-28

## 2020-12-28 RX ORDER — HEPARIN SODIUM (PORCINE) LOCK FLUSH IV SOLN 100 UNIT/ML 100 UNIT/ML
500 SOLUTION INTRAVENOUS PRN
Status: CANCELLED | OUTPATIENT
Start: 2020-12-28

## 2020-12-28 ASSESSMENT — PAIN SCALES - GENERAL: PAINLEVEL_OUTOF10: 0

## 2020-12-28 NOTE — PROGRESS NOTES
Pt came in for a port flush and lab draw today. She stated she wants her labs drawn in her hand due to port issues. She is going to [place] tomorrow and she will now more after that, she will call us and make next appt.

## 2020-12-30 ENCOUNTER — HOSPITAL ENCOUNTER (OUTPATIENT)
Age: 80
Discharge: HOME | End: 2021-01-01
Payer: MEDICARE

## 2020-12-30 DIAGNOSIS — Z01.818 PRE-OP EXAM: ICD-10-CM

## 2020-12-30 PROCEDURE — U0003 INFECTIOUS AGENT DETECTION BY NUCLEIC ACID (DNA OR RNA); SEVERE ACUTE RESPIRATORY SYNDROME CORONAVIRUS 2 (SARS-COV-2) (CORONAVIRUS DISEASE [COVID-19]), AMPLIFIED PROBE TECHNIQUE, MAKING USE OF HIGH THROUGHPUT TECHNOLOGIES AS DESCRIBED BY CMS-2020-01-R: HCPCS

## 2020-12-31 LAB
SARS-COV-2: NOT DETECTED
SOURCE: NORMAL

## 2021-01-05 ENCOUNTER — HOSPITAL ENCOUNTER (OUTPATIENT)
Dept: INTERVENTIONAL RADIOLOGY/VASCULAR | Age: 81
Discharge: HOME | End: 2021-01-07
Payer: MEDICARE

## 2021-01-05 ENCOUNTER — TELEPHONE (OUTPATIENT)
Dept: SURGERY | Age: 81
End: 2021-01-05

## 2021-01-05 DIAGNOSIS — C54.1 ENDOMETRIAL CANCER (HCC): ICD-10-CM

## 2021-01-05 PROCEDURE — 36598 INJ W/FLUOR EVAL CV DEVICE: CPT | Performed by: RADIOLOGY

## 2021-01-05 PROCEDURE — 6360000002 HC RX W HCPCS: Performed by: RADIOLOGY

## 2021-01-05 PROCEDURE — 36598 INJ W/FLUOR EVAL CV DEVICE: CPT

## 2021-01-05 PROCEDURE — 2580000003 HC RX 258: Performed by: RADIOLOGY

## 2021-01-05 PROCEDURE — 6360000004 HC RX CONTRAST MEDICATION: Performed by: RADIOLOGY

## 2021-01-05 RX ORDER — SODIUM CHLORIDE 0.9 % (FLUSH) 0.9 %
10 SYRINGE (ML) INJECTION PRN
Status: DISCONTINUED | OUTPATIENT
Start: 2021-01-05 | End: 2021-01-08 | Stop reason: HOSPADM

## 2021-01-05 RX ORDER — HEPARIN SODIUM (PORCINE) LOCK FLUSH IV SOLN 100 UNIT/ML 100 UNIT/ML
100 SOLUTION INTRAVENOUS PRN
Status: DISCONTINUED | OUTPATIENT
Start: 2021-01-05 | End: 2021-01-08 | Stop reason: HOSPADM

## 2021-01-05 RX ADMIN — SODIUM CHLORIDE, PRESERVATIVE FREE 100 UNITS: 5 INJECTION INTRAVENOUS at 11:28

## 2021-01-05 RX ADMIN — Medication 10 ML: at 11:28

## 2021-01-05 RX ADMIN — IOVERSOL 10 ML: 678 INJECTION INTRA-ARTERIAL; INTRAVENOUS at 11:15

## 2021-01-05 NOTE — TELEPHONE ENCOUNTER
I called wili and went over her mediport check with her. She stated IR went over it too. She states she is still needing a port for treatments and blood draws. She states that they spend about 2h trying to get the port to function each time. She would like it replaced. I explained how I would place a new port on the right and remove the left port. She understands and wishes to proceed.

## 2021-01-05 NOTE — TELEPHONE ENCOUNTER
ANEL Marrero called and spoke to [facility] and scheduled PT for left sided medi-port removal and right sided medi-port placement on 1/14/2021 @ 8am with Dr. Cassidy Sam. PT denied taking any ASA/blood thinner products. PT verbalized she understood prep instructions, and NPO after midnight. PT verbalized that she understood appointment date/time, as well as to arrive 2 hours prior to procedure. PT advised PAT will call to go over all medication and advise on where to park and enter the hospital at for procedure. PT has been told to make sure they have a ride to and from procedure as they are not allowed to drive after procedure. MA instructed PT to call the office at [phone] with any questions, comments, or concerns about the procedure.            Electronically signed by Samantha Ford MA on 1/5/21 at 3:18 PM EST

## 2021-01-05 NOTE — TELEPHONE ENCOUNTER
Prior Authorization Form:      DEMOGRAPHICS:                     Patient Name:  Regla Garcia  Patient :  1940            Insurance:  Payor: MEDICARE / Plan: MEDICARE PART A AND B / Product Type: *No Product type* /   Insurance ID Number:    Payor/Plan Subscr  Sex Relation Sub. Ins. ID Effective Group Num   1. MEDICARE - MENoelia Mock 1940 Female Self [id number] 1/1/15                                    [address] [address]   2.  [address] 1940 Female Self [id number] 1/1/15 plan F                                   [id number]         DIAGNOSIS & PROCEDURE:                       Procedure/Operation: left side medi-port removal and right side mediport placement           CPT Code: 42004 & 94195    Diagnosis:  Endometrial CA    ICD10 Code: C54.1    [facility]  [facility]    Surgeon:  Arsalan Headley    SCHEDULING INFORMATION:                          Date: 2021    Time: 8am              Anesthesia:  MAC/TIVA                                                       Status:  Outpatient        Special Comments:  N/A       Electronically signed by Hannah Garcia MA on 2021 at 3:18 PM

## 2021-01-06 NOTE — PROGRESS NOTES
[facility] PRE-ADMISSION TESTING GENERAL INSTRUCTIONS- [facility]-phone number:[phone]    GENERAL INSTRUCTIONS  [x] Antibacterial Soap shower Night before and/or AM of Surgery  [] Canelo wipe instruction sheet and wipes given. [x] Nothing by mouth after midnight, including gum, candy, mints, or water. [x] You may brush your teeth, gargle, but do NOT swallow water. []Hibiclens shower  the night before and the morning of surgery. Do not use             Hibiclens on your face or head. [x]No smoking, chewing tobacco, illegal drugs, or alcohol within 24 hours of your surgery. [] Jewelry, valuables or body piercing's should not be brought to the hospital. All body and/or tongue piercing's must be removed prior to arriving to hospital.  ALL hair pins must be removed. [x] Do not wear makeup, lotions, powders, deodorant. Nail polish as directed by the nurse. [x] Arrange transportation with a responsible adult  to and from the hospital. If you do not have a responsible adult  to transport you, you will need to make arrangements with a medical transportation company (i.e. [employer]. A Uber/taxi/bus is not appropriate unless you are accompanied by a responsible adult ). Arrange for someone to be with you for the remainder of the day and for 24 hours after your procedure due to having had anesthesia. Who will be your  for transportation?_______husband___________   Who will be staying with you for 24 hrs after your procedure?_________husband_________  [] Bring insurance card and photo ID.  [] Transfusion Bracelet: Please bring with you to hospital, day of surgery  [] Bring urine specimen day of surgery. Any small container is acceptable. [] Use inhalers the morning of surgery and bring with you to hospital.  [] Bring copy of living will or healthcare power of  papers to be placed in your electronic record. [] CPAP/BI-PAP: Please bring your machine if you are to spend the night in the hospital.     PARKING INSTRUCTIONS:   [x] Arrival Time:___06[address] via [address] door__________  · [] Parking lot '\"I\"  is located on [facility] (the corner of [facility] and [facility]). To enter, press the button and the gate will lift. A free token will be provided to exit the lot. One car per patient is allowed to park in this lot. All other cars are to park on [address] [place] [address] Street either in the parking garage or the handicap lot. [] To reach the [facility] lobby from [address] [place] [address], upon entering the hospital, take elevator B to the 3rd floor. EDUCATION INSTRUCTIONS:      [] Knee or hip replacement booklet & exercise pamphlets given. [] [address] placed in chart. [] Pre-admission Testing educational folder given  [] Incentive Spirometry,coughing & deep breathing exercises reviewed. []Medication information sheet(s)   []Fluoroscopy-Xray used in surgery reviewed with patient. Educational pamphlet placed in chart. []Pain: Post-op pain is normal and to be expected. You will be asked to rate your pain from 0-10(a zero is not acceptable-education is needed). Your post-op pain goal is:  [] Ask your nurse for your pain medication. [] Joint camp offered. [] Joint replacement booklets given. [] Other:___________________________    MEDICATION INSTRUCTIONS:   [x]Bring a complete list of your medications, please write the last time you took the medicine, give this list to the nurse.   [] Take the following medications the morning of surgery with 1-2 ounces of water:   [x] Stop herbal supplements and vitamins 5 days before your surgery. [] DO NOT take any diabetic medicine the morning of surgery. Follow instructions for insulin the day before surgery. [] If you are diabetic and your blood sugar is low or you feel symptomatic, you may drink 1-2 ounces of apple juice or take a glucose tablet. The morning of your procedure, you may call the pre-op area if you have concerns about your blood sugar [phone]. [] Use your inhalers the morning of surgery. Bring your emergency inhaler with you day of surgery. [] Follow physician instructions regarding any blood thinners you may be taking. WHAT TO EXPECT:  [x] The day of surgery you will be greeted and checked in by the [employer].  In addition, you will be registered in the [place] by a Patient Access Representative. Please bring your photo ID and insurance card. A nurse will greet you in accordance to the time you are needed in the pre-op area to prepare you for surgery. Please do not be discouraged if you are not greeted in the order you arrive as there are many variables that are involved in patient preparation. Your patience is greatly appreciated as you wait for your nurse. Please bring in items such as: books, magazines, newspapers, electronics, or any other items  to occupy your time in the waiting area. []  Delays may occur with surgery and staff will make a sincere effort to keep you informed of delays. If any delays occur with your procedure, we apologize ahead of time for your inconvenience as we recognize the value of your time.

## 2021-01-06 NOTE — PROGRESS NOTES
Patient agreed to COVID test on 1-7 at the  [facility]  located at  [address] [place] [address]. between the hours of 6 am- 2:30 pm, instructed to bring ID. Patient instructed to self isolate until day of surgery.

## 2021-01-07 ENCOUNTER — HOSPITAL ENCOUNTER (OUTPATIENT)
Age: 81
Discharge: HOME | End: 2021-01-09
Payer: MEDICARE

## 2021-01-07 DIAGNOSIS — U07.1 COVID-19: ICD-10-CM

## 2021-01-07 PROCEDURE — U0003 INFECTIOUS AGENT DETECTION BY NUCLEIC ACID (DNA OR RNA); SEVERE ACUTE RESPIRATORY SYNDROME CORONAVIRUS 2 (SARS-COV-2) (CORONAVIRUS DISEASE [COVID-19]), AMPLIFIED PROBE TECHNIQUE, MAKING USE OF HIGH THROUGHPUT TECHNOLOGIES AS DESCRIBED BY CMS-2020-01-R: HCPCS

## 2021-01-08 LAB
SARS-COV-2: NOT DETECTED
SOURCE: NORMAL

## 2021-01-13 ENCOUNTER — ANESTHESIA EVENT (OUTPATIENT)
Dept: OPERATING ROOM | Age: 81
End: 2021-01-13
Payer: MEDICARE

## 2021-01-14 ENCOUNTER — HOSPITAL ENCOUNTER (OUTPATIENT)
Dept: GENERAL RADIOLOGY | Age: 81
Discharge: HOME | End: 2021-01-16
Attending: SURGERY
Payer: MEDICARE

## 2021-01-14 ENCOUNTER — TELEPHONE (OUTPATIENT)
Dept: SURGERY | Age: 81
End: 2021-01-14

## 2021-01-14 ENCOUNTER — PREP FOR PROCEDURE (OUTPATIENT)
Dept: SURGERY | Age: 81
End: 2021-01-14

## 2021-01-14 ENCOUNTER — HOSPITAL ENCOUNTER (OUTPATIENT)
Age: 81
Setting detail: OUTPATIENT SURGERY
Discharge: HOME | End: 2021-01-14
Attending: SURGERY | Admitting: SURGERY
Payer: MEDICARE

## 2021-01-14 ENCOUNTER — ANESTHESIA (OUTPATIENT)
Dept: OPERATING ROOM | Age: 81
End: 2021-01-14
Payer: MEDICARE

## 2021-01-14 ENCOUNTER — APPOINTMENT (OUTPATIENT)
Dept: GENERAL RADIOLOGY | Age: 81
End: 2021-01-14
Attending: SURGERY
Payer: MEDICARE

## 2021-01-14 VITALS — OXYGEN SATURATION: 97 % | SYSTOLIC BLOOD PRESSURE: 143 MMHG | DIASTOLIC BLOOD PRESSURE: 68 MMHG

## 2021-01-14 VITALS
HEIGHT: 60 IN | SYSTOLIC BLOOD PRESSURE: 179 MMHG | TEMPERATURE: 97.3 F | BODY MASS INDEX: 34.95 KG/M2 | HEART RATE: 72 BPM | RESPIRATION RATE: 17 BRPM | OXYGEN SATURATION: 98 % | WEIGHT: 178 LBS | DIASTOLIC BLOOD PRESSURE: 80 MMHG

## 2021-01-14 DIAGNOSIS — G89.18 POSTOPERATIVE PAIN: ICD-10-CM

## 2021-01-14 DIAGNOSIS — C80.1 CANCER (HCC): ICD-10-CM

## 2021-01-14 DIAGNOSIS — U07.1 COVID-19: Primary | ICD-10-CM

## 2021-01-14 PROCEDURE — 2580000003 HC RX 258: Performed by: SURGERY

## 2021-01-14 PROCEDURE — 6360000002 HC RX W HCPCS: Performed by: SURGERY

## 2021-01-14 PROCEDURE — 36582 REPLACE TUNNELED CV CATH: CPT | Performed by: SURGERY

## 2021-01-14 PROCEDURE — 7100000010 HC PHASE II RECOVERY - FIRST 15 MIN: Performed by: SURGERY

## 2021-01-14 PROCEDURE — 71045 X-RAY EXAM CHEST 1 VIEW: CPT

## 2021-01-14 PROCEDURE — C1788 PORT, INDWELLING, IMP: HCPCS | Performed by: SURGERY

## 2021-01-14 PROCEDURE — 3600000003 HC SURGERY LEVEL 3 BASE: Performed by: SURGERY

## 2021-01-14 PROCEDURE — 3700000001 HC ADD 15 MINUTES (ANESTHESIA): Performed by: SURGERY

## 2021-01-14 PROCEDURE — 6360000002 HC RX W HCPCS: Performed by: ANESTHESIOLOGIST ASSISTANT

## 2021-01-14 PROCEDURE — 2709999900 HC NON-CHARGEABLE SUPPLY: Performed by: SURGERY

## 2021-01-14 PROCEDURE — 2500000003 HC RX 250 WO HCPCS: Performed by: SURGERY

## 2021-01-14 PROCEDURE — 3700000000 HC ANESTHESIA ATTENDED CARE: Performed by: SURGERY

## 2021-01-14 PROCEDURE — 77001 FLUOROGUIDE FOR VEIN DEVICE: CPT | Performed by: SURGERY

## 2021-01-14 PROCEDURE — 3600000013 HC SURGERY LEVEL 3 ADDTL 15MIN: Performed by: SURGERY

## 2021-01-14 PROCEDURE — 3209999900 FLUORO FOR SURGICAL PROCEDURES

## 2021-01-14 PROCEDURE — 76937 US GUIDE VASCULAR ACCESS: CPT | Performed by: SURGERY

## 2021-01-14 PROCEDURE — 7100000011 HC PHASE II RECOVERY - ADDTL 15 MIN: Performed by: SURGERY

## 2021-01-14 PROCEDURE — 2500000003 HC RX 250 WO HCPCS: Performed by: ANESTHESIOLOGY

## 2021-01-14 DEVICE — PORT INFUS 8FR PWR INJ CT FOR VASC ACCS CATH: Type: IMPLANTABLE DEVICE | Site: SUBCLAVIAN | Status: FUNCTIONAL

## 2021-01-14 RX ORDER — HYDROCODONE BITARTRATE AND ACETAMINOPHEN 5; 325 MG/1; MG/1
2 TABLET ORAL PRN
Status: DISCONTINUED | OUTPATIENT
Start: 2021-01-14 | End: 2021-01-14 | Stop reason: HOSPADM

## 2021-01-14 RX ORDER — FENTANYL CITRATE 50 UG/ML
INJECTION, SOLUTION INTRAMUSCULAR; INTRAVENOUS PRN
Status: DISCONTINUED | OUTPATIENT
Start: 2021-01-14 | End: 2021-01-14 | Stop reason: SDUPTHER

## 2021-01-14 RX ORDER — SODIUM CHLORIDE 0.9 % (FLUSH) 0.9 %
10 SYRINGE (ML) INJECTION PRN
Status: CANCELLED | OUTPATIENT
Start: 2021-01-14

## 2021-01-14 RX ORDER — FENTANYL CITRATE 50 UG/ML
25 INJECTION, SOLUTION INTRAMUSCULAR; INTRAVENOUS EVERY 5 MIN PRN
Status: DISCONTINUED | OUTPATIENT
Start: 2021-01-14 | End: 2021-01-14 | Stop reason: HOSPADM

## 2021-01-14 RX ORDER — SODIUM CHLORIDE 0.9 % (FLUSH) 0.9 %
10 SYRINGE (ML) INJECTION EVERY 12 HOURS SCHEDULED
Status: DISCONTINUED | OUTPATIENT
Start: 2021-01-14 | End: 2021-01-14 | Stop reason: HOSPADM

## 2021-01-14 RX ORDER — PROPOFOL 10 MG/ML
INJECTION, EMULSION INTRAVENOUS CONTINUOUS PRN
Status: DISCONTINUED | OUTPATIENT
Start: 2021-01-14 | End: 2021-01-14 | Stop reason: SDUPTHER

## 2021-01-14 RX ORDER — LABETALOL HYDROCHLORIDE 5 MG/ML
5 INJECTION, SOLUTION INTRAVENOUS EVERY 10 MIN PRN
Status: COMPLETED | OUTPATIENT
Start: 2021-01-14 | End: 2021-01-14

## 2021-01-14 RX ORDER — PROPOFOL 10 MG/ML
INJECTION, EMULSION INTRAVENOUS PRN
Status: DISCONTINUED | OUTPATIENT
Start: 2021-01-14 | End: 2021-01-14 | Stop reason: SDUPTHER

## 2021-01-14 RX ORDER — HYDROCODONE BITARTRATE AND ACETAMINOPHEN 5; 325 MG/1; MG/1
1 TABLET ORAL EVERY 8 HOURS PRN
Qty: 6 TABLET | Refills: 0 | Status: SHIPPED | OUTPATIENT
Start: 2021-01-14 | End: 2021-01-16

## 2021-01-14 RX ORDER — SODIUM CHLORIDE 9 MG/ML
INJECTION, SOLUTION INTRAVENOUS CONTINUOUS
Status: DISCONTINUED | OUTPATIENT
Start: 2021-01-14 | End: 2021-01-14 | Stop reason: HOSPADM

## 2021-01-14 RX ORDER — BUPIVACAINE HYDROCHLORIDE 2.5 MG/ML
INJECTION, SOLUTION EPIDURAL; INFILTRATION; INTRACAUDAL PRN
Status: DISCONTINUED | OUTPATIENT
Start: 2021-01-14 | End: 2021-01-14 | Stop reason: ALTCHOICE

## 2021-01-14 RX ORDER — SODIUM CHLORIDE 0.9 % (FLUSH) 0.9 %
10 SYRINGE (ML) INJECTION EVERY 12 HOURS SCHEDULED
Status: CANCELLED | OUTPATIENT
Start: 2021-01-14

## 2021-01-14 RX ORDER — HYDROCODONE BITARTRATE AND ACETAMINOPHEN 5; 325 MG/1; MG/1
1 TABLET ORAL PRN
Status: DISCONTINUED | OUTPATIENT
Start: 2021-01-14 | End: 2021-01-14 | Stop reason: HOSPADM

## 2021-01-14 RX ORDER — ONDANSETRON 2 MG/ML
4 INJECTION INTRAMUSCULAR; INTRAVENOUS
Status: DISCONTINUED | OUTPATIENT
Start: 2021-01-14 | End: 2021-01-14 | Stop reason: HOSPADM

## 2021-01-14 RX ORDER — SODIUM CHLORIDE 9 MG/ML
INJECTION, SOLUTION INTRAVENOUS CONTINUOUS
Status: CANCELLED | OUTPATIENT
Start: 2021-01-14

## 2021-01-14 RX ORDER — SODIUM CHLORIDE 0.9 % (FLUSH) 0.9 %
10 SYRINGE (ML) INJECTION PRN
Status: DISCONTINUED | OUTPATIENT
Start: 2021-01-14 | End: 2021-01-14 | Stop reason: HOSPADM

## 2021-01-14 RX ADMIN — Medication 2 G: at 08:06

## 2021-01-14 RX ADMIN — FENTANYL CITRATE 12.5 MCG: 50 INJECTION, SOLUTION INTRAMUSCULAR; INTRAVENOUS at 08:34

## 2021-01-14 RX ADMIN — FENTANYL CITRATE 12.5 MCG: 50 INJECTION, SOLUTION INTRAMUSCULAR; INTRAVENOUS at 08:39

## 2021-01-14 RX ADMIN — FENTANYL CITRATE 25 MCG: 50 INJECTION, SOLUTION INTRAMUSCULAR; INTRAVENOUS at 08:16

## 2021-01-14 RX ADMIN — LABETALOL HYDROCHLORIDE 5 MG: 5 INJECTION INTRAVENOUS at 10:46

## 2021-01-14 RX ADMIN — FENTANYL CITRATE 25 MCG: 50 INJECTION, SOLUTION INTRAMUSCULAR; INTRAVENOUS at 08:54

## 2021-01-14 RX ADMIN — LABETALOL HYDROCHLORIDE 5 MG: 5 INJECTION INTRAVENOUS at 10:31

## 2021-01-14 RX ADMIN — FENTANYL CITRATE 25 MCG: 50 INJECTION, SOLUTION INTRAMUSCULAR; INTRAVENOUS at 08:10

## 2021-01-14 RX ADMIN — SODIUM CHLORIDE: 9 INJECTION, SOLUTION INTRAVENOUS at 06:58

## 2021-01-14 RX ADMIN — PROPOFOL 40 MG: 10 INJECTION, EMULSION INTRAVENOUS at 08:05

## 2021-01-14 RX ADMIN — PROPOFOL 100 MCG/KG/MIN: 10 INJECTION, EMULSION INTRAVENOUS at 08:05

## 2021-01-14 ASSESSMENT — ENCOUNTER SYMPTOMS
GASTROINTESTINAL NEGATIVE: 1
SHORTNESS OF BREATH: 1
EYES NEGATIVE: 1
ALLERGIC/IMMUNOLOGIC NEGATIVE: 1
RESPIRATORY NEGATIVE: 1

## 2021-01-14 ASSESSMENT — PAIN SCALES - GENERAL
PAINLEVEL_OUTOF10: 0

## 2021-01-14 ASSESSMENT — PULMONARY FUNCTION TESTS
PIF_VALUE: 0
PIF_VALUE: 2

## 2021-01-14 NOTE — ANESTHESIA PRE PROCEDURE
Department of Anesthesiology  Preprocedure Note       Name:  Olga Phoenix   Age:  [de-identified] y.o.  :  1940                                          MRN:  [id number]         Date:  2021      Surgeon: Dell Mistry):  Esme Peck MD    Procedure: Procedure(s):  REMOVAL AND RE-INSERTION RIGHT MEDI PORT    Medications prior to admission:   Prior to Admission medications    Medication Sig Start Date End Date Taking?  Authorizing Provider   Polyethyl Glycol-Propyl Glycol (SYSTANE OP) Apply to eye   Yes Historical Provider, MD   Magnesium Oxide 500 MG CAPS 1 capsule 2 times daily   Yes Historical Provider, MD   ezetimibe (ZETIA) 10 MG tablet Take 1 tablet by mouth Daily with supper 20  Yes Khanh Pinon DO   doxazosin (CARDURA) 2 MG tablet Take 2 mg by mouth nightly   Yes Historical Provider, MD   acetaminophen (TYLENOL) 325 MG tablet Take 2 tablets by mouth every 6 hours 3/5/20  Yes Ayanna Ewing MD   hyoscyamine (ANASPAZ;LEVSIN) 125 MCG tablet Take 0.125 mg by mouth 2 times daily Takes prior to meals   Yes Historical Provider, MD   Bevacizumab (AVASTIN IV) Infuse intravenously Last infusion 1/10/2020   Yes Historical Provider, MD   lactobacillus (CULTURELLE) CAPS capsule Take 1 capsule by mouth daily 10/2/19  Yes Rebel Stafford DO   vitamin D (CHOLECALCIFEROL) 1000 UNIT TABS tablet Take 1,000 Units by mouth daily    Yes Historical Provider, MD   lidocaine-prilocaine (EMLA) 2.5-2.5 % cream SANGEETA TOPICALLY AA PRN 19  Yes Historical Provider, MD   bumetanide (BUMEX) 1 MG tablet Take 1 mg by mouth three times a week    Yes Historical Provider, MD   Biotin 5000 MCG CAPS Take 1 capsule by mouth daily STOP PREOP MED   Yes Historical Provider, MD   vitamin B-12 (CYANOCOBALAMIN) 1000 MCG tablet Take 1,000 mcg by mouth daily    Yes Historical Provider, MD   fish oil-omega-3 fatty acids 1000 MG capsule Take 1 g by mouth STOP PREOP MED   Yes Historical Provider, MD   Sod Fluoride-Potassium Nitrate ([address] 5000 SENSITIVE) 1.1-5 % PSTE 2 times daily    Historical Provider, MD   folic acid (FOLVITE) 1 MG tablet Take 1 tablet by mouth daily OK TO TAKE 11/20/20   Khanh Pinon DO   docusate sodium (COLACE) 100 MG capsule Take 1 capsule by mouth 2 times daily  Patient taking differently: Take 100 mg by mouth daily as needed  3/5/20   Leila Molina MD       Current medications:    Current Facility-Administered Medications   Medication Dose Route Frequency Provider Last Rate Last Admin    0.9 % sodium chloride infusion   Intravenous Continuous Gianna Escobar  mL/hr at 01/14/21 0658 New Bag at 01/14/21 0658    ceFAZolin (ANCEF) 2 g in sterile water 20 mL IV syringe  2 g Intravenous On Call to [address], MD        sodium chloride flush 0.9 % injection 10 mL  10 mL Intravenous 2 times per day Gianna Escobar MD        sodium chloride flush 0.9 % injection 10 mL  10 mL Intravenous PRN Gianna Escobar MD           Allergies: Allergies   Allergen Reactions    Latuda [Lurasidone] Shortness Of Breath and Diarrhea     Cancer med    Aspirin      affected kidneys  Other reaction(s): Other (see comments)    Pembrolizumab      Other reaction(s):  Other (see comments)    Lisinopril      cough       Problem List:    Patient Active Problem List   Diagnosis Code    Malignant neoplasm of uterus ([facility] [address] C55    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 ([facility] [address]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.5    Mixed hyperlipidemia E78.2    Hypertension I10    Arthritis M19.90    Chronic kidney disease, stage 3 (moderate) N18.30    Vitamin D deficiency E55.9    Uterine cancer ([facility] [address] C49    GERD (gastroesophageal reflux disease) K21.9    S/P total knee arthroplasty, right Z96.651    Edema of lower extremity R60.0    Hypercalcemia E83.52    Hypomagnesemia E83.42       Past Medical History:        Diagnosis Date    Acute kidney injury ([facility] [address]     hx of    Acute renal failure syndrome ([facility] [address] 7/9/2020    Arthritis     Chronic kidney disease, stage 3 (moderate) 11/29/2019    Dry eye     GERD (gastroesophageal reflux disease)     Hypertension     was removed off medication in 3/2019 dt effected her kidneys    Hypomagnesemia     hx of    Mixed hyperlipidemia 5/24/2019    Primary osteoarthritis of right knee 3/4/2020    SOB (shortness of breath) on exertion     Uterine cancer ([facility] [address] 2012    metatstaic, treated with surgery, chemo x2 , radiation x 2, Theora Bottom therapy x 2,andAvastin therapy    Vitamin D deficiency        Past Surgical History:        Procedure Laterality Date    APPENDECTOMY  1950    BREAST SURGERY  6418,8805, 2002    benign  lumps    DILATION AND CURETTAGE OF UTERUS  3/20/12    HYSTERECTOMY  4/12/12    INSERTION / REMOVAL / REPLACEMENT VENOUS ACCESS CATHETER N/A 2/22/2019    MEDI PORT INSERTION performed by Arian Mcmahan MD at [facility] 13  2012    pelvic nodes    TONSILLECTOMY  child    TOTAL KNEE ARTHROPLASTY Right 3/4/2020    RIGHT KNEE TOTAL ARTHROPLASTY ROBOTIC ANGIE performed by Dalia Boykin MD at [facility] OR       Social History:    Social History     Tobacco Use    Smoking status: Never Smoker    Smokeless tobacco: Never Used   Substance Use Topics    Alcohol use:  No                                Counseling given: Not Answered      Vital Signs (Current):   Vitals:    01/14/21 0627 01/14/21 0644   BP: (!) 182/86    Pulse: 87    Resp: 16    Temp: 97.2 °F (36.2 °C)    TempSrc: Temporal    SpO2: 99%    Weight:  178 lb (80.7 kg)   Height:  5' (1.524 m)                                              BP Readings from Last 3 Encounters:   01/14/21 (!) 182/86   12/28/20 139/64   12/07/20 (!) 142/66       NPO Status: Time of last liquid consumption: 2300                        Time of last solid consumption: 2300                        Date of last liquid consumption: 01/13/21                        Date of last solid food consumption: 01/13/21    BMI:   Wt Readings from Last 3 Encounters:   01/14/21 178 lb (80.7 kg)   12/28/20 175 lb (79.4 kg)   12/07/20 175 lb (79.4 kg)     Body mass index is 34.76 kg/m². CBC:   Lab Results   Component Value Date    WBC 9.9 03/05/2020    RBC 4.09 03/05/2020    HGB 12.6 03/05/2020    HCT 38.9 03/05/2020    MCV 95.1 03/05/2020    RDW 13.6 03/05/2020     03/05/2020       CMP:   Lab Results   Component Value Date     03/05/2020    K 4.7 03/05/2020     03/05/2020    CO2 18 03/05/2020    BUN 56 12/28/2020    CREATININE 1.5 12/28/2020    GFRAA 40 12/28/2020    LABGLOM 33 12/28/2020    GLUCOSE 113 03/05/2020    PROT 5.1 10/02/2019    CALCIUM 9.2 03/05/2020    BILITOT 0.4 10/02/2019    ALKPHOS 80 10/02/2019    AST 13 10/02/2019    ALT 16 10/02/2019       POC Tests: No results for input(s): POCGLU, POCNA, POCK, POCCL, POCBUN, POCHEMO, POCHCT in the last 72 hours. Coags:   Lab Results   Component Value Date    PROTIME 10.9 11/04/2014    INR 1.0 11/04/2014    APTT 33.6 11/04/2014       HCG (If Applicable): No results found for: PREGTESTUR, PREGSERUM, HCG, HCGQUANT     ABGs: No results found for: PHART, PO2ART, XIB6GSM, TZJ7IBT, BEART, W4AJJEHV     Type & Screen (If Applicable):  No results found for: LABABO, LABRH    Drug/Infectious Status (If Applicable):  No results found for: HIV, HEPCAB    COVID-19 Screening (If Applicable):   Lab Results   Component Value Date    COVID19 Not Detected 01/07/2021         Anesthesia Evaluation    Airway: Mallampati: II  TM distance: >3 FB   Neck ROM: full  Mouth opening: > = 3 FB Dental: normal exam         Pulmonary: breath sounds clear to auscultation  (+) shortness of breath:                             Cardiovascular:    (+) hypertension:,         Rhythm: regular                      Neuro/Psych:               GI/Hepatic/Renal:   (+) GERD:,           Endo/Other:    (+) malignancy/cancer.                   ROS comment: Uterine CA Abdominal:           Vascular:                                        Anesthesia Plan      MAC     ASA 3 Induction: intravenous. MIPS: Postoperative opioids intended and Prophylactic antiemetics administered.                       Mac Escudero MD   1/14/2021

## 2021-01-14 NOTE — OP NOTE
[facility]  OPERATIVE REPORT    Selma Bravo  1940      DATE OF PROCEDURE: 1/14/2021     SURGEON: Olegario Mancia MD, MSc, FACS     ASSISTANT: none     PREOPERATIVE DIAGNOSIS:  Malfunctioning mediport. POSTOPERATIVE DIAGNOSIS: Same    OPERATION: revision of mediport with 8F Bard (CT and MRI compatible) using ultrasound guidance and fluoroscopy via left subclavian vein. ANESTHESIA: LMAC     ESTIMATED BLOOD LOSS: 56XM     COMPLICATIONS: None    SPECIMEN:  none    DRAINS:  none    HISTORY:  This is a [de-identified] y. o.female who presents with difficult mediport. She states it takes hours to flush and draw blood from. She had a mediport check with IR and was told the catheter needs to be longer or she needs to lay down during chemo and blood draws. She wished to have it revised. DESCRIPTION OF PROCEDURE: The patient was identified and the procedure was confirmed. Ancef 2g IV was given, bilateral SCDs in place, rolled blanket used to extend the neck. Bilateral chest and neck was prepped and draped in the standard surgical fashion. 0.25% Marcaine was used throughout the case. Ultrasound was used to identify and access the right internal jugular vein. I was able to access the vein and get good return, but unable to thread the wire after multiple attempts and after using fluoro to visualize as well. I decided to revise the left subclavian mediport. The skin was anesthetized. #15 blade scalpel used to reopen prior incision. The port was removed from its pocket. It was not sutured in place. The tubing was secured with hemostat and cut. A wire was passed through the tubing and checked with fluoro. The tubing removed. A dilator and sheath placed, wire and dilator removed and new tubing passed. This was checked and repositioned under fluoro. The sheath was removed. The new mediport was attached. The mediport was accessed with the [facility] needle and there was easy blood return and flush with heparinized saline. The port was secured with 3-0 prolene. The port position was checked with fluoro. The pocket irrigated copiously with saline. The wound closed with 3-0 vicryl and 4-0 monocryl. Wounds cleaned and dermabond applied. Needle, sponge, and instrument counts were reported as correct x2. The patient tolerated the procedure and was transferred to the recovery area in satisfactory condition. Family updated after procedure.       Joey Rice MD, MSc, FACS  1/14/2021  9:20 AM

## 2021-01-14 NOTE — PROGRESS NOTES
PATIENT'S GLASSES PLACED IN CLEAR PLASTIC BAG LABELLED WITH PT. LABELS AND SENT WITH CHART TO RECOVERY.

## 2021-01-14 NOTE — H&P
Andrea Smoker [Lurasidone] Shortness Of Breath and Diarrhea     Cancer med    Aspirin      affected kidneys  Other reaction(s): Other (see comments)    Pembrolizumab      Other reaction(s): Other (see comments)    Lisinopril      cough     Current Facility-Administered Medications   Medication Dose Route Frequency Provider Last Rate Last Admin    0.9 % sodium chloride infusion   Intravenous Continuous Earlean Klinefelter,  mL/hr at 01/14/21 0658 New Bag at 01/14/21 0658    ceFAZolin (ANCEF) 2 g in sterile water 20 mL IV syringe  2 g Intravenous On Call to [address], MD        sodium chloride flush 0.9 % injection 10 mL  10 mL Intravenous 2 times per day Earlean Klinefelter, MD        sodium chloride flush 0.9 % injection 10 mL  10 mL Intravenous PRN Earlean Klinefelter, MD         Review of Systems   Constitutional: Negative. HENT: Negative. Eyes: Negative. Respiratory: Negative. Cardiovascular: Negative. Gastrointestinal: Negative. Endocrine: Negative. Genitourinary: Negative. Musculoskeletal: Negative. Skin: Negative. Allergic/Immunologic: Negative. Neurological: Negative. Hematological: Negative. Psychiatric/Behavioral: Negative. BP (!) 182/86   Pulse 87   Temp 97.2 °F (36.2 °C) (Temporal)   Resp 16   Ht 5' (1.524 m)   Wt 178 lb (80.7 kg)   LMP  (LMP Unknown)   SpO2 99%   BMI 34.76 kg/m²   Physical Exam  Constitutional:       Appearance: Normal appearance. HENT:      Head: Normocephalic and atraumatic. Eyes:      Extraocular Movements: Extraocular movements intact. Pupils: Pupils are equal, round, and reactive to light. Neck:      Musculoskeletal: Normal range of motion and neck supple. Cardiovascular:      Rate and Rhythm: Normal rate and regular rhythm. Pulses: Normal pulses. Pulmonary:      Effort: Pulmonary effort is normal.   Musculoskeletal:         General: No tenderness or signs of injury.    Skin: General: Skin is warm and dry. Neurological:      General: No focal deficit present. Mental Status: She is alert and oriented to person, place, and time. Psychiatric:         Mood and Affect: Mood normal.         Behavior: Behavior normal.         Thought Content: Thought content normal.         Judgment: Judgment normal.       ASSESSMENT/PLAN:  Malfunctioning mediport--plan for new insertion right IJ and removal old port. Concern for infection with redoing old port. Patient understands r/b/a to procedure and wishes to proceed.     Дмитрий Mcnair MD, MSc, FACS  1/14/2021  7:56 AM

## 2021-01-14 NOTE — TELEPHONE ENCOUNTER
MA attempt to contact patient and discuss about the antibiotic. Patient phone just rang and rang. MA wanted to advise patient that no antibiotics were suppose to be sent in and to monitor area and if shows any signs or symptoms of infection to give the office a call.         Electronically signed by Jazz Severino MA on 1/14/21 at 1:47 PM EST

## 2021-01-14 NOTE — PROGRESS NOTES
Dr Luis Enrique Gabriel aware of blood pressure of 206/85 and heart rate of 85, see trandate order.  Ottoniel Sullivan

## 2021-01-18 ENCOUNTER — HOSPITAL ENCOUNTER (OUTPATIENT)
Dept: INFUSION THERAPY | Age: 81
Setting detail: INFUSION SERIES
Discharge: HOME | End: 2021-01-18
Payer: MEDICARE

## 2021-01-18 VITALS
HEART RATE: 88 BPM | SYSTOLIC BLOOD PRESSURE: 209 MMHG | WEIGHT: 178 LBS | RESPIRATION RATE: 16 BRPM | DIASTOLIC BLOOD PRESSURE: 89 MMHG | BODY MASS INDEX: 34.76 KG/M2 | TEMPERATURE: 98.2 F | OXYGEN SATURATION: 99 %

## 2021-01-18 DIAGNOSIS — C55 MALIGNANT NEOPLASM OF UTERUS, UNSPECIFIED SITE (HCC): Primary | ICD-10-CM

## 2021-01-18 LAB
BACTERIA: NORMAL /HPF
BILIRUBIN URINE: NEGATIVE
BLOOD, URINE: NEGATIVE
BUN BLDV-MCNC: 47 MG/DL (ref 8–23)
CA 125: 16.7 U/ML (ref 0–35)
CLARITY: CLEAR
COLOR: YELLOW
CREAT SERPL-MCNC: 1.5 MG/DL (ref 0.5–1)
GFR AFRICAN AMERICAN: 40
GFR NON-AFRICAN AMERICAN: 33 ML/MIN/1.73
GLUCOSE URINE: NEGATIVE MG/DL
KETONES, URINE: NEGATIVE MG/DL
LEUKOCYTE ESTERASE, URINE: NEGATIVE
NITRITE, URINE: NEGATIVE
PH UA: 6 (ref 5–9)
PROTEIN UA: 100 MG/DL
RBC UA: NORMAL /HPF (ref 0–2)
SPECIFIC GRAVITY UA: 1.01 (ref 1–1.03)
UROBILINOGEN, URINE: 0.2 E.U./DL
WBC UA: NORMAL /HPF (ref 0–5)

## 2021-01-18 PROCEDURE — 6360000002 HC RX W HCPCS: Performed by: NURSE PRACTITIONER

## 2021-01-18 PROCEDURE — 81001 URINALYSIS AUTO W/SCOPE: CPT

## 2021-01-18 PROCEDURE — 36415 COLL VENOUS BLD VENIPUNCTURE: CPT

## 2021-01-18 PROCEDURE — 84520 ASSAY OF UREA NITROGEN: CPT

## 2021-01-18 PROCEDURE — 36591 DRAW BLOOD OFF VENOUS DEVICE: CPT

## 2021-01-18 PROCEDURE — 2580000003 HC RX 258: Performed by: NURSE PRACTITIONER

## 2021-01-18 PROCEDURE — 86304 IMMUNOASSAY TUMOR CA 125: CPT

## 2021-01-18 PROCEDURE — 82565 ASSAY OF CREATININE: CPT

## 2021-01-18 RX ORDER — HEPARIN SODIUM (PORCINE) LOCK FLUSH IV SOLN 100 UNIT/ML 100 UNIT/ML
500 SOLUTION INTRAVENOUS PRN
Status: CANCELLED | OUTPATIENT
Start: 2021-01-18

## 2021-01-18 RX ORDER — SODIUM CHLORIDE 0.9 % (FLUSH) 0.9 %
10 SYRINGE (ML) INJECTION PRN
Status: CANCELLED | OUTPATIENT
Start: 2021-01-18

## 2021-01-18 RX ORDER — SODIUM CHLORIDE 0.9 % (FLUSH) 0.9 %
10 SYRINGE (ML) INJECTION PRN
Status: DISCONTINUED | OUTPATIENT
Start: 2021-01-18 | End: 2021-01-19 | Stop reason: HOSPADM

## 2021-01-18 RX ORDER — HEPARIN SODIUM (PORCINE) LOCK FLUSH IV SOLN 100 UNIT/ML 100 UNIT/ML
500 SOLUTION INTRAVENOUS PRN
Status: DISCONTINUED | OUTPATIENT
Start: 2021-01-18 | End: 2021-01-19 | Stop reason: HOSPADM

## 2021-01-18 RX ORDER — SODIUM CHLORIDE 0.9 % (FLUSH) 0.9 %
20 SYRINGE (ML) INJECTION PRN
Status: CANCELLED | OUTPATIENT
Start: 2021-01-18

## 2021-01-18 RX ADMIN — Medication 500 UNITS: at 13:43

## 2021-01-18 RX ADMIN — SODIUM CHLORIDE, PRESERVATIVE FREE 10 ML: 5 INJECTION INTRAVENOUS at 13:39

## 2021-01-18 RX ADMIN — SODIUM CHLORIDE, PRESERVATIVE FREE 10 ML: 5 INJECTION INTRAVENOUS at 13:41

## 2021-01-18 RX ADMIN — SODIUM CHLORIDE, PRESERVATIVE FREE 10 ML: 5 INJECTION INTRAVENOUS at 13:42

## 2021-01-18 NOTE — PROGRESS NOTES
Patient tolerated port flush well. Therapy plan reviewed with patient. Verbalizes understanding. Declines copy of AVS and any medication information, verbalizes good knowledge of current plan, and has no signs or symptoms to report at this time. Next appointment made.   Labs drawn via port and pending results  To  [facility]     Labs drawn as ordered bun creat  Ca  125  Urinalysis  Sent

## 2021-01-26 ENCOUNTER — IMMUNIZATION (OUTPATIENT)
Dept: PRIMARY CARE CLINIC | Age: 81
End: 2021-01-26
Payer: MEDICARE

## 2021-01-26 PROCEDURE — 0001A COVID-19, PFIZER VACCINE 30MCG/0.3ML DOSE: CPT | Performed by: PHYSICIAN ASSISTANT

## 2021-01-26 PROCEDURE — 91300 COVID-19, PFIZER VACCINE 30MCG/0.3ML DOSE: CPT | Performed by: PHYSICIAN ASSISTANT

## 2021-02-08 ENCOUNTER — HOSPITAL ENCOUNTER (OUTPATIENT)
Dept: INFUSION THERAPY | Age: 81
Setting detail: INFUSION SERIES
Discharge: HOME | End: 2021-02-08
Payer: MEDICARE

## 2021-02-08 VITALS
TEMPERATURE: 97.4 F | RESPIRATION RATE: 16 BRPM | BODY MASS INDEX: 34.76 KG/M2 | SYSTOLIC BLOOD PRESSURE: 143 MMHG | HEART RATE: 83 BPM | OXYGEN SATURATION: 96 % | DIASTOLIC BLOOD PRESSURE: 80 MMHG | WEIGHT: 178 LBS

## 2021-02-08 DIAGNOSIS — C55 MALIGNANT NEOPLASM OF UTERUS, UNSPECIFIED SITE (HCC): Primary | ICD-10-CM

## 2021-02-08 LAB
BACTERIA: NORMAL /HPF
BILIRUBIN URINE: NEGATIVE
BLOOD, URINE: NEGATIVE
BUN BLDV-MCNC: 53 MG/DL (ref 8–23)
CA 125: 16.6 U/ML (ref 0–35)
CLARITY: CLEAR
COLOR: YELLOW
CREAT SERPL-MCNC: 1.7 MG/DL (ref 0.5–1)
GFR AFRICAN AMERICAN: 35
GFR NON-AFRICAN AMERICAN: 29 ML/MIN/1.73
GLUCOSE URINE: NEGATIVE MG/DL
KETONES, URINE: NEGATIVE MG/DL
LEUKOCYTE ESTERASE, URINE: NEGATIVE
NITRITE, URINE: NEGATIVE
PH UA: 6 (ref 5–9)
PROTEIN UA: 100 MG/DL
RBC UA: NORMAL /HPF (ref 0–2)
SPECIFIC GRAVITY UA: 1.02 (ref 1–1.03)
UROBILINOGEN, URINE: 0.2 E.U./DL
WBC UA: NORMAL /HPF (ref 0–5)

## 2021-02-08 PROCEDURE — 2580000003 HC RX 258: Performed by: NURSE PRACTITIONER

## 2021-02-08 PROCEDURE — 84520 ASSAY OF UREA NITROGEN: CPT

## 2021-02-08 PROCEDURE — 86304 IMMUNOASSAY TUMOR CA 125: CPT

## 2021-02-08 PROCEDURE — 6360000002 HC RX W HCPCS: Performed by: NURSE PRACTITIONER

## 2021-02-08 PROCEDURE — 36415 COLL VENOUS BLD VENIPUNCTURE: CPT

## 2021-02-08 PROCEDURE — 36591 DRAW BLOOD OFF VENOUS DEVICE: CPT

## 2021-02-08 PROCEDURE — 82565 ASSAY OF CREATININE: CPT

## 2021-02-08 PROCEDURE — 81001 URINALYSIS AUTO W/SCOPE: CPT

## 2021-02-08 RX ORDER — SODIUM CHLORIDE 0.9 % (FLUSH) 0.9 %
10 SYRINGE (ML) INJECTION PRN
Status: DISCONTINUED | OUTPATIENT
Start: 2021-02-08 | End: 2021-02-09 | Stop reason: HOSPADM

## 2021-02-08 RX ORDER — HEPARIN SODIUM (PORCINE) LOCK FLUSH IV SOLN 100 UNIT/ML 100 UNIT/ML
500 SOLUTION INTRAVENOUS PRN
Status: CANCELLED | OUTPATIENT
Start: 2021-02-08

## 2021-02-08 RX ORDER — SODIUM CHLORIDE 0.9 % (FLUSH) 0.9 %
10 SYRINGE (ML) INJECTION PRN
Status: CANCELLED | OUTPATIENT
Start: 2021-02-08

## 2021-02-08 RX ORDER — SODIUM CHLORIDE 0.9 % (FLUSH) 0.9 %
20 SYRINGE (ML) INJECTION PRN
Status: DISCONTINUED | OUTPATIENT
Start: 2021-02-08 | End: 2021-02-09 | Stop reason: HOSPADM

## 2021-02-08 RX ORDER — SODIUM CHLORIDE 0.9 % (FLUSH) 0.9 %
20 SYRINGE (ML) INJECTION PRN
Status: CANCELLED | OUTPATIENT
Start: 2021-02-08

## 2021-02-08 RX ORDER — HEPARIN SODIUM (PORCINE) LOCK FLUSH IV SOLN 100 UNIT/ML 100 UNIT/ML
500 SOLUTION INTRAVENOUS PRN
Status: DISCONTINUED | OUTPATIENT
Start: 2021-02-08 | End: 2021-02-09 | Stop reason: HOSPADM

## 2021-02-08 RX ADMIN — Medication 500 UNITS: at 13:33

## 2021-02-08 RX ADMIN — SODIUM CHLORIDE, PRESERVATIVE FREE 10 ML: 5 INJECTION INTRAVENOUS at 13:32

## 2021-02-08 RX ADMIN — SODIUM CHLORIDE, PRESERVATIVE FREE 20 ML: 5 INJECTION INTRAVENOUS at 13:33

## 2021-02-08 ASSESSMENT — PAIN SCALES - WONG BAKER: WONGBAKER_NUMERICALRESPONSE: 0

## 2021-02-08 ASSESSMENT — PAIN SCALES - GENERAL: PAINLEVEL_OUTOF10: 0

## 2021-02-08 NOTE — PROGRESS NOTES
Patient tolerated port flush and lab draw well. Therapy plan reviewed with patient. Verbalizes understanding. Declines copy of AVS and any medication information, verbalizes good knowledge of current plan, and has no signs or symptoms to report at this time.  Next appointment not made at this time, she is getting a new Dr and she will call us to reschedule

## 2021-02-16 ENCOUNTER — IMMUNIZATION (OUTPATIENT)
Dept: PRIMARY CARE CLINIC | Age: 81
End: 2021-02-16
Payer: MEDICARE

## 2021-02-16 PROCEDURE — 0002A COVID-19, PFIZER VACCINE 30MCG/0.3ML DOSE: CPT | Performed by: CLINICAL NURSE SPECIALIST

## 2021-02-16 PROCEDURE — 91300 COVID-19, PFIZER VACCINE 30MCG/0.3ML DOSE: CPT | Performed by: CLINICAL NURSE SPECIALIST

## 2021-02-26 ENCOUNTER — HOSPITAL ENCOUNTER (OUTPATIENT)
Dept: INFUSION THERAPY | Age: 81
Setting detail: INFUSION SERIES
Discharge: HOME | End: 2021-02-26
Payer: MEDICARE

## 2021-02-26 VITALS
WEIGHT: 178 LBS | HEART RATE: 76 BPM | HEIGHT: 60 IN | TEMPERATURE: 97.4 F | OXYGEN SATURATION: 98 % | BODY MASS INDEX: 34.95 KG/M2 | SYSTOLIC BLOOD PRESSURE: 181 MMHG | RESPIRATION RATE: 16 BRPM | DIASTOLIC BLOOD PRESSURE: 75 MMHG

## 2021-02-26 DIAGNOSIS — C55 MALIGNANT NEOPLASM OF UTERUS, UNSPECIFIED SITE (HCC): Primary | ICD-10-CM

## 2021-02-26 LAB
ANION GAP SERPL CALCULATED.3IONS-SCNC: 7 MMOL/L (ref 7–16)
BACTERIA: NORMAL /HPF
BASOPHILS ABSOLUTE: 0.03 E9/L (ref 0–0.2)
BASOPHILS RELATIVE PERCENT: 0.6 % (ref 0–2)
BILIRUBIN URINE: NEGATIVE
BLOOD, URINE: ABNORMAL
BUN BLDV-MCNC: 41 MG/DL (ref 8–23)
CA 125: 16.2 U/ML (ref 0–35)
CALCIUM SERPL-MCNC: 9.1 MG/DL (ref 8.6–10.2)
CHLORIDE BLD-SCNC: 106 MMOL/L (ref 98–107)
CLARITY: CLEAR
CO2: 26 MMOL/L (ref 22–29)
COLOR: ABNORMAL
CREAT SERPL-MCNC: 1.4 MG/DL (ref 0.5–1)
CREATININE URINE: 38 MG/DL (ref 29–226)
EOSINOPHILS ABSOLUTE: 0.14 E9/L (ref 0.05–0.5)
EOSINOPHILS RELATIVE PERCENT: 3 % (ref 0–6)
GFR AFRICAN AMERICAN: 44
GFR NON-AFRICAN AMERICAN: 36 ML/MIN/1.73
GLUCOSE BLD-MCNC: 124 MG/DL (ref 74–99)
GLUCOSE URINE: NEGATIVE MG/DL
HCT VFR BLD CALC: 40.3 % (ref 34–48)
HEMOGLOBIN: 13.1 G/DL (ref 11.5–15.5)
IMMATURE GRANULOCYTES #: 0.02 E9/L
IMMATURE GRANULOCYTES %: 0.4 % (ref 0–5)
KETONES, URINE: NEGATIVE MG/DL
LEUKOCYTE ESTERASE, URINE: NEGATIVE
LYMPHOCYTES ABSOLUTE: 0.75 E9/L (ref 1.5–4)
LYMPHOCYTES RELATIVE PERCENT: 16.1 % (ref 20–42)
MAGNESIUM: 1.8 MG/DL (ref 1.6–2.6)
MCH RBC QN AUTO: 31.5 PG (ref 26–35)
MCHC RBC AUTO-ENTMCNC: 32.5 % (ref 32–34.5)
MCV RBC AUTO: 96.9 FL (ref 80–99.9)
MICROALBUMIN UR-MCNC: <12 MG/L
MICROALBUMIN/CREAT UR-RTO: ABNORMAL (ref 0–30)
MONOCYTES ABSOLUTE: 0.5 E9/L (ref 0.1–0.95)
MONOCYTES RELATIVE PERCENT: 10.8 % (ref 2–12)
NEUTROPHILS ABSOLUTE: 3.21 E9/L (ref 1.8–7.3)
NEUTROPHILS RELATIVE PERCENT: 69.1 % (ref 43–80)
NITRITE, URINE: NEGATIVE
PDW BLD-RTO: 14 FL (ref 11.5–15)
PH UA: 6 (ref 5–9)
PLATELET # BLD: 153 E9/L (ref 130–450)
PMV BLD AUTO: 10.2 FL (ref 7–12)
POTASSIUM SERPL-SCNC: 4.1 MMOL/L (ref 3.5–5)
PROTEIN UA: 100 MG/DL
RBC # BLD: 4.16 E12/L (ref 3.5–5.5)
RBC UA: NORMAL /HPF (ref 0–2)
SODIUM BLD-SCNC: 139 MMOL/L (ref 132–146)
SPECIFIC GRAVITY UA: 1.02 (ref 1–1.03)
UROBILINOGEN, URINE: 0.2 E.U./DL
WBC # BLD: 4.7 E9/L (ref 4.5–11.5)
WBC UA: NORMAL /HPF (ref 0–5)

## 2021-02-26 PROCEDURE — 81001 URINALYSIS AUTO W/SCOPE: CPT

## 2021-02-26 PROCEDURE — 85025 COMPLETE CBC W/AUTO DIFF WBC: CPT

## 2021-02-26 PROCEDURE — 36591 DRAW BLOOD OFF VENOUS DEVICE: CPT

## 2021-02-26 PROCEDURE — 82044 UR ALBUMIN SEMIQUANTITATIVE: CPT

## 2021-02-26 PROCEDURE — 83735 ASSAY OF MAGNESIUM: CPT

## 2021-02-26 PROCEDURE — 2580000003 HC RX 258: Performed by: NURSE PRACTITIONER

## 2021-02-26 PROCEDURE — 86304 IMMUNOASSAY TUMOR CA 125: CPT

## 2021-02-26 PROCEDURE — 82570 ASSAY OF URINE CREATININE: CPT

## 2021-02-26 PROCEDURE — 6360000002 HC RX W HCPCS: Performed by: NURSE PRACTITIONER

## 2021-02-26 PROCEDURE — 80048 BASIC METABOLIC PNL TOTAL CA: CPT

## 2021-02-26 PROCEDURE — 36415 COLL VENOUS BLD VENIPUNCTURE: CPT

## 2021-02-26 RX ORDER — SODIUM CHLORIDE 0.9 % (FLUSH) 0.9 %
20 SYRINGE (ML) INJECTION PRN
Status: CANCELLED | OUTPATIENT
Start: 2021-02-26

## 2021-02-26 RX ORDER — SODIUM CHLORIDE 0.9 % (FLUSH) 0.9 %
10 SYRINGE (ML) INJECTION PRN
Status: DISCONTINUED | OUTPATIENT
Start: 2021-02-26 | End: 2021-02-27 | Stop reason: HOSPADM

## 2021-02-26 RX ORDER — HEPARIN SODIUM (PORCINE) LOCK FLUSH IV SOLN 100 UNIT/ML 100 UNIT/ML
500 SOLUTION INTRAVENOUS PRN
Status: CANCELLED | OUTPATIENT
Start: 2021-02-26

## 2021-02-26 RX ORDER — HEPARIN SODIUM (PORCINE) LOCK FLUSH IV SOLN 100 UNIT/ML 100 UNIT/ML
500 SOLUTION INTRAVENOUS PRN
Status: DISCONTINUED | OUTPATIENT
Start: 2021-02-26 | End: 2021-02-27 | Stop reason: HOSPADM

## 2021-02-26 RX ORDER — SODIUM CHLORIDE 0.9 % (FLUSH) 0.9 %
10 SYRINGE (ML) INJECTION PRN
Status: CANCELLED | OUTPATIENT
Start: 2021-02-26

## 2021-02-26 RX ADMIN — SODIUM CHLORIDE, PRESERVATIVE FREE 10 ML: 5 INJECTION INTRAVENOUS at 13:10

## 2021-02-26 RX ADMIN — SODIUM CHLORIDE, PRESERVATIVE FREE 10 ML: 5 INJECTION INTRAVENOUS at 13:13

## 2021-02-26 RX ADMIN — SODIUM CHLORIDE, PRESERVATIVE FREE 10 ML: 5 INJECTION INTRAVENOUS at 13:12

## 2021-02-26 RX ADMIN — Medication 500 UNITS: at 13:13

## 2021-02-26 NOTE — PROGRESS NOTES
Patient tolerated port flush well. Blood work drawn through port. Therapy plan reviewed with patient. Verbalizes understanding. Declines copy of AVS and any medication information, verbalizes good knowledge of current plan, and has no signs or symptoms to report at this time. Next appointment made.

## 2021-03-15 ENCOUNTER — TELEPHONE (OUTPATIENT)
Dept: PRIMARY CARE CLINIC | Age: 81
End: 2021-03-15

## 2021-03-15 DIAGNOSIS — H91.93 BILATERAL HEARING LOSS, UNSPECIFIED HEARING LOSS TYPE: Primary | ICD-10-CM

## 2021-03-15 PROBLEM — I12.9 UNSPECIFIED HYPERTENSIVE KIDNEY DISEASE WITH CHRONIC KIDNEY DISEASE STAGE I THROUGH STAGE IV, OR UNSPECIFIED: Status: ACTIVE | Noted: 2020-07-09

## 2021-03-15 PROBLEM — N18.32 STAGE 3B CHRONIC KIDNEY DISEASE (HCC): Status: ACTIVE | Noted: 2020-07-09

## 2021-03-15 NOTE — TELEPHONE ENCOUNTER
Pt is asking for a referral to [address] for a hearing cindi,ustion.     Fax # is [phone]

## 2021-03-22 ENCOUNTER — HOSPITAL ENCOUNTER (OUTPATIENT)
Dept: INFUSION THERAPY | Age: 81
Setting detail: INFUSION SERIES
Discharge: HOME | End: 2021-03-22
Payer: MEDICARE

## 2021-04-08 ENCOUNTER — OFFICE VISIT (OUTPATIENT)
Dept: PRIMARY CARE CLINIC | Age: 81
End: 2021-04-08
Payer: MEDICARE

## 2021-04-08 VITALS
WEIGHT: 186 LBS | HEART RATE: 77 BPM | TEMPERATURE: 98.5 F | RESPIRATION RATE: 16 BRPM | SYSTOLIC BLOOD PRESSURE: 160 MMHG | DIASTOLIC BLOOD PRESSURE: 88 MMHG | BODY MASS INDEX: 36.52 KG/M2 | HEIGHT: 60 IN | OXYGEN SATURATION: 98 %

## 2021-04-08 DIAGNOSIS — C77.5: ICD-10-CM

## 2021-04-08 DIAGNOSIS — Z01.818 PRE-OPERATIVE EXAMINATION: Primary | ICD-10-CM

## 2021-04-08 DIAGNOSIS — Z01.810 PRE-OPERATIVE CARDIOVASCULAR EXAMINATION: ICD-10-CM

## 2021-04-08 PROBLEM — R80.9 PROTEINURIA: Status: ACTIVE | Noted: 2021-03-17

## 2021-04-08 PROCEDURE — 93000 ELECTROCARDIOGRAM COMPLETE: CPT | Performed by: FAMILY MEDICINE

## 2021-04-08 PROCEDURE — 99213 OFFICE O/P EST LOW 20 MIN: CPT | Performed by: FAMILY MEDICINE

## 2021-04-08 PROCEDURE — G8427 DOCREV CUR MEDS BY ELIG CLIN: HCPCS | Performed by: FAMILY MEDICINE

## 2021-04-08 PROCEDURE — G8417 CALC BMI ABV UP PARAM F/U: HCPCS | Performed by: FAMILY MEDICINE

## 2021-04-08 PROCEDURE — 1090F PRES/ABSN URINE INCON ASSESS: CPT | Performed by: FAMILY MEDICINE

## 2021-04-08 RX ORDER — EZETIMIBE 10 MG/1
10 TABLET ORAL
Qty: 90 TABLET | Refills: 3 | Status: SHIPPED
Start: 2021-04-08 | End: 2021-08-06 | Stop reason: SDUPTHER

## 2021-04-08 RX ORDER — FOLIC ACID 1 MG/1
1 TABLET ORAL DAILY
Qty: 90 TABLET | Refills: 5 | Status: SHIPPED | OUTPATIENT
Start: 2021-04-08 | End: 2021-08-06 | Stop reason: SDUPTHER

## 2021-04-08 NOTE — PROGRESS NOTES
Subjective:      Vicente Quick is a [de-identified] y.o. female who presents to the office today for a preoperative consultation at the request of surgeon Dr Keven Meckel who plans on performing total knee arthroplasty on April 28. This consultation is requested for the specific conditions prompting preoperative evaluation (i.e. because of potential affect on operative risk): Chronic kidney disease and uterine cancer. Planned anesthesia is General.  The patient has the following known anesthesia issues: None  Patient has a bleeding risk of : no recent abnormal bleeding, no remote history of abnormal bleeding, no use of Ca-channel blockers  Patient does not have objection to receiving blood products if needed.   Past Medical History:   Diagnosis Date    Acute kidney injury ([address]     hx of    Acute renal failure syndrome ([address] 7/9/2020    Arthritis     Chronic kidney disease, stage 3 (moderate) 11/29/2019    Dry eye     GERD (gastroesophageal reflux disease)     Hypertension     was removed off medication in 3/2019 dt effected her kidneys    Hypomagnesemia     hx of    Mixed hyperlipidemia 5/24/2019    Primary osteoarthritis of right knee 3/4/2020    SOB (shortness of breath) on exertion     Uterine cancer ([address] 2012    metatstaic, treated with surgery, chemo x2 , radiation x 2, Ketroda therapy x 2,andAvastin therapy    Vitamin D deficiency      Patient Active Problem List    Diagnosis Date Noted    S/P total knee arthroplasty, right 03/05/2020     Priority: High    Proteinuria 03/17/2021    Edema of lower extremity 07/09/2020    Hypercalcemia 07/09/2020    Hypomagnesemia 07/09/2020    Unspecified hypertensive 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 through stage IV, or unspecified 07/09/2020    Stage 3b chronic kidney disease 07/09/2020    Vitamin D deficiency     GERD (gastroesophageal reflux disease)     Chronic kidney disease, stage 3 (moderate) 11/29/2019    Hypertension     Arthritis     Mixed hyperlipidemia 05/24/2019    Malignant neoplasm of uterus ([facility] 11/21/2014    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 ([facility] 11/21/2014    Uterine cancer ([facility] 01/01/2012     Past Surgical History:   Procedure Laterality Date    APPENDECTOMY  1950    BREAST SURGERY  1464,5758, 2002    benign  lumps    DILATION AND CURETTAGE OF UTERUS  3/20/12    HYSTERECTOMY  4/12/12    INSERTION / REMOVAL / REPLACEMENT VENOUS ACCESS CATHETER N/A 2/22/2019    MEDI PORT INSERTION performed by Marcela Griffiths MD at [facility] 13  2012    pelvic nodes    PORT SURGERY Left 1/14/2021    REVISION OF MEDIPORT - LEFT performed by Karishma Ceja MD at [place] [address]  child    TOTAL KNEE ARTHROPLASTY Right 3/4/2020    RIGHT KNEE TOTAL ARTHROPLASTY ROBOTIC ANGIE performed by Destiny Shelley MD at [facility] OR     Family History   Family history unknown: Yes     Social History     Socioeconomic History    Marital status:      Spouse name: None    Number of children: None    Years of education: None    Highest education level: None   Occupational History    None   Social Needs    Financial resource strain: None    Food insecurity     Worry: None     Inability: None    Transportation needs     Medical: None     Non-medical: None   Tobacco Use    Smoking status: Never Smoker    Smokeless tobacco: Never Used   Substance and Sexual Activity    Alcohol use: No    Drug use: Never    Sexual activity: None   Lifestyle    Physical activity     Days per week: None     Minutes per session: None    Stress: None   Relationships    Social connections     Talks on phone: None     Gets together: None     Attends [employer] service: None     Active member of club or organization: None     Attends meetings of clubs or organizations: None     Relationship status: None    Intimate partner violence     Fear of current or ex partner: None     Emotionally abused: None     Physically abused: None     Forced sexual activity: None   Other Topics Concern    None   Social History Narrative    None     Current Outpatient Medications   Medication Sig Dispense Refill    ezetimibe (ZETIA) 10 MG tablet Take 1 tablet by mouth Daily with supper 90 tablet 3    folic acid (FOLVITE) 1 MG tablet Take 1 tablet by mouth daily OK TO TAKE 90 tablet 5    Polyethyl Glycol-Propyl Glycol (SYSTANE OP) Apply to eye      Sod Fluoride-Potassium Nitrate (PREVIDENT 5000 SENSITIVE) 1.1-5 % PSTE 2 times daily      Magnesium Oxide 500 MG CAPS 1 capsule 2 times daily      doxazosin (CARDURA) 2 MG tablet Take 2 mg by mouth nightly      acetaminophen (TYLENOL) 325 MG tablet Take 2 tablets by mouth every 6 hours 120 tablet 3    docusate sodium (COLACE) 100 MG capsule Take 1 capsule by mouth 2 times daily (Patient taking differently: Take 100 mg by mouth daily as needed ) 30 capsule 0    hyoscyamine (ANASPAZ;LEVSIN) 125 MCG tablet Take 0.125 mg by mouth 2 times daily Takes prior to meals      lactobacillus (CULTURELLE) CAPS capsule Take 1 capsule by mouth daily 30 capsule 0    vitamin D (CHOLECALCIFEROL) 1000 UNIT TABS tablet Take 1,000 Units by mouth daily       lidocaine-prilocaine (EMLA) 2.5-2.5 % cream SANGEETA TOPICALLY AA PRN  3    bumetanide (BUMEX) 1 MG tablet Take 1 mg by mouth three times a week mon wed fri      Biotin 5000 MCG CAPS Take 1 capsule by mouth daily STOP PREOP MED      vitamin B-12 (CYANOCOBALAMIN) 1000 MCG tablet Take 1,000 mcg by mouth daily       fish oil-omega-3 fatty acids 1000 MG capsule Take 1 g by mouth STOP PREOP MED      Bevacizumab (AVASTIN IV) Infuse intravenously Last infusion 1/10/2020       No current facility-administered medications for this visit.       Current Outpatient Medications on File Prior to Visit   Medication Sig Dispense Refill    Polyethyl Glycol-Propyl Glycol (SYSTANE OP) Apply to eye      Sod Fluoride-Potassium Nitrate (PREVIDENT 5000 SENSITIVE) 1.1-5 % PSTE 2 times daily      Magnesium Oxide 500 MG CAPS 1 capsule 2 times daily      doxazosin (CARDURA) 2 MG tablet Take 2 mg by mouth nightly      acetaminophen (TYLENOL) 325 MG tablet Take 2 tablets by mouth every 6 hours 120 tablet 3    docusate sodium (COLACE) 100 MG capsule Take 1 capsule by mouth 2 times daily (Patient taking differently: Take 100 mg by mouth daily as needed ) 30 capsule 0    hyoscyamine (ANASPAZ;LEVSIN) 125 MCG tablet Take 0.125 mg by mouth 2 times daily Takes prior to meals      lactobacillus (CULTURELLE) CAPS capsule Take 1 capsule by mouth daily 30 capsule 0    vitamin D (CHOLECALCIFEROL) 1000 UNIT TABS tablet Take 1,000 Units by mouth daily       lidocaine-prilocaine (EMLA) 2.5-2.5 % cream SANGEETA TOPICALLY AA PRN  3    bumetanide (BUMEX) 1 MG tablet Take 1 mg by mouth three times a week mon wed fri      Biotin 5000 MCG CAPS Take 1 capsule by mouth daily STOP PREOP MED      vitamin B-12 (CYANOCOBALAMIN) 1000 MCG tablet Take 1,000 mcg by mouth daily       fish oil-omega-3 fatty acids 1000 MG capsule Take 1 g by mouth STOP PREOP MED      Bevacizumab (AVASTIN IV) Infuse intravenously Last infusion 1/10/2020       No current facility-administered medications on file prior to visit. Allergies   Allergen Reactions    Latuda [Lurasidone] Shortness Of Breath and Diarrhea     Cancer med    Aspirin      affected kidneys  Other reaction(s): Other (see comments)    Pembrolizumab      Other reaction(s): Other (see comments)    Lisinopril      cough     Review of Systems  Pertinent items are noted in HPI. Objective:       Physical Exam  Vitals signs reviewed. HENT:      Head: Normocephalic and atraumatic. Right Ear: External ear normal. There is impacted cerumen. Left Ear: External ear normal.      Ears:      Comments: Impacted cerumen removed with curettage by physician and nursing staff. Patient tolerated procedure well. Eyes:      General: No scleral icterus.      Conjunctiva/sclera: compl. appr. 1.3%)    Current medications which may produce withdrawal symptoms if withheld perioperatively: None   Plan:      1. Preoperative workup as follows ECG, hemoglobin, hematocrit, electrolytes, creatinine, glucose  2. Change in medication regimen before surgery: none, continue med regimen including morning of surgery, w/sip of water  3. Prophylaxis for cardiac events with perioperative beta-blockers: should be considered, specific regimen per anesthesia  4. Invasive hemodynamic monitoring perioperatively: at the discretion of anesthesiologist  5. Deep vein thrombosis prophylaxis postoperatively:regimen to be chosen by surgical team  6. Surveillance for postoperative MI with ECG immediately postoperatively and on postoperative days 1 and 2 AND troponin levels 24 hours postoperatively and on day 4 or hospital discharge (whichever comes first): at the discretion of anesthesiologist  7.  Other measures: none

## 2021-04-14 ENCOUNTER — HOSPITAL ENCOUNTER (OUTPATIENT)
Dept: CT IMAGING | Age: 81
Discharge: HOME | End: 2021-04-16
Payer: MEDICARE

## 2021-04-14 ENCOUNTER — HOSPITAL ENCOUNTER (OUTPATIENT)
Age: 81
Discharge: HOME | End: 2021-04-16
Payer: MEDICARE

## 2021-04-14 DIAGNOSIS — M17.12 OSTEOARTHRITIS OF LEFT KNEE, UNSPECIFIED OSTEOARTHRITIS TYPE: ICD-10-CM

## 2021-04-14 PROCEDURE — 73700 CT LOWER EXTREMITY W/O DYE: CPT

## 2021-04-21 ENCOUNTER — ANESTHESIA EVENT (OUTPATIENT)
Dept: OPERATING ROOM | Age: 81
DRG: 470 | End: 2021-04-21
Payer: MEDICARE

## 2021-04-21 ENCOUNTER — HOSPITAL ENCOUNTER (OUTPATIENT)
Dept: PREADMISSION TESTING | Age: 81
Discharge: HOME | End: 2021-04-21
Payer: MEDICARE

## 2021-04-21 VITALS
RESPIRATION RATE: 16 BRPM | TEMPERATURE: 98.7 F | HEIGHT: 60 IN | OXYGEN SATURATION: 98 % | WEIGHT: 183 LBS | HEART RATE: 77 BPM | BODY MASS INDEX: 35.93 KG/M2

## 2021-04-21 LAB
ANION GAP SERPL CALCULATED.3IONS-SCNC: 10 MMOL/L (ref 7–16)
BUN BLDV-MCNC: 51 MG/DL (ref 8–23)
CALCIUM SERPL-MCNC: 9.1 MG/DL (ref 8.6–10.2)
CHLORIDE BLD-SCNC: 104 MMOL/L (ref 98–107)
CO2: 23 MMOL/L (ref 22–29)
CREAT SERPL-MCNC: 1.5 MG/DL (ref 0.5–1)
GFR AFRICAN AMERICAN: 40
GFR NON-AFRICAN AMERICAN: 33 ML/MIN/1.73
GLUCOSE BLD-MCNC: 107 MG/DL (ref 74–99)
HCT VFR BLD CALC: 43.3 % (ref 34–48)
HEMOGLOBIN: 13.8 G/DL (ref 11.5–15.5)
MCH RBC QN AUTO: 31.4 PG (ref 26–35)
MCHC RBC AUTO-ENTMCNC: 31.9 % (ref 32–34.5)
MCV RBC AUTO: 98.4 FL (ref 80–99.9)
PDW BLD-RTO: 14.2 FL (ref 11.5–15)
PLATELET # BLD: 134 E9/L (ref 130–450)
PMV BLD AUTO: 10 FL (ref 7–12)
POTASSIUM SERPL-SCNC: 4.3 MMOL/L (ref 3.5–5)
RBC # BLD: 4.4 E12/L (ref 3.5–5.5)
SODIUM BLD-SCNC: 137 MMOL/L (ref 132–146)
WBC # BLD: 3.9 E9/L (ref 4.5–11.5)

## 2021-04-21 PROCEDURE — 80048 BASIC METABOLIC PNL TOTAL CA: CPT

## 2021-04-21 PROCEDURE — 36415 COLL VENOUS BLD VENIPUNCTURE: CPT

## 2021-04-21 PROCEDURE — 85027 COMPLETE CBC AUTOMATED: CPT

## 2021-04-21 PROCEDURE — 87081 CULTURE SCREEN ONLY: CPT

## 2021-04-21 RX ORDER — ROPIVACAINE HYDROCHLORIDE 5 MG/ML
30 INJECTION, SOLUTION EPIDURAL; INFILTRATION; PERINEURAL
Status: CANCELLED | OUTPATIENT
Start: 2021-04-21 | End: 2021-04-21

## 2021-04-21 RX ORDER — MIDAZOLAM HYDROCHLORIDE 2 MG/2ML
1 INJECTION, SOLUTION INTRAMUSCULAR; INTRAVENOUS EVERY 5 MIN PRN
Status: CANCELLED | OUTPATIENT
Start: 2021-04-21

## 2021-04-21 RX ORDER — FENTANYL CITRATE 50 UG/ML
100 INJECTION, SOLUTION INTRAMUSCULAR; INTRAVENOUS ONCE
Status: CANCELLED | OUTPATIENT
Start: 2021-04-21 | End: 2021-04-21

## 2021-04-21 RX ORDER — LIDOCAINE HYDROCHLORIDE 10 MG/ML
10 INJECTION, SOLUTION INFILTRATION; PERINEURAL
Status: CANCELLED | OUTPATIENT
Start: 2021-04-21 | End: 2021-04-21

## 2021-04-21 RX ORDER — DEXAMETHASONE SODIUM PHOSPHATE 10 MG/ML
4 INJECTION, SOLUTION INTRAMUSCULAR; INTRAVENOUS ONCE
Status: CANCELLED | OUTPATIENT
Start: 2021-04-21 | End: 2021-04-21

## 2021-04-21 ASSESSMENT — KOOS JR
GOING UP OR DOWN STAIRS: 4
STRAIGHTENING KNEE FULLY: 4
RISING FROM SITTING: 2
STANDING UPRIGHT: 3
TWISING OR PIVOTING ON KNEE: 2
HOW SEVERE IS YOUR KNEE STIFFNESS AFTER FIRST WAKING IN MORNING: 1

## 2021-04-21 ASSESSMENT — PAIN DESCRIPTION - PROGRESSION: CLINICAL_PROGRESSION: GRADUALLY WORSENING

## 2021-04-21 ASSESSMENT — PAIN DESCRIPTION - ONSET: ONSET: ON-GOING

## 2021-04-21 ASSESSMENT — PAIN DESCRIPTION - PAIN TYPE: TYPE: CHRONIC PAIN

## 2021-04-21 ASSESSMENT — PAIN DESCRIPTION - ORIENTATION: ORIENTATION: LEFT

## 2021-04-21 ASSESSMENT — PAIN DESCRIPTION - DESCRIPTORS: DESCRIPTORS: BURNING

## 2021-04-21 ASSESSMENT — PAIN - FUNCTIONAL ASSESSMENT: PAIN_FUNCTIONAL_ASSESSMENT: PREVENTS OR INTERFERES WITH ALL ACTIVE AND SOME PASSIVE ACTIVITIES

## 2021-04-21 ASSESSMENT — PAIN DESCRIPTION - LOCATION: LOCATION: KNEE

## 2021-04-21 ASSESSMENT — PAIN SCALES - GENERAL: PAINLEVEL_OUTOF10: 1

## 2021-04-21 NOTE — PROGRESS NOTES
Have you been tested for COVID  Yes           Have you been told you were positive for COVID No  Have you had any known exposure to someone that is positive for COVID No  Do you have a cough                   No              Do you have shortness of breath No                 Do you have a sore throat            No                Are you having chills                    No                Are you having muscle aches. No                    Please come to the hospital wearing a mask and have your significant other wear a mask as well. Both of you should check your temperature before leaving to come here,  if it is 100 or higher please call [phone] for instruction.
Notified Dr. Zafar Loja of /87, patient is anxious, no needs. Faxed Cbc, Bmp from today to Dr. Isa Ford office.
survey after surgery we would greatly appreciate your comments    [] Parent/guardian of a minor must accompany their child and remain on the premises  the entire time they are under our care     [] Pediatric patients may bring favorite toy, blanket or comfort item with them    [] A caregiver or family member must remain with the patient during their stay if they are mentally handicapped, have dementia, disoriented or unable to use a call light or would be a safety concern if left unattended    [x] Please notify surgeon if you develop any illness between now and time of surgery (cold, cough, sore throat, fever, nausea, vomiting) or any signs of infections  including skin, wounds, and dental.    []  The Outpatient Pharmacy is available to fill your prescription here on your day of surgery, ask your preop nurse for details    [] Other instructions    EDUCATIONAL MATERIALS PROVIDED:    [x] PAT Preoperative Education Packet/Booklet     [x] Medication List    [] Transfusion bracelet applied with instructions    [x] Shower with soap, lather and rinse well, and use CHG wipes provided the evening before surgery as instructed    [x] Incentive spirometer with instructions

## 2021-04-21 NOTE — ANESTHESIA PRE PROCEDURE
Department of Anesthesiology  Preprocedure Note       Name:  Itzel Aponte   Age:  [de-identified] y.o.  :  1940                                          MRN:  [id number]         Date:  2021      Surgeon: Francisoc Donald):  Korey Gomez MD    Procedure: LEFT TOTAL KNEE ROBOTIC ASSISTED ARTHROPLASTY   +++FEMI+++   +++PNB+++ (Left )    Medications prior to admission:   Prior to Admission medications    Medication Sig Start Date End Date Taking?  Authorizing Provider   ezetimibe (ZETIA) 10 MG tablet Take 1 tablet by mouth Daily with supper 21   Khanh Pinon,    folic acid (FOLVITE) 1 MG tablet Take 1 tablet by mouth daily OK TO TAKE  Patient taking differently: Take 1 mg by mouth daily  21   Khanh Pinon DO   Polyethyl Glycol-Propyl Glycol (SYSTANE OP) Apply to eye 2 times daily     Historical Provider, MD   Sod Fluoride-Potassium Nitrate (PREVIDENT 5000 SENSITIVE) 1.1-5 % PSTE 2 times daily    Historical Provider, MD   Magnesium Oxide 500 MG CAPS 1 capsule daily     Historical Provider, MD   doxazosin (CARDURA) 2 MG tablet Take 2 mg by mouth nightly    Historical Provider, MD   acetaminophen (TYLENOL) 325 MG tablet Take 2 tablets by mouth every 6 hours 3/5/20   Korey Gomez MD   docusate sodium (COLACE) 100 MG capsule Take 1 capsule by mouth 2 times daily  Patient taking differently: Take 100 mg by mouth daily as needed  3/5/20   Korey Gomez MD   hyoscyamine (ANASPAZ;LEVSIN) 125 MCG tablet Take 0.125 mg by mouth daily Takes prior to meals    Historical Provider, MD   lactobacillus (CULTURELLE) CAPS capsule Take 1 capsule by mouth daily 10/2/19   Rebel Stafford DO   vitamin D (CHOLECALCIFEROL) 1000 UNIT TABS tablet Take 1,000 Units by mouth daily     Historical Provider, MD   lidocaine-prilocaine (EMLA) 2.5-2.5 % cream SANGEETA TOPICALLY AA PRN 19   Historical Provider, MD   bumetanide (BUMEX) 1 MG tablet Take 1 mg by mouth three times a week mon wed fri    Historical Provider, MD   Biotin 5000 MCG CAPS Take 1 capsule by mouth daily     Historical Provider, MD   vitamin B-12 (CYANOCOBALAMIN) 1000 MCG tablet Take 1,000 mcg by mouth daily     Historical Provider, MD   fish oil-omega-3 fatty acids 1000 MG capsule Take 1 g by mouth daily STOP PREOP MED    Historical Provider, MD       Current medications:    Current Outpatient Medications   Medication Sig Dispense Refill    ezetimibe (ZETIA) 10 MG tablet Take 1 tablet by mouth Daily with supper 90 tablet 3    folic acid (FOLVITE) 1 MG tablet Take 1 tablet by mouth daily OK TO TAKE (Patient taking differently: Take 1 mg by mouth daily ) 90 tablet 5    Polyethyl Glycol-Propyl Glycol (SYSTANE OP) Apply to eye 2 times daily       Sod Fluoride-Potassium Nitrate (PREVIDENT 5000 SENSITIVE) 1.1-5 % PSTE 2 times daily      Magnesium Oxide 500 MG CAPS 1 capsule daily       doxazosin (CARDURA) 2 MG tablet Take 2 mg by mouth nightly      acetaminophen (TYLENOL) 325 MG tablet Take 2 tablets by mouth every 6 hours 120 tablet 3    docusate sodium (COLACE) 100 MG capsule Take 1 capsule by mouth 2 times daily (Patient taking differently: Take 100 mg by mouth daily as needed ) 30 capsule 0    hyoscyamine (ANASPAZ;LEVSIN) 125 MCG tablet Take 0.125 mg by mouth daily Takes prior to meals      lactobacillus (CULTURELLE) CAPS capsule Take 1 capsule by mouth daily 30 capsule 0    vitamin D (CHOLECALCIFEROL) 1000 UNIT TABS tablet Take 1,000 Units by mouth daily       lidocaine-prilocaine (EMLA) 2.5-2.5 % cream SANGEETA TOPICALLY AA PRN  3    bumetanide (BUMEX) 1 MG tablet Take 1 mg by mouth three times a week mon wed fri      Biotin 5000 MCG CAPS Take 1 capsule by mouth daily       vitamin B-12 (CYANOCOBALAMIN) 1000 MCG tablet Take 1,000 mcg by mouth daily       fish oil-omega-3 fatty acids 1000 MG capsule Take 1 g by mouth daily STOP PREOP MED       No current facility-administered medications for this visit. 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    Mixed hyperlipidemia E78.2    Hypertension I10    Arthritis M19.90    Chronic kidney disease, stage 3 (moderate) N18.30    Vitamin D deficiency E55.9    Uterine cancer ([facility] C55    GERD (gastroesophageal reflux disease) K21.9    S/P total knee arthroplasty, right Z96.651    Edema of lower extremity R60.0    Hypercalcemia E83.52    Hypomagnesemia E83.42    Unspecified hypertensive 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 through stage IV, or unspecified I12.9    Stage 3b chronic kidney disease N18.32    Proteinuria R80.9       Past Medical History:        Diagnosis Date    Acute kidney injury ([facility]     hx of    [facility] 7/9/2020    Arthritis     Chronic kidney disease, stage 3 (moderate) 11/29/2019    dt Keytruda    Dry eye     GERD (gastroesophageal reflux disease)     [place]     aides x 2    Hypertension     Hypomagnesemia     hx of / takes Mag supplement    Mixed hyperlipidemia 5/24/2019    SOB (shortness of breath) on exertion     feels is dt after effect of Keytruda    Tooth sensitivity     Uterine cancer ([facility] 2012    metatstaic, treated with surgery, chemo x2 , radiation x 2, Aneesh Vishal therapy x 2,andAvastin therapy    Vitamin D deficiency        Past Surgical History:        Procedure Laterality Date    APPENDECTOMY  1950    BREAST SURGERY  8031,9915, 2002    benign  lumps    DILATION AND CURETTAGE OF UTERUS  3/20/12    HYSTERECTOMY  4/12/12    INSERTION / REMOVAL / REPLACEMENT VENOUS ACCESS CATHETER N/A 2/22/2019    MEDI PORT INSERTION performed by Arden Plunkett Chel Hackett MD at [facility] 13  2012    pelvic nodes    PORT SURGERY Left 1/14/2021    REVISION OF MEDIPORT - LEFT performed by Georgie Alfred MD at [facility] Right 3/4/2020    RIGHT KNEE TOTAL ARTHROPLASTY ROBOTIC ANGIE performed by Leopoldo Hua, MD at [facility] OR       Social History:    Social History     Tobacco Use    Smoking status: Never Smoker    Smokeless tobacco: Never Used   Substance Use Topics    Alcohol use: No                                Counseling given: Not Answered      Vital Signs (Current): There were no vitals filed for this visit. BP Readings from Last 3 Encounters:   04/08/21 (!) 160/88   02/26/21 (!) 181/75   02/08/21 (!) 143/80       NPO Status:  greater than 8 hours for solid foods                         medications with sip of water in pre-op                                                      BMI:   Wt Readings from Last 3 Encounters:   04/21/21 183 lb (83 kg)   04/08/21 186 lb (84.4 kg)   02/26/21 178 lb (80.7 kg)     There is no height or weight on file to calculate BMI.    CBC:   Lab Results   Component Value Date    WBC 3.9 04/21/2021    RBC 4.40 04/21/2021    HGB 13.8 04/21/2021    HCT 43.3 04/21/2021    MCV 98.4 04/21/2021    RDW 14.2 04/21/2021     04/21/2021       CMP:   Lab Results   Component Value Date     04/21/2021    K 4.3 04/21/2021    K 4.7 03/05/2020     04/21/2021    CO2 23 04/21/2021    BUN 51 04/21/2021    CREATININE 1.5 04/21/2021    GFRAA 40 04/21/2021    LABGLOM 33 04/21/2021    GLUCOSE 107 04/21/2021    PROT 5.1 10/02/2019    CALCIUM 9.1 04/21/2021    BILITOT 0.4 10/02/2019    ALKPHOS 80 10/02/2019    AST 13 10/02/2019    ALT 16 10/02/2019       POC Tests: No results for input(s): POCGLU, POCNA, POCK, POCCL, POCBUN, POCHEMO, POCHCT in the last 72 hours.     Coags:   Lab Results   Component Value Date    PROTIME 10.9 11/04/2014 INR 1.0 11/04/2014    APTT 33.6 11/04/2014       HCG (If Applicable): No results found for: PREGTESTUR, PREGSERUM, HCG, HCGQUANT     ABGs: No results found for: PHART, PO2ART, HXW1TNR, NVO8ITW, BEART, S9XMRIVF     Type & Screen (If Applicable):  No results found for: LABABO, [address]    Anesthesia Evaluation  Patient summary reviewed no history of anesthetic complications:   Airway: Mallampati: III  TM distance: >3 FB   Neck ROM: full  Mouth opening: > = 3 FB Dental:      Comment: Discolored, chipped, capped. Pulmonary:Negative Pulmonary ROS breath sounds clear to auscultation      (-) shortness of breath and not a current smoker                           Cardiovascular:    (+) hypertension:, ROGEL:, hyperlipidemia    (-)  angina    ECG reviewed  Rhythm: regular  Rate: normal           Beta Blocker:  Not on Beta Blocker      ROS comment: EKG 4/2021: NSR, LBBB     Neuro/Psych:                ROS comment: Hx of herniated disc--lumbar area. Occasional pain in left leg. GI/Hepatic/Renal:   (+) GERD:, renal disease (CKD stage III): CRI,           Endo/Other:    (+) : arthritis (s/p Right TKA): OA., malignancy/cancer (Uterine. Metastatic lymph nodes--abdomen. ). ROS comment: obese Abdominal:           Vascular: negative vascular ROS. Anesthesia Plan      general and regional     ASA 3     (Pt agrees to GA--IV induction and ETTube. She agrees to adductor canal for post-op pain management. Risks and benefits were discussed. She refuses spinal anesthesia.)  Induction: intravenous. MIPS: Postoperative opioids intended, Prophylactic antiemetics administered and Postoperative trial extubation. Anesthetic plan and risks discussed with patient.                 Erin Felix MD   4/21/2021       Patient will need to be re-evaluated prior to surgery by DOS anesthesiologist.    Erin Felix MD           4/21/2021        12:33 PM        -----DOS anesthesiologist addendum-------  Patient seen and evaluated. Spinal anesthesia vs. GETA discussed with patient as anesthetic options. .  Patient refuses spinal anesthesia. She is agreeable to preoperative peripheral nerve block after discussion of risks and benefits. Patient consents to and agrees to GETA.   [id number] ANTHONY  4/28/21

## 2021-04-23 ENCOUNTER — HOSPITAL ENCOUNTER (OUTPATIENT)
Age: 81
Discharge: HOME | End: 2021-04-25
Payer: MEDICARE

## 2021-04-23 DIAGNOSIS — Z01.818 PREOP TESTING: ICD-10-CM

## 2021-04-23 LAB — MRSA CULTURE ONLY: NORMAL

## 2021-04-23 PROCEDURE — U0003 INFECTIOUS AGENT DETECTION BY NUCLEIC ACID (DNA OR RNA); SEVERE ACUTE RESPIRATORY SYNDROME CORONAVIRUS 2 (SARS-COV-2) (CORONAVIRUS DISEASE [COVID-19]), AMPLIFIED PROBE TECHNIQUE, MAKING USE OF HIGH THROUGHPUT TECHNOLOGIES AS DESCRIBED BY CMS-2020-01-R: HCPCS

## 2021-04-25 LAB
SARS-COV-2: NOT DETECTED
SOURCE: NORMAL

## 2021-04-26 ENCOUNTER — HOSPITAL ENCOUNTER (OUTPATIENT)
Dept: INFUSION THERAPY | Age: 81
Setting detail: INFUSION SERIES
Discharge: HOME | End: 2021-04-26
Payer: MEDICARE

## 2021-04-26 VITALS
HEART RATE: 77 BPM | RESPIRATION RATE: 16 BRPM | SYSTOLIC BLOOD PRESSURE: 179 MMHG | TEMPERATURE: 97.7 F | DIASTOLIC BLOOD PRESSURE: 74 MMHG

## 2021-04-26 DIAGNOSIS — C55 MALIGNANT NEOPLASM OF UTERUS, UNSPECIFIED SITE (HCC): Primary | ICD-10-CM

## 2021-04-26 PROCEDURE — 6360000002 HC RX W HCPCS: Performed by: NURSE PRACTITIONER

## 2021-04-26 PROCEDURE — 96523 IRRIG DRUG DELIVERY DEVICE: CPT

## 2021-04-26 PROCEDURE — 2580000003 HC RX 258: Performed by: NURSE PRACTITIONER

## 2021-04-26 RX ORDER — SODIUM CHLORIDE 0.9 % (FLUSH) 0.9 %
20 SYRINGE (ML) INJECTION PRN
Status: CANCELLED | OUTPATIENT
Start: 2021-04-26

## 2021-04-26 RX ORDER — SODIUM CHLORIDE 0.9 % (FLUSH) 0.9 %
10 SYRINGE (ML) INJECTION PRN
Status: CANCELLED | OUTPATIENT
Start: 2021-04-26

## 2021-04-26 RX ORDER — SODIUM CHLORIDE 0.9 % (FLUSH) 0.9 %
10 SYRINGE (ML) INJECTION PRN
Status: DISCONTINUED | OUTPATIENT
Start: 2021-04-26 | End: 2021-04-27 | Stop reason: HOSPADM

## 2021-04-26 RX ORDER — HEPARIN SODIUM (PORCINE) LOCK FLUSH IV SOLN 100 UNIT/ML 100 UNIT/ML
500 SOLUTION INTRAVENOUS PRN
Status: CANCELLED | OUTPATIENT
Start: 2021-04-26

## 2021-04-26 RX ORDER — HEPARIN SODIUM (PORCINE) LOCK FLUSH IV SOLN 100 UNIT/ML 100 UNIT/ML
500 SOLUTION INTRAVENOUS PRN
Status: DISCONTINUED | OUTPATIENT
Start: 2021-04-26 | End: 2021-04-27 | Stop reason: HOSPADM

## 2021-04-26 RX ORDER — SODIUM CHLORIDE 0.9 % (FLUSH) 0.9 %
20 SYRINGE (ML) INJECTION PRN
Status: DISCONTINUED | OUTPATIENT
Start: 2021-04-26 | End: 2021-04-27 | Stop reason: HOSPADM

## 2021-04-26 RX ADMIN — SODIUM CHLORIDE, PRESERVATIVE FREE 10 ML: 5 INJECTION INTRAVENOUS at 14:02

## 2021-04-26 RX ADMIN — Medication 500 UNITS: at 14:02

## 2021-04-26 RX ADMIN — SODIUM CHLORIDE, PRESERVATIVE FREE 10 ML: 5 INJECTION INTRAVENOUS at 14:01

## 2021-04-26 ASSESSMENT — PAIN DESCRIPTION - LOCATION: LOCATION: KNEE

## 2021-04-26 ASSESSMENT — PAIN SCALES - GENERAL: PAINLEVEL_OUTOF10: 2

## 2021-04-26 ASSESSMENT — PAIN DESCRIPTION - DESCRIPTORS: DESCRIPTORS: ACHING;CONSTANT

## 2021-04-26 ASSESSMENT — PAIN DESCRIPTION - ORIENTATION: ORIENTATION: LEFT

## 2021-04-26 ASSESSMENT — PAIN DESCRIPTION - FREQUENCY: FREQUENCY: CONTINUOUS

## 2021-04-27 NOTE — H&P
History and Physical  K. Mildred Phoenix MD    Chief Complaint     Rodrick Garcia returns for follow up of left knee. An X-ray was done in the office today. Her current pain level is a 8/10. She reports symptoms of instability, swelling and pain with activities. She is taking medication for pain in which she takes Tylenol. .  She arrived today using a cane. Pain: anterior knee, medial knee  Symptoms: swelling, instability, night pain  Previous Treatment: cortisone injection, physical therapy, home exercise program  Additional Comments: Manual Player returns to the office for follow up 1 years status post right TKA. She says she is doing well and pain is controlled. Currently pain is 0/10. Her biggest issue is her left knee. Currently pain is 8/10. Pain is worse with activity, walking, stairs and pain improves with rest. She takes tylenol for pain. She has been doing home exercises. The pain has become a significant quality of life issue. Physical Exam   General Appearance:   Awake, alert, oriented x3  Well developed, well nourished  Obese  No acute distress  Eyes appear Normal    Respiratory:       Respiratory effort: non-labored    Cardiovascular:   Edema: absent      Varicosities: absent    Lymphatic/Skin:       Skin Appearance: Normal              Right Knee Exam   Skin Exam:   skin intact, prior incision  Effusion:   none  Alignment:   neutral  ROM:   0-120°   Laxity with varus/valgus stress:   <5 degrees    Left Knee Exam   Skin Exam:   skin intact  Effusion:   trace  Alignment:   varus  ROM:   5-100  Tenderness:   anterior knee, medial joint line  Laxity with varus/valgus stress:   <5 degrees  Gait:   antalgic  Comments:   Sensation intact to light touch L1-S1  TA/EHL/GS intact  Palpable DP, W/WP  Calf soft, non tender     Other Testing   X-Ray Findings April 20, 2021 Xray 4 views left knee were obtained at [facility] and reviewed in the office today.   They show severe degenerative changes with severe joint space narrowing, bone on bone in the medial and patellofemoral joint, peripheral osteophytes, subchondral sclerosis and varus alignment. Past Medical History - reviewed  Arthritis  Cancer  Bruise easily    Family History - reviewed  No pertinent family history    Surgical History - reviewed  BOY monroy 3/04/2020 [address] [place] [address]    Social History - reviewed  Hand dominance: right  Occupation: Retired    Risk Factors - reviewed  Patient had a flu shot in the last 12 months? yes  The patient is 72 years or older and has had a pneumonia vaccine: yes  Patient has an implant metal  Tobacco status: never smoker  Alcohol use: social drinker  Does patient exercise? yes  How often does patient exercise? 2 x week   In the past 12 months, have you fallen? no        Current Medications (including meds started today):   SODIUM FLUORIDE 5000 SENSITIVE 1.1-5 % DENTAL PASTE (SOD FLUORIDE-POTASSIUM NITRATE) ; Route: DENTAL  DOXAZOSIN MESYLATE 2 MG ORAL TABLET (DOXAZOSIN MESYLATE) ; Route: ORAL  AMMONIUM LACTATE 12 % EXTERNAL LOTION (AMMONIUM LACTATE) ; Route: EXTERNAL  ENDOCET 5-325 MG ORAL TABLET (OXYCODONE-ACETAMINOPHEN) Take 1 tab po q 6hrs PRN; Route: ORAL  BIOTIN 5000 MCG ORAL TABLET (BIOTIN) ; Route: ORAL  B-12 1000 MCG ORAL TABLET (CYANOCOBALAMIN) ; Route: ORAL  KP VITAMIN D3 CAPSULE (CHOLECALCIFEROL CAPS)   POTASSIUM CHLORIDE MOHIT ER 20 MEQ ORAL TABLET EXTENDED RELEASE (POTASSIUM CHLORIDE MOHIT ER) ; Route: ORAL  HYOSCYAMINE SULFATE 0.125 MG SUBLINGUAL TABLET SUBLINGUAL (HYOSCYAMINE SULFATE) TAKE 1 TABLET BY MOUTH TWICE DAILY; Route: SUBLINGUAL  LIDOCAINE-PRILOCAINE 2.5-2.5 % EXTERNAL CREAM (LIDOCAINE-PRILOCAINE) ; Route: EXTERNAL  FOLIC ACID 1 MG ORAL TABLET (FOLIC ACID) TAKE 1 TABLET BY MOUTH EVERY DAY; Route: ORAL  EZETIMIBE 10 MG ORAL TABLET (EZETIMIBE) ; Route: ORAL  BUMETANIDE 1 MG ORAL TABLET (BUMETANIDE) ; Route: ORAL  ASPIR-LOW 81 MG ORAL TABLET DELAYED RELEASE (ASPIRIN) ;  Route: ORAL      Current Allergies (reviewed this update):  LISINOPRIL (Critical)  * ASA (Critical)  * KEYTRUDA (Critical)        Vital Signs   Weight: 184.99 lbs. (83.91 kg.)  Height: 61 in.    (154.94 cm.)  Temperature: 98.20 deg F    (36.78 deg C)  Pulse Rate: 77  Blood Pressure: 140/71 mm Hg  Body Mass Index: 34.95  Body Surface Area: 1.83 m2      Review of Systems   General:  Patient denies sweats, weight loss, fevers, chills, fatigue. Eyes:  Patient denies eye irritation, vision loss - 1 eye, discharge, blurring, vision loss - both eyes. ENT:  Patient denies decreased hearing, difficulty swallowing. Cardiovascular:  Patient denies chest pain or discomfort, racing / skipping heartbeats, swelling of hands or feet, difficulty breathing while lying down, palpitations, shortness of breath with exertion, weight gain, blackouts/fainting. Respiratory:  Patient denies cough, coughing up blood, chest discomfort, wheezing, shortness of breath. Gastrointestinal:  Patient denies vomiting, loss of appetite, diarrhea, nausea. Genitourinary:  Patient denies urinary retention, urinary frequency, frequent UTI, urinary urgency, pain. Musculoskeletal: Positive for arthritis, joint pain. Skin:  Patient denies dryness, unusual hair distribution, suspicious lesions, psoriasis, changes in color of skin, changes in nail beds, poor wound healing. Neurologic:  Patient denies headaches, tingling, visual disturbances, weakness, seizures, memory loss, fainting, tremors, poor balance, disturbances in coordination, numbness, falling down. Psychiatric:  Patient denies anxiety, depression. Heme/Lymphatic:  Patient denies abnormal bruising. Allergic/Immunologic:  Patient denies seasonal allergies, persistent infections. The remainder of the complete review of systems was negative. Impression   1. Left knee osteoarthritis: Deteriorated  2. Varus deformity, not elsewhere classified, left knee: Deteriorated  3.   Presence of right artificial knee joint: Improved    Plan of Care:   Clinton Cedillo is doing well at this time with her right knee. Her biggest issue is her left knee. I had a long discussion regarding degenerative joint disease. Operative and nonoperative treatment was discussed. Xrays show severe degenerative changes, nonop treatment was failed and pain has become a quality of life issue. Futher nonoperative treatment is unlikely to provide any benefit. Clinton Cedillo would like to proceed with Left Ajay Total Knee Arthroplasty. The surgery, implants, postoperative course and risks and benefits of surgery were discussed and all questions were answered.

## 2021-04-28 ENCOUNTER — ANESTHESIA (OUTPATIENT)
Dept: OPERATING ROOM | Age: 81
DRG: 470 | End: 2021-04-28
Payer: MEDICARE

## 2021-04-28 ENCOUNTER — HOSPITAL ENCOUNTER (INPATIENT)
Age: 81
LOS: 1 days | Discharge: HOME HEALTH | DRG: 470 | End: 2021-05-01
Attending: ORTHOPAEDIC SURGERY | Admitting: ORTHOPAEDIC SURGERY
Payer: MEDICARE

## 2021-04-28 ENCOUNTER — APPOINTMENT (OUTPATIENT)
Dept: GENERAL RADIOLOGY | Age: 81
DRG: 470 | End: 2021-04-28
Attending: ORTHOPAEDIC SURGERY
Payer: MEDICARE

## 2021-04-28 VITALS — DIASTOLIC BLOOD PRESSURE: 60 MMHG | TEMPERATURE: 73.9 F | SYSTOLIC BLOOD PRESSURE: 141 MMHG | OXYGEN SATURATION: 98 %

## 2021-04-28 DIAGNOSIS — Z01.818 PREOP TESTING: Primary | ICD-10-CM

## 2021-04-28 DIAGNOSIS — M17.12 PRIMARY OSTEOARTHRITIS OF LEFT KNEE: ICD-10-CM

## 2021-04-28 PROBLEM — R60.0 EDEMA OF LOWER EXTREMITY: Status: RESOLVED | Noted: 2020-07-09 | Resolved: 2021-04-28

## 2021-04-28 PROBLEM — Z96.652 TOTAL KNEE REPLACEMENT STATUS, LEFT: Status: ACTIVE | Noted: 2021-04-28

## 2021-04-28 PROBLEM — N18.30 CHRONIC KIDNEY DISEASE, STAGE 3 (MODERATE): Status: RESOLVED | Noted: 2019-11-29 | Resolved: 2021-04-28

## 2021-04-28 PROBLEM — E83.52 HYPERCALCEMIA: Status: RESOLVED | Noted: 2020-07-09 | Resolved: 2021-04-28

## 2021-04-28 PROBLEM — E83.42 HYPOMAGNESEMIA: Status: RESOLVED | Noted: 2020-07-09 | Resolved: 2021-04-28

## 2021-04-28 PROCEDURE — 6360000002 HC RX W HCPCS: Performed by: ORTHOPAEDIC SURGERY

## 2021-04-28 PROCEDURE — 88305 TISSUE EXAM BY PATHOLOGIST: CPT

## 2021-04-28 PROCEDURE — 8E0Y0CZ ROBOTIC ASSISTED PROCEDURE OF LOWER EXTREMITY, OPEN APPROACH: ICD-10-PCS | Performed by: ORTHOPAEDIC SURGERY

## 2021-04-28 PROCEDURE — 7100000000 HC PACU RECOVERY - FIRST 15 MIN: Performed by: ORTHOPAEDIC SURGERY

## 2021-04-28 PROCEDURE — 6360000002 HC RX W HCPCS: Performed by: ANESTHESIOLOGY

## 2021-04-28 PROCEDURE — 2580000003 HC RX 258: Performed by: ORTHOPAEDIC SURGERY

## 2021-04-28 PROCEDURE — 7100000001 HC PACU RECOVERY - ADDTL 15 MIN: Performed by: ORTHOPAEDIC SURGERY

## 2021-04-28 PROCEDURE — 73560 X-RAY EXAM OF KNEE 1 OR 2: CPT

## 2021-04-28 PROCEDURE — C1713 ANCHOR/SCREW BN/BN,TIS/BN: HCPCS | Performed by: ORTHOPAEDIC SURGERY

## 2021-04-28 PROCEDURE — 97530 THERAPEUTIC ACTIVITIES: CPT

## 2021-04-28 PROCEDURE — 0SRD0J9 REPLACEMENT OF LEFT KNEE JOINT WITH SYNTHETIC SUBSTITUTE, CEMENTED, OPEN APPROACH: ICD-10-PCS | Performed by: ORTHOPAEDIC SURGERY

## 2021-04-28 PROCEDURE — 2500000003 HC RX 250 WO HCPCS: Performed by: ORTHOPAEDIC SURGERY

## 2021-04-28 PROCEDURE — 2720000010 HC SURG SUPPLY STERILE: Performed by: ORTHOPAEDIC SURGERY

## 2021-04-28 PROCEDURE — 3700000001 HC ADD 15 MINUTES (ANESTHESIA): Performed by: ORTHOPAEDIC SURGERY

## 2021-04-28 PROCEDURE — 97165 OT EVAL LOW COMPLEX 30 MIN: CPT

## 2021-04-28 PROCEDURE — C1776 JOINT DEVICE (IMPLANTABLE): HCPCS | Performed by: ORTHOPAEDIC SURGERY

## 2021-04-28 PROCEDURE — 97161 PT EVAL LOW COMPLEX 20 MIN: CPT

## 2021-04-28 PROCEDURE — 3600000015 HC SURGERY LEVEL 5 ADDTL 15MIN: Performed by: ORTHOPAEDIC SURGERY

## 2021-04-28 PROCEDURE — 6370000000 HC RX 637 (ALT 250 FOR IP): Performed by: ORTHOPAEDIC SURGERY

## 2021-04-28 PROCEDURE — 3700000000 HC ANESTHESIA ATTENDED CARE: Performed by: ORTHOPAEDIC SURGERY

## 2021-04-28 PROCEDURE — 2500000003 HC RX 250 WO HCPCS: Performed by: NURSE ANESTHETIST, CERTIFIED REGISTERED

## 2021-04-28 PROCEDURE — 2709999900 HC NON-CHARGEABLE SUPPLY: Performed by: ORTHOPAEDIC SURGERY

## 2021-04-28 PROCEDURE — 6360000002 HC RX W HCPCS: Performed by: NURSE ANESTHETIST, CERTIFIED REGISTERED

## 2021-04-28 PROCEDURE — 3600000005 HC SURGERY LEVEL 5 BASE: Performed by: ORTHOPAEDIC SURGERY

## 2021-04-28 PROCEDURE — 3E0T3BZ INTRODUCTION OF ANESTHETIC AGENT INTO PERIPHERAL NERVES AND PLEXI, PERCUTANEOUS APPROACH: ICD-10-PCS | Performed by: ANESTHESIOLOGY

## 2021-04-28 PROCEDURE — 64447 NJX AA&/STRD FEMORAL NRV IMG: CPT | Performed by: ANESTHESIOLOGY

## 2021-04-28 PROCEDURE — G0378 HOSPITAL OBSERVATION PER HR: HCPCS

## 2021-04-28 PROCEDURE — 88311 DECALCIFY TISSUE: CPT

## 2021-04-28 PROCEDURE — C2617 STENT, NON-COR, TEM W/O DEL: HCPCS | Performed by: ORTHOPAEDIC SURGERY

## 2021-04-28 PROCEDURE — 2500000003 HC RX 250 WO HCPCS

## 2021-04-28 DEVICE — CEMENT BNE 20ML 41GM FULL DOSE PMMA W/ TOBRA M VISC RADPQ: Type: IMPLANTABLE DEVICE | Site: KNEE | Status: FUNCTIONAL

## 2021-04-28 DEVICE — IMPLANT PAT DIA31MM THK9MM X3 SYMMETRIC TRIATHLON: Type: IMPLANTABLE DEVICE | Site: KNEE | Status: FUNCTIONAL

## 2021-04-28 DEVICE — IMPLANTABLE DEVICE: Type: IMPLANTABLE DEVICE | Site: KNEE | Status: FUNCTIONAL

## 2021-04-28 DEVICE — BASEPLATE TIB SZ 3 KNEE TRITANIUM CEM PRI LO PROF TRIATHLON: Type: IMPLANTABLE DEVICE | Site: KNEE | Status: FUNCTIONAL

## 2021-04-28 RX ORDER — PROPOFOL 10 MG/ML
INJECTION, EMULSION INTRAVENOUS PRN
Status: DISCONTINUED | OUTPATIENT
Start: 2021-04-28 | End: 2021-04-28 | Stop reason: SDUPTHER

## 2021-04-28 RX ORDER — DEXAMETHASONE SODIUM PHOSPHATE 4 MG/ML
INJECTION, SOLUTION INTRA-ARTICULAR; INTRALESIONAL; INTRAMUSCULAR; INTRAVENOUS; SOFT TISSUE PRN
Status: DISCONTINUED | OUTPATIENT
Start: 2021-04-28 | End: 2021-04-28 | Stop reason: SDUPTHER

## 2021-04-28 RX ORDER — HYOSCYAMINE SULFATE 0.125 MG
0.12 TABLET ORAL DAILY
Status: DISCONTINUED | OUTPATIENT
Start: 2021-04-28 | End: 2021-05-01 | Stop reason: HOSPADM

## 2021-04-28 RX ORDER — NEOSTIGMINE METHYLSULFATE 1 MG/ML
INJECTION, SOLUTION INTRAVENOUS PRN
Status: DISCONTINUED | OUTPATIENT
Start: 2021-04-28 | End: 2021-04-28 | Stop reason: SDUPTHER

## 2021-04-28 RX ORDER — SODIUM CHLORIDE 9 MG/ML
25 INJECTION, SOLUTION INTRAVENOUS PRN
Status: DISCONTINUED | OUTPATIENT
Start: 2021-04-28 | End: 2021-04-28 | Stop reason: HOSPADM

## 2021-04-28 RX ORDER — OXYCODONE HYDROCHLORIDE AND ACETAMINOPHEN 5; 325 MG/1; MG/1
1 TABLET ORAL
Status: DISCONTINUED | OUTPATIENT
Start: 2021-04-28 | End: 2021-04-28 | Stop reason: HOSPADM

## 2021-04-28 RX ORDER — EZETIMIBE 10 MG/1
10 TABLET ORAL
Status: DISCONTINUED | OUTPATIENT
Start: 2021-04-28 | End: 2021-05-01 | Stop reason: HOSPADM

## 2021-04-28 RX ORDER — VITAMIN B COMPLEX
1000 TABLET ORAL DAILY
Status: DISCONTINUED | OUTPATIENT
Start: 2021-04-28 | End: 2021-05-01 | Stop reason: HOSPADM

## 2021-04-28 RX ORDER — SENNA AND DOCUSATE SODIUM 50; 8.6 MG/1; MG/1
1 TABLET, FILM COATED ORAL 2 TIMES DAILY
Qty: 30 TABLET | Refills: 0 | Status: SHIPPED | OUTPATIENT
Start: 2021-04-28

## 2021-04-28 RX ORDER — ROCURONIUM BROMIDE 10 MG/ML
INJECTION, SOLUTION INTRAVENOUS PRN
Status: DISCONTINUED | OUTPATIENT
Start: 2021-04-28 | End: 2021-04-28 | Stop reason: SDUPTHER

## 2021-04-28 RX ORDER — DEXAMETHASONE SODIUM PHOSPHATE 10 MG/ML
4 INJECTION, SOLUTION INTRAMUSCULAR; INTRAVENOUS ONCE
Status: DISCONTINUED | OUTPATIENT
Start: 2021-04-28 | End: 2021-04-28 | Stop reason: HOSPADM

## 2021-04-28 RX ORDER — LABETALOL HYDROCHLORIDE 5 MG/ML
5 INJECTION, SOLUTION INTRAVENOUS ONCE
Status: COMPLETED | OUTPATIENT
Start: 2021-04-28 | End: 2021-04-28

## 2021-04-28 RX ORDER — DEXAMETHASONE SODIUM PHOSPHATE 10 MG/ML
8 INJECTION, SOLUTION INTRAMUSCULAR; INTRAVENOUS ONCE
Status: DISCONTINUED | OUTPATIENT
Start: 2021-04-28 | End: 2021-04-28 | Stop reason: HOSPADM

## 2021-04-28 RX ORDER — LABETALOL HYDROCHLORIDE 5 MG/ML
INJECTION, SOLUTION INTRAVENOUS
Status: COMPLETED
Start: 2021-04-28 | End: 2021-04-28

## 2021-04-28 RX ORDER — ACETAMINOPHEN 325 MG/1
650 TABLET ORAL EVERY 6 HOURS
Status: DISCONTINUED | OUTPATIENT
Start: 2021-04-28 | End: 2021-05-01 | Stop reason: HOSPADM

## 2021-04-28 RX ORDER — MIDAZOLAM HYDROCHLORIDE 2 MG/2ML
1 INJECTION, SOLUTION INTRAMUSCULAR; INTRAVENOUS EVERY 5 MIN PRN
Status: DISCONTINUED | OUTPATIENT
Start: 2021-04-28 | End: 2021-04-28 | Stop reason: HOSPADM

## 2021-04-28 RX ORDER — LIDOCAINE HYDROCHLORIDE 10 MG/ML
10 INJECTION, SOLUTION INFILTRATION; PERINEURAL
Status: DISCONTINUED | OUTPATIENT
Start: 2021-04-28 | End: 2021-04-28 | Stop reason: HOSPADM

## 2021-04-28 RX ORDER — SODIUM CHLORIDE 0.9 % (FLUSH) 0.9 %
5-40 SYRINGE (ML) INJECTION EVERY 12 HOURS SCHEDULED
Status: DISCONTINUED | OUTPATIENT
Start: 2021-04-28 | End: 2021-04-28 | Stop reason: HOSPADM

## 2021-04-28 RX ORDER — OXYCODONE HYDROCHLORIDE 5 MG/1
10 TABLET ORAL EVERY 4 HOURS PRN
Status: DISCONTINUED | OUTPATIENT
Start: 2021-04-28 | End: 2021-05-01 | Stop reason: HOSPADM

## 2021-04-28 RX ORDER — FOLIC ACID 1 MG/1
1 TABLET ORAL DAILY
Status: DISCONTINUED | OUTPATIENT
Start: 2021-04-28 | End: 2021-05-01 | Stop reason: HOSPADM

## 2021-04-28 RX ORDER — GABAPENTIN 300 MG/1
300 CAPSULE ORAL ONCE
Status: COMPLETED | OUTPATIENT
Start: 2021-04-28 | End: 2021-04-28

## 2021-04-28 RX ORDER — SODIUM CHLORIDE 9 MG/ML
25 INJECTION, SOLUTION INTRAVENOUS PRN
Status: DISCONTINUED | OUTPATIENT
Start: 2021-04-28 | End: 2021-05-01 | Stop reason: HOSPADM

## 2021-04-28 RX ORDER — LIDOCAINE HYDROCHLORIDE 20 MG/ML
INJECTION, SOLUTION EPIDURAL; INFILTRATION; INTRACAUDAL; PERINEURAL PRN
Status: DISCONTINUED | OUTPATIENT
Start: 2021-04-28 | End: 2021-04-28 | Stop reason: SDUPTHER

## 2021-04-28 RX ORDER — HYDRALAZINE HYDROCHLORIDE 20 MG/ML
5 INJECTION INTRAMUSCULAR; INTRAVENOUS EVERY 10 MIN PRN
Status: DISCONTINUED | OUTPATIENT
Start: 2021-04-28 | End: 2021-04-28 | Stop reason: HOSPADM

## 2021-04-28 RX ORDER — DEXAMETHASONE SODIUM PHOSPHATE 10 MG/ML
10 INJECTION INTRAMUSCULAR; INTRAVENOUS ONCE
Status: COMPLETED | OUTPATIENT
Start: 2021-04-29 | End: 2021-04-29

## 2021-04-28 RX ORDER — PROMETHAZINE HYDROCHLORIDE 25 MG/ML
6.25 INJECTION, SOLUTION INTRAMUSCULAR; INTRAVENOUS
Status: DISCONTINUED | OUTPATIENT
Start: 2021-04-28 | End: 2021-04-28 | Stop reason: HOSPADM

## 2021-04-28 RX ORDER — LANOLIN ALCOHOL/MO/W.PET/CERES
1000 CREAM (GRAM) TOPICAL DAILY
Status: DISCONTINUED | OUTPATIENT
Start: 2021-04-28 | End: 2021-05-01 | Stop reason: HOSPADM

## 2021-04-28 RX ORDER — DOXAZOSIN 2 MG/1
2 TABLET ORAL NIGHTLY
Status: DISCONTINUED | OUTPATIENT
Start: 2021-04-28 | End: 2021-05-01 | Stop reason: HOSPADM

## 2021-04-28 RX ORDER — SODIUM CHLORIDE 9 MG/ML
INJECTION, SOLUTION INTRAVENOUS CONTINUOUS
Status: DISCONTINUED | OUTPATIENT
Start: 2021-04-28 | End: 2021-04-29

## 2021-04-28 RX ORDER — ONDANSETRON 2 MG/ML
4 INJECTION INTRAMUSCULAR; INTRAVENOUS EVERY 6 HOURS PRN
Status: DISCONTINUED | OUTPATIENT
Start: 2021-04-28 | End: 2021-05-01 | Stop reason: HOSPADM

## 2021-04-28 RX ORDER — ACETAMINOPHEN 325 MG/1
650 TABLET ORAL EVERY 6 HOURS
Qty: 120 TABLET | Refills: 3 | Status: SHIPPED | OUTPATIENT
Start: 2021-04-28

## 2021-04-28 RX ORDER — SODIUM CHLORIDE 0.9 % (FLUSH) 0.9 %
10 SYRINGE (ML) INJECTION PRN
Status: DISCONTINUED | OUTPATIENT
Start: 2021-04-28 | End: 2021-05-01 | Stop reason: HOSPADM

## 2021-04-28 RX ORDER — ROPIVACAINE HYDROCHLORIDE 5 MG/ML
INJECTION, SOLUTION EPIDURAL; INFILTRATION; PERINEURAL
Status: COMPLETED | OUTPATIENT
Start: 2021-04-28 | End: 2021-04-28

## 2021-04-28 RX ORDER — ONDANSETRON 2 MG/ML
INJECTION INTRAMUSCULAR; INTRAVENOUS PRN
Status: DISCONTINUED | OUTPATIENT
Start: 2021-04-28 | End: 2021-04-28 | Stop reason: SDUPTHER

## 2021-04-28 RX ORDER — MEPERIDINE HYDROCHLORIDE 25 MG/ML
12.5 INJECTION INTRAMUSCULAR; INTRAVENOUS; SUBCUTANEOUS EVERY 5 MIN PRN
Status: DISCONTINUED | OUTPATIENT
Start: 2021-04-28 | End: 2021-04-28 | Stop reason: HOSPADM

## 2021-04-28 RX ORDER — GLYCOPYRROLATE 1 MG/5 ML
SYRINGE (ML) INTRAVENOUS PRN
Status: DISCONTINUED | OUTPATIENT
Start: 2021-04-28 | End: 2021-04-28 | Stop reason: SDUPTHER

## 2021-04-28 RX ORDER — FENTANYL CITRATE 50 UG/ML
100 INJECTION, SOLUTION INTRAMUSCULAR; INTRAVENOUS ONCE
Status: COMPLETED | OUTPATIENT
Start: 2021-04-28 | End: 2021-04-28

## 2021-04-28 RX ORDER — VANCOMYCIN HYDROCHLORIDE 500 MG/10ML
INJECTION, POWDER, LYOPHILIZED, FOR SOLUTION INTRAVENOUS PRN
Status: DISCONTINUED | OUTPATIENT
Start: 2021-04-28 | End: 2021-04-28 | Stop reason: ALTCHOICE

## 2021-04-28 RX ORDER — BUMETANIDE 1 MG/1
1 TABLET ORAL
Status: DISCONTINUED | OUTPATIENT
Start: 2021-04-28 | End: 2021-05-01 | Stop reason: HOSPADM

## 2021-04-28 RX ORDER — SODIUM CHLORIDE 0.9 % (FLUSH) 0.9 %
10 SYRINGE (ML) INJECTION EVERY 12 HOURS SCHEDULED
Status: DISCONTINUED | OUTPATIENT
Start: 2021-04-28 | End: 2021-05-01 | Stop reason: HOSPADM

## 2021-04-28 RX ORDER — ACETAMINOPHEN 500 MG
1000 TABLET ORAL ONCE
Status: COMPLETED | OUTPATIENT
Start: 2021-04-28 | End: 2021-04-28

## 2021-04-28 RX ORDER — OXYCODONE HYDROCHLORIDE 5 MG/1
5 TABLET ORAL EVERY 4 HOURS PRN
Qty: 42 TABLET | Refills: 0 | Status: SHIPPED | OUTPATIENT
Start: 2021-04-28 | End: 2021-05-05

## 2021-04-28 RX ORDER — LABETALOL HYDROCHLORIDE 5 MG/ML
5 INJECTION, SOLUTION INTRAVENOUS EVERY 10 MIN PRN
Status: DISCONTINUED | OUTPATIENT
Start: 2021-04-28 | End: 2021-04-28 | Stop reason: HOSPADM

## 2021-04-28 RX ORDER — OXYCODONE HYDROCHLORIDE 5 MG/1
5 TABLET ORAL EVERY 4 HOURS PRN
Status: DISCONTINUED | OUTPATIENT
Start: 2021-04-28 | End: 2021-05-01 | Stop reason: HOSPADM

## 2021-04-28 RX ORDER — SODIUM CHLORIDE 0.9 % (FLUSH) 0.9 %
5-40 SYRINGE (ML) INJECTION PRN
Status: DISCONTINUED | OUTPATIENT
Start: 2021-04-28 | End: 2021-04-28 | Stop reason: HOSPADM

## 2021-04-28 RX ORDER — SENNA AND DOCUSATE SODIUM 50; 8.6 MG/1; MG/1
1 TABLET, FILM COATED ORAL 2 TIMES DAILY
Status: DISCONTINUED | OUTPATIENT
Start: 2021-04-28 | End: 2021-05-01

## 2021-04-28 RX ORDER — ROPIVACAINE HYDROCHLORIDE 5 MG/ML
30 INJECTION, SOLUTION EPIDURAL; INFILTRATION; PERINEURAL
Status: COMPLETED | OUTPATIENT
Start: 2021-04-28 | End: 2021-04-28

## 2021-04-28 RX ORDER — SODIUM CHLORIDE 9 MG/ML
INJECTION, SOLUTION INTRAVENOUS CONTINUOUS
Status: DISCONTINUED | OUTPATIENT
Start: 2021-04-28 | End: 2021-04-28

## 2021-04-28 RX ORDER — POLYVINYL ALCOHOL 14 MG/ML
1 SOLUTION/ DROPS OPHTHALMIC 2 TIMES DAILY
Status: DISCONTINUED | OUTPATIENT
Start: 2021-04-28 | End: 2021-05-01 | Stop reason: HOSPADM

## 2021-04-28 RX ADMIN — SODIUM CHLORIDE: 9 INJECTION, SOLUTION INTRAVENOUS at 08:23

## 2021-04-28 RX ADMIN — FENTANYL CITRATE 100 MCG: 50 INJECTION, SOLUTION INTRAMUSCULAR; INTRAVENOUS at 09:39

## 2021-04-28 RX ADMIN — DOCUSATE SODIUM 50 MG AND SENNOSIDES 8.6 MG 1 TABLET: 8.6; 5 TABLET, FILM COATED ORAL at 21:19

## 2021-04-28 RX ADMIN — OXYCODONE 5 MG: 5 TABLET ORAL at 21:19

## 2021-04-28 RX ADMIN — TRANEXAMIC ACID 1000 MG: 1 INJECTION, SOLUTION INTRAVENOUS at 09:25

## 2021-04-28 RX ADMIN — ROPIVACAINE HYDROCHLORIDE 20 ML: 5 INJECTION, SOLUTION EPIDURAL; INFILTRATION; PERINEURAL at 08:24

## 2021-04-28 RX ADMIN — SODIUM CHLORIDE: 9 INJECTION, SOLUTION INTRAVENOUS at 14:28

## 2021-04-28 RX ADMIN — DOXAZOSIN 2 MG: 2 TABLET ORAL at 21:19

## 2021-04-28 RX ADMIN — PROPOFOL 180 MG: 10 INJECTION, EMULSION INTRAVENOUS at 09:24

## 2021-04-28 RX ADMIN — LIDOCAINE HYDROCHLORIDE 100 MG: 20 INJECTION, SOLUTION EPIDURAL; INFILTRATION; INTRACAUDAL; PERINEURAL at 09:24

## 2021-04-28 RX ADMIN — ACETAMINOPHEN 650 MG: 325 TABLET ORAL at 21:19

## 2021-04-28 RX ADMIN — FENTANYL CITRATE 100 MCG: 50 INJECTION, SOLUTION INTRAMUSCULAR; INTRAVENOUS at 08:15

## 2021-04-28 RX ADMIN — EZETIMIBE 10 MG: 10 TABLET ORAL at 17:06

## 2021-04-28 RX ADMIN — Medication 3 MG: at 11:19

## 2021-04-28 RX ADMIN — Medication 2000 MG: at 17:06

## 2021-04-28 RX ADMIN — PHENYLEPHRINE HYDROCHLORIDE 50 MCG: 10 INJECTION INTRAVENOUS at 09:43

## 2021-04-28 RX ADMIN — ACETAMINOPHEN 650 MG: 325 TABLET ORAL at 14:51

## 2021-04-28 RX ADMIN — DOCUSATE SODIUM 50 MG AND SENNOSIDES 8.6 MG 1 TABLET: 8.6; 5 TABLET, FILM COATED ORAL at 14:51

## 2021-04-28 RX ADMIN — POLYVINYL ALCOHOL 1 DROP: 14 SOLUTION/ DROPS OPHTHALMIC at 14:30

## 2021-04-28 RX ADMIN — LABETALOL HYDROCHLORIDE 5 MG: 5 INJECTION INTRAVENOUS at 08:46

## 2021-04-28 RX ADMIN — DEXAMETHASONE SODIUM PHOSPHATE 8 MG: 4 INJECTION, SOLUTION INTRAMUSCULAR; INTRAVENOUS at 09:32

## 2021-04-28 RX ADMIN — BUMETANIDE 1 MG: 1 TABLET ORAL at 14:50

## 2021-04-28 RX ADMIN — Medication 1000 UNITS: at 14:51

## 2021-04-28 RX ADMIN — FENTANYL CITRATE 50 MCG: 50 INJECTION, SOLUTION INTRAMUSCULAR; INTRAVENOUS at 11:45

## 2021-04-28 RX ADMIN — CYANOCOBALAMIN TAB 1000 MCG 1000 MCG: 1000 TAB at 14:30

## 2021-04-28 RX ADMIN — Medication 0.6 MG: at 11:19

## 2021-04-28 RX ADMIN — HYDRALAZINE HYDROCHLORIDE 5 MG: 20 INJECTION, SOLUTION INTRAMUSCULAR; INTRAVENOUS at 09:09

## 2021-04-28 RX ADMIN — PHENYLEPHRINE HYDROCHLORIDE 50 MCG: 10 INJECTION INTRAVENOUS at 09:56

## 2021-04-28 RX ADMIN — SODIUM CHLORIDE: 9 INJECTION, SOLUTION INTRAVENOUS at 11:24

## 2021-04-28 RX ADMIN — ACETAMINOPHEN 1000 MG: 500 TABLET ORAL at 07:49

## 2021-04-28 RX ADMIN — ROPIVACAINE HYDROCHLORIDE 20 ML: 5 INJECTION, SOLUTION EPIDURAL; INFILTRATION; PERINEURAL at 08:37

## 2021-04-28 RX ADMIN — LABETALOL HYDROCHLORIDE 5 MG: 5 INJECTION, SOLUTION INTRAVENOUS at 08:46

## 2021-04-28 RX ADMIN — SODIUM CHLORIDE: 9 INJECTION, SOLUTION INTRAVENOUS at 11:23

## 2021-04-28 RX ADMIN — TRANEXAMIC ACID 1000 MG: 100 INJECTION, SOLUTION INTRAVENOUS at 12:22

## 2021-04-28 RX ADMIN — Medication 2000 MG: at 09:17

## 2021-04-28 RX ADMIN — SODIUM CHLORIDE, PRESERVATIVE FREE 10 ML: 5 INJECTION INTRAVENOUS at 21:20

## 2021-04-28 RX ADMIN — FOLIC ACID 1 MG: 1 TABLET ORAL at 14:51

## 2021-04-28 RX ADMIN — ROCURONIUM BROMIDE 50 MG: 10 INJECTION, SOLUTION INTRAVENOUS at 09:24

## 2021-04-28 RX ADMIN — ASPIRIN 325 MG: 325 TABLET, COATED ORAL at 14:51

## 2021-04-28 RX ADMIN — POLYVINYL ALCOHOL 1 DROP: 14 SOLUTION/ DROPS OPHTHALMIC at 21:21

## 2021-04-28 RX ADMIN — GABAPENTIN 300 MG: 300 CAPSULE ORAL at 07:49

## 2021-04-28 RX ADMIN — HYOSCYAMINE SULFATE 0.12 MG: 0.12 TABLET ORAL at 14:30

## 2021-04-28 RX ADMIN — SODIUM CHLORIDE: 9 INJECTION, SOLUTION INTRAVENOUS at 07:52

## 2021-04-28 RX ADMIN — ONDANSETRON 4 MG: 2 INJECTION INTRAMUSCULAR; INTRAVENOUS at 09:25

## 2021-04-28 RX ADMIN — Medication 400 MG: at 14:50

## 2021-04-28 ASSESSMENT — PULMONARY FUNCTION TESTS
PIF_VALUE: 19
PIF_VALUE: 20
PIF_VALUE: 19
PIF_VALUE: 20
PIF_VALUE: 3
PIF_VALUE: 4
PIF_VALUE: 20
PIF_VALUE: 19
PIF_VALUE: 19
PIF_VALUE: 18
PIF_VALUE: 3
PIF_VALUE: 19
PIF_VALUE: 20
PIF_VALUE: 19
PIF_VALUE: 20
PIF_VALUE: 2
PIF_VALUE: 19
PIF_VALUE: 2
PIF_VALUE: 19
PIF_VALUE: 20
PIF_VALUE: 20
PIF_VALUE: 19
PIF_VALUE: 20
PIF_VALUE: 19
PIF_VALUE: 20
PIF_VALUE: 19
PIF_VALUE: 20
PIF_VALUE: 19
PIF_VALUE: 3
PIF_VALUE: 19
PIF_VALUE: 20
PIF_VALUE: 20
PIF_VALUE: 3
PIF_VALUE: 19
PIF_VALUE: 20
PIF_VALUE: 20
PIF_VALUE: 3
PIF_VALUE: 19
PIF_VALUE: 20
PIF_VALUE: 19
PIF_VALUE: 19
PIF_VALUE: 20
PIF_VALUE: 3
PIF_VALUE: 20
PIF_VALUE: 2
PIF_VALUE: 20
PIF_VALUE: 5
PIF_VALUE: 8
PIF_VALUE: 3
PIF_VALUE: 19
PIF_VALUE: 19
PIF_VALUE: 20
PIF_VALUE: 3
PIF_VALUE: 20
PIF_VALUE: 21
PIF_VALUE: 3
PIF_VALUE: 8
PIF_VALUE: 20
PIF_VALUE: 19

## 2021-04-28 ASSESSMENT — PAIN SCALES - GENERAL
PAINLEVEL_OUTOF10: 2
PAINLEVEL_OUTOF10: 2
PAINLEVEL_OUTOF10: 0
PAINLEVEL_OUTOF10: 0
PAINLEVEL_OUTOF10: 2

## 2021-04-28 ASSESSMENT — ENCOUNTER SYMPTOMS: SHORTNESS OF BREATH: 0

## 2021-04-28 ASSESSMENT — PAIN DESCRIPTION - ORIENTATION: ORIENTATION: LEFT

## 2021-04-28 ASSESSMENT — LIFESTYLE VARIABLES: SMOKING_STATUS: 0

## 2021-04-28 ASSESSMENT — PAIN DESCRIPTION - LOCATION: LOCATION: KNEE

## 2021-04-28 NOTE — ANESTHESIA POSTPROCEDURE EVALUATION
Department of Anesthesiology  Postprocedure Note    Patient: Shirley Hargrove  MRN: [id number]  [place]: 1940  Date of evaluation: 4/28/2021  Time:  11:54 AM     Procedure Summary     Date: 04/28/21 Room / Location: [facility] 01 / [facility]    Anesthesia Start: 5758 Anesthesia Stop: 7879    Procedure: LEFT TOTAL KNEE ROBOTIC ASSISTED ARTHROPLASTY   +++FEMI+++   +++PNB+++ (Left ) Diagnosis: (OSTEOARTHRITIS)    Surgeons: Radha Lambert MD Responsible Provider: Rebel Nunez MD    Anesthesia Type: general, regional ASA Status: 3          Anesthesia Type: general, regional    Faraz Phase I: Faraz Score: 10    Faraz Phase II:      Last vitals: Reviewed and per EMR flowsheets.        Anesthesia Post Evaluation

## 2021-04-28 NOTE — PROGRESS NOTES
Physical Therapy    Facility/Department: [facility] SURGERY  Initial Assessment    NAME: Olga Celaya  :   MRN: [id number]    Date of Service: 2021       Patient Diagnosis(es): The encounter diagnosis was Preop testing. has a past medical history of Acute kidney injury ([address] Acute renal failure syndrome ([address] Arthritis, Chronic kidney disease, stage 3 (moderate), Dry eye, GERD (gastroesophageal reflux disease), [place] (hard of hearing), Hypertension, Hypomagnesemia, Mixed hyperlipidemia, SOB (shortness of breath) on exertion, Tooth sensitivity, Uterine cancer ([address] and Vitamin D deficiency. has a past surgical history that includes Dilation and curettage of uterus (3/20/12); Hysterectomy (12); Breast surgery (4214,0093, ); lymph node biopsy (); INSERTION / REMOVAL / REPLACEMENT VENOUS ACCESS CATHETER (N/A, 2019); Appendectomy (); Tonsillectomy (child); Total knee arthroplasty (Right, 3/4/2020); Port Surgery (Left, 2021); and Total knee arthroplasty (Left, 2021). Referring Provider:  Cheryl Tolbert MD    Evaluating PT:  Deena Davila, PT, DPT [id number]    Room #:  2133/4692-U  Diagnosis:  Left TKA  Precautions:  Fall risk, FWBAT left LE  Equipment Needs:  None. Pt reported owing a walker. SUBJECTIVE:    Pt lives with  in a 1 story home with 1 stairs to enter and 0 rail. Bed and bath is on first floor. Pt ambulated with walker PTA. OBJECTIVE:   Initial Evaluation  Date:  Treatment Short Term/ Long Term   Goals   Was pt agreeable to Eval/treatment? yes     Does pt have pain?  Right knee     Bed Mobility  Rolling: NT  Supine to sit: Min A  Sit to supine: NT  Scooting: Min A to sitting EOB  SBA   Transfers Sit to stand: Min A  Stand to sit: Min A  Stand pivot: Min A with w/w  SBA   Ambulation    15 feet and 100 feet with w/w Min A  200+ feet with w/w SBA    Stair negotiation: ascended and descended  NT  4 steps with 1 rails SBA   ROM Right LE:  Hip and ankle WFL, knee 0-80 degrees  Left LE:  WFL  Increase right knee ROM 0 - 90 degrees   Strength Right LE:  Grossly 3-/5  Left LE:  Grossly 4-/5  Increase LE strength by 1/2 mm grade. Balance Sitting EOB:  SBA  Dynamic Standing:  Min A with w/w  Sitting EOB:  independent  Dynamic Standing:  SBA with w/w   AM-PAC 6 Clicks 03/32       Pt is A & O x 3. Pt hard of hearing  Sensation:  Pt denies numbness and tingling to extremities      Patient education  Pt educated on PT objectives during eval and while in the hospital, ankle pumps, quad sets and glut sets while in bed. Hand placement during transfers. W/w usage and safety. Patient response to education:   Pt verbalized understanding Pt demonstrated skill Pt requires further education in this area   yes With cueing yes     ASSESSMENT:    Comments:  Pt found in bed with  and nursing present. At end of eval, pt left sitting up in the chair with call light in reach and  present. Treatment:  Patient practiced and was instructed in the following treatment:     Functional mobility performed as documented above. Cueing required for hand placement during transfers. Pt took seated rest break once sitting EOB due to dizziness. Pt's ambulation changed from step to pattern to reciprocal pattern. Pt took standing rest breaks at times throughout ambulation. Pt's/ family goals   1. Increase mobility    Patient and or family understand(s) diagnosis, prognosis, and plan of care.   yes    PLAN OF CARE:    Current Treatment Recommendations     [x] Strengthening     [x] ROM   [x] Balance Training   [x] Endurance Training   [x] Transfer Training   [x] Gait Training   [x] Stair Training   [] Positioning   [x] Safety and Education Training   [x] Patient/Caregiver Education   [] HEP  [] Other     Frequency of treatments: BID    Time in  1450  Time out  1509    Total Treatment Time  10 minutes     Evaluation Time includes thorough review of current medical information, gathering information on past medical history/social history and prior level of function, completion of standardized testing/informal observation of tasks, assessment of data and education on plan of care and goals.     CPT codes:  [x] Low Complexity PT evaluation 90900  [] Moderate Complexity PT evaluation 60600  [] High Complexity PT evaluation 82589  [] PT Re-evaluation 64379  [] Gait training 22756 _ minutes  [x] Therapeutic activities 27861 10 minutes  [] Therapeutic exercises 26518 _ minutes      Daria Reyes PT, DPT  PT [id number]

## 2021-04-28 NOTE — DISCHARGE SUMMARY
Patient ID:  Nanci Navarro  [id number]  45 y.o.  1940    Admit date: 4/28/2021    Discharge date and time:  4/28/2021    Admitting Physician: ART Orlando MD     Discharge Physician: Naa Orlando MD    Admission Diagnoses: left Knee Osteoarthritis    Discharge Diagnoses: Same    Admission Condition: Good    Discharged Condition: Good    Procedure and Date: Ajay left Total Knee Arthroplasty     Hospital Course: A Ajay total knee arthroplasty was performed on 4/28/2021. Following this procedure the patient was admitted for a 3 day postoperative hospital stay. During this admission, DVT prophylaxis was followed using bilateral ICDs, CONNIE hose, and daily ASA. Post-operative pain control was achieved with the use of IV/oral pain medications. Discharge plans were discussed with the patient with the aid of . Consults: Hospitalist    Disposition: Home    Patient Instructions:     Medications for pain:    Acetaminophen - Take one tablet every 6 hours for 1 week. Then take every 6 hours as needed for pain. Tramadol - Take one tablet every 6 hours for 1 week. Then take every 6 hours as needed for pain. Oxycodone 1 tablet every 4 hours as needed for pain. You can take 2 every 6 hours for severe pain. Medication for DVT prophylaxis (Prevention of blood clots)  Aspirin - Take 1 tablet daily for 6 weeks for prevention of blood clots    Medication for constipation:  Colace - Take 1 tablet every 12 hours as needed for constipation        Activity: activity as tolerated  Diet: regular diet  Wound Care: Patient should remove dressing in 9 days. Patient can shower with the dressing on but should not bathe. After removing, the dressing keep incision clean and dry    Follow-up with Dr. Awa Abdalla in 2 weeks.

## 2021-04-28 NOTE — PROGRESS NOTES
Occupational Therapy  OCCUPATIONAL THERAPY INITIAL EVALUATION      Date:2021  Patient Name: Ac Beckham  MRN: [id number]  : 1940  Room: [address] [place] [address]-A    Referring Provider: Vince Melendez MD    Evaluating OT: Chichi Israel OTR/L [id number]    AM-PAC Daily Activity Raw Score: 18    Recommended Adaptive Equipment: TBD    Diagnosis: L knee OA   Surgery: 21 L TKA   Pertinent Medical History: CKD, HTN   Precautions:  Falls, FWBAT L LE     Home Living: Pt lives with significant other in a single story home. Bathroom setup: walk in shower with grab bar and seat, standard commode     Prior Level of Function: Independent with ADLs, significant other assists with IADLs; completed functional mobility with cane or WW    Pain Level: no reported pain, dizziness with initial movements    Cognition: A&O: . Pt is alert and oriented   Problem solving:  WFL   Judgement/safety:  WFL     Functional Assessment:   Initial Eval Status  Date: 21 Treatment session:  Short Term Goals     Feeding Independent     Grooming SBA  Standing sink level for hand hygiene  Independent   UB Dressing Min A  Donning gown as robe  Independent   LB Dressing Min A  Donning brief  Mod I    Bathing Mod A  Mod I   Toileting Min A  Use of grab bar for support in transfer  Assist in brief management for safety  Able to manage jovi care  Mod I   Bed Mobility  Supine to sit: Min A     Functional Transfers STS: Min A  Mod I   Functional Mobility Min A with [employer]  Hallway distance  Mod I during ADLs   Balance Sitting: fair plus    Standing: fair minus at [employer]     Activity Tolerance Fair minus  Slow pace  standing veronica x7-8 min with fair plus balance during self care tasks             Treatment: Patient educated on techniques for completion of ADL, safe functional transfers and functional mobility.   Patient required cues for follow through with proper hand/foot placement, pacing, safety, compensatory strategies, breathing, toileting, grooming, UB dressing and technique in bed mobility, functional transfers, functional mobility, toileting, grooming, UB dressing and LB dressing in preparation for maximum independence in all self care tasks.      Hand Dominance: Right []  Left []   Strength ROM Additional Info:    RUE  4-/5 WFL good  and FMC/dexterity noted during ADL tasks     LUE 4-/5 WFL good  and FMC/dexterity noted during ADL tasks         Hearing: [place]  Vision: WFL   Sensation:  No c/o numbness or tingling   Tone: WFL                             Long Term Goal (1-3 wks): Pt will maximize functional performance in all self care tasks/functional transfers with good follow through of all trained techniques for safe transition to next level of care    Assessment of current deficits   Functional mobility [x]  ADLs [x] Strength [x]  Cognition []  Functional transfers  [x] IADLs [x] Safety Awareness [x]  Endurance [x]  Fine Motor Coordination [] Balance [x] Vision/perception [] Sensation []   Gross Motor Coordination [] ROM [] Delirium []                  Motor Control []    Plan of Care: 2-5 days/week for 1-2 weeks PRN   [x]ADL retraining/adaptive techniques and AE recommendations to increase functional independence within precautions                    [x]Energy conservation techniques to improve tolerance for ADL/IADLs  [x]Functional transfer/mobility training/DME recommendations for increased independence, safety and fall prevention         [x]Patient/family education to increase safety and functional independence during daily routine          [x]Environmental modifications for safe mobility and completion of ADLs                             []Cognitive retraining to improve problem solving skills & safe participation in ADLs/IADLs     []Sensory re-education techniques to improve extremity awareness, maintain skin integrity and improve hand function                             []Visual/Perceptual retraining to improve body awareness and safety during transfers and ADLs  []Splinting/positioning needs to maintain joint/skin integrity and contracture prevention  [x]Therapeutic activity to improve functional performance during ADLs                                        [x]Therapeutic exercise to improve tolerance and functional strength for ADLs   [x]Balance retraining/tolerance tasks for facilitation of postural control with dynamic challenges during ADLs  []Neuromuscular re-education to facilitate righting/equilibrium reactions, midline orientation, scapular stability/mobility, normalize muscle tone and facilitate active functional movement                        []Delirium prevention/treatment    [x]Positioning to improve functional independence and decrease risk of skin breakdown  []Other:     Rehab Potential: Good for established goals     Patient/Family Goal: To get home. Patient and/or family were instructed on functional diagnosis, prognosis/goals and OT plan of care. Pt verbalized understanding. Upon arrival, patient supine in bed. At end of session, patient seated in armchair with call light and phone within reach, all lines and tubes intact. Pt would benefit from continued skilled OT to increase safety and independence with completion of ADL/IADL tasks for functional independence and quality of life.     Low Evaluation [id number]  Time In: 1446   Time Out: 1510      Evaluation time includes thorough review of current medical information, gathering information on past medical history/social history and prior level of function, completion of standardized testing/informal observation of tasks, assessment of data, and development of POC/Goals    Marilyn Kin OTR/L  [id number]

## 2021-04-28 NOTE — INTERVAL H&P NOTE
Update History & Physical    H&P reviewed. No changes.     Electronically signed by Joyce Gudino MD on 4/28/2021 at 7:25 AM

## 2021-04-28 NOTE — OP NOTE
excised and the proximal tibia was exposed. The ACL and medial and lateral meniscus were transected. 2 Kim pins were then placed in the distal femur. A Ajay satellite array was then affixed. 2 additional small skin incisions were made over the proximal tibia and 2 Kim pins were placed. A Ajay satellite array was then affixed. At this point, 2 check points were placed on the femur and tibia. Anatomical landmarks on the ankle were registered. The femur and tibia were then registered and verified. The patient was noted to have a 10° varus deformity 6° flexion contracture. Osteophytes were then removed with a rongeur. With valgus and varus stresses applied, poses were captured at 0° 90°. Small modifications were made to implant position using the [facility] software to create balanced flexion and extension gaps. The [employer] robot was then used to make cuts in the distal femur, anterior and poster chamfer, anterior femur, posterior condyles and proximal tibia. The medial and lateral meniscus was then excised. The knee was then trialed with a size 3 femur and tibia and a 11 mm poly. The knee felt balanced in flexion and extension and this was confirmed with the [address]. A patellar osteotomy was performed with the patellar reamer and cleaned up with a saw. A size 31 patella was then selected. All trial components were removed and the tibia and femur were thoroughly irrigated with normal saline. Using modern cement techniques, all components were cemented in position. Once the cement had hardened, a size 11 trial poly was used and the knee was again found to be balanced in flexion and extension. The poly was then inserted. The knee was then thoroughly irrigated with normal saline. The [place] cocktail was then injected into the soft tissues prior to closure. The wound was then closed with 1, 2-0, 3-0 Stratofix, dermabond and steri strips.  A sterile dressing was then applied and the knee was wrapped with an ACE wrap. The tourniquet was then dropped. There were no complications and the patient tolerated the procedure well. The patient was taken to the recovery room in stable condition.     Bishop Goncalves MD

## 2021-04-28 NOTE — ANESTHESIA POSTPROCEDURE EVALUATION
Department of Anesthesiology  Postprocedure Note    Patient: Billie Rodgers  MRN: [id number]  [place]: 1940  Date of evaluation: 4/28/2021  Time:  4:39 PM     Procedure Summary     Date: 04/28/21 Room / Location: [facility] 01 / [facility]    Anesthesia Start: 4982 Anesthesia Stop: 0976    Procedure: LEFT TOTAL KNEE ROBOTIC ASSISTED ARTHROPLASTY (Left ) Diagnosis: (OSTEOARTHRITIS)    Surgeons: Gabby Nair MD Responsible Provider: Adilson Meza MD    Anesthesia Type: general, regional ASA Status: 3          Anesthesia Type: general, regional    Faraz Phase I: Faraz Score: 9    Faraz Phase II:      Last vitals: Reviewed and per EMR flowsheets.        Anesthesia Post Evaluation    Patient location during evaluation: PACU  Patient participation: complete - patient participated  Level of consciousness: awake and alert  Airway patency: patent  Nausea & Vomiting: no nausea and no vomiting  Complications: no  Cardiovascular status: hemodynamically stable  Respiratory status: acceptable  Hydration status: euvolemic

## 2021-04-29 LAB
ANION GAP SERPL CALCULATED.3IONS-SCNC: 11 MMOL/L (ref 7–16)
ANION GAP SERPL CALCULATED.3IONS-SCNC: 16 MMOL/L (ref 7–16)
BUN BLDV-MCNC: 53 MG/DL (ref 6–23)
BUN BLDV-MCNC: 60 MG/DL (ref 6–23)
CALCIUM SERPL-MCNC: 9 MG/DL (ref 8.6–10.2)
CALCIUM SERPL-MCNC: 9.1 MG/DL (ref 8.6–10.2)
CHLORIDE BLD-SCNC: 97 MMOL/L (ref 98–107)
CHLORIDE BLD-SCNC: 98 MMOL/L (ref 98–107)
CO2: 18 MMOL/L (ref 22–29)
CO2: 26 MMOL/L (ref 22–29)
CREAT SERPL-MCNC: 2 MG/DL (ref 0.5–1)
CREAT SERPL-MCNC: 2 MG/DL (ref 0.5–1)
GFR AFRICAN AMERICAN: 29
GFR AFRICAN AMERICAN: 29
GFR NON-AFRICAN AMERICAN: 24 ML/MIN/1.73
GFR NON-AFRICAN AMERICAN: 24 ML/MIN/1.73
GLUCOSE BLD-MCNC: 159 MG/DL (ref 74–99)
GLUCOSE BLD-MCNC: 163 MG/DL (ref 74–99)
HCT VFR BLD CALC: 39.1 % (ref 34–48)
HEMOGLOBIN: 12.7 G/DL (ref 11.5–15.5)
MCH RBC QN AUTO: 31.2 PG (ref 26–35)
MCHC RBC AUTO-ENTMCNC: 32.5 % (ref 32–34.5)
MCV RBC AUTO: 96.1 FL (ref 80–99.9)
PDW BLD-RTO: 14 FL (ref 11.5–15)
PLATELET # BLD: 130 E9/L (ref 130–450)
PMV BLD AUTO: 10.3 FL (ref 7–12)
POTASSIUM REFLEX MAGNESIUM: 5.1 MMOL/L (ref 3.5–5)
POTASSIUM REFLEX MAGNESIUM: 5.3 MMOL/L (ref 3.5–5)
RBC # BLD: 4.07 E12/L (ref 3.5–5.5)
SODIUM BLD-SCNC: 131 MMOL/L (ref 132–146)
SODIUM BLD-SCNC: 135 MMOL/L (ref 132–146)
WBC # BLD: 6 E9/L (ref 4.5–11.5)

## 2021-04-29 PROCEDURE — 97110 THERAPEUTIC EXERCISES: CPT

## 2021-04-29 PROCEDURE — 2580000003 HC RX 258: Performed by: ORTHOPAEDIC SURGERY

## 2021-04-29 PROCEDURE — 6360000002 HC RX W HCPCS: Performed by: ORTHOPAEDIC SURGERY

## 2021-04-29 PROCEDURE — 2580000003 HC RX 258: Performed by: INTERNAL MEDICINE

## 2021-04-29 PROCEDURE — 36415 COLL VENOUS BLD VENIPUNCTURE: CPT

## 2021-04-29 PROCEDURE — 85027 COMPLETE CBC AUTOMATED: CPT

## 2021-04-29 PROCEDURE — 97530 THERAPEUTIC ACTIVITIES: CPT

## 2021-04-29 PROCEDURE — 96374 THER/PROPH/DIAG INJ IV PUSH: CPT

## 2021-04-29 PROCEDURE — G0378 HOSPITAL OBSERVATION PER HR: HCPCS

## 2021-04-29 PROCEDURE — 97535 SELF CARE MNGMENT TRAINING: CPT

## 2021-04-29 PROCEDURE — 6370000000 HC RX 637 (ALT 250 FOR IP): Performed by: PHYSICIAN ASSISTANT

## 2021-04-29 PROCEDURE — 6370000000 HC RX 637 (ALT 250 FOR IP): Performed by: ORTHOPAEDIC SURGERY

## 2021-04-29 PROCEDURE — 80048 BASIC METABOLIC PNL TOTAL CA: CPT

## 2021-04-29 RX ORDER — CALCIUM CARBONATE 200(500)MG
500 TABLET,CHEWABLE ORAL 3 TIMES DAILY PRN
Status: DISCONTINUED | OUTPATIENT
Start: 2021-04-29 | End: 2021-05-01 | Stop reason: HOSPADM

## 2021-04-29 RX ORDER — SODIUM CHLORIDE 9 MG/ML
INJECTION, SOLUTION INTRAVENOUS CONTINUOUS
Status: DISCONTINUED | OUTPATIENT
Start: 2021-04-29 | End: 2021-05-01 | Stop reason: HOSPADM

## 2021-04-29 RX ADMIN — ACETAMINOPHEN 650 MG: 325 TABLET ORAL at 09:19

## 2021-04-29 RX ADMIN — DEXAMETHASONE SODIUM PHOSPHATE 10 MG: 10 INJECTION INTRAMUSCULAR; INTRAVENOUS at 06:40

## 2021-04-29 RX ADMIN — POLYVINYL ALCOHOL 1 DROP: 14 SOLUTION/ DROPS OPHTHALMIC at 21:12

## 2021-04-29 RX ADMIN — Medication 2000 MG: at 03:10

## 2021-04-29 RX ADMIN — SODIUM CHLORIDE, PRESERVATIVE FREE 10 ML: 5 INJECTION INTRAVENOUS at 09:18

## 2021-04-29 RX ADMIN — ACETAMINOPHEN 650 MG: 325 TABLET ORAL at 21:07

## 2021-04-29 RX ADMIN — SODIUM CHLORIDE: 9 INJECTION, SOLUTION INTRAVENOUS at 21:12

## 2021-04-29 RX ADMIN — CYANOCOBALAMIN TAB 1000 MCG 1000 MCG: 1000 TAB at 09:18

## 2021-04-29 RX ADMIN — ASPIRIN 325 MG: 325 TABLET, COATED ORAL at 09:18

## 2021-04-29 RX ADMIN — DOXAZOSIN 2 MG: 2 TABLET ORAL at 21:08

## 2021-04-29 RX ADMIN — DOCUSATE SODIUM 50 MG AND SENNOSIDES 8.6 MG 1 TABLET: 8.6; 5 TABLET, FILM COATED ORAL at 21:08

## 2021-04-29 RX ADMIN — Medication 400 MG: at 09:19

## 2021-04-29 RX ADMIN — FOLIC ACID 1 MG: 1 TABLET ORAL at 09:18

## 2021-04-29 RX ADMIN — Medication 1000 UNITS: at 09:18

## 2021-04-29 RX ADMIN — SODIUM CHLORIDE: 9 INJECTION, SOLUTION INTRAVENOUS at 09:18

## 2021-04-29 RX ADMIN — DOCUSATE SODIUM 50 MG AND SENNOSIDES 8.6 MG 1 TABLET: 8.6; 5 TABLET, FILM COATED ORAL at 09:19

## 2021-04-29 RX ADMIN — ACETAMINOPHEN 650 MG: 325 TABLET ORAL at 14:15

## 2021-04-29 RX ADMIN — ACETAMINOPHEN 650 MG: 325 TABLET ORAL at 03:10

## 2021-04-29 RX ADMIN — EZETIMIBE 10 MG: 10 TABLET ORAL at 17:13

## 2021-04-29 RX ADMIN — CALCIUM CARBONATE 500 MG: 500 TABLET, CHEWABLE ORAL at 11:08

## 2021-04-29 RX ADMIN — POLYVINYL ALCOHOL 1 DROP: 14 SOLUTION/ DROPS OPHTHALMIC at 09:18

## 2021-04-29 RX ADMIN — CALCIUM CARBONATE 500 MG: 500 TABLET, CHEWABLE ORAL at 21:08

## 2021-04-29 ASSESSMENT — PAIN SCALES - GENERAL
PAINLEVEL_OUTOF10: 3

## 2021-04-29 ASSESSMENT — PAIN DESCRIPTION - PAIN TYPE: TYPE: SURGICAL PAIN

## 2021-04-29 ASSESSMENT — PAIN DESCRIPTION - LOCATION
LOCATION: KNEE

## 2021-04-29 NOTE — CARE COORDINATION
Social Work:    Notified Lucinda at [facility] of possible hold on discharge today due to potassium level, per charge [employer].     Electronically signed by JAMESON Silva on 4/29/2021 at 10:34 AM

## 2021-04-29 NOTE — PLAN OF CARE
Problem: Falls - Risk of:  Goal: Will remain free from falls  Description: Will remain free from falls  Outcome: Met This Shift     Problem: Mobility - Impaired:  Goal: Mobility will improve  Description: Mobility will improve  Outcome: Met This Shift     Problem: Pain - Acute:  Goal: Pain level will decrease  Description: Pain level will decrease  Outcome: Met This Shift

## 2021-04-29 NOTE — PROGRESS NOTES
Physical Therapy  Facility/Department: [address] ORTHO SURGERY  Daily Treatment Note  NAME: Vivian Chance  :   MRN: [id number]    Date of Service: 2021    Patient Diagnosis(es): The primary encounter diagnosis was Preop testing. A diagnosis of Primary osteoarthritis of left knee was also pertinent to this visit. has a past medical history of Acute kidney injury ([address] Acute renal failure syndrome ([address] Arthritis, Chronic kidney disease, stage 3 (moderate), Dry eye, GERD (gastroesophageal reflux disease), [place] (hard of hearing), Hypertension, Hypomagnesemia, Mixed hyperlipidemia, SOB (shortness of breath) on exertion, Tooth sensitivity, Uterine cancer ([address] and Vitamin D deficiency. has a past surgical history that includes Dilation and curettage of uterus (3/20/12); Hysterectomy (12); Breast surgery (4382,2850, ); lymph node biopsy (); INSERTION / REMOVAL / REPLACEMENT VENOUS ACCESS CATHETER (N/A, 2019); Appendectomy (); Tonsillectomy (child); Total knee arthroplasty (Right, 3/4/2020); Port Surgery (Left, 2021); and Total knee arthroplasty (Left, 2021). Referring Provider:  William Garzon MD     Evaluating PT:  Yoni Marshall PT, ROBERTO [id number]     Room #:  0161/0361-D  Diagnosis:  Left TKA  Precautions:  Fall risk, FWBAT left LE  Equipment Needs:  None. Pt reported owing a walker.     SUBJECTIVE:     Pt lives with  in a 1 story home with 1 stairs to enter and 0 rail. Bed and bath is on first floor. Pt ambulated with walker PTA.     OBJECTIVE:    Initial Evaluation  Date:  Treatment   Short Term/ Long Term   Goals   Was pt agreeable to Eval/treatment? yes  yes     Does pt have pain?  Right knee  right knee     Bed Mobility  Rolling: NT  Supine to sit: Min A  Sit to supine: NT  Scooting: Min A to sitting EOB  supine to sit:  SBA  Sit to supine:  SBA  Scooting:  SBA to sitting EOB SBA   Transfers Sit to stand: Min A  Stand to sit: Min A  Stand pivot: Min A with w/w  sit to stand:  SBA  Stand to sit:  SBA  Stand pivot:  SBA with ww SBA   Ambulation    15 feet and 100 feet with w/w Min A 150 feet and 60 feet with w/w SBA  200+ feet with w/w SBA    Stair negotiation: ascended and descended  NT  unable in this session. Pt on contact isolation 4 steps with 1 rails SBA   ROM Right LE:  Hip and ankle WFL, knee 0-80 degrees  Left LE:  WFL   Increase right knee ROM 0 - 90 degrees   Strength Right LE:  Grossly 3-/5  Left LE:  Grossly 4-/5   Increase LE strength by 1/2 mm grade. Balance Sitting EOB:  SBA  Dynamic Standing:  Min A with w/w   Sitting EOB:  independent  Dynamic Standing:  SBA with w/w   AM-PAC 6 Clicks 59/89  75/64        Therapeutic Exercises:  LAQs and heel slides while sitting EOB, ankle pumps, quad sets, glut sets, heel slides, hip abd/add, SAQs while supine in bed. Gave pt HEP. Patient education  Pt educated on PT objectives during treatment session, HEP, safety during mobility. Bed mobility to higher surface. Patient response to education:   Pt verbalized understanding Pt demonstrated skill Pt requires further education in this area   yes yes yes     ASSESSMENT:    Comments:  Pt found and left sitting up in the chair with call light in reach and  present. Treatment:  Patient practiced and was instructed in the following treatment:    Functional mobility and therapeutic exercises performed as documented above. No report of dizziness during functional mobility. Cueing required for hand placement during transfers. No LOB during ambulation. All pt's questions answered prior to end of treatment session. PLAN:    Patient is making good progress towards established goals. Will continue with current POC.      Time in  0957  Time out  1025    Total Treatment Time  28 minutes     CPT codes:    [x] Therapeutic activities 01240 15minutes  [x] Therapeutic exercises [id number] 13minutes      Becca Garcia, [address]

## 2021-04-29 NOTE — PROGRESS NOTES
Physical Therapy  Facility/Department: [address] ORTHO SURGERY  Daily Treatment Note  NAME: Luis Armando Celis  :   MRN: [id number]    Date of Service: 2021    Patient Diagnosis(es): The primary encounter diagnosis was Preop testing. A diagnosis of Primary osteoarthritis of left knee was also pertinent to this visit. has a past medical history of Acute kidney injury ([address] Acute renal failure syndrome ([address] Arthritis, Chronic kidney disease, stage 3 (moderate), Dry eye, GERD (gastroesophageal reflux disease), [place] (hard of hearing), Hypertension, Hypomagnesemia, Mixed hyperlipidemia, SOB (shortness of breath) on exertion, Tooth sensitivity, Uterine cancer ([address] and Vitamin D deficiency. has a past surgical history that includes Dilation and curettage of uterus (3/20/12); Hysterectomy (12); Breast surgery (3753,3486, ); lymph node biopsy (); INSERTION / REMOVAL / REPLACEMENT VENOUS ACCESS CATHETER (N/A, 2019); Appendectomy (); Tonsillectomy (child); Total knee arthroplasty (Right, 3/4/2020); Port Surgery (Left, 2021); and Total knee arthroplasty (Left, 2021). Referring Francisco Hall MD     Evaluating PT: Alfredo Cool PT, DPT [id number]     Room #:  [address] [place] [address]  Diagnosis:  Left TKA  Precautions:  Fall risk, FWBAT left LE  Equipment Needs:  None.  Pt reported owing a walker.     SUBJECTIVE:     Pt lives with  in a 1 story home with 1 stairs to enter and 0 rail.  Bed and bath is on first floor.  Pt ambulated with walker PTA.     OBJECTIVE:    Initial Evaluation  Date:  Treatment   pm Short Term/ Long Term   Goals   Was pt agreeable to Eval/treatment? yes  yes     Does pt have pain?  Right knee  right knee     Bed Mobility  Rolling: NT  Supine to sit: Min A  Sit to supine: NT  Scooting: Min A to sitting EOB NT SBA   Transfers Sit to stand: Min A  Stand to sit: Min A  Stand pivot: Min A with w/w  sit to stand:  Supervision  Stand to sit: Supervision  Stand pivot:  Supervision SBA   Ambulation    15 feet and 100 feet with w/w Min A 480 feet with w/w supervision  200+ feet with w/w SBA    Stair negotiation: ascended and descended  NT 4 steps with 2 rails CGA 4 steps with 1 rails SBA   ROM Right LE:  Hip and ankle WFL, knee 0-80 degrees  Left LE:  WFL   Increase right knee ROM 0 - 90 degrees   Strength Right LE:  Grossly 3-/5  Left LE:  Grossly 4-/5   Increase LE strength by 1/2 mm grade. Balance Sitting EOB:  SBA  Dynamic Standing:  Min A with w/w   Sitting EOB:  independent  Dynamic Standing:  SBA with w/w   AM-PAC 6 Clicks 28/04 22/93          Patient education  Pt educated on PT objectives during treatment session    Patient response to education:   Pt verbalized understanding Pt demonstrated skill Pt requires further education in this area    yes  yes no     ASSESSMENT:    Comments:  Pt found and left sitting up in the chair with call light in reach. Treatment:  Patient practiced and was instructed in the following treatment:    Functional mobility performed as documented above. No report of dizziness during functional mobility. No LOB during ambulation. Pt able to ambulate with reciprocal gait pattern. PLAN:    Patient is making good progress towards established goals. Will continue with current POC.      Time in  1338  Time out  1350    Total Treatment Time  12 minutes     CPT codes:    [x] Therapeutic activities 59662 12minutes  [] Therapeutic exercises 28374  minutes      Mark Nyhan, [address]

## 2021-04-29 NOTE — CONSULTS
Internal Medicine Consultation    Chief Complaint: Elective left knee arthroplasty  Primary Care Physician: Kenisha John DO  Reason for consultation: Post-op medical management    History of Present Illness  Nanda Handley is a [de-identified] y.o. female who presents for a left knee replacement. The surgery was 4/28/21. It well and there were no reported complications. The patient pain is controlled. The patient has worked with physical therapy. The plan is for the patient to go home with [address]. The patient is using the incentive spirometer. She has no other complaints at this time including no abdominal pain, nausea, vomiting, chest pain, dyspnea. There are no family or friends at bedside. The history is provided by the patient. She is felt to be a good historian.     Past Medical History:   Diagnosis Date    Acute kidney injury ([facility]     hx of    Acute renal failure syndrome ([address] 7/9/2020    Arthritis     Chronic kidney disease, stage 3 (moderate) 11/29/2019    dt Keytruda    Dry eye     GERD (gastroesophageal reflux disease)     [place] (hard of hearing)     aides x 2    Hypertension     Hypomagnesemia     hx of / takes Mag supplement    Mixed hyperlipidemia 5/24/2019    SOB (shortness of breath) on exertion     feels is dt after effect of Keytruda    Tooth sensitivity     Uterine cancer ([facility] 2012    metatstaic, treated with surgery, chemo x2 , radiation x 2, Julissa Gonzales therapy x 2,andAvastin therapy    Vitamin D deficiency        Past Surgical History:   Procedure Laterality Date    APPENDECTOMY  1950    BREAST SURGERY  5733,7271, 2002    benign  lumps    DILATION AND CURETTAGE OF UTERUS  3/20/12    HYSTERECTOMY  4/12/12    INSERTION / REMOVAL / REPLACEMENT VENOUS ACCESS CATHETER N/A 2/22/2019    MEDI PORT INSERTION performed by Lb Alvarado MD at [facility]  2012    pelvic nodes    PORT SURGERY Left 1/14/2021    REVISION OF MEDIPORT - LEFT performed by Kadi Negron MD at [facility] OR    TONSILLECTOMY  child    TOTAL KNEE ARTHROPLASTY Right 3/4/2020    RIGHT KNEE TOTAL ARTHROPLASTY ROBOTIC ANGIE performed by Radha Hernandez MD at [facility] Left 4/28/2021    LEFT TOTAL KNEE ROBOTIC ASSISTED ARTHROPLASTY performed by Radha Hernandez MD at [facility] OR       Family History  Family History   Family history unknown: Yes       Social History  Patient lives at home with her . Employment: retired  Illicit drug use- denies   TOBACCO:   reports that she has never smoked. She has never used smokeless tobacco.  ETOH:   reports no history of alcohol use. Home Medications  Prior to Admission medications    Medication Sig Start Date End Date Taking? Authorizing Provider   oxyCODONE (ROXICODONE) 5 MG immediate release tablet Take 1 tablet by mouth every 4 hours as needed for Pain for up to 7 days.  4/28/21 5/5/21 Yes Radha Hernandez MD   acetaminophen (TYLENOL) 325 MG tablet Take 2 tablets by mouth every 6 hours 4/28/21  Yes Radha Hernandez MD   aspirin 325 MG EC tablet Take 1 tablet by mouth daily 4/29/21  Yes Radha Hernandez MD   sennosides-docusate sodium (SENOKOT-S) 8.6-50 MG tablet Take 1 tablet by mouth 2 times daily 4/28/21  Yes Radha Hernandez MD   ezetimibe (ZETIA) 10 MG tablet Take 1 tablet by mouth Daily with supper 4/8/21  Yes Khanh Pinon, DO   folic acid (FOLVITE) 1 MG tablet Take 1 tablet by mouth daily OK TO TAKE  Patient taking differently: Take 1 mg by mouth daily  4/8/21  Yes Khanh Pinon, DO   Polyethyl Glycol-Propyl Glycol (SYSTANE OP) Apply to eye 2 times daily    Yes Historical Provider, MD   Sod Fluoride-Potassium Nitrate (PREVIDENT 5000 SENSITIVE) 1.1-5 % PSTE 2 times daily   Yes Historical Provider, MD   Magnesium Oxide 500 MG CAPS 1 capsule daily    Yes Historical Provider, MD   doxazosin (CARDURA) 2 MG tablet Take 2 mg by mouth nightly   Yes Historical Provider, MD   hyoscyamine (ANASPAZ;LEVSIN) 125 MCG tablet Take 0.125 mg by mouth daily Takes prior to meals unintentional weight change  Hematologic/Lymphatic: lymphadenopathy, easy bruising, easy bleeding   Allergic/Immunologic: recurrent infections      Objective  VITALS:  BP (!) 108/57   Pulse 81   Temp 97.1 °F (36.2 °C) (Temporal)   Resp 16   Ht 5' 1\" (1.549 m)   Wt 185 lb (83.9 kg)   LMP  (LMP Unknown)   SpO2 97%   BMI 34.96 kg/m²     Physical Exam:  General: awake, alert, oriented to person, place, time, and purpose, appears stated age, cooperative, no acute distress, pleasant, appropriate mood  Eyes: conjunctivae/corneas clear, sclera non icteric, EOMI  Ears: no obvious scars, no lesions, no masses, hearing intact  Mouth: mucous membranes moist, no obvious oral sores  Head: normocephalic, atraumatic  Neck: no JVD, no adenopathy, no thyromegaly, neck is supple, trachea is midline  Back: ROM normal, no CVA tenderness.   Chest: no pain on palpation  Lungs: clear to auscultation bilaterally, without rhonchi, crackle, wheezing, or rale, no retractions or use of accessory muscles  Heart: regular rate and regular rhythm, no murmur, normal S1, S2  Abdomen: soft, non-tender; bowel sounds normal; no masses, no organomegaly  Extremities: no lower extremity edema, post-surgical changes to the left knee, no cyanosis, no clubbing, 2+ pedal pulses palpated  Skin: normal color, normal texture, normal turgor, no rashes, no lesions  Neurologic:5/5 muscle strength throughout, normal muscle tone throughout, face symmetric, hearing intact, tongue midline, speech appropriate without slurring, sensation to fine touch intact in upper and lower extremities    Labs-   Lab Results   Component Value Date    WBC 6.0 04/29/2021    HGB 12.7 04/29/2021    HCT 39.1 04/29/2021     04/29/2021     04/29/2021    K 5.3 (H) 04/29/2021    CL 98 04/29/2021    CREATININE 2.0 (H) 04/29/2021    BUN 53 (H) 04/29/2021    CO2 26 04/29/2021    GLUCOSE 163 (H) 04/29/2021    ALT 16 10/02/2019    AST 13 10/02/2019    INR 1.0 11/04/2014     Lab Results   Component Value Date    TROPONINI <0.01 09/30/2019       Recent Radiological Studies:  Xr Knee Left (1-2 Views)    Result Date: 4/28/2021  EXAMINATION: TWO XRAY VIEWS OF THE LEFT KNEE 4/28/2021 11:47 am COMPARISON: CT left the 14 April 2021 HISTORY: ORDERING SYSTEM PROVIDED HISTORY: Post op TECHNOLOGIST PROVIDED HISTORY: Of operative side while in recovery room. Reason for exam:->Post op FINDINGS: Anatomically aligned recently placed left knee arthroplasty with no unexpected findings. Anatomically aligned left knee arthroplasty with no unexpected findings. Ct Knee Left Wo Contrast    Result Date: 4/15/2021  EXAMINATION: CT OF THE LEFT KNEE WITHOUT CONTRAST, 4/14/2021 10:06 am TECHNIQUE: CT of the left knee was performed without the administration of intravenous contrast.  Additional images were obtained of the left hip and left ankle. Dose modulation, iterative reconstruction, and/or weight based adjustment of the mA/kV was utilized to reduce the radiation dose to as low as reasonably achievable. COMPARISON: None HISTORY ORDERING SYSTEM PROVIDED HISTORY:  Osteoarthritis of left knee, unspecified osteoarthritis type. FINDINGS: Left Hip:  Mild sigmoid diverticulosis. Left hip muscle bulk is grossly maintained. Left hip joint is maintained. Mild bilateral sacroiliac degenerative changes. Left Knee: Moderate-severe medial compartment osteoarthritis. Mild patellofemoral osteoarthritis. Small joint effusion. No lytic or blastic lesion. Left Ankle:  Superficial soft tissue edema. Limited images of the left ankle demonstrate no significant abnormality. Preoperative planning CT. Medial compartment osteoarthritis in the knee.        Assessment  Patient Active Problem List    Diagnosis Date Noted    Total knee replacement status, left 04/28/2021     Priority: High    Primary osteoarthritis of left knee 04/28/2021     Priority: Medium    Proteinuria 03/17/2021    Unspecified hypertensive 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 through stage IV, or unspecified 07/09/2020    Stage 3b chronic kidney disease 07/09/2020    S/P total knee arthroplasty, right 03/05/2020    Vitamin D deficiency     GERD (gastroesophageal reflux disease)     Hypertension     Arthritis     Mixed hyperlipidemia 05/24/2019    Malignant neoplasm of uterus ([facility] 11/21/2014    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 IVF. Follow labs  Continue home medications except pain meds  Please see orders for further management and care.  for discharge planning  Discharge plan: Likely home with Danette Macias soon-- likely no DC home today considering K and sCr    Rebel Stafford DO  4/29/2021  8:43 AM    Hospital Cell (always forwarded to covering physician): [phone]. I can be reached through [facility] as well. NOTE:  This report was transcribed using voice recognition software. Every effort was made to ensure accuracy; however, inadvertent computerized transcription errors may be present.

## 2021-04-29 NOTE — PLAN OF CARE
Problem: Falls - Risk of:  Goal: Will remain free from falls  Description: Will remain free from falls  Outcome: Ongoing  Goal: Absence of physical injury  Description: Absence of physical injury  Outcome: Ongoing     Problem: Musculor/Skeletal Functional Status  Goal: Highest potential functional level  Outcome: Ongoing  Goal: Absence of falls  Outcome: Ongoing     Problem: Discharge Planning:  Goal: Discharged to appropriate level of care  Description: Discharged to appropriate level of care  Outcome: Ongoing     Problem: Mobility - Impaired:  Goal: Mobility will improve  Description: Mobility will improve  Outcome: Ongoing     Problem: Infection - Surgical Site:  Goal: Will show no infection signs and symptoms  Description: Will show no infection signs and symptoms  Outcome: Ongoing     Problem: Pain - Acute:  Goal: Pain level will decrease  Description: Pain level will decrease  Outcome: Ongoing

## 2021-04-29 NOTE — PROGRESS NOTES
Notified Dr. Dena Tejada of [facility] redraw, no new orders at this time.

## 2021-04-29 NOTE — PROGRESS NOTES
veronica x7-8 min with fair plus balance during self care tasks       Comments:  Pt pleasant and cooperative. Now in isolation. Unable to leave room for shower transfers. Will address tomorrow if pt here. If discharged, pt recommended to complete shower transfer with home health services. Pt remained in the chair at end of the session. Education/treatment:  ADL retraining with facilitation of movement to increase self care skills. Therapeutic activity to address balance and endurance for ADL and transfers. Pt education of walker safety, transfer safety, and home safety. · Pt has made  progress towards set goals. Plan of Care: 2-5 days/week for 1-2 weeks PRN   [x]? ?ADL retraining/adaptive techniques and AE recommendations to increase functional independence within precautions                    [x]? ? Energy conservation techniques to improve tolerance for ADL/IADLs  [x]? ? Functional transfer/mobility training/DME recommendations for increased independence, safety and fall prevention         [x]? ?Patient/family education to increase safety and functional independence during daily routine          [x]? ? Environmental modifications for safe mobility and completion of ADLs                             []? ? Cognitive retraining to improve problem solving skills & safe participation in ADLs/IADLs     []? ?Sensory re-education techniques to improve extremity awareness, maintain skin integrity and improve hand function                             []? ? Visual/Perceptual retraining to improve body awareness and safety during transfers and ADLs  []? ? Splinting/positioning needs to maintain joint/skin integrity and contracture prevention  [x]? ? Therapeutic activity to improve functional performance during ADLs                                        [x]? ? Therapeutic exercise to improve tolerance and functional strength for ADLs   [x]? ? Balance retraining/tolerance tasks for facilitation of postural control with dynamic challenges during ADLs  []? ?Neuromuscular re-education to facilitate righting/equilibrium reactions, midline orientation, scapular stability/mobility, normalize muscle tone and facilitate active functional movement                        []? ? Delirium prevention/treatment    [x]? Positioning to improve functional independence and decrease risk of skin breakdown  []? ?Other:        Time In: 8:31  Time Out: 9:21      Min Units   Therapeutic Ex 94539     Therapeutic Activities 94757 69 0   ADL/Self Care 39513 40 2   Orthotic Management 02700     Neuro Re-Ed 09482     Non-Billable Time     TOTAL TIMED TREATMENT [facility] HAYDEN/L 22491

## 2021-04-30 PROBLEM — M19.90 OSTEOARTHRITIS: Status: ACTIVE | Noted: 2021-04-30

## 2021-04-30 LAB
ANION GAP SERPL CALCULATED.3IONS-SCNC: 13 MMOL/L (ref 7–16)
BUN BLDV-MCNC: 58 MG/DL (ref 6–23)
CALCIUM SERPL-MCNC: 8.3 MG/DL (ref 8.6–10.2)
CHLORIDE BLD-SCNC: 101 MMOL/L (ref 98–107)
CO2: 20 MMOL/L (ref 22–29)
CREAT SERPL-MCNC: 1.8 MG/DL (ref 0.5–1)
GFR AFRICAN AMERICAN: 33
GFR NON-AFRICAN AMERICAN: 27 ML/MIN/1.73
GLUCOSE BLD-MCNC: 108 MG/DL (ref 74–99)
HCT VFR BLD CALC: 34.8 % (ref 34–48)
HEMOGLOBIN: 11.3 G/DL (ref 11.5–15.5)
MCH RBC QN AUTO: 31.4 PG (ref 26–35)
MCHC RBC AUTO-ENTMCNC: 32.5 % (ref 32–34.5)
MCV RBC AUTO: 96.7 FL (ref 80–99.9)
PDW BLD-RTO: 14 FL (ref 11.5–15)
PLATELET # BLD: 122 E9/L (ref 130–450)
PMV BLD AUTO: 10.5 FL (ref 7–12)
POTASSIUM REFLEX MAGNESIUM: 4.6 MMOL/L (ref 3.5–5)
RBC # BLD: 3.6 E12/L (ref 3.5–5.5)
SODIUM BLD-SCNC: 134 MMOL/L (ref 132–146)
WBC # BLD: 6.5 E9/L (ref 4.5–11.5)

## 2021-04-30 PROCEDURE — 97110 THERAPEUTIC EXERCISES: CPT

## 2021-04-30 PROCEDURE — G0378 HOSPITAL OBSERVATION PER HR: HCPCS

## 2021-04-30 PROCEDURE — 6360000002 HC RX W HCPCS: Performed by: ORTHOPAEDIC SURGERY

## 2021-04-30 PROCEDURE — 1200000000 HC SEMI PRIVATE

## 2021-04-30 PROCEDURE — 2580000003 HC RX 258: Performed by: ORTHOPAEDIC SURGERY

## 2021-04-30 PROCEDURE — 85027 COMPLETE CBC AUTOMATED: CPT

## 2021-04-30 PROCEDURE — 96375 TX/PRO/DX INJ NEW DRUG ADDON: CPT

## 2021-04-30 PROCEDURE — 80048 BASIC METABOLIC PNL TOTAL CA: CPT

## 2021-04-30 PROCEDURE — 6370000000 HC RX 637 (ALT 250 FOR IP): Performed by: ORTHOPAEDIC SURGERY

## 2021-04-30 PROCEDURE — 6370000000 HC RX 637 (ALT 250 FOR IP): Performed by: PHYSICIAN ASSISTANT

## 2021-04-30 PROCEDURE — 36415 COLL VENOUS BLD VENIPUNCTURE: CPT

## 2021-04-30 PROCEDURE — 97530 THERAPEUTIC ACTIVITIES: CPT

## 2021-04-30 PROCEDURE — 97535 SELF CARE MNGMENT TRAINING: CPT

## 2021-04-30 RX ADMIN — Medication 400 MG: at 09:43

## 2021-04-30 RX ADMIN — POLYVINYL ALCOHOL 1 DROP: 14 SOLUTION/ DROPS OPHTHALMIC at 21:10

## 2021-04-30 RX ADMIN — HYOSCYAMINE SULFATE 0.12 MG: 0.12 TABLET ORAL at 19:12

## 2021-04-30 RX ADMIN — ACETAMINOPHEN 650 MG: 325 TABLET ORAL at 09:43

## 2021-04-30 RX ADMIN — Medication 1000 UNITS: at 09:43

## 2021-04-30 RX ADMIN — ACETAMINOPHEN 650 MG: 325 TABLET ORAL at 14:20

## 2021-04-30 RX ADMIN — CALCIUM CARBONATE 500 MG: 500 TABLET, CHEWABLE ORAL at 14:21

## 2021-04-30 RX ADMIN — ACETAMINOPHEN 650 MG: 325 TABLET ORAL at 03:42

## 2021-04-30 RX ADMIN — OXYCODONE 5 MG: 5 TABLET ORAL at 21:10

## 2021-04-30 RX ADMIN — ONDANSETRON 4 MG: 2 INJECTION INTRAMUSCULAR; INTRAVENOUS at 08:18

## 2021-04-30 RX ADMIN — POLYVINYL ALCOHOL 1 DROP: 14 SOLUTION/ DROPS OPHTHALMIC at 09:43

## 2021-04-30 RX ADMIN — ACETAMINOPHEN 650 MG: 325 TABLET ORAL at 21:10

## 2021-04-30 RX ADMIN — CYANOCOBALAMIN TAB 1000 MCG 1000 MCG: 1000 TAB at 09:44

## 2021-04-30 RX ADMIN — SODIUM CHLORIDE, PRESERVATIVE FREE 10 ML: 5 INJECTION INTRAVENOUS at 08:18

## 2021-04-30 RX ADMIN — DOXAZOSIN 2 MG: 2 TABLET ORAL at 21:10

## 2021-04-30 RX ADMIN — SODIUM CHLORIDE, PRESERVATIVE FREE 10 ML: 5 INJECTION INTRAVENOUS at 21:10

## 2021-04-30 RX ADMIN — OXYCODONE 5 MG: 5 TABLET ORAL at 00:17

## 2021-04-30 RX ADMIN — ASPIRIN 325 MG: 325 TABLET, COATED ORAL at 09:44

## 2021-04-30 RX ADMIN — DOCUSATE SODIUM 50 MG AND SENNOSIDES 8.6 MG 1 TABLET: 8.6; 5 TABLET, FILM COATED ORAL at 09:43

## 2021-04-30 RX ADMIN — FOLIC ACID 1 MG: 1 TABLET ORAL at 09:44

## 2021-04-30 ASSESSMENT — PAIN DESCRIPTION - ORIENTATION
ORIENTATION: RIGHT
ORIENTATION: LEFT

## 2021-04-30 ASSESSMENT — ENCOUNTER SYMPTOMS
SHORTNESS OF BREATH: 0
VOMITING: 0
NAUSEA: 0
DIARRHEA: 0
COUGH: 0

## 2021-04-30 ASSESSMENT — PAIN SCALES - GENERAL
PAINLEVEL_OUTOF10: 6
PAINLEVEL_OUTOF10: 2
PAINLEVEL_OUTOF10: 5
PAINLEVEL_OUTOF10: 0

## 2021-04-30 ASSESSMENT — PAIN DESCRIPTION - PROGRESSION: CLINICAL_PROGRESSION: GRADUALLY WORSENING

## 2021-04-30 ASSESSMENT — PAIN DESCRIPTION - PAIN TYPE
TYPE: SURGICAL PAIN
TYPE: SURGICAL PAIN;ACUTE PAIN

## 2021-04-30 ASSESSMENT — PAIN DESCRIPTION - FREQUENCY: FREQUENCY: CONTINUOUS

## 2021-04-30 ASSESSMENT — PAIN DESCRIPTION - DESCRIPTORS: DESCRIPTORS: ACHING;DISCOMFORT

## 2021-04-30 ASSESSMENT — PAIN - FUNCTIONAL ASSESSMENT: PAIN_FUNCTIONAL_ASSESSMENT: PREVENTS OR INTERFERES SOME ACTIVE ACTIVITIES AND ADLS

## 2021-04-30 NOTE — PROGRESS NOTES
sounds are normal.   There is no abdominal tenderness. There is no rebound tenderness. There is no guarding. Extremities: There is no dependent edema. (Left knee bandaged, clean and dry. )  Skin:  Warm and dry. Labs/Imaging/Diagnostics    Labs:  CBC:  Recent Labs     04/29/21  0428 04/30/21  0255   WBC 6.0 6.5   RBC 4.07 3.60   HGB 12.7 11.3*   HCT 39.1 34.8   MCV 96.1 96.7   RDW 14.0 14.0    122*     CHEMISTRIES:  Recent Labs     04/29/21  0428 04/29/21  1506 04/30/21  0255    131* 134   K 5.3* 5.1* 4.6   CL 98 97* 101   CO2 26 18* 20*   BUN 53* 60* 58*   CREATININE 2.0* 2.0* 1.8*   GLUCOSE 163* 159* 108*     PT/INR:No results for input(s): PROTIME, INR in the last 72 hours. APTT:No results for input(s): APTT in the last 72 hours. LIVER PROFILE:No results for input(s): AST, ALT, BILIDIR, BILITOT, ALKPHOS in the last 72 hours. Imaging Last 24 Hours:  Xr Knee Left (1-2 Views)    Result Date: 4/28/2021  EXAMINATION: TWO XRAY VIEWS OF THE LEFT KNEE 4/28/2021 11:47 am COMPARISON: CT left the 14 April 2021 HISTORY: ORDERING SYSTEM PROVIDED HISTORY: Post op TECHNOLOGIST PROVIDED HISTORY: Of operative side while in recovery room. Reason for exam:->Post op FINDINGS: Anatomically aligned recently placed left knee arthroplasty with no unexpected findings. Anatomically aligned left knee arthroplasty with no unexpected findings.      Assessment//Plan           Hospital Problems           Last Modified POA    Mixed hyperlipidemia 4/28/2021 Yes    Vitamin D deficiency 4/28/2021 Yes    Uterine cancer ([address] 4/28/2021 Yes    Overview Signed 3/5/2020  6:17 AM by DO rosario Mclean, treated with surgery, chemo x2 , radiation x 2, Ketroda therapy x 2,andAvastin therapy         GERD (gastroesophageal reflux disease) 4/28/2021 Yes    Stage 3b chronic kidney disease 4/28/2021 Yes    Overview Signed 3/15/2021  4:33 PM by Cristina Stevens, DO     Update for Diagnosis Load    Last Assessment & Plan:   Creatinine 1.36 mg/dL and GFR 37 mL/min. Renal function stable. Will continue to monitor. Primary osteoarthritis of left knee 4/28/2021 Yes    Total knee replacement status, left 4/28/2021 Yes        Assessment & Plan  1. Left Knee OA s/p TKA   2. WALI on CKD stage III  3. Hypertension  4. Constipation     Continue on pain control. May need enema if still unable to move bowels today. WALI improving with IVF. Would continue.       Toño Simmons DO    Electronically signed by Toño Simmons DO on 4/30/21 at 6:52 AM EDT

## 2021-04-30 NOTE — CARE COORDINATION
Updated discharge plan of care. POD#2. Continue to monitor labs, plan for discharge tomorrow.  [facility] following-[facility]

## 2021-04-30 NOTE — PROGRESS NOTES
Supervision  Stand pivot:  Supervision SBA   Ambulation    15 feet and 100 feet with w/w Min A 220 feet with w/w supervision  200+ feet with w/w SBA    Stair negotiation: ascended and descended  NT 6\" platform step using [employer] for support Min A for balance 4 steps with 1 rails SBA   ROM Right LE:  Hip and ankle WFL, knee 0-80 degrees  Left LE:  WFL   Increase right knee ROM 0 - 90 degrees   Strength Right LE:  Grossly 3-/5  Left LE:  Grossly 4-/5   Increase LE strength by 1/2 mm grade. Balance Sitting EOB:  SBA  Dynamic Standing:  Min A with w/w   Sitting EOB:  independent  Dynamic Standing:  SBA with w/w   AM-PAC 6 Clicks 30/05  70/41            Pt is alert and able to follow instruction, states with concerns about her diarrhea, states does not want to walk a long distance far from her room. Balance: fair dynamic using [employer] for support    Pt performed therapeutic exercise of the following: seated B ankle pumps x 40, L LE quad sets x 30 AROM; L LE towel slides AROM x 50    Patient education  Pt was educated on exercise promoting circulation and strengthening, platform step negotiation using [employer] for support    Patient response to education:   Pt verbalized understanding Pt demonstrated skill Pt requires further education in this area   yes With instruction yes     ASSESSMENT:   Comments: Pt found in a bedside chair, agreed to rx, states with nausea and a small emesis earlier today. Exercise performed in the chair. Gait to the hallway with Pt dictating direction and distance, susu slow and consistent, step through reciprocal pattern used, no loss of balance or shortness of breath noted. Pt fatigued after activity, states not as strong as yesterday.     Treatment: Pt practiced and was instructed in the following treatment: exercise, step negotiation    Pt was left in a bedside chair as found with call light in reach    Time in 0823   Time out 0850   Total Treatment Time 27 minutes   CPT codes:     Therapeutic activities 49648 14 minutes   Therapeutic exercises 85172 13 minutes       Pt is making good progress toward established Physical Therapy goals as per functional mobility performed and exercise participation. Continue with physical therapy current plan of care.     [facility] PTA   License Number: [id number]

## 2021-04-30 NOTE — PLAN OF CARE
Problem: Discharge Planning:  Goal: Discharged to appropriate level of care  Description: Discharged to appropriate level of care  Outcome: Ongoing     Problem: Pain - Acute:  Goal: Pain level will decrease  Description: Pain level will decrease  4/29/2021 2316 by Sylvia Breaux RN  Outcome: Ongoing  4/29/2021 1757 by Edison Reyes RN  Outcome: Met This Shift     Problem: Falls - Risk of:  Goal: Will remain free from falls  Description: Will remain free from falls  4/29/2021 2316 by Sylvia Breaux RN  Outcome: Met This Shift  4/29/2021 1757 by Edison Reyes RN  Outcome: Met This Shift  Goal: Absence of physical injury  Description: Absence of physical injury  Outcome: Met This Shift     Problem: Musculor/Skeletal Functional Status  Goal: Absence of falls  Outcome: Met This Shift     Problem: Mobility - Impaired:  Goal: Mobility will improve  Description: Mobility will improve  4/29/2021 2316 by Sylvia Breaux RN  Outcome: Met This Shift  4/29/2021 1757 by Edison Reyes RN  Outcome: Met This Shift     Problem: Infection - Surgical Site:  Goal: Will show no infection signs and symptoms  Description: Will show no infection signs and symptoms  Outcome: Met This Shift     Problem: Skin Integrity:  Goal: Will show no infection signs and symptoms  Description: Will show no infection signs and symptoms  Outcome: Met This Shift  Goal: Absence of new skin breakdown  Description: Absence of new skin breakdown  Outcome: Met This Shift

## 2021-04-30 NOTE — PROGRESS NOTES
Occupational Therapy  OT BEDSIDE TREATMENT NOTE      Date:2021  Patient Name: Itzel Aponte  MRN: [id number]  : 1940  Room: [address] [place] [address]-A     Referring Provider: Korey Gomez MD     Evaluating OT: Raquel Ivy OTR/L [id number]     AM-PAC Daily Activity Raw Score:      Recommended Adaptive Equipment: continue to assess      Diagnosis: L knee OA   Surgery: 21 L TKA   Pertinent Medical History: CKD, HTN   Precautions:  Falls, FWBAT L LE, isolation      Home Living: Pt lives with significant other in a single story home. Bathroom setup: walk in shower with grab bar and seat, standard commode      Prior Level of Function: Independent with ADLs, significant other assists with IADLs; completed functional mobility with cane or [employer]     Pain Level: Pt reports mild pain.      Cognition: Alert and oriented. Functional Assessment:    Initial Eval Status  Date: 21 Treatment session:  Short Term Goals      Feeding Independent       Grooming SBA  Standing sink level for hand hygiene Setup while standing at the sink.   Independent   UB Dressing Min A  Donning gown as robe Setup   Independent   LB Dressing Min A  Donantwon brief Pt has long shoehorn at home. Will have assist with emily hose.   Mod I    Bathing Mod A  walk in shower transfer with instruction of technique for safety. CGA during transfer. Recommended pt use shower chair. Mod I   Toileting Min A  Use of grab bar for support in transfer  Assist in brief management for safety  Able to manage jovi care  Supervision  Mod I   Bed Mobility  Supine to sit: Min A       Functional Transfers STS: Min A Supervision    Mod I   Functional Mobility Min A with [employer]  Hallway distance  Supervision using w/w  Mod I during ADLs   Balance Sitting: fair plus     Standing: fair minus at [employer]      Activity Tolerance Fair minus  Slow pace  fair   Pt reports having a bad morning.    standing veronica x7-8 min with fair plus balance during self care tasks Comments:  Pt pleasant and cooperative. States she had stomach cramps all night and diarrhea this AM.  Limited tolerance for activity. Pt has AE for ADL and no new DME or AE needs at this time. Pt without any questions regarding home safety or ADL. Remained in chair at end of the session and student nurse present. Education/treatment:  ADL retraining with facilitation of movement to increase self care skills. Therapeutic activity to address balance and endurance for ADL and transfers. Pt education of walker safety, transfer safety, and home safety. · Pt has made  progress towards set goals. Plan of Care: 2-5 days/week for 1-2 weeks PRN   [x]? ?ADL retraining/adaptive techniques and AE recommendations to increase functional independence within precautions                    [x]? ? Energy conservation techniques to improve tolerance for ADL/IADLs  [x]? ? Functional transfer/mobility training/DME recommendations for increased independence, safety and fall prevention         [x]? ?Patient/family education to increase safety and functional independence during daily routine          [x]? ? Environmental modifications for safe mobility and completion of ADLs                             []? ? Cognitive retraining to improve problem solving skills & safe participation in ADLs/IADLs     []? ?Sensory re-education techniques to improve extremity awareness, maintain skin integrity and improve hand function                             []? ? Visual/Perceptual retraining to improve body awareness and safety during transfers and ADLs  []? ? Splinting/positioning needs to maintain joint/skin integrity and contracture prevention  [x]? ? Therapeutic activity to improve functional performance during ADLs                                        [x]? ? Therapeutic exercise to improve tolerance and functional strength for ADLs   [x]? ? Balance retraining/tolerance tasks for facilitation of postural control with dynamic challenges during

## 2021-04-30 NOTE — PROGRESS NOTES
Physical Therapy  Facility/Department: [address] ORTHO SURGERY  Daily Treatment Note  NAME: Brianna Santiago  : [birth year]  MRN: [id number]    Date of Service: 2021    Patient Diagnosis(es): The primary encounter diagnosis was Preop testing. A diagnosis of Primary osteoarthritis of left knee was also pertinent to this visit. has a past medical history of Acute kidney injury ([address] Acute renal failure syndrome ([address] Arthritis, Chronic kidney disease, stage 3 (moderate), Dry eye, GERD (gastroesophageal reflux disease), [place] (hard of hearing), Hypertension, Hypomagnesemia, Mixed hyperlipidemia, SOB (shortness of breath) on exertion, Tooth sensitivity, Uterine cancer ([address] and Vitamin D deficiency. has a past surgical history that includes Dilation and curettage of uterus (3/20/12); Hysterectomy (12); Breast surgery (6667,1550, ); lymph node biopsy (); INSERTION / REMOVAL / REPLACEMENT VENOUS ACCESS CATHETER (N/A, 2019); Appendectomy (1950); Tonsillectomy (child); Total knee arthroplasty (Right, 3/4/2020); Port Surgery (Left, 2021); and Total knee arthroplasty (Left, 2021). Referring Roxane Rodriguez MD     Evaluating PT: Rosalina Diego PT, DPT [id number]     Room #:  [address] [place] [address]-A  Diagnosis:  Left TKA  Precautions:  Fall risk, FWBAT left LE  Equipment Needs:  None.  Pt reported owing a walker.     SUBJECTIVE:     Pt lives with  in a 1 story home with 1 stairs to enter and 0 rail.  Bed and bath is on first floor.  Pt ambulated with walker PTA.     OBJECTIVE:    Initial Evaluation  Date:  Treatment  21 PM Short Term/ Long Term   Goals   Was pt agreeable to Eval/treatment? yes  yes     Does pt have pain?  Right knee  right knee soreness     Bed Mobility  Rolling: NT  Supine to sit: Min A  Sit to supine: NT  Scooting: Min A to sitting EOB Sit to supine: Min A L LE support SBA   Transfers Sit to stand: Min A  Stand to sit: Min A  Stand pivot: Min A with w/w  Sit to stand:  Supervision  Stand to sit:  Supervision  Stand pivot:  Supervision SBA   Ambulation    15 feet and 100 feet with w/w Min A 200 feet and 120 feet x 1 each with w/w supervision  200+ feet with w/w SBA    Stair negotiation: ascended and descended  NT 4 stairs x 2 with B rails for support SBA, step to pattern used. 4 steps with 1 rails SBA   ROM Right LE:  Hip and ankle WFL, knee 0-80 degrees  Left LE:  WFL   Increase right knee ROM 0 - 90 degrees   Strength Right LE:  Grossly 3-/5  Left LE:  Grossly 4-/5   Increase LE strength by 1/2 mm grade. Balance Sitting EOB:  SBA  Dynamic Standing:  Min A with w/w   Sitting EOB:  independent  Dynamic Standing:  SBA with w/w   AM-PAC 6 Clicks 86/36  64/90            Pt is alert and able to follow instruction, states feels better this afternoon compared to AM, states wants to participate then go back to bed  Balance: fair dynamic using [employer] for support    Pt performed therapeutic exercise of the following: supine B ankle pumps x 30, B quad/glut sets x 30 AROM; L LE heel slides AAROM 20 reps x 2    Patient education  Pt was educated on exercise promoting circulation and strengthening, stair negotiation promoting sequence, assistance required for bed mobility  Patient response to education:   Pt verbalized understanding Pt demonstrated skill Pt requires further education in this area   yes With instruction yes     ASSESSMENT:   Comments: Pt found seated EOB, agreed to rx, states no nausea. Gait to the hallway, susu slow and consistent, step through reciprocal pattern used, no loss of balance or shortness of breath noted. Stairs performed with minimal difficulty. Pt then assisted back to bed, exercise performed.     Treatment: Pt practiced and was instructed in the following treatment: exercise, stair negotiation    Pt was left in bed with call light in reach    Time in 1315   Time out 1345  Total Treatment Time 30 minutes   CPT codes:     Therapeutic activities 06715 15 minutes   Therapeutic exercises 30803 15 minutes       Pt is making good progress toward established Physical Therapy goals as per functional mobility performed and exercise participation. Pt displays functional mobility needed to go home with family assistance when medically able. Pt states has a Daughter coming from out of town to stay with her. Continue with physical therapy current plan of care.     Shantal Bhardwaj PTA   License Number: [id number]

## 2021-04-30 NOTE — PROGRESS NOTES
Dr. Anuj Zelaya returned call, notified of +BM and relief of bloating sensation. States to keep IFV running, hold DC for today, and check Cr in AM. Should be able to DC tomorrow.

## 2021-05-01 VITALS
HEART RATE: 86 BPM | SYSTOLIC BLOOD PRESSURE: 121 MMHG | WEIGHT: 176.6 LBS | RESPIRATION RATE: 16 BRPM | OXYGEN SATURATION: 99 % | HEIGHT: 61 IN | TEMPERATURE: 97.2 F | DIASTOLIC BLOOD PRESSURE: 68 MMHG | BODY MASS INDEX: 33.34 KG/M2

## 2021-05-01 LAB
ANION GAP SERPL CALCULATED.3IONS-SCNC: 9 MMOL/L (ref 7–16)
BUN BLDV-MCNC: 58 MG/DL (ref 6–23)
CALCIUM SERPL-MCNC: 8.1 MG/DL (ref 8.6–10.2)
CHLORIDE BLD-SCNC: 104 MMOL/L (ref 98–107)
CO2: 21 MMOL/L (ref 22–29)
CREAT SERPL-MCNC: 1.9 MG/DL (ref 0.5–1)
GFR AFRICAN AMERICAN: 31
GFR NON-AFRICAN AMERICAN: 25 ML/MIN/1.73
GLUCOSE BLD-MCNC: 89 MG/DL (ref 74–99)
HCT VFR BLD CALC: 33.4 % (ref 34–48)
HEMOGLOBIN: 10.7 G/DL (ref 11.5–15.5)
MCH RBC QN AUTO: 31.7 PG (ref 26–35)
MCHC RBC AUTO-ENTMCNC: 32 % (ref 32–34.5)
MCV RBC AUTO: 98.8 FL (ref 80–99.9)
PDW BLD-RTO: 14.3 FL (ref 11.5–15)
PLATELET # BLD: 103 E9/L (ref 130–450)
PMV BLD AUTO: 10.2 FL (ref 7–12)
POTASSIUM REFLEX MAGNESIUM: 4.6 MMOL/L (ref 3.5–5)
RBC # BLD: 3.38 E12/L (ref 3.5–5.5)
SODIUM BLD-SCNC: 134 MMOL/L (ref 132–146)
WBC # BLD: 3 E9/L (ref 4.5–11.5)

## 2021-05-01 PROCEDURE — 36415 COLL VENOUS BLD VENIPUNCTURE: CPT

## 2021-05-01 PROCEDURE — 97116 GAIT TRAINING THERAPY: CPT

## 2021-05-01 PROCEDURE — 6370000000 HC RX 637 (ALT 250 FOR IP): Performed by: ORTHOPAEDIC SURGERY

## 2021-05-01 PROCEDURE — 80048 BASIC METABOLIC PNL TOTAL CA: CPT

## 2021-05-01 PROCEDURE — 97110 THERAPEUTIC EXERCISES: CPT

## 2021-05-01 PROCEDURE — 2580000003 HC RX 258: Performed by: ORTHOPAEDIC SURGERY

## 2021-05-01 PROCEDURE — 85027 COMPLETE CBC AUTOMATED: CPT

## 2021-05-01 PROCEDURE — 97530 THERAPEUTIC ACTIVITIES: CPT

## 2021-05-01 RX ADMIN — ACETAMINOPHEN 650 MG: 325 TABLET ORAL at 09:20

## 2021-05-01 RX ADMIN — ACETAMINOPHEN 650 MG: 325 TABLET ORAL at 03:12

## 2021-05-01 RX ADMIN — ASPIRIN 325 MG: 325 TABLET, COATED ORAL at 09:20

## 2021-05-01 RX ADMIN — FOLIC ACID 1 MG: 1 TABLET ORAL at 09:20

## 2021-05-01 RX ADMIN — OXYCODONE 10 MG: 5 TABLET ORAL at 10:35

## 2021-05-01 RX ADMIN — OXYCODONE 10 MG: 5 TABLET ORAL at 05:47

## 2021-05-01 RX ADMIN — CYANOCOBALAMIN TAB 1000 MCG 1000 MCG: 1000 TAB at 09:21

## 2021-05-01 RX ADMIN — POLYVINYL ALCOHOL 1 DROP: 14 SOLUTION/ DROPS OPHTHALMIC at 09:21

## 2021-05-01 RX ADMIN — Medication 1000 UNITS: at 09:21

## 2021-05-01 RX ADMIN — SODIUM CHLORIDE, PRESERVATIVE FREE 10 ML: 5 INJECTION INTRAVENOUS at 09:23

## 2021-05-01 RX ADMIN — HYOSCYAMINE SULFATE 0.12 MG: 0.12 TABLET ORAL at 09:21

## 2021-05-01 ASSESSMENT — PAIN DESCRIPTION - FREQUENCY: FREQUENCY: INTERMITTENT

## 2021-05-01 ASSESSMENT — PAIN - FUNCTIONAL ASSESSMENT: PAIN_FUNCTIONAL_ASSESSMENT: PREVENTS OR INTERFERES WITH MANY ACTIVE NOT PASSIVE ACTIVITIES

## 2021-05-01 ASSESSMENT — ENCOUNTER SYMPTOMS
COUGH: 0
DIARRHEA: 1
NAUSEA: 0
VOMITING: 0
SHORTNESS OF BREATH: 0

## 2021-05-01 ASSESSMENT — PAIN SCALES - GENERAL
PAINLEVEL_OUTOF10: 7
PAINLEVEL_OUTOF10: 3

## 2021-05-01 ASSESSMENT — PAIN DESCRIPTION - ONSET: ONSET: ON-GOING

## 2021-05-01 NOTE — PROGRESS NOTES
Orthopaedic Surgery Progress Note  ART Church MD      SUBJECTIVE:  No acute events over night. Pain controlled. Denies chest pain or shortness of breath. OBJECTIVE:   AAOx3, NAD    Left lower extremity    Dressings C/D/I  SILT L1-S1  TA/EHL/GS intact  Calf soft, NT  +2 DP, W/WP     Data:  CBC:   Lab Results   Component Value Date    WBC 3.0 05/01/2021    RBC 3.38 05/01/2021    HGB 10.7 05/01/2021    HCT 33.4 05/01/2021    MCV 98.8 05/01/2021    MCH 31.7 05/01/2021    MCHC 32.0 05/01/2021    RDW 14.3 05/01/2021     05/01/2021    MPV 10.2 05/01/2021     BMP:    Lab Results   Component Value Date     05/01/2021    K 4.6 05/01/2021     05/01/2021    CO2 21 05/01/2021    BUN 58 05/01/2021    LABALBU 3.2 10/02/2019    CREATININE 1.9 05/01/2021    CALCIUM 8.1 05/01/2021    GFRAA 31 05/01/2021    LABGLOM 25 05/01/2021    GLUCOSE 89 05/01/2021         A/P: POD 3 left TKA  - WBAT  - Pain control  - PT/OT  - DVT prophylaxis  - D/C planning for home today    ART Church MD

## 2021-05-01 NOTE — PROGRESS NOTES
Educated patient on the importance of Incentive Spirometer, Patient returned demonstration of 750 tolerated fair. Will continue to promote during hourly rounds.

## 2021-05-01 NOTE — PLAN OF CARE
Problem: Falls - Risk of:  Goal: Will remain free from falls  Description: Will remain free from falls  5/1/2021 0118 by [facility]  Outcome: Met This Shift     Problem: Falls - Risk of:  Goal: Absence of physical injury  Description: Absence of physical injury  5/1/2021 0118 by [facility]  Outcome: Met This Shift     Problem: Musculor/Skeletal Functional Status  Goal: Absence of falls  Outcome: Met This Shift     Problem: Mobility - Impaired:  Goal: Mobility will improve  Description: Mobility will improve  5/1/2021 0118 by [facility]  Outcome: Met This Shift     Problem: Infection - Surgical Site:  Goal: Will show no infection signs and symptoms  Description: Will show no infection signs and symptoms  Outcome: Met This Shift     Problem: Pain - Acute:  Goal: Pain level will decrease  Description: Pain level will decrease  5/1/2021 0118 by [facility]  Outcome: Met This Shift     Problem: Skin Integrity:  Goal: Will show no infection signs and symptoms  Description: Will show no infection signs and symptoms  Outcome: Met This Shift     Problem: Skin Integrity:  Goal: Absence of new skin breakdown  Description: Absence of new skin breakdown  Outcome: Met This Shift     Problem: Pain:  Goal: Pain level will decrease  Description: Pain level will decrease  5/1/2021 0118 by [facility]  Outcome: Met This Shift     Problem: Pain:  Goal: Control of acute pain  Description: Control of acute pain  Outcome: Met This Shift     Problem: Musculor/Skeletal Functional Status  Goal: Highest potential functional level  Outcome: Ongoing

## 2021-05-01 NOTE — PROGRESS NOTES
Patient ambulated to chair for breakfast. Patient educated on the significant of ambulation in healing process and to promote circulation and prevent DVT's. Patient tolerated well.

## 2021-05-01 NOTE — PROGRESS NOTES
Understands imp of ROM and early mobilization. Requires high demand use of chair arm rests to transfer into standing; no buckle or LOB with transfer. No dizziness reported throughout. Amb with WW with step through patterns void drifting, lateral veering, buckle or LOB. Walked 500' with no dizziness or inc pain; takes frwquent very stop standing rest breaks to control the pain and fatigue. No overt fatigue behaviors, air hunger/conversationaldyspnea during/post 500' walk. Steps negotiated with high demand use of HR to ascend and descend. Fearful of coming down steps and required increased time to come down. Returned to room following walk and steps for there-ex. Fair tolerances with ROM in flexion but still pain preventing inc range past 95* due to pain. Call light and phone within reach. Tray to RT side. Hsb in room assisting care. Very thankful for treat. Plan is to D/C following BID session. Treatment:  Patient practiced and was instructed in the following treatment:     There-ex   Amb 500'   Steps x 4   Education   Manual ROM     PLAN:    Pt is making good progress toward established Physical Therapy goals. Continue with physical therapy current plan of care. Total Treatment Time  30 minutes     Evaluation Time includes thorough review of current medical information, gathering information on past medical history/social history and prior level of function, completion of standardized testing/informal observation of tasks, assessment of data and education on plan of care and goals.     CPT codes:  [] Low Complexity PT evaluation 76195  [] Moderate Complexity PT evaluation 28686  [] High Complexity PT evaluation 39333  [] PT Re-evaluation 31287  [x] Gait training 70582 12 minutes  [x] Manual therapy 68801  3 minutes  [x] Therapeutic activities 57094 5 minutes  [x] Therapeutic exercises 41780  10 minutes  [] Neuromuscular reeducation 41506  minutes       Adriano Linder PT

## 2021-05-01 NOTE — PROGRESS NOTES
Progress Note  Date:2021       [facility]:[id number]  Patient David Morrell     Date of Birth:[birth year]     Age:80 y.o. Patient was up overnight due to repeated episodes of diarrhea. She is tired this am. She has been on senna twice a day as well as magnesium hydroxide. Called by nursing to check a c diff but did not as on laxatives. Patient states this am her pain is well controlled on current pain meds. She denies any fevers, chills, cough, shortness of breath, nausea, or vomiting. Subjective    Subjective:  Symptoms:  She reports diarrhea. No shortness of breath, cough, chest pain, headache or chest pressure. Diet:  No nausea or vomiting. Review of Systems   Respiratory: Negative for cough and shortness of breath. Cardiovascular: Negative for chest pain. Gastrointestinal: Positive for diarrhea. Negative for nausea and vomiting. Objective         Vitals Last 24 Hours:  TEMPERATURE:  Temp  Av.7 °F (36.5 °C)  Min: 96.4 °F (35.8 °C)  Max: 98.2 °F (36.8 °C)  RESPIRATIONS RANGE: Resp  Av.3  Min: 16  Max: 18  PULSE OXIMETRY RANGE: SpO2  Av %  Min: 93 %  Max: 95 %  PULSE RANGE: Pulse  Av.8  Min: 69  Max: 85  BLOOD PRESSURE RANGE: Systolic (40BPM), ZCT:200 , Min:132 , MMI:635   ; Diastolic (67KUL), UKV:74, Min:61, Max:73    I/O (24Hr): Intake/Output Summary (Last 24 hours) at 2021 0636  Last data filed at 2021 0543  Gross per 24 hour   Intake 660 ml   Output 625 ml   Net 35 ml     Objective:  General Appearance:  Comfortable, well-appearing and in no acute distress. Vital signs: (most recent): Blood pressure 116/60, pulse 94, temperature 97.9 °F (36.6 °C), temperature source Temporal, resp. rate 16, height 5' 1\" (1.549 m), weight 176 lb 9.6 oz (80.1 kg), SpO2 99 %, not currently breastfeeding. Lungs:  Normal effort and normal respiratory rate. Breath sounds clear to auscultation. No rales or rhonchi. Heart: Normal rate. Regular rhythm.   S1 normal and

## 2021-05-01 NOTE — PROGRESS NOTES
Daily Treat  Evaluating PT:  Hugo Lipa, PT, [place] [id number]     Room #:  0586/4877-W  Diagnosis:  Left TKA  Precautions:  Fall risk, FWBAT left LE  Equipment Needs:  None. Pt reported owing a walker.     SUBJECTIVE:     Pt lives with  in a 1 story home with 1 stairs to enter and 0 rail. Bed and bath is on first floor. Pt ambulated with walker PTA.     OBJECTIVE:    Initial Evaluation  Date: 4/28 Treatment  5/1/21 PM Short Term/ Long Term   Goals   Was pt agreeable to Eval/treatment? yes Yes      Does pt have pain? Right knee Yes RT knee 3/10      Bed Mobility  Rolling: NT  Supine to sit: Min A  Sit to supine: NT  Scooting: Min A to sitting EOB NA  SBA   Transfers Sit to stand: Min A  Stand to sit: Min A  Stand pivot: Min A with w/w S/Indep with chair arm rest support B/L  SBA   Ambulation    15 feet and 100 feet with w/w Min A 500' [employer] use @ S/Indep levels  200+ feet with w/w SBA    Stair negotiation: ascended and descended  NT AM and request to hold off as feels ok steps  4 steps with 1 rails SBA   ROM Right LE:  Hip and ankle WFL, knee 0-80 degrees  Left LE:  WFL  LT knee -5*-95* to 0*-100* Increase right knee ROM 0 - 90 degrees   Strength Right LE:  Grossly 3-/5  Left LE:  Grossly 4-/5  LT hip 3+/5, knee 3+/5 Increase LE strength by 1/2 mm grade. Balance Sitting EOB:  SBA  Dynamic Standing:  Min A with w/w   Sitting EOB:  independent  Dynamic Standing:  SBA with w/w   AM-PAC 6 Clicks 62/68   85/82          Pt is alert and oriented x3  Balance: Indep with [employer] usage    Therapeutic Exercises:  AP, heel raises, LAQ, heel slide, hip flexion x 30 ea    Patient education  Pt educated on Transfers, steps, gait pattern/sequencing,     Patient response to education:   Pt verbalized understanding Pt demonstrated skill Pt requires further education in this area   Yes yes No     ASSESSMENT:    Comments AM:  In chair on arrival receptive for Tx. Pain 5/10 LT knee and 0/10 RT knee; prior RT TKA 3/20.   Understands imp of ROM and early mobilization. Requires high demand use of chair arm rests to transfer into standing; no buckle or LOB with transfer. No dizziness reported throughout. Amb with WW with step through patterns void drifting, lateral veering, buckle or LOB. Walked 500' with no dizziness or inc pain; takes frwquent very stop standing rest breaks to control the pain and fatigue. No overt fatigue behaviors, air hunger/conversationaldyspnea during/post 500' walk. Steps negotiated with high demand use of HR to ascend and descend. Fearful of coming down steps and required increased time to come down. Returned to room following walk and steps for there-ex. Fair tolerances with ROM in flexion but still pain preventing inc range past 95* due to pain. Call light and phone within reach. Tray to RT side. Hsb in room assisting care. Very thankful for treat. Plan is to D/C following BID session. 5/1/21 PM: In chair on arrival ready to be discharged. Pain under control 3/10. Moving leg as inst during AM.  Able to transfer from stack chair indep w/o dizziness or buckle. Amb with a step through gait pattern 500' w/o inc pain, buckle or drift. Did again take frequent very short < 5 sec standing rest breaks but she talks throughout whole walk and when she stops I think she does to talk more as no fatigue behaviors, dyspnea or overt signs pain. Highly engaged and motivated. Tolerated all there-ex ROM and MMT great. Call light and phone to LT on bed. Hsb in room. Very appreciative for PT care following TKA reported. No safety issues preventing return home today. Treatment:  Patient practiced and was instructed in the following treatment:     There-ex   Amb 500'   Manual ROM     PLAN:    Pt is making good progress toward established Physical Therapy goals. Continue with physical therapy current plan of care.        Total Treatment Time  25 minutes     Evaluation Time includes thorough review of current medical information, gathering information on past medical history/social history and prior level of function, completion of standardized testing/informal observation of tasks, assessment of data and education on plan of care and goals.     CPT codes:  [] Low Complexity PT evaluation 49663  [] Moderate Complexity PT evaluation 19618  [] High Complexity PT evaluation 79176  [] PT Re-evaluation 39158  [x] Gait training 69185 10 minutes  [] Manual therapy 48902   minutes  [] Therapeutic activities 26251  minutes  [x] Therapeutic exercises 22641  15 minutes  [] Neuromuscular reeducation 45467  minutes       Junior Mckeon PT

## 2021-05-06 ENCOUNTER — HOSPITAL ENCOUNTER (OUTPATIENT)
Dept: INFUSION THERAPY | Age: 81
Setting detail: INFUSION SERIES
Discharge: HOME | End: 2021-05-06
Payer: MEDICARE

## 2021-05-06 ENCOUNTER — OFFICE VISIT (OUTPATIENT)
Dept: PRIMARY CARE CLINIC | Age: 81
End: 2021-05-06
Payer: MEDICARE

## 2021-05-06 ENCOUNTER — HOSPITAL ENCOUNTER (OUTPATIENT)
Age: 81
Discharge: HOME | End: 2021-05-06
Payer: MEDICARE

## 2021-05-06 VITALS
TEMPERATURE: 98.1 F | WEIGHT: 176 LBS | SYSTOLIC BLOOD PRESSURE: 135 MMHG | OXYGEN SATURATION: 99 % | HEART RATE: 90 BPM | DIASTOLIC BLOOD PRESSURE: 68 MMHG | BODY MASS INDEX: 33.25 KG/M2

## 2021-05-06 VITALS
BODY MASS INDEX: 33.37 KG/M2 | OXYGEN SATURATION: 98 % | DIASTOLIC BLOOD PRESSURE: 84 MMHG | RESPIRATION RATE: 16 BRPM | SYSTOLIC BLOOD PRESSURE: 140 MMHG | TEMPERATURE: 97.9 F | HEART RATE: 84 BPM | HEIGHT: 61 IN

## 2021-05-06 DIAGNOSIS — N18.32 STAGE 3B CHRONIC KIDNEY DISEASE (HCC): ICD-10-CM

## 2021-05-06 DIAGNOSIS — C77.5: ICD-10-CM

## 2021-05-06 DIAGNOSIS — C55 MALIGNANT NEOPLASM OF UTERUS, UNSPECIFIED SITE (HCC): Primary | ICD-10-CM

## 2021-05-06 DIAGNOSIS — N17.9 AKI (ACUTE KIDNEY INJURY) (HCC): ICD-10-CM

## 2021-05-06 DIAGNOSIS — R73.03 PREDIABETES: ICD-10-CM

## 2021-05-06 DIAGNOSIS — N17.9 AKI (ACUTE KIDNEY INJURY) (HCC): Primary | ICD-10-CM

## 2021-05-06 DIAGNOSIS — C55 MALIGNANT NEOPLASM OF UTERUS, UNSPECIFIED SITE (HCC): ICD-10-CM

## 2021-05-06 DIAGNOSIS — Z96.652 TOTAL KNEE REPLACEMENT STATUS, LEFT: ICD-10-CM

## 2021-05-06 LAB
ALBUMIN SERPL-MCNC: 3.3 G/DL (ref 3.5–5.2)
ALP BLD-CCNC: 240 U/L (ref 35–104)
ALT SERPL-CCNC: 10 U/L (ref 0–32)
ANION GAP SERPL CALCULATED.3IONS-SCNC: 11 MMOL/L (ref 7–16)
AST SERPL-CCNC: 22 U/L (ref 0–31)
BASOPHILS ABSOLUTE: 0.04 E9/L (ref 0–0.2)
BASOPHILS RELATIVE PERCENT: 0.8 % (ref 0–2)
BILIRUB SERPL-MCNC: 0.7 MG/DL (ref 0–1.2)
BUN BLDV-MCNC: 46 MG/DL (ref 6–23)
C-REACTIVE PROTEIN: 3 MG/DL (ref 0–0.4)
CALCIUM SERPL-MCNC: 9 MG/DL (ref 8.6–10.2)
CHLORIDE BLD-SCNC: 109 MMOL/L (ref 98–107)
CO2: 22 MMOL/L (ref 22–29)
CREAT SERPL-MCNC: 1.6 MG/DL (ref 0.5–1)
EOSINOPHILS ABSOLUTE: 0.14 E9/L (ref 0.05–0.5)
EOSINOPHILS RELATIVE PERCENT: 2.7 % (ref 0–6)
GFR AFRICAN AMERICAN: 37
GFR NON-AFRICAN AMERICAN: 31 ML/MIN/1.73
GLUCOSE BLD-MCNC: 107 MG/DL (ref 74–99)
HCT VFR BLD CALC: 34.4 % (ref 34–48)
HEMOGLOBIN: 11.5 G/DL (ref 11.5–15.5)
IMMATURE GRANULOCYTES #: 0.23 E9/L
IMMATURE GRANULOCYTES %: 4.4 % (ref 0–5)
LYMPHOCYTES ABSOLUTE: 1.06 E9/L (ref 1.5–4)
LYMPHOCYTES RELATIVE PERCENT: 20.4 % (ref 20–42)
MAGNESIUM: 1.9 MG/DL (ref 1.6–2.6)
MCH RBC QN AUTO: 32.1 PG (ref 26–35)
MCHC RBC AUTO-ENTMCNC: 33.4 % (ref 32–34.5)
MCV RBC AUTO: 96.1 FL (ref 80–99.9)
MONOCYTES ABSOLUTE: 0.86 E9/L (ref 0.1–0.95)
MONOCYTES RELATIVE PERCENT: 16.5 % (ref 2–12)
NEUTROPHILS ABSOLUTE: 2.87 E9/L (ref 1.8–7.3)
NEUTROPHILS RELATIVE PERCENT: 55.2 % (ref 43–80)
PDW BLD-RTO: 14.7 FL (ref 11.5–15)
PHOSPHORUS: 3 MG/DL (ref 2.5–4.5)
PLATELET # BLD: 169 E9/L (ref 130–450)
PMV BLD AUTO: 9.5 FL (ref 7–12)
POTASSIUM SERPL-SCNC: 4.5 MMOL/L (ref 3.5–5)
RBC # BLD: 3.58 E12/L (ref 3.5–5.5)
SODIUM BLD-SCNC: 142 MMOL/L (ref 132–146)
T4 FREE: 1.5 NG/DL (ref 0.93–1.7)
TOTAL PROTEIN: 6.2 G/DL (ref 6.4–8.3)
TSH SERPL DL<=0.05 MIU/L-ACNC: 2.25 UIU/ML (ref 0.27–4.2)
WBC # BLD: 5.2 E9/L (ref 4.5–11.5)

## 2021-05-06 PROCEDURE — 84100 ASSAY OF PHOSPHORUS: CPT

## 2021-05-06 PROCEDURE — 2580000003 HC RX 258: Performed by: NURSE PRACTITIONER

## 2021-05-06 PROCEDURE — G8399 PT W/DXA RESULTS DOCUMENT: HCPCS | Performed by: FAMILY MEDICINE

## 2021-05-06 PROCEDURE — 83735 ASSAY OF MAGNESIUM: CPT

## 2021-05-06 PROCEDURE — 1123F ACP DISCUSS/DSCN MKR DOCD: CPT | Performed by: FAMILY MEDICINE

## 2021-05-06 PROCEDURE — 1090F PRES/ABSN URINE INCON ASSESS: CPT | Performed by: FAMILY MEDICINE

## 2021-05-06 PROCEDURE — 6360000002 HC RX W HCPCS: Performed by: NURSE PRACTITIONER

## 2021-05-06 PROCEDURE — 36591 DRAW BLOOD OFF VENOUS DEVICE: CPT

## 2021-05-06 PROCEDURE — 1036F TOBACCO NON-USER: CPT | Performed by: FAMILY MEDICINE

## 2021-05-06 PROCEDURE — 99214 OFFICE O/P EST MOD 30 MIN: CPT | Performed by: FAMILY MEDICINE

## 2021-05-06 PROCEDURE — G8427 DOCREV CUR MEDS BY ELIG CLIN: HCPCS | Performed by: FAMILY MEDICINE

## 2021-05-06 PROCEDURE — 1111F DSCHRG MED/CURRENT MED MERGE: CPT | Performed by: FAMILY MEDICINE

## 2021-05-06 PROCEDURE — 80053 COMPREHEN METABOLIC PANEL: CPT

## 2021-05-06 PROCEDURE — 84439 ASSAY OF FREE THYROXINE: CPT

## 2021-05-06 PROCEDURE — 4040F PNEUMOC VAC/ADMIN/RCVD: CPT | Performed by: FAMILY MEDICINE

## 2021-05-06 PROCEDURE — 85025 COMPLETE CBC W/AUTO DIFF WBC: CPT

## 2021-05-06 PROCEDURE — G8417 CALC BMI ABV UP PARAM F/U: HCPCS | Performed by: FAMILY MEDICINE

## 2021-05-06 PROCEDURE — 84443 ASSAY THYROID STIM HORMONE: CPT

## 2021-05-06 PROCEDURE — 86140 C-REACTIVE PROTEIN: CPT

## 2021-05-06 RX ORDER — SODIUM CHLORIDE 0.9 % (FLUSH) 0.9 %
10 SYRINGE (ML) INJECTION PRN
Status: CANCELLED | OUTPATIENT
Start: 2021-05-06

## 2021-05-06 RX ORDER — SODIUM CHLORIDE 0.9 % (FLUSH) 0.9 %
20 SYRINGE (ML) INJECTION PRN
Status: CANCELLED | OUTPATIENT
Start: 2021-05-06

## 2021-05-06 RX ORDER — HEPARIN SODIUM (PORCINE) LOCK FLUSH IV SOLN 100 UNIT/ML 100 UNIT/ML
500 SOLUTION INTRAVENOUS PRN
Status: CANCELLED | OUTPATIENT
Start: 2021-05-06

## 2021-05-06 RX ORDER — SODIUM CHLORIDE 0.9 % (FLUSH) 0.9 %
10 SYRINGE (ML) INJECTION PRN
Status: DISCONTINUED | OUTPATIENT
Start: 2021-05-06 | End: 2021-05-07 | Stop reason: HOSPADM

## 2021-05-06 RX ORDER — HEPARIN SODIUM (PORCINE) LOCK FLUSH IV SOLN 100 UNIT/ML 100 UNIT/ML
500 SOLUTION INTRAVENOUS PRN
Status: DISCONTINUED | OUTPATIENT
Start: 2021-05-06 | End: 2021-05-07 | Stop reason: HOSPADM

## 2021-05-06 RX ORDER — OXYCODONE HYDROCHLORIDE 5 MG/1
5 TABLET ORAL EVERY 4 HOURS PRN
COMMUNITY
End: 2021-06-28 | Stop reason: ALTCHOICE

## 2021-05-06 RX ADMIN — Medication 500 UNITS: at 15:03

## 2021-05-06 RX ADMIN — SODIUM CHLORIDE, PRESERVATIVE FREE 10 ML: 5 INJECTION INTRAVENOUS at 15:02

## 2021-05-06 RX ADMIN — SODIUM CHLORIDE, PRESERVATIVE FREE 10 ML: 5 INJECTION INTRAVENOUS at 15:01

## 2021-05-06 ASSESSMENT — ENCOUNTER SYMPTOMS
VOMITING: 0
SORE THROAT: 0
BACK PAIN: 0
CONSTIPATION: 0
BLOOD IN STOOL: 0
PHOTOPHOBIA: 0
NAUSEA: 0
DIARRHEA: 0
COUGH: 0
ABDOMINAL PAIN: 0
SHORTNESS OF BREATH: 0

## 2021-05-06 ASSESSMENT — PATIENT HEALTH QUESTIONNAIRE - PHQ9
2. FEELING DOWN, DEPRESSED OR HOPELESS: 0
SUM OF ALL RESPONSES TO PHQ QUESTIONS 1-9: 0
SUM OF ALL RESPONSES TO PHQ9 QUESTIONS 1 & 2: 0
SUM OF ALL RESPONSES TO PHQ QUESTIONS 1-9: 0

## 2021-05-06 ASSESSMENT — PAIN SCALES - WONG BAKER: WONGBAKER_NUMERICALRESPONSE: 0

## 2021-05-06 ASSESSMENT — PAIN DESCRIPTION - ORIENTATION: ORIENTATION: RIGHT

## 2021-05-06 ASSESSMENT — PAIN DESCRIPTION - PAIN TYPE: TYPE: ACUTE PAIN

## 2021-05-06 ASSESSMENT — PAIN DESCRIPTION - ONSET: ONSET: ON-GOING

## 2021-05-06 ASSESSMENT — PAIN DESCRIPTION - DESCRIPTORS: DESCRIPTORS: ACHING

## 2021-05-06 ASSESSMENT — PAIN DESCRIPTION - LOCATION: LOCATION: KNEE

## 2021-05-06 ASSESSMENT — PAIN DESCRIPTION - FREQUENCY: FREQUENCY: INTERMITTENT

## 2021-05-06 NOTE — PROGRESS NOTES
Patient tolerated port flush well. Therapy plan reviewed with patient. Verbalizes understanding. Declines copy of AVS and any medication information, verbalizes good knowledge of current plan, and has no signs or symptoms to report at this time. Next appointment made.     Labs drawn  As ordered via port

## 2021-05-06 NOTE — PROGRESS NOTES
Post-Discharge Transitional Care Management Services or Hospital Follow Up      Nicki Wiggins   YOB: 1940    Date of Office Visit:  5/6/2021  Date of Hospital Admission: 4/28/21  Date of Hospital Discharge: 5/1/21  Readmission Risk Score(high >=14%. Medium >=10%):Readmission Risk Score: 16      Care management risk score Rising risk (score 2-5) and Complex Care (Scores >=6): 2     Non face to face  following discharge, date last encounter closed (first attempt may have been earlier): *No documented post hospital discharge outreach found in the last 14 days *No documented post hospital discharge outreach found in the last 14 days    Call initiated 2 business days of discharge: *No response recorded in the last 14 days     Patient Active Problem List   Diagnosis    Malignant [facility]oplasm of uterus [facility] [address]    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 ([facility] [address]    WALI (acute kidney injury) ([facility] [address]    Mixed hyperlipidemia    Hypertension    Arthritis    Vitamin D deficiency    Uterine cancer ([facility] [address]    GERD (gastroesophageal reflux disease)    S/P total knee arthroplasty, right    Unspecified hypertensive 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 through stage IV, or unspecified    Stage 3b chronic kidney disease    Proteinuria    Primary osteoarthritis of left knee    Total knee replacement status, left    Osteoarthritis       Allergies   Allergen Reactions    Latuda [Lurasidone] Shortness Of Breath and Diarrhea     Cancer med    Aspirin      affected kidneys  Other reaction(s): Other (see comments)    Pembrolizumab      Other reaction(s):  Other (see comments)    Lisinopril      cough       Medications listed as ordered at the time of discharge from hospital   Jeff Velásquez   [place] Medication Instructions TATYANA:    Printed on:05/06/21 1437   Medication Information                      acetaminophen (TYLENOL) 325 MG tablet  Take 2 tablets by mouth every 6 hours             aspirin 325 MG EC tablet  Take 1 tablet by mouth daily             Biotin 5000 MCG CAPS  Take 1 capsule by mouth daily              bumetanide (BUMEX) 1 MG tablet  Take 1 mg by mouth three times a week mon wed fri             doxazosin (CARDURA) 2 MG tablet  Take 2 mg by mouth nightly             ezetimibe (ZETIA) 10 MG tablet  Take 1 tablet by mouth Daily with supper             fish oil-omega-3 fatty acids 1000 MG capsule  Take 1 g by mouth daily STOP PREOP MED             folic acid (FOLVITE) 1 MG tablet  Take 1 tablet by mouth daily OK TO TAKE             hyoscyamine (ANASPAZ;LEVSIN) 125 MCG tablet  Take 0.125 mg by mouth daily Takes prior to meals             lactobacillus (CULTURELLE) CAPS capsule  Take 1 capsule by mouth daily             lidocaine-prilocaine (EMLA) 2.5-2.5 % cream  SANGEETA TOPICALLY AA PRN             Magnesium Oxide 500 MG CAPS  1 capsule daily              oxyCODONE (ROXICODONE) 5 MG immediate release tablet  Take 5 mg by mouth every 4 hours as needed for Pain. Polyethyl Glycol-Propyl Glycol (SYSTANE OP)  Apply to eye 2 times daily              sennosides-docusate sodium (SENOKOT-S) 8.6-50 MG tablet  Take 1 tablet by mouth 2 times daily             Sod Fluoride-Potassium Nitrate (PREVIDENT 5000 SENSITIVE) 1.1-5 % PSTE  2 times daily             vitamin B-12 (CYANOCOBALAMIN) 1000 MCG tablet  Take 1,000 mcg by mouth daily              vitamin D (CHOLECALCIFEROL) 1000 UNIT TABS tablet  Take 1,000 Units by mouth daily                    Medications marked \"taking\" at this time  Outpatient Medications Marked as Taking for the 5/6/21 encounter (Office Visit) with Irena Pinon DO   Medication Sig Dispense Refill    oxyCODONE (ROXICODONE) 5 MG immediate release tablet Take 5 mg by mouth every 4 hours as needed for Pain.       acetaminophen (TYLENOL) 325 MG tablet Take 2 tablets by mouth every 6 hours 120 tablet 3    aspirin 325 MG EC tablet Take 1 tablet by mouth daily 42 tablet 0    sennosides-docusate sodium (SENOKOT-S) 8.6-50 MG tablet Take 1 tablet by mouth 2 times daily 30 tablet 0    ezetimibe (ZETIA) 10 MG tablet Take 1 tablet by mouth Daily with supper 90 tablet 3    folic acid (FOLVITE) 1 MG tablet Take 1 tablet by mouth daily OK TO TAKE (Patient taking differently: Take 1 mg by mouth daily ) 90 tablet 5    Polyethyl Glycol-Propyl Glycol (SYSTANE OP) Apply to eye 2 times daily       Sod Fluoride-Potassium Nitrate (PREVIDENT 5000 SENSITIVE) 1.1-5 % PSTE 2 times daily      Magnesium Oxide 500 MG CAPS 1 capsule daily       doxazosin (CARDURA) 2 MG tablet Take 2 mg by mouth nightly      hyoscyamine (ANASPAZ;LEVSIN) 125 MCG tablet Take 0.125 mg by mouth daily Takes prior to meals      lactobacillus (CULTURELLE) CAPS capsule Take 1 capsule by mouth daily 30 capsule 0    vitamin D (CHOLECALCIFEROL) 1000 UNIT TABS tablet Take 1,000 Units by mouth daily       lidocaine-prilocaine (EMLA) 2.5-2.5 % cream SANGEETA TOPICALLY AA PRN  3    bumetanide (BUMEX) 1 MG tablet Take 1 mg by mouth three times a week mon wed fri      Biotin 5000 MCG CAPS Take 1 capsule by mouth daily       vitamin B-12 (CYANOCOBALAMIN) 1000 MCG tablet Take 1,000 mcg by mouth daily       fish oil-omega-3 fatty acids 1000 MG capsule Take 1 g by mouth daily STOP PREOP MED          Medications patient taking as of now reconciled against medications ordered at time of hospital discharge: Yes    Chief Complaint   Patient presents with    Other     states abnormal BMP after surgery    Follow-Up from Hospital       HPI    Inpatient course: Discharge summary reviewed- see chart. Interval history/Current status: Today after discharge from hospital post left total knee. Patient states that in regards to the knee itself she did quite well. She did however have an issue with WALI and hypomagnesemia while she was in the hospital.  It did resolve slowly. She presents today for repeat blood work for reevaluation.   She also had diarrhea while she was in the hospital.  This is also since resolved. Patient states she is doing well and participating in home physical therapy. She is taking pain medication as indicated. She is taking stool softeners secondary to opiate-induced constipation. Review of Systems   Constitutional: Negative for chills and fever. HENT: Negative for congestion, hearing loss, nosebleeds and sore throat. Eyes: Negative for photophobia. Respiratory: Negative for cough and shortness of breath. Cardiovascular: Positive for leg swelling. Negative for chest pain and palpitations. Gastrointestinal: Negative for abdominal pain, blood in stool, constipation, diarrhea, nausea and vomiting. Endocrine: Negative for polydipsia. Genitourinary: Negative for dysuria, frequency, hematuria, pelvic pain and urgency. Musculoskeletal: Positive for arthralgias and gait problem. Negative for back pain and myalgias. Skin: Negative. Neurological: Negative for dizziness, tremors, weakness and headaches. Hematological: Does not bruise/bleed easily. Psychiatric/Behavioral: Positive for sleep disturbance. Negative for hallucinations, self-injury and suicidal ideas. The patient is nervous/anxious. All other systems reviewed and are negative. Vitals:    05/06/21 1347 05/06/21 1423   BP: (!) 180/88 (!) 140/84   Pulse: 84    Resp: 16    Temp: 97.9 °F (36.6 °C)    SpO2: 98%    Height: 5' 1\" (1.549 m)      Body mass index is 33.37 kg/m². Wt Readings from Last 3 Encounters:   05/01/21 176 lb 9.6 oz (80.1 kg)   04/21/21 183 lb (83 kg)   04/08/21 186 lb (84.4 kg)     BP Readings from Last 3 Encounters:   05/06/21 (!) 140/84   05/01/21 121/68   04/28/21 (!) 141/60       Physical Exam  Vitals signs reviewed. HENT:      Head: Normocephalic and atraumatic. Right Ear: External ear normal. There is no impacted cerumen. Left Ear: External ear normal.   Eyes:      General: No scleral icterus. Conjunctiva/sclera: Conjunctivae normal.      Pupils: Pupils are equal, round, and reactive to light. Neck:      Musculoskeletal: Neck supple. Thyroid: No thyromegaly. Cardiovascular:      Rate and Rhythm: Normal rate and regular rhythm. Heart sounds: Normal heart sounds. No murmur. Pulmonary:      Effort: Pulmonary effort is normal.      Breath sounds: Normal breath sounds. No rales. Abdominal:      General: Bowel sounds are normal. There is no distension. Palpations: Abdomen is soft. Tenderness: There is no abdominal tenderness. Musculoskeletal:         General: Swelling and tenderness present. Comments: Walks with rolling walker    Significant unilateral left lower extremity swelling secondary to postoperative issues. No signs of blood clot. Dressing intact. No signs of cellulitis at this time. Lymphadenopathy:      Cervical: No cervical adenopathy. Skin:     General: Skin is warm and dry. Findings: No erythema or rash. Neurological:      Mental Status: She is alert and oriented to person, place, and time. Cranial Nerves: No cranial nerve deficit. Psychiatric:         Behavior: Behavior is cooperative. Judgment: Judgment normal.             Assessment/Plan:   Diagnosis Orders   1. WALI (acute kidney injury) ([facility] [address]  IA DISCHARGE MEDS RECONCILED W/ CURRENT OUTPATIENT MED LIST    CBC Auto Differential    COMPREHENSIVE METABOLIC PANEL    MAGNESIUM    PHOSPHORUS    TSH    T4, FREE    C-REACTIVE PROTEIN   2. Stage 3b chronic kidney disease  IA DISCHARGE MEDS RECONCILED W/ CURRENT OUTPATIENT MED LIST    CBC Auto Differential    COMPREHENSIVE METABOLIC PANEL    MAGNESIUM    PHOSPHORUS    TSH    T4, FREE    C-REACTIVE PROTEIN   3. Total knee replacement status, left     4. Prediabetes   TSH    T4, FREE   5. Malignant neoplasm of uterus, unspecified site ([address]     6.  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 We will need to manage fluid intake and blood pressure appropriately. Has follow-up with orthopedic surgery next week for suture removal.  See her back in 3 months or sooner if needed. Hector Zamora D.O.   2:31 PM  5/6/2021       This document may have been prepared at least partially through the use of voice recognition software. Although effort is taken to assure the accuracy of this document, it is possible that grammatical, syntax,  or spelling errors may occur.       Medical Decision Making: moderate complexity

## 2021-05-24 ENCOUNTER — HOSPITAL ENCOUNTER (OUTPATIENT)
Dept: INFUSION THERAPY | Age: 81
Setting detail: INFUSION SERIES
Discharge: HOME | End: 2021-05-24
Payer: MEDICARE

## 2021-05-24 VITALS
DIASTOLIC BLOOD PRESSURE: 93 MMHG | SYSTOLIC BLOOD PRESSURE: 173 MMHG | HEART RATE: 80 BPM | RESPIRATION RATE: 16 BRPM | OXYGEN SATURATION: 99 % | TEMPERATURE: 98.1 F

## 2021-05-24 DIAGNOSIS — C55 MALIGNANT NEOPLASM OF UTERUS, UNSPECIFIED SITE (HCC): Primary | ICD-10-CM

## 2021-05-24 PROCEDURE — 2580000003 HC RX 258: Performed by: NURSE PRACTITIONER

## 2021-05-24 PROCEDURE — 96523 IRRIG DRUG DELIVERY DEVICE: CPT

## 2021-05-24 PROCEDURE — 6360000002 HC RX W HCPCS: Performed by: NURSE PRACTITIONER

## 2021-05-24 RX ORDER — HEPARIN SODIUM (PORCINE) LOCK FLUSH IV SOLN 100 UNIT/ML 100 UNIT/ML
500 SOLUTION INTRAVENOUS PRN
Status: CANCELLED | OUTPATIENT
Start: 2021-05-24

## 2021-05-24 RX ORDER — SODIUM CHLORIDE 0.9 % (FLUSH) 0.9 %
10 SYRINGE (ML) INJECTION PRN
Status: CANCELLED | OUTPATIENT
Start: 2021-05-24

## 2021-05-24 RX ORDER — SODIUM CHLORIDE 0.9 % (FLUSH) 0.9 %
20 SYRINGE (ML) INJECTION PRN
Status: CANCELLED | OUTPATIENT
Start: 2021-05-24

## 2021-05-24 RX ORDER — SODIUM CHLORIDE 0.9 % (FLUSH) 0.9 %
10 SYRINGE (ML) INJECTION PRN
Status: DISCONTINUED | OUTPATIENT
Start: 2021-05-24 | End: 2021-05-25 | Stop reason: HOSPADM

## 2021-05-24 RX ORDER — HEPARIN SODIUM (PORCINE) LOCK FLUSH IV SOLN 100 UNIT/ML 100 UNIT/ML
500 SOLUTION INTRAVENOUS PRN
Status: DISCONTINUED | OUTPATIENT
Start: 2021-05-24 | End: 2021-05-25 | Stop reason: HOSPADM

## 2021-05-24 RX ADMIN — SODIUM CHLORIDE, PRESERVATIVE FREE 10 ML: 5 INJECTION INTRAVENOUS at 13:36

## 2021-05-24 RX ADMIN — Medication 500 UNITS: at 13:39

## 2021-05-24 RX ADMIN — SODIUM CHLORIDE, PRESERVATIVE FREE 10 ML: 5 INJECTION INTRAVENOUS at 13:38

## 2021-05-24 ASSESSMENT — PAIN DESCRIPTION - DESCRIPTORS: DESCRIPTORS: ACHING;DISCOMFORT;DULL

## 2021-05-24 ASSESSMENT — PAIN DESCRIPTION - PAIN TYPE: TYPE: ACUTE PAIN

## 2021-05-24 ASSESSMENT — PAIN SCALES - GENERAL: PAINLEVEL_OUTOF10: 3

## 2021-05-24 ASSESSMENT — PAIN SCALES - WONG BAKER: WONGBAKER_NUMERICALRESPONSE: 0

## 2021-05-24 ASSESSMENT — PAIN DESCRIPTION - FREQUENCY: FREQUENCY: INTERMITTENT

## 2021-05-24 ASSESSMENT — PAIN DESCRIPTION - LOCATION: LOCATION: KNEE

## 2021-05-24 NOTE — PROGRESS NOTES
Patient tolerated port flush well. Therapy plan reviewed with patient. Verbalizes understanding. Declines copy of AVS and any medication information, verbalizes good knowledge of current plan, and has no signs or symptoms to report at this time. Next appointment made.     Patient  states no labwork  Needed today   Patient  Instructed  To clarify at next physician appoitment  For lab obtaining schedule

## 2021-06-28 ENCOUNTER — HOSPITAL ENCOUNTER (OUTPATIENT)
Dept: INFUSION THERAPY | Age: 81
Setting detail: INFUSION SERIES
Discharge: HOME | End: 2021-06-28
Payer: MEDICARE

## 2021-06-28 VITALS
DIASTOLIC BLOOD PRESSURE: 67 MMHG | HEIGHT: 60 IN | RESPIRATION RATE: 16 BRPM | BODY MASS INDEX: 34.55 KG/M2 | HEART RATE: 79 BPM | WEIGHT: 176 LBS | TEMPERATURE: 98.2 F | OXYGEN SATURATION: 99 % | SYSTOLIC BLOOD PRESSURE: 149 MMHG

## 2021-06-28 DIAGNOSIS — C55 MALIGNANT NEOPLASM OF UTERUS, UNSPECIFIED SITE (HCC): Primary | ICD-10-CM

## 2021-06-28 PROCEDURE — 6360000002 HC RX W HCPCS: Performed by: NURSE PRACTITIONER

## 2021-06-28 PROCEDURE — 2580000003 HC RX 258: Performed by: NURSE PRACTITIONER

## 2021-06-28 PROCEDURE — 96523 IRRIG DRUG DELIVERY DEVICE: CPT

## 2021-06-28 RX ORDER — SODIUM CHLORIDE 0.9 % (FLUSH) 0.9 %
20 SYRINGE (ML) INJECTION PRN
Status: CANCELLED | OUTPATIENT
Start: 2021-06-28

## 2021-06-28 RX ORDER — SODIUM CHLORIDE 0.9 % (FLUSH) 0.9 %
10 SYRINGE (ML) INJECTION PRN
Status: CANCELLED | OUTPATIENT
Start: 2021-06-28

## 2021-06-28 RX ORDER — SODIUM CHLORIDE 0.9 % (FLUSH) 0.9 %
10 SYRINGE (ML) INJECTION PRN
Status: DISCONTINUED | OUTPATIENT
Start: 2021-06-28 | End: 2021-06-29 | Stop reason: HOSPADM

## 2021-06-28 RX ORDER — HEPARIN SODIUM (PORCINE) LOCK FLUSH IV SOLN 100 UNIT/ML 100 UNIT/ML
500 SOLUTION INTRAVENOUS PRN
Status: CANCELLED | OUTPATIENT
Start: 2021-06-28

## 2021-06-28 RX ORDER — HEPARIN SODIUM (PORCINE) LOCK FLUSH IV SOLN 100 UNIT/ML 100 UNIT/ML
500 SOLUTION INTRAVENOUS PRN
Status: DISCONTINUED | OUTPATIENT
Start: 2021-06-28 | End: 2021-06-29 | Stop reason: HOSPADM

## 2021-06-28 RX ADMIN — Medication 500 UNITS: at 13:32

## 2021-06-28 RX ADMIN — SODIUM CHLORIDE, PRESERVATIVE FREE 10 ML: 5 INJECTION INTRAVENOUS at 13:30

## 2021-06-28 RX ADMIN — SODIUM CHLORIDE, PRESERVATIVE FREE 10 ML: 5 INJECTION INTRAVENOUS at 13:31

## 2021-07-26 ENCOUNTER — HOSPITAL ENCOUNTER (OUTPATIENT)
Dept: INFUSION THERAPY | Age: 81
Setting detail: INFUSION SERIES
Discharge: HOME | End: 2021-07-26
Payer: MEDICARE

## 2021-07-26 VITALS
DIASTOLIC BLOOD PRESSURE: 60 MMHG | TEMPERATURE: 98.1 F | SYSTOLIC BLOOD PRESSURE: 134 MMHG | RESPIRATION RATE: 16 BRPM | OXYGEN SATURATION: 99 % | HEART RATE: 79 BPM

## 2021-07-26 DIAGNOSIS — C55 MALIGNANT NEOPLASM OF UTERUS, UNSPECIFIED SITE (HCC): Primary | ICD-10-CM

## 2021-07-26 PROCEDURE — 2580000003 HC RX 258: Performed by: NURSE PRACTITIONER

## 2021-07-26 PROCEDURE — 96523 IRRIG DRUG DELIVERY DEVICE: CPT

## 2021-07-26 PROCEDURE — 6360000002 HC RX W HCPCS: Performed by: NURSE PRACTITIONER

## 2021-07-26 RX ORDER — SODIUM CHLORIDE 0.9 % (FLUSH) 0.9 %
10 SYRINGE (ML) INJECTION PRN
Status: CANCELLED | OUTPATIENT
Start: 2021-07-26

## 2021-07-26 RX ORDER — SODIUM CHLORIDE 0.9 % (FLUSH) 0.9 %
20 SYRINGE (ML) INJECTION PRN
Status: CANCELLED | OUTPATIENT
Start: 2021-07-26

## 2021-07-26 RX ORDER — SODIUM CHLORIDE 0.9 % (FLUSH) 0.9 %
10 SYRINGE (ML) INJECTION PRN
Status: DISCONTINUED | OUTPATIENT
Start: 2021-07-26 | End: 2021-07-27 | Stop reason: HOSPADM

## 2021-07-26 RX ORDER — HEPARIN SODIUM (PORCINE) LOCK FLUSH IV SOLN 100 UNIT/ML 100 UNIT/ML
500 SOLUTION INTRAVENOUS PRN
Status: DISCONTINUED | OUTPATIENT
Start: 2021-07-26 | End: 2021-07-27 | Stop reason: HOSPADM

## 2021-07-26 RX ORDER — HEPARIN SODIUM (PORCINE) LOCK FLUSH IV SOLN 100 UNIT/ML 100 UNIT/ML
500 SOLUTION INTRAVENOUS PRN
Status: CANCELLED | OUTPATIENT
Start: 2021-07-26

## 2021-07-26 RX ORDER — SODIUM CHLORIDE 0.9 % (FLUSH) 0.9 %
20 SYRINGE (ML) INJECTION PRN
Status: DISCONTINUED | OUTPATIENT
Start: 2021-07-26 | End: 2021-07-27 | Stop reason: HOSPADM

## 2021-07-26 RX ADMIN — SODIUM CHLORIDE, PRESERVATIVE FREE 10 ML: 5 INJECTION INTRAVENOUS at 13:37

## 2021-07-26 RX ADMIN — Medication 500 UNITS: at 13:38

## 2021-07-26 RX ADMIN — SODIUM CHLORIDE, PRESERVATIVE FREE 10 ML: 5 INJECTION INTRAVENOUS at 13:38

## 2021-07-26 ASSESSMENT — PAIN SCALES - GENERAL: PAINLEVEL_OUTOF10: 0

## 2021-08-06 ENCOUNTER — OFFICE VISIT (OUTPATIENT)
Dept: PRIMARY CARE CLINIC | Age: 81
End: 2021-08-06
Payer: MEDICARE

## 2021-08-06 VITALS
BODY MASS INDEX: 35.73 KG/M2 | HEIGHT: 60 IN | HEART RATE: 88 BPM | OXYGEN SATURATION: 99 % | DIASTOLIC BLOOD PRESSURE: 84 MMHG | WEIGHT: 182 LBS | TEMPERATURE: 98.4 F | SYSTOLIC BLOOD PRESSURE: 138 MMHG

## 2021-08-06 DIAGNOSIS — Z13.6 SCREENING FOR CARDIOVASCULAR CONDITION: ICD-10-CM

## 2021-08-06 DIAGNOSIS — Z00.00 ROUTINE GENERAL MEDICAL EXAMINATION AT A HEALTH CARE FACILITY: ICD-10-CM

## 2021-08-06 DIAGNOSIS — Z71.89 ACP (ADVANCE CARE PLANNING): ICD-10-CM

## 2021-08-06 PROCEDURE — 1123F ACP DISCUSS/DSCN MKR DOCD: CPT | Performed by: FAMILY MEDICINE

## 2021-08-06 PROCEDURE — 4040F PNEUMOC VAC/ADMIN/RCVD: CPT | Performed by: FAMILY MEDICINE

## 2021-08-06 PROCEDURE — G0447 BEHAVIOR COUNSEL OBESITY 15M: HCPCS | Performed by: FAMILY MEDICINE

## 2021-08-06 PROCEDURE — G0439 PPPS, SUBSEQ VISIT: HCPCS | Performed by: FAMILY MEDICINE

## 2021-08-06 PROCEDURE — 99497 ADVNCD CARE PLAN 30 MIN: CPT | Performed by: FAMILY MEDICINE

## 2021-08-06 PROCEDURE — G0446 INTENS BEHAVE THER CARDIO DX: HCPCS | Performed by: FAMILY MEDICINE

## 2021-08-06 RX ORDER — MAGNESIUM CHLORIDE 64 MG
64 TABLET, DELAYED RELEASE (ENTERIC COATED) ORAL 2 TIMES DAILY
COMMUNITY
End: 2022-02-11 | Stop reason: SDUPTHER

## 2021-08-06 RX ORDER — FOLIC ACID 1 MG/1
1 TABLET ORAL DAILY
Qty: 90 TABLET | Refills: 3 | Status: SHIPPED | OUTPATIENT
Start: 2021-08-06 | End: 2022-02-11 | Stop reason: SDUPTHER

## 2021-08-06 RX ORDER — EZETIMIBE 10 MG/1
10 TABLET ORAL
Qty: 90 TABLET | Refills: 3 | Status: SHIPPED
Start: 2021-08-06 | End: 2022-02-11 | Stop reason: SDUPTHER

## 2021-08-06 RX ORDER — POTASSIUM CHLORIDE 750 MG/1
10 TABLET, EXTENDED RELEASE ORAL 2 TIMES DAILY
COMMUNITY

## 2021-08-06 ASSESSMENT — LIFESTYLE VARIABLES: HOW OFTEN DO YOU HAVE A DRINK CONTAINING ALCOHOL: 0

## 2021-08-06 ASSESSMENT — PATIENT HEALTH QUESTIONNAIRE - PHQ9
SUM OF ALL RESPONSES TO PHQ9 QUESTIONS 1 & 2: 0
SUM OF ALL RESPONSES TO PHQ QUESTIONS 1-9: 0
1. LITTLE INTEREST OR PLEASURE IN DOING THINGS: 0
2. FEELING DOWN, DEPRESSED OR HOPELESS: 0
SUM OF ALL RESPONSES TO PHQ QUESTIONS 1-9: 0
SUM OF ALL RESPONSES TO PHQ QUESTIONS 1-9: 0

## 2021-08-06 NOTE — PROGRESS NOTES
Medicare Annual Wellness Visit  Name: Ela Bartholomew Date: 2021   MRN: [id number] Sex: Female   Age: 80 y.o. Ethnicity: Non- / Non    : 1940 Race: White (non-)      Margurette Apley is here for Medicare AWV    Screenings for behavioral, psychosocial and functional/safety risks, and cognitive dysfunction are all negative except as indicated below. These results, as well as other patient data from the [address] form, are documented in Flowsheets linked to this Encounter. Allergies   Allergen Reactions    Latuda [Lurasidone] Shortness Of Breath and Diarrhea     Cancer med    Aspirin      affected kidneys  Other reaction(s): Other (see comments)    Pembrolizumab      Other reaction(s): Other (see comments)    Lisinopril      cough       Prior to Visit Medications    Medication Sig Taking?  Authorizing Provider   folic acid (FOLVITE) 1 MG tablet Take 1 tablet by mouth daily Yes Khanh Pinon, DO   ezetimibe (ZETIA) 10 MG tablet Take 1 tablet by mouth Daily with supper Yes Khanh Pinon, DO   potassium chloride (KLOR-CON M) 10 MEQ extended release tablet Take 10 mEq by mouth 2 times daily Yes Historical Provider, MD   magnesium chloride (MAG DELAY) 64 MG TBEC extended release tablet Take 64 mg by mouth 2 times daily Yes Historical Provider, MD   acetaminophen (TYLENOL) 325 MG tablet Take 2 tablets by mouth every 6 hours Yes Terrie Cowden, MD   sennosides-docusate sodium (SENOKOT-S) 8.6-50 MG tablet Take 1 tablet by mouth 2 times daily Yes Terrie Cowden, MD   Polyethyl Glycol-Propyl Glycol (SYSTANE OP) Apply to eye 2 times daily  Yes Historical Provider, MD   Sod Fluoride-Potassium Nitrate (PREVIDENT 5000 SENSITIVE) 1.1-5 % PSTE 2 times daily Yes Historical Provider, MD   Magnesium Oxide 500 MG CAPS 1 capsule daily  Yes Historical Provider, MD   doxazosin (CARDURA) 2 MG tablet Take 2 mg by mouth nightly Yes Historical Provider, MD   hyoscyamine (ANASPAZ;LEVSIN) 125 MCG tablet Take 0.125 mg by mouth daily Takes prior to meals Yes Historical Provider, MD   lactobacillus (CULTURELLE) CAPS capsule Take 1 capsule by mouth daily Yes Rebel Stafford,    vitamin D (CHOLECALCIFEROL) 1000 UNIT TABS tablet Take 1,000 Units by mouth daily  Yes Historical Provider, MD   lidocaine-prilocaine (EMLA) 2.5-2.5 % cream SANGEETA TOPICALLY AA PRN Yes Historical Provider, MD   bumetanide (BUMEX) 1 MG tablet Take 1 mg by mouth three times a week mon wed fri Yes Historical Provider, MD   Biotin 5000 MCG CAPS Take 1 capsule by mouth daily  Yes Historical Provider, MD   vitamin B-12 (CYANOCOBALAMIN) 1000 MCG tablet Take 1,000 mcg by mouth daily  Yes Historical Provider, MD   fish oil-omega-3 fatty acids 1000 MG capsule Take 1 g by mouth daily STOP PREOP MED Yes Historical Provider, MD       Past Medical History:   Diagnosis Date    Acute kidney injury ([facility] [address]     hx of    Acute renal failure syndrome ([facility] [address] 7/9/2020    Arthritis     Chronic kidney disease, stage 3 (moderate) 11/29/2019    dt Keytruda    Dry eye     GERD (gastroesophageal reflux disease)     [place] (hard of hearing)     aides x 2    Hypertension     Hypomagnesemia     hx of / takes Mag supplement    Malignant neoplasm of uterus ([facility] [address] 11/21/2014    Mixed hyperlipidemia 5/24/2019    SOB (shortness of breath) on exertion     feels is dt after effect of Keytruda    Tooth sensitivity     Uterine cancer ([facility] [address] 2012    metatstaic, treated with surgery, chemo x2 , radiation x 2, [place] therapy x 2,andAvastin therapy    Vitamin D deficiency        Past Surgical History:   Procedure Laterality Date    APPENDECTOMY  1950    BREAST SURGERY  [id number],[id number], 2002    benign  lumps    DILATION AND CURETTAGE OF UTERUS  3/20/12    HYSTERECTOMY  4/12/12    INSERTION / REMOVAL / REPLACEMENT VENOUS ACCESS CATHETER N/A 2/22/2019    MEDI PORT INSERTION performed by Charles Lewis MD at [place] [address]  2012    pelvic nodes    PORT SURGERY Left 1/14/2021    REVISION OF MEDIPORT - LEFT performed by Mary Sorensen MD at [place]  child   [address] Right 3/4/2020    RIGHT KNEE TOTAL ARTHROPLASTY ROBOTIC ANGIE performed by Jasmin Garcia MD at [address] Left 4/28/2021    LEFT TOTAL KNEE ROBOTIC ASSISTED ARTHROPLASTY performed by Jamsin Garcia MD at [address] [facility] History   Family history unknown: Yes       CareTeam (Including outside providers/suppliers regularly involved in providing care):   Patient Care Team:  Herb Gale DO as PCP - General (Family Medicine)  Herb Gale DO as PCP - [facility]    Wt Readings from Last 3 Encounters:   08/06/21 182 lb (82.6 kg)   06/28/21 176 lb (79.8 kg)   05/06/21 176 lb (79.8 kg)     Vitals:    08/06/21 1300   BP: 138/84   Pulse: 88   Temp: 98.4 °F (36.9 °C)   SpO2: 99%   Weight: 182 lb (82.6 kg)   Height: 5' (1.524 m)     Body mass index is 35.54 kg/m². Based upon direct observation of the patient, evaluation of cognition reveals recent and remote memory intact. Physical Exam  Vitals reviewed. HENT:      Head: Normocephalic and atraumatic. Right Ear: External ear normal. There is no impacted cerumen. Left Ear: External ear normal.   Eyes:      General: No scleral icterus. Conjunctiva/sclera: Conjunctivae normal.      Pupils: Pupils are equal, round, and reactive to light. Neck:      Thyroid: No thyromegaly. Cardiovascular:      Rate and Rhythm: Normal rate and regular rhythm. Heart sounds: Normal heart sounds. No murmur heard. Pulmonary:      Effort: Pulmonary effort is normal.      Breath sounds: Normal breath sounds. No rales. Abdominal:      General: Bowel sounds are normal. There is no distension. Palpations: Abdomen is soft. Tenderness: There is no abdominal tenderness. Musculoskeletal:         General: Swelling and tenderness present. Cervical back: Neck supple. Comments: Without a walker today. Still has compression socks on bilateral lower extremities. Movement much improved since knee replacement   Lymphadenopathy:      Cervical: No cervical adenopathy. Skin:     General: Skin is warm and dry. Findings: No erythema or rash. Neurological:      Mental Status: She is alert and oriented to person, place, and time. Cranial Nerves: No cranial nerve deficit. Psychiatric:         Behavior: Behavior is cooperative. Judgment: Judgment normal.           Patient's complete Health Risk Assessment and screening values have been reviewed and are found in Flowsheets. The following problems were reviewed today and where indicated follow up appointments were made and/or referrals ordered.     Positive Risk Factor Screenings with Interventions:           Health Habits/Nutrition:  Health Habits/Nutrition  Do you exercise for at least 20 minutes 2-3 times per week?: Yes  Have you lost any weight without trying in the past 3 months?: No  Do you eat only one meal per day?: No  Have you seen the dentist within the past year?: Yes  Body mass index: (!) 35.54  Health Habits/Nutrition Interventions:  · none       Personalized Preventive Plan   Current Health Maintenance Status  Immunization History   Administered Date(s) Administered    COVID-19, Garcia Peter, PF, 30mcg/0.3mL 01/26/2021, 02/16/2021    Influenza Vaccine, unspecified formulation 10/02/2013, 09/10/2014, 10/18/2018    Influenza, High Dose (Fluzone 65 yrs and older) 09/23/2015, 09/26/2017, 10/01/2018, 11/18/2019    Influenza, Quadv, adjuvanted, 65 yrs +, IM, PF (Fluad) 09/14/2020    Influenza, Triv, inactivated, subunit, adjuvanted, IM (Fluad 65 yrs and older) 10/02/2019    Pneumococcal Conjugate Vaccine 05/13/2016    Pneumococcal Polysaccharide ([id number]) 03/18/2019, 11/29/2019    Tdap (Boostrix, Adacel) 07/17/2017    Zoster Live (Zostavax) 09/23/2020, 12/03/2020    Zoster Recombinant (Shingrix) 09/23/2020, 12/03/2020        Health Maintenance   Topic Date Due    Pneumococcal 65+ yrs at Risk Vaccine (2 of 2 - PCV13) 11/29/2020    Annual Wellness Visit (AWV)  11/29/2021 (Originally 5/29/2019)    Flu vaccine (1) 09/01/2021    Potassium monitoring  05/06/2022    Creatinine monitoring  05/06/2022    DTaP/Tdap/Td vaccine (2 - Td or Tdap) 07/17/2027    DEXA (modify frequency per FRAX score)  Completed    Shingles Vaccine  Completed    COVID-19 Vaccine  Completed    Hepatitis A vaccine  Aged Out    Hepatitis B vaccine  Aged Out    Hib vaccine  Aged Out    Meningococcal (ACWY) vaccine  Aged Out     Recommendations for ki work Due: see orders and patient instructions/AVS.  . Recommended screening schedule for the next 5-10 years is provided to the patient in written form: see Patient Watt Lesches was seen today for medicare awv. Diagnoses and all orders for this visit:    Body mass index (BMI) 35.0-35.9, adult   -     ME Behavior  obesity 15m []    ACP (advance care planning)  -     ME ADVANCED CARE PLAN FACE TO [facility] [facility], [address] [[id number]]  -     ME Behavior  obesity 15m []    Screening for cardiovascular condition  -     ME Intens behave ther cardio dx, 15 minutes []    Routine general medical examination at a health care facility    Other orders  -     folic acid (FOLVITE) 1 MG tablet; Take 1 tablet by mouth daily  -     ezetimibe (ZETIA) 10 MG tablet; Take 1 tablet by mouth Daily with supper                   Advance Care Planning   Advanced Care Planning: Discussed the patients choices for care and treatment in case of a health event that adversely affects decision-making abilities. Also discussed the patients long-term treatment options.  Reviewed with the patient the [facility] of [facility] Will Declaration forms  Reviewed the process of designating a competent adult as an Agent (or -in-fact) that could take make health care decisions for the patient if incompetent. Patient was asked to complete the declaration forms, either acknowledge the forms by a public notary or an eligible witness and provide a signed copy to the practice office. Time spent (minutes): 10       Obesity Counseling: Assessed behavioral health risks and factors affecting choice of behavior. Suggested weight control approaches, including dietary changes behavioral modification and follow up plan. Provided educational and support documentation. Time spent (minutes): 10    Cardiovascular Disease Risk Counseling: Assessed the patient's risk to develop cardiovascular disease and reviewed main risk factors. Reviewed steps to reduce disease risk including:   · Quitting tobacco use, reducing amount smoked, or not starting the habit  · Making healthy food choices  · Being physically active and gradualy increasing activity levels   · Reduce weight and determine a healthy BMI goal  · Monitor blood pressure and treat if higher than 140/90 mmHg  · Maintain blood total cholesterol levels under 5 mmol/l or 190 mg/dl  · Maintain LDL cholesterol levels under 3.0 mmol/l or 115 mg/dl   · Control blood glucose levels  · Consider taking aspirin (75 mg daily), once blood pressure is controlled   Provided a follow up plan.   Time spent (minutes): 10

## 2021-08-06 NOTE — PATIENT INSTRUCTIONS
Advance Directives: Care Instructions  Overview  An advance directive is a legal way to state your wishes at the end of your life. It tells your family and your doctor what to do if you can't say what you want. There are two main types of advance directives. You can change them any time your wishes change. Living will. This form tells your family and your doctor your wishes about life support and other treatment. The form is also called a declaration. Medical power of . This form lets you name a person to make treatment decisions for you when you can't speak for yourself. This person is called a health care agent (health care proxy, health care surrogate). The form is also called a durable power of  for health care. If you do not have an advance directive, decisions about your medical care may be made by a family member, or by a doctor or a  who doesn't know you. It may help to think of an advance directive as a gift to the people who care for you. If you have one, they won't have to make tough decisions by themselves. Follow-up care is a key part of your treatment and safety. Be sure to make and go to all appointments, and call your doctor if you are having problems. It's also a good idea to know your test results and keep a list of the medicines you take. What should you include in an advance directive? Many states have a unique advance directive form. (It may ask you to address specific issues.) Or you might use a universal form that's approved by many states. If your form doesn't tell you what to address, it may be hard to know what to include in your advance directive. Use the questions below to help you get started. · Who do you want to make decisions about your medical care if you are not able to? · What life-support measures do you want if you have a serious illness that gets worse over time or can't be cured? · What are you most afraid of that might happen? (Maybe you're afraid of having pain, losing your independence, or being kept alive by machines.)  · Where would you prefer to die? (Your home? A hospital? A nursing home?)  · Do you want to donate your organs when you die? · Do you want certain Sikhism practices performed before you die? When should you call for help? Be sure to contact your doctor if you have any questions. Where can you learn more? Go to [email or web address] and sign in to your SwitchForce account. Enter R264 in the [employer] box to learn more about \"Advance Directives: Care Instructions. \"     If you do not have an account, please click on the \"Sign Up Now\" link. Current as of: March 17, 2021               Content Version: 12.9  © 2006-2021 Healthwise, Incorporated. Care instructions adapted under license by [facility] ([facility]). If you have questions about a medical condition or this instruction, always ask your healthcare professional. [address] any warranty or liability for your use of this information. Body Mass Index: Care Instructions  Your Care Instructions     Body mass index (BMI) can help you see if your weight is raising your risk for health problems. It uses a formula to compare how much you weigh with how tall you are. · A BMI lower than 18.5 is considered underweight. · A BMI between 18.5 and 24.9 is considered healthy. · A BMI between 25 and 29.9 is considered overweight. A BMI of 30 or higher is considered obese. If your BMI is in the normal range, it means that you have a lower risk for weight-related health problems. If your BMI is in the overweight or obese range, you may be at increased risk for weight-related health problems, such as high blood pressure, heart disease, stroke, arthritis or joint pain, and diabetes.  If your BMI is in the underweight range, you may be at increased risk for health problems such as fatigue, lower protection (immunity) against illness, muscle loss, bone loss, hair loss, and hormone problems. BMI is just one measure of your risk for weight-related health problems. You may be at higher risk for health problems if you are not active, you eat an unhealthy diet, or you drink too much alcohol or use tobacco products. Follow-up care is a key part of your treatment and safety. Be sure to make and go to all appointments, and call your doctor if you are having problems. It's also a good idea to know your test results and keep a list of the medicines you take. How can you care for yourself at home? · Practice healthy eating habits. This includes eating plenty of fruits, vegetables, whole grains, lean protein, and low-fat dairy. · If your doctor recommends it, get more exercise. Walking is a good choice. Bit by bit, increase the amount you walk every day. Try for at least 30 minutes on most days of the week. · Do not smoke. Smoking can increase your risk for health problems. If you need help quitting, talk to your doctor about stop-smoking programs and medicines. These can increase your chances of quitting for good. · Limit alcohol to 2 drinks a day for men and 1 drink a day for women. Too much alcohol can cause health problems. If you have a BMI higher than 25  · Your doctor may do other tests to check your risk for weight-related health problems. This may include measuring the distance around your waist. A waist measurement of more than 40 inches in men or 35 inches in women can increase the risk of weight-related health problems. · Talk with your doctor about steps you can take to stay healthy or improve your health. You may need to make lifestyle changes to lose weight and stay healthy, such as changing your diet and getting regular exercise. If you have a BMI lower than 18.5  · Your doctor may do other tests to check your risk for health problems. · Talk with your doctor about steps you can take to stay healthy or improve your health. You may need to make lifestyle changes to gain or maintain weight and stay healthy, such as getting more healthy foods in your diet and doing exercises to build muscle. Where can you learn more? Go to [email or web address]://bhavya.[email or web address] org and sign in to your Goalbook account. Enter S176 in the [facility] box to learn more about \"Body Mass Index: Care Instructions. \"     If you do not have an account, please click on the \"Sign Up Now\" link. Current as of: March 17, 2021               Content Version: 12.9  © 4600-3607 Healthwise, [employer]. Care instructions adapted under license by [facility] ([facility]). If you have questions about a medical condition or this instruction, always ask your healthcare professional. [address] any warranty or liability for your use of this information. DASH Diet: Care Instructions  Your Care Instructions     The DASH diet is an eating plan that can help lower your blood pressure. DASH stands for Dietary Approaches to Stop Hypertension. Hypertension is high blood pressure. The DASH diet focuses on eating foods that are high in calcium, potassium, and magnesium. These nutrients can lower blood pressure. The foods that are highest in these nutrients are fruits, vegetables, low-fat dairy products, nuts, seeds, and legumes. But taking calcium, potassium, and magnesium supplements instead of eating foods that are high in those nutrients does not have the same effect. The DASH diet also includes whole grains, fish, and poultry. The DASH diet is one of several lifestyle changes your doctor may recommend to lower your high blood pressure. Your doctor may also want you to decrease the amount of sodium in your diet. Lowering sodium while following the DASH diet can lower blood pressure even further than just the DASH diet alone. Follow-up care is a key part of your treatment and safety.  Be sure to make and go to all appointments, and call your doctor if you are having problems. It's also a good idea to know your test results and keep a list of the medicines you take. How can you care for yourself at home? Following the DASH diet  · Eat 4 to 5 servings of fruit each day. A serving is 1 medium-sized piece of fruit, ½ cup chopped or canned fruit, 1/4 cup dried fruit, or 4 ounces (½ cup) of fruit juice. Choose fruit more often than fruit juice. · Eat 4 to 5 servings of vegetables each day. A serving is 1 cup of lettuce or raw leafy vegetables, ½ cup of chopped or cooked vegetables, or 4 ounces (½ cup) of vegetable juice. Choose vegetables more often than vegetable juice. · Get 2 to 3 servings of low-fat and fat-free dairy each day. A serving is 8 ounces of milk, 1 cup of yogurt, or 1 ½ ounces of cheese. · Eat 6 to 8 servings of grains each day. A serving is 1 slice of bread, 1 ounce of dry cereal, or ½ cup of cooked rice, pasta, or cooked cereal. Try to choose whole-grain products as much as possible. · Limit lean meat, poultry, and fish to 2 servings each day. A serving is 3 ounces, about the size of a deck of cards. · Eat 4 to 5 servings of nuts, seeds, and legumes (cooked dried beans, lentils, and split peas) each week. A serving is 1/3 cup of nuts, 2 tablespoons of seeds, or ½ cup of cooked beans or peas. · Limit fats and oils to 2 to 3 servings each day. A serving is 1 teaspoon of vegetable oil or 2 tablespoons of salad dressing. · Limit sweets and added sugars to 5 servings or less a week. A serving is 1 tablespoon jelly or jam, ½ cup sorbet, or 1 cup of lemonade. · Eat less than 2,300 milligrams (mg) of sodium a day. If you limit your sodium to 1,500 mg a day, you can lower your blood pressure even more. · Be aware that all of these are the suggested number of servings for people who eat 1,800 to 2,000 calories a day. Your recommended number of servings may be different if you need more or fewer calories. Tips for success  · Start small.  Do not try to make dramatic changes to your diet all at once. You might feel that you are missing out on your favorite foods and then be more likely to not follow the plan. Make small changes, and stick with them. Once those changes become habit, add a few more changes. · Try some of the following:  ? Make it a goal to eat a fruit or vegetable at every meal and at snacks. This will make it easy to get the recommended amount of fruits and vegetables each day. ? Try yogurt topped with fruit and nuts for a snack or healthy dessert. ? Add lettuce, tomato, cucumber, and onion to sandwiches. ? Combine a ready-made pizza crust with low-fat mozzarella cheese and lots of vegetable toppings. Try using tomatoes, squash, spinach, broccoli, carrots, cauliflower, and onions. ? Have a variety of cut-up vegetables with a low-fat dip as an appetizer instead of chips and dip. ? Sprinkle sunflower seeds or chopped almonds over salads. Or try adding chopped walnuts or almonds to cooked vegetables. ? Try some vegetarian meals using beans and peas. Add garbanzo or kidney beans to salads. Make burritos and tacos with mashed gonzales beans or black beans. Where can you learn more? Go to https://Fleet Management [email or web address].Libersy. org and sign in to your Portero account. Enter F750 in the [facility] box to learn more about \"DASH Diet: Care Instructions. \"     If you do not have an account, please click on the \"Sign Up Now\" link. Current as of: August 31, 2020               Content Version: 12.9  © 7254-4268 Healthwise, SumAll. Care instructions adapted under license by [facility] ([facility]). If you have questions about a medical condition or this instruction, always ask your healthcare professional. Lawanda Harper any warranty or liability for your use of this information. Personalized Preventive Plan for Jhonathan Arriaga - 8/6/2021  Medicare offers a range of preventive health benefits.  Some of the tests and screenings are paid in full while other may be subject to a deductible, co-insurance, and/or copay. Some of these benefits include a comprehensive review of your medical history including lifestyle, illnesses that may run in your family, and various assessments and screenings as appropriate. After reviewing your medical record and screening and assessments performed today your provider may have ordered immunizations, labs, imaging, and/or referrals for you. A list of these orders (if applicable) as well as your Preventive Care list are included within your After Visit Summary for your review. Other Preventive Recommendations:    · A preventive eye exam performed by an eye specialist is recommended every 1-2 years to screen for glaucoma; cataracts, macular degeneration, and other eye disorders. · A preventive dental visit is recommended every 6 months. · Try to get at least 150 minutes of exercise per week or 10,000 steps per day on a pedometer . · Order or download the FREE \"Exercise & Physical Activity: Your Everyday Guide\" from [employer]. Call 4-180.249.8062 or search [employer] online. · You need 3403-6029 mg of calcium and 9984-7511 IU of vitamin D per day. It is possible to meet your calcium requirement with diet alone, but a vitamin D supplement is usually necessary to meet this goal.  · When exposed to the sun, use a sunscreen that protects against both UVA and UVB radiation with an SPF of 30 or greater. Reapply every 2 to 3 hours or after sweating, drying off with a towel, or swimming. · Always wear a seat belt when traveling in a car. Always wear a helmet when riding a bicycle or motorcycle.

## 2021-08-23 ENCOUNTER — HOSPITAL ENCOUNTER (OUTPATIENT)
Dept: INFUSION THERAPY | Age: 81
Setting detail: INFUSION SERIES
Discharge: HOME | End: 2021-08-23
Payer: MEDICARE

## 2021-08-23 VITALS
TEMPERATURE: 98 F | RESPIRATION RATE: 16 BRPM | SYSTOLIC BLOOD PRESSURE: 177 MMHG | OXYGEN SATURATION: 98 % | HEART RATE: 84 BPM | DIASTOLIC BLOOD PRESSURE: 78 MMHG | WEIGHT: 182 LBS | BODY MASS INDEX: 35.73 KG/M2 | HEIGHT: 60 IN

## 2021-08-23 DIAGNOSIS — C55 MALIGNANT NEOPLASM OF UTERUS, UNSPECIFIED SITE (HCC): Primary | ICD-10-CM

## 2021-08-23 PROCEDURE — 96523 IRRIG DRUG DELIVERY DEVICE: CPT

## 2021-08-23 PROCEDURE — 6360000002 HC RX W HCPCS: Performed by: NURSE PRACTITIONER

## 2021-08-23 PROCEDURE — 2580000003 HC RX 258: Performed by: NURSE PRACTITIONER

## 2021-08-23 RX ORDER — SODIUM CHLORIDE 0.9 % (FLUSH) 0.9 %
20 SYRINGE (ML) INJECTION PRN
Status: CANCELLED | OUTPATIENT
Start: 2021-08-23

## 2021-08-23 RX ORDER — HEPARIN SODIUM (PORCINE) LOCK FLUSH IV SOLN 100 UNIT/ML 100 UNIT/ML
500 SOLUTION INTRAVENOUS PRN
Status: DISCONTINUED | OUTPATIENT
Start: 2021-08-23 | End: 2021-08-24 | Stop reason: HOSPADM

## 2021-08-23 RX ORDER — HEPARIN SODIUM (PORCINE) LOCK FLUSH IV SOLN 100 UNIT/ML 100 UNIT/ML
500 SOLUTION INTRAVENOUS PRN
Status: CANCELLED | OUTPATIENT
Start: 2021-08-23

## 2021-08-23 RX ORDER — SODIUM CHLORIDE 0.9 % (FLUSH) 0.9 %
10 SYRINGE (ML) INJECTION PRN
Status: CANCELLED | OUTPATIENT
Start: 2021-08-23

## 2021-08-23 RX ORDER — SODIUM CHLORIDE 0.9 % (FLUSH) 0.9 %
20 SYRINGE (ML) INJECTION PRN
Status: DISCONTINUED | OUTPATIENT
Start: 2021-08-23 | End: 2021-08-24 | Stop reason: HOSPADM

## 2021-08-23 RX ORDER — SODIUM CHLORIDE 0.9 % (FLUSH) 0.9 %
10 SYRINGE (ML) INJECTION PRN
Status: DISCONTINUED | OUTPATIENT
Start: 2021-08-23 | End: 2021-08-24 | Stop reason: HOSPADM

## 2021-08-23 RX ADMIN — Medication 500 UNITS: at 13:36

## 2021-08-23 RX ADMIN — SODIUM CHLORIDE, PRESERVATIVE FREE 10 ML: 5 INJECTION INTRAVENOUS at 13:34

## 2021-08-23 RX ADMIN — SODIUM CHLORIDE, PRESERVATIVE FREE 10 ML: 5 INJECTION INTRAVENOUS at 13:35

## 2021-08-23 NOTE — PROGRESS NOTES
Patient  States she  Does not require any labwork  To  Be performed  Today .   She states she  Going to evaluate if chemothearpy treatments  Need to resume  And she will call when she needs to attend  Next day for labwork and port to be flushed

## 2021-09-03 ENCOUNTER — TELEPHONE (OUTPATIENT)
Dept: CARDIOLOGY | Age: 81
End: 2021-09-03

## 2021-09-03 NOTE — TELEPHONE ENCOUNTER
CALLED PATIENT TO SCHEDULE ECHO FOR 09-13-21. REVIEWED COVID CHECKLIST WITH PATIENT.     Electronically signed by Sadiq Stephen on 9/3/2021 at 2:14 PM

## 2021-09-13 ENCOUNTER — HOSPITAL ENCOUNTER (OUTPATIENT)
Dept: CARDIOLOGY | Age: 81
Discharge: HOME | End: 2021-09-13
Payer: MEDICARE

## 2021-09-13 LAB
LV EF: 55 %
LVEF MODALITY: NORMAL

## 2021-09-13 PROCEDURE — 93356 MYOCRD STRAIN IMG SPCKL TRCK: CPT | Performed by: PSYCHIATRY & NEUROLOGY

## 2021-09-13 PROCEDURE — 93306 TTE W/DOPPLER COMPLETE: CPT | Performed by: PSYCHIATRY & NEUROLOGY

## 2021-10-11 ENCOUNTER — HOSPITAL ENCOUNTER (OUTPATIENT)
Dept: INFUSION THERAPY | Age: 81
Setting detail: INFUSION SERIES
Discharge: HOME | End: 2021-10-11
Payer: MEDICARE

## 2021-10-11 VITALS
DIASTOLIC BLOOD PRESSURE: 58 MMHG | HEIGHT: 60 IN | RESPIRATION RATE: 16 BRPM | TEMPERATURE: 98.3 F | BODY MASS INDEX: 35.73 KG/M2 | HEART RATE: 78 BPM | SYSTOLIC BLOOD PRESSURE: 149 MMHG | OXYGEN SATURATION: 100 % | WEIGHT: 182 LBS

## 2021-10-11 DIAGNOSIS — C55 MALIGNANT NEOPLASM OF UTERUS, UNSPECIFIED SITE (HCC): Primary | ICD-10-CM

## 2021-10-11 LAB
ALBUMIN SERPL-MCNC: 3.9 G/DL (ref 3.5–5.2)
ALP BLD-CCNC: 97 U/L (ref 35–104)
ALT SERPL-CCNC: 13 U/L (ref 0–32)
ANION GAP SERPL CALCULATED.3IONS-SCNC: 11 MMOL/L (ref 7–16)
ANISOCYTOSIS: ABNORMAL
AST SERPL-CCNC: 21 U/L (ref 0–31)
BASOPHILS ABSOLUTE: 0.02 E9/L (ref 0–0.2)
BASOPHILS RELATIVE PERCENT: 1 % (ref 0–2)
BILIRUB SERPL-MCNC: 0.3 MG/DL (ref 0–1.2)
BUN BLDV-MCNC: 39 MG/DL (ref 6–23)
CA 125: 18.5 U/ML (ref 0–35)
CALCIUM SERPL-MCNC: 9.4 MG/DL (ref 8.6–10.2)
CHLORIDE BLD-SCNC: 103 MMOL/L (ref 98–107)
CO2: 25 MMOL/L (ref 22–29)
CREAT SERPL-MCNC: 1.4 MG/DL (ref 0.5–1)
EOSINOPHILS ABSOLUTE: 0.01 E9/L (ref 0.05–0.5)
EOSINOPHILS RELATIVE PERCENT: 0.5 % (ref 0–6)
GFR AFRICAN AMERICAN: 44
GFR NON-AFRICAN AMERICAN: 36 ML/MIN/1.73
GLUCOSE BLD-MCNC: 130 MG/DL (ref 74–99)
HCT VFR BLD CALC: 31.5 % (ref 34–48)
HEMOGLOBIN: 10.1 G/DL (ref 11.5–15.5)
IMMATURE GRANULOCYTES #: 0.02 E9/L
IMMATURE GRANULOCYTES %: 1 % (ref 0–5)
LYMPHOCYTES ABSOLUTE: 0.69 E9/L (ref 1.5–4)
LYMPHOCYTES RELATIVE PERCENT: 35.8 % (ref 20–42)
MAGNESIUM: 2.1 MG/DL (ref 1.6–2.6)
MCH RBC QN AUTO: 31.6 PG (ref 26–35)
MCHC RBC AUTO-ENTMCNC: 32.1 % (ref 32–34.5)
MCV RBC AUTO: 98.4 FL (ref 80–99.9)
MONOCYTES ABSOLUTE: 0.36 E9/L (ref 0.1–0.95)
MONOCYTES RELATIVE PERCENT: 18.7 % (ref 2–12)
NEUTROPHILS ABSOLUTE: 0.83 E9/L (ref 1.8–7.3)
NEUTROPHILS RELATIVE PERCENT: 43 % (ref 43–80)
OVALOCYTES: ABNORMAL
PDW BLD-RTO: 15.3 FL (ref 11.5–15)
PLATELET # BLD: 187 E9/L (ref 130–450)
PMV BLD AUTO: 9.5 FL (ref 7–12)
POIKILOCYTES: ABNORMAL
POTASSIUM SERPL-SCNC: 4.5 MMOL/L (ref 3.5–5)
RBC # BLD: 3.2 E12/L (ref 3.5–5.5)
SODIUM BLD-SCNC: 139 MMOL/L (ref 132–146)
TEAR DROP CELLS: ABNORMAL
TOTAL PROTEIN: 6.7 G/DL (ref 6.4–8.3)
WBC # BLD: 1.9 E9/L (ref 4.5–11.5)

## 2021-10-11 PROCEDURE — 83735 ASSAY OF MAGNESIUM: CPT

## 2021-10-11 PROCEDURE — 80053 COMPREHEN METABOLIC PANEL: CPT

## 2021-10-11 PROCEDURE — 36415 COLL VENOUS BLD VENIPUNCTURE: CPT

## 2021-10-11 PROCEDURE — 36591 DRAW BLOOD OFF VENOUS DEVICE: CPT

## 2021-10-11 PROCEDURE — 85025 COMPLETE CBC W/AUTO DIFF WBC: CPT

## 2021-10-11 PROCEDURE — 2580000003 HC RX 258: Performed by: NURSE PRACTITIONER

## 2021-10-11 PROCEDURE — 86304 IMMUNOASSAY TUMOR CA 125: CPT

## 2021-10-11 PROCEDURE — 6360000002 HC RX W HCPCS: Performed by: NURSE PRACTITIONER

## 2021-10-11 RX ORDER — HEPARIN SODIUM (PORCINE) LOCK FLUSH IV SOLN 100 UNIT/ML 100 UNIT/ML
500 SOLUTION INTRAVENOUS PRN
Status: DISCONTINUED | OUTPATIENT
Start: 2021-10-11 | End: 2021-10-12 | Stop reason: HOSPADM

## 2021-10-11 RX ORDER — HEPARIN SODIUM (PORCINE) LOCK FLUSH IV SOLN 100 UNIT/ML 100 UNIT/ML
500 SOLUTION INTRAVENOUS PRN
Status: CANCELLED | OUTPATIENT
Start: 2021-10-11

## 2021-10-11 RX ORDER — SODIUM CHLORIDE 0.9 % (FLUSH) 0.9 %
20 SYRINGE (ML) INJECTION PRN
Status: DISCONTINUED | OUTPATIENT
Start: 2021-10-11 | End: 2021-10-12 | Stop reason: HOSPADM

## 2021-10-11 RX ORDER — SODIUM CHLORIDE 0.9 % (FLUSH) 0.9 %
10 SYRINGE (ML) INJECTION PRN
Status: DISCONTINUED | OUTPATIENT
Start: 2021-10-11 | End: 2021-10-12 | Stop reason: HOSPADM

## 2021-10-11 RX ORDER — SODIUM CHLORIDE 0.9 % (FLUSH) 0.9 %
20 SYRINGE (ML) INJECTION PRN
Status: CANCELLED | OUTPATIENT
Start: 2021-10-11

## 2021-10-11 RX ORDER — SODIUM CHLORIDE 0.9 % (FLUSH) 0.9 %
10 SYRINGE (ML) INJECTION PRN
Status: CANCELLED | OUTPATIENT
Start: 2021-10-11

## 2021-10-11 RX ADMIN — SODIUM CHLORIDE, PRESERVATIVE FREE 10 ML: 5 INJECTION INTRAVENOUS at 13:33

## 2021-10-11 RX ADMIN — Medication 500 UNITS: at 13:35

## 2021-10-11 RX ADMIN — SODIUM CHLORIDE, PRESERVATIVE FREE 20 ML: 5 INJECTION INTRAVENOUS at 13:35

## 2021-10-18 ENCOUNTER — HOSPITAL ENCOUNTER (OUTPATIENT)
Dept: INFUSION THERAPY | Age: 81
Setting detail: INFUSION SERIES
Discharge: HOME | End: 2021-10-18
Payer: MEDICARE

## 2021-10-18 VITALS
DIASTOLIC BLOOD PRESSURE: 56 MMHG | WEIGHT: 182 LBS | BODY MASS INDEX: 35.73 KG/M2 | TEMPERATURE: 98.2 F | SYSTOLIC BLOOD PRESSURE: 123 MMHG | OXYGEN SATURATION: 100 % | RESPIRATION RATE: 16 BRPM | HEIGHT: 60 IN | HEART RATE: 86 BPM

## 2021-10-18 DIAGNOSIS — C55 MALIGNANT NEOPLASM OF UTERUS, UNSPECIFIED SITE (HCC): Primary | ICD-10-CM

## 2021-10-18 LAB
ALBUMIN SERPL-MCNC: 3.9 G/DL (ref 3.5–5.2)
ALP BLD-CCNC: 94 U/L (ref 35–104)
ALT SERPL-CCNC: 12 U/L (ref 0–32)
ANION GAP SERPL CALCULATED.3IONS-SCNC: 11 MMOL/L (ref 7–16)
AST SERPL-CCNC: 23 U/L (ref 0–31)
BASOPHILS ABSOLUTE: 0.02 E9/L (ref 0–0.2)
BASOPHILS RELATIVE PERCENT: 0.6 % (ref 0–2)
BILIRUB SERPL-MCNC: 0.4 MG/DL (ref 0–1.2)
BUN BLDV-MCNC: 38 MG/DL (ref 6–23)
CA 125: 17.1 U/ML (ref 0–35)
CALCIUM SERPL-MCNC: 9.5 MG/DL (ref 8.6–10.2)
CHLORIDE BLD-SCNC: 102 MMOL/L (ref 98–107)
CO2: 27 MMOL/L (ref 22–29)
CREAT SERPL-MCNC: 1.6 MG/DL (ref 0.5–1)
EOSINOPHILS ABSOLUTE: 0.02 E9/L (ref 0.05–0.5)
EOSINOPHILS RELATIVE PERCENT: 0.6 % (ref 0–6)
GFR AFRICAN AMERICAN: 37
GFR NON-AFRICAN AMERICAN: 31 ML/MIN/1.73
GLUCOSE BLD-MCNC: 160 MG/DL (ref 74–99)
HCT VFR BLD CALC: 31.8 % (ref 34–48)
HEMOGLOBIN: 10.3 G/DL (ref 11.5–15.5)
IMMATURE GRANULOCYTES #: 0.03 E9/L
IMMATURE GRANULOCYTES %: 1 % (ref 0–5)
LYMPHOCYTES ABSOLUTE: 0.78 E9/L (ref 1.5–4)
LYMPHOCYTES RELATIVE PERCENT: 24.8 % (ref 20–42)
MAGNESIUM: 2.2 MG/DL (ref 1.6–2.6)
MCH RBC QN AUTO: 31.6 PG (ref 26–35)
MCHC RBC AUTO-ENTMCNC: 32.4 % (ref 32–34.5)
MCV RBC AUTO: 97.5 FL (ref 80–99.9)
MONOCYTES ABSOLUTE: 0.57 E9/L (ref 0.1–0.95)
MONOCYTES RELATIVE PERCENT: 18.2 % (ref 2–12)
NEUTROPHILS ABSOLUTE: 1.72 E9/L (ref 1.8–7.3)
NEUTROPHILS RELATIVE PERCENT: 54.8 % (ref 43–80)
PDW BLD-RTO: 15.7 FL (ref 11.5–15)
PLATELET # BLD: 182 E9/L (ref 130–450)
PMV BLD AUTO: 9.6 FL (ref 7–12)
POTASSIUM SERPL-SCNC: 4.6 MMOL/L (ref 3.5–5)
RBC # BLD: 3.26 E12/L (ref 3.5–5.5)
SODIUM BLD-SCNC: 140 MMOL/L (ref 132–146)
TOTAL PROTEIN: 6.7 G/DL (ref 6.4–8.3)
WBC # BLD: 3.1 E9/L (ref 4.5–11.5)

## 2021-10-18 PROCEDURE — 36415 COLL VENOUS BLD VENIPUNCTURE: CPT

## 2021-10-18 PROCEDURE — 36591 DRAW BLOOD OFF VENOUS DEVICE: CPT

## 2021-10-18 PROCEDURE — 86304 IMMUNOASSAY TUMOR CA 125: CPT

## 2021-10-18 PROCEDURE — 2580000003 HC RX 258: Performed by: NURSE PRACTITIONER

## 2021-10-18 PROCEDURE — 83735 ASSAY OF MAGNESIUM: CPT

## 2021-10-18 PROCEDURE — 6360000002 HC RX W HCPCS: Performed by: NURSE PRACTITIONER

## 2021-10-18 PROCEDURE — 85025 COMPLETE CBC W/AUTO DIFF WBC: CPT

## 2021-10-18 PROCEDURE — 80053 COMPREHEN METABOLIC PANEL: CPT

## 2021-10-18 RX ORDER — SODIUM CHLORIDE 0.9 % (FLUSH) 0.9 %
10 SYRINGE (ML) INJECTION PRN
Status: CANCELLED | OUTPATIENT
Start: 2021-10-18

## 2021-10-18 RX ORDER — SODIUM CHLORIDE 0.9 % (FLUSH) 0.9 %
20 SYRINGE (ML) INJECTION PRN
Status: DISCONTINUED | OUTPATIENT
Start: 2021-10-18 | End: 2021-10-19 | Stop reason: HOSPADM

## 2021-10-18 RX ORDER — HEPARIN SODIUM (PORCINE) LOCK FLUSH IV SOLN 100 UNIT/ML 100 UNIT/ML
500 SOLUTION INTRAVENOUS PRN
Status: DISCONTINUED | OUTPATIENT
Start: 2021-10-18 | End: 2021-10-19 | Stop reason: HOSPADM

## 2021-10-18 RX ORDER — HEPARIN SODIUM (PORCINE) LOCK FLUSH IV SOLN 100 UNIT/ML 100 UNIT/ML
500 SOLUTION INTRAVENOUS PRN
Status: CANCELLED | OUTPATIENT
Start: 2021-10-18

## 2021-10-18 RX ORDER — SODIUM CHLORIDE 0.9 % (FLUSH) 0.9 %
10 SYRINGE (ML) INJECTION PRN
Status: DISCONTINUED | OUTPATIENT
Start: 2021-10-18 | End: 2021-10-19 | Stop reason: HOSPADM

## 2021-10-18 RX ORDER — SODIUM CHLORIDE 0.9 % (FLUSH) 0.9 %
20 SYRINGE (ML) INJECTION PRN
Status: CANCELLED | OUTPATIENT
Start: 2021-10-18

## 2021-10-18 RX ADMIN — SODIUM CHLORIDE, PRESERVATIVE FREE 10 ML: 5 INJECTION INTRAVENOUS at 14:56

## 2021-10-18 RX ADMIN — Medication 500 UNITS: at 14:59

## 2021-10-18 RX ADMIN — SODIUM CHLORIDE, PRESERVATIVE FREE 10 ML: 5 INJECTION INTRAVENOUS at 14:59

## 2021-10-18 RX ADMIN — SODIUM CHLORIDE, PRESERVATIVE FREE 10 ML: 5 INJECTION INTRAVENOUS at 14:58

## 2021-10-18 NOTE — PROGRESS NOTES
Patient tolerated port flush well. Therapy plan reviewed with patient. Verbalizes understanding. Declines copy of AVS and any medication information, verbalizes good knowledge of current plan, and has no signs or symptoms to report at this time. Next appointment made.     Labs drawn s ordered  Cbc diff cmp  Mg ca 125  Pending results  To be faxed

## 2021-11-15 ENCOUNTER — HOSPITAL ENCOUNTER (OUTPATIENT)
Dept: INFUSION THERAPY | Age: 81
Setting detail: INFUSION SERIES
Discharge: HOME | End: 2021-11-15
Payer: MEDICARE

## 2021-11-15 VITALS
WEIGHT: 182 LBS | OXYGEN SATURATION: 100 % | TEMPERATURE: 97.8 F | HEIGHT: 60 IN | RESPIRATION RATE: 16 BRPM | SYSTOLIC BLOOD PRESSURE: 150 MMHG | DIASTOLIC BLOOD PRESSURE: 65 MMHG | HEART RATE: 77 BPM | BODY MASS INDEX: 35.73 KG/M2

## 2021-11-15 DIAGNOSIS — C55 MALIGNANT NEOPLASM OF UTERUS, UNSPECIFIED SITE (HCC): Primary | ICD-10-CM

## 2021-11-15 LAB
ALBUMIN SERPL-MCNC: 3.7 G/DL (ref 3.5–5.2)
ALP BLD-CCNC: 84 U/L (ref 35–104)
ALT SERPL-CCNC: 13 U/L (ref 0–32)
ANION GAP SERPL CALCULATED.3IONS-SCNC: 10 MMOL/L (ref 7–16)
AST SERPL-CCNC: 19 U/L (ref 0–31)
BASOPHILS ABSOLUTE: 0.04 E9/L (ref 0–0.2)
BASOPHILS RELATIVE PERCENT: 1.7 % (ref 0–2)
BILIRUB SERPL-MCNC: 0.4 MG/DL (ref 0–1.2)
BUN BLDV-MCNC: 45 MG/DL (ref 6–23)
CA 125: 19.2 U/ML (ref 0–35)
CALCIUM SERPL-MCNC: 9.3 MG/DL (ref 8.6–10.2)
CHLORIDE BLD-SCNC: 104 MMOL/L (ref 98–107)
CO2: 26 MMOL/L (ref 22–29)
CREAT SERPL-MCNC: 1.4 MG/DL (ref 0.5–1)
EOSINOPHILS ABSOLUTE: 0.03 E9/L (ref 0.05–0.5)
EOSINOPHILS RELATIVE PERCENT: 1.3 % (ref 0–6)
GFR AFRICAN AMERICAN: 44
GFR NON-AFRICAN AMERICAN: 36 ML/MIN/1.73
GLUCOSE BLD-MCNC: 117 MG/DL (ref 74–99)
HCT VFR BLD CALC: 30.3 % (ref 34–48)
HEMOGLOBIN: 9.9 G/DL (ref 11.5–15.5)
IMMATURE GRANULOCYTES #: 0.01 E9/L
IMMATURE GRANULOCYTES %: 0.4 % (ref 0–5)
LYMPHOCYTES ABSOLUTE: 0.66 E9/L (ref 1.5–4)
LYMPHOCYTES RELATIVE PERCENT: 27.6 % (ref 20–42)
MAGNESIUM: 2.2 MG/DL (ref 1.6–2.6)
MCH RBC QN AUTO: 32.9 PG (ref 26–35)
MCHC RBC AUTO-ENTMCNC: 32.7 % (ref 32–34.5)
MCV RBC AUTO: 100.7 FL (ref 80–99.9)
MONOCYTES ABSOLUTE: 0.47 E9/L (ref 0.1–0.95)
MONOCYTES RELATIVE PERCENT: 19.7 % (ref 2–12)
NEUTROPHILS ABSOLUTE: 1.18 E9/L (ref 1.8–7.3)
NEUTROPHILS RELATIVE PERCENT: 49.3 % (ref 43–80)
PDW BLD-RTO: 17 FL (ref 11.5–15)
PLATELET # BLD: 173 E9/L (ref 130–450)
PMV BLD AUTO: 9.7 FL (ref 7–12)
POTASSIUM SERPL-SCNC: 4.1 MMOL/L (ref 3.5–5)
RBC # BLD: 3.01 E12/L (ref 3.5–5.5)
SODIUM BLD-SCNC: 140 MMOL/L (ref 132–146)
TOTAL PROTEIN: 6.5 G/DL (ref 6.4–8.3)
WBC # BLD: 2.4 E9/L (ref 4.5–11.5)

## 2021-11-15 PROCEDURE — 6360000002 HC RX W HCPCS: Performed by: NURSE PRACTITIONER

## 2021-11-15 PROCEDURE — 80053 COMPREHEN METABOLIC PANEL: CPT

## 2021-11-15 PROCEDURE — 83735 ASSAY OF MAGNESIUM: CPT

## 2021-11-15 PROCEDURE — 36591 DRAW BLOOD OFF VENOUS DEVICE: CPT

## 2021-11-15 PROCEDURE — 85025 COMPLETE CBC W/AUTO DIFF WBC: CPT

## 2021-11-15 PROCEDURE — 2580000003 HC RX 258: Performed by: NURSE PRACTITIONER

## 2021-11-15 PROCEDURE — 36415 COLL VENOUS BLD VENIPUNCTURE: CPT

## 2021-11-15 PROCEDURE — 86304 IMMUNOASSAY TUMOR CA 125: CPT

## 2021-11-15 RX ORDER — HEPARIN SODIUM (PORCINE) LOCK FLUSH IV SOLN 100 UNIT/ML 100 UNIT/ML
500 SOLUTION INTRAVENOUS PRN
Status: CANCELLED | OUTPATIENT
Start: 2021-11-15

## 2021-11-15 RX ORDER — SODIUM CHLORIDE 0.9 % (FLUSH) 0.9 %
10 SYRINGE (ML) INJECTION PRN
Status: CANCELLED | OUTPATIENT
Start: 2021-11-15

## 2021-11-15 RX ORDER — SODIUM CHLORIDE 0.9 % (FLUSH) 0.9 %
10 SYRINGE (ML) INJECTION PRN
Status: DISCONTINUED | OUTPATIENT
Start: 2021-11-15 | End: 2021-11-16 | Stop reason: HOSPADM

## 2021-11-15 RX ORDER — HEPARIN SODIUM (PORCINE) LOCK FLUSH IV SOLN 100 UNIT/ML 100 UNIT/ML
500 SOLUTION INTRAVENOUS PRN
Status: DISCONTINUED | OUTPATIENT
Start: 2021-11-15 | End: 2021-11-16 | Stop reason: HOSPADM

## 2021-11-15 RX ORDER — SODIUM CHLORIDE 0.9 % (FLUSH) 0.9 %
20 SYRINGE (ML) INJECTION PRN
Status: CANCELLED | OUTPATIENT
Start: 2021-11-15

## 2021-11-15 RX ADMIN — SODIUM CHLORIDE, PRESERVATIVE FREE 10 ML: 5 INJECTION INTRAVENOUS at 14:12

## 2021-11-15 RX ADMIN — SODIUM CHLORIDE, PRESERVATIVE FREE 10 ML: 5 INJECTION INTRAVENOUS at 14:13

## 2021-11-15 RX ADMIN — HEPARIN SODIUM (PORCINE) LOCK FLUSH IV SOLN 100 UNIT/ML 500 UNITS: 100 SOLUTION at 14:13

## 2021-11-15 RX ADMIN — SODIUM CHLORIDE, PRESERVATIVE FREE 10 ML: 5 INJECTION INTRAVENOUS at 14:10

## 2021-11-22 ENCOUNTER — HOSPITAL ENCOUNTER (OUTPATIENT)
Dept: INFUSION THERAPY | Age: 81
Setting detail: INFUSION SERIES
Discharge: HOME | End: 2021-11-22
Payer: MEDICARE

## 2021-11-22 VITALS
HEART RATE: 90 BPM | RESPIRATION RATE: 18 BRPM | BODY MASS INDEX: 35.73 KG/M2 | TEMPERATURE: 98 F | HEIGHT: 60 IN | SYSTOLIC BLOOD PRESSURE: 177 MMHG | DIASTOLIC BLOOD PRESSURE: 74 MMHG | WEIGHT: 182 LBS | OXYGEN SATURATION: 99 %

## 2021-11-22 DIAGNOSIS — C55 MALIGNANT NEOPLASM OF UTERUS, UNSPECIFIED SITE (HCC): Primary | ICD-10-CM

## 2021-11-22 LAB
BASOPHILS ABSOLUTE: 0.03 E9/L (ref 0–0.2)
BASOPHILS RELATIVE PERCENT: 0.9 % (ref 0–2)
EOSINOPHILS ABSOLUTE: 0.03 E9/L (ref 0.05–0.5)
EOSINOPHILS RELATIVE PERCENT: 0.9 % (ref 0–6)
HCT VFR BLD CALC: 30.5 % (ref 34–48)
HEMOGLOBIN: 10 G/DL (ref 11.5–15.5)
IMMATURE GRANULOCYTES #: 0.02 E9/L
IMMATURE GRANULOCYTES %: 0.6 % (ref 0–5)
LYMPHOCYTES ABSOLUTE: 0.86 E9/L (ref 1.5–4)
LYMPHOCYTES RELATIVE PERCENT: 27 % (ref 20–42)
MCH RBC QN AUTO: 32.9 PG (ref 26–35)
MCHC RBC AUTO-ENTMCNC: 32.8 % (ref 32–34.5)
MCV RBC AUTO: 100.3 FL (ref 80–99.9)
MONOCYTES ABSOLUTE: 0.62 E9/L (ref 0.1–0.95)
MONOCYTES RELATIVE PERCENT: 19.5 % (ref 2–12)
NEUTROPHILS ABSOLUTE: 1.62 E9/L (ref 1.8–7.3)
NEUTROPHILS RELATIVE PERCENT: 51.1 % (ref 43–80)
PDW BLD-RTO: 16.8 FL (ref 11.5–15)
PLATELET # BLD: 162 E9/L (ref 130–450)
PMV BLD AUTO: 9.8 FL (ref 7–12)
RBC # BLD: 3.04 E12/L (ref 3.5–5.5)
WBC # BLD: 3.2 E9/L (ref 4.5–11.5)

## 2021-11-22 PROCEDURE — 36415 COLL VENOUS BLD VENIPUNCTURE: CPT

## 2021-11-22 PROCEDURE — 6360000002 HC RX W HCPCS: Performed by: NURSE PRACTITIONER

## 2021-11-22 PROCEDURE — 85025 COMPLETE CBC W/AUTO DIFF WBC: CPT

## 2021-11-22 PROCEDURE — 36591 DRAW BLOOD OFF VENOUS DEVICE: CPT

## 2021-11-22 PROCEDURE — 2580000003 HC RX 258: Performed by: NURSE PRACTITIONER

## 2021-11-22 RX ORDER — HEPARIN SODIUM (PORCINE) LOCK FLUSH IV SOLN 100 UNIT/ML 100 UNIT/ML
500 SOLUTION INTRAVENOUS PRN
Status: DISCONTINUED | OUTPATIENT
Start: 2021-11-22 | End: 2021-11-23 | Stop reason: HOSPADM

## 2021-11-22 RX ORDER — HEPARIN SODIUM (PORCINE) LOCK FLUSH IV SOLN 100 UNIT/ML 100 UNIT/ML
500 SOLUTION INTRAVENOUS PRN
Status: CANCELLED | OUTPATIENT
Start: 2021-11-22

## 2021-11-22 RX ORDER — SODIUM CHLORIDE 0.9 % (FLUSH) 0.9 %
10 SYRINGE (ML) INJECTION PRN
Status: CANCELLED | OUTPATIENT
Start: 2021-11-22

## 2021-11-22 RX ORDER — SODIUM CHLORIDE 0.9 % (FLUSH) 0.9 %
20 SYRINGE (ML) INJECTION PRN
Status: DISCONTINUED | OUTPATIENT
Start: 2021-11-22 | End: 2021-11-23 | Stop reason: HOSPADM

## 2021-11-22 RX ORDER — SODIUM CHLORIDE 0.9 % (FLUSH) 0.9 %
10 SYRINGE (ML) INJECTION PRN
Status: DISCONTINUED | OUTPATIENT
Start: 2021-11-22 | End: 2021-11-23 | Stop reason: HOSPADM

## 2021-11-22 RX ORDER — SODIUM CHLORIDE 0.9 % (FLUSH) 0.9 %
20 SYRINGE (ML) INJECTION PRN
Status: CANCELLED | OUTPATIENT
Start: 2021-11-22

## 2021-11-22 RX ADMIN — SODIUM CHLORIDE, PRESERVATIVE FREE 10 ML: 5 INJECTION INTRAVENOUS at 14:33

## 2021-11-22 RX ADMIN — SODIUM CHLORIDE, PRESERVATIVE FREE 20 ML: 5 INJECTION INTRAVENOUS at 14:35

## 2021-11-22 RX ADMIN — HEPARIN SODIUM (PORCINE) LOCK FLUSH IV SOLN 100 UNIT/ML 500 UNITS: 100 SOLUTION at 14:36

## 2021-11-22 NOTE — PROGRESS NOTES
Patient tolerated port flush well. Blood drawn through port. Therapy plan reviewed with patient. Verbalizes understanding. Declines copy of AVS and any medication information, verbalizes good knowledge of current plan, and has no signs or symptoms to report at this time. Next appointment made.

## 2022-01-10 ENCOUNTER — HOSPITAL ENCOUNTER (OUTPATIENT)
Dept: INFUSION THERAPY | Age: 82
Setting detail: INFUSION SERIES
Discharge: HOME | End: 2022-01-10
Payer: MEDICARE

## 2022-01-10 VITALS
RESPIRATION RATE: 18 BRPM | DIASTOLIC BLOOD PRESSURE: 69 MMHG | TEMPERATURE: 98.1 F | OXYGEN SATURATION: 100 % | HEART RATE: 94 BPM | SYSTOLIC BLOOD PRESSURE: 166 MMHG

## 2022-01-10 DIAGNOSIS — C55 MALIGNANT NEOPLASM OF UTERUS, UNSPECIFIED SITE (HCC): Primary | ICD-10-CM

## 2022-01-10 LAB
ALBUMIN SERPL-MCNC: 4 G/DL (ref 3.5–5.2)
ALP BLD-CCNC: 127 U/L (ref 35–104)
ALT SERPL-CCNC: 14 U/L (ref 0–32)
ANION GAP SERPL CALCULATED.3IONS-SCNC: 12 MMOL/L (ref 7–16)
AST SERPL-CCNC: 21 U/L (ref 0–31)
BASOPHILS ABSOLUTE: 0.03 E9/L (ref 0–0.2)
BASOPHILS RELATIVE PERCENT: 0.8 % (ref 0–2)
BILIRUB SERPL-MCNC: 0.3 MG/DL (ref 0–1.2)
BUN BLDV-MCNC: 40 MG/DL (ref 6–23)
CA 125: 20.3 U/ML (ref 0–35)
CALCIUM SERPL-MCNC: 9.2 MG/DL (ref 8.6–10.2)
CHLORIDE BLD-SCNC: 104 MMOL/L (ref 98–107)
CO2: 24 MMOL/L (ref 22–29)
CREAT SERPL-MCNC: 1.5 MG/DL (ref 0.5–1)
EOSINOPHILS ABSOLUTE: 0.1 E9/L (ref 0.05–0.5)
EOSINOPHILS RELATIVE PERCENT: 2.6 % (ref 0–6)
GFR AFRICAN AMERICAN: 40
GFR NON-AFRICAN AMERICAN: 33 ML/MIN/1.73
GLUCOSE BLD-MCNC: 144 MG/DL (ref 74–99)
HCT VFR BLD CALC: 32 % (ref 34–48)
HEMOGLOBIN: 10.4 G/DL (ref 11.5–15.5)
IMMATURE GRANULOCYTES #: 0.03 E9/L
IMMATURE GRANULOCYTES %: 0.8 % (ref 0–5)
LYMPHOCYTES ABSOLUTE: 0.86 E9/L (ref 1.5–4)
LYMPHOCYTES RELATIVE PERCENT: 22.5 % (ref 20–42)
MAGNESIUM: 1.8 MG/DL (ref 1.6–2.6)
MCH RBC QN AUTO: 33.5 PG (ref 26–35)
MCHC RBC AUTO-ENTMCNC: 32.5 % (ref 32–34.5)
MCV RBC AUTO: 103.2 FL (ref 80–99.9)
MONOCYTES ABSOLUTE: 0.47 E9/L (ref 0.1–0.95)
MONOCYTES RELATIVE PERCENT: 12.3 % (ref 2–12)
NEUTROPHILS ABSOLUTE: 2.34 E9/L (ref 1.8–7.3)
NEUTROPHILS RELATIVE PERCENT: 61 % (ref 43–80)
PDW BLD-RTO: 15.4 FL (ref 11.5–15)
PLATELET # BLD: 153 E9/L (ref 130–450)
PMV BLD AUTO: 9.7 FL (ref 7–12)
POTASSIUM SERPL-SCNC: 4.1 MMOL/L (ref 3.5–5)
RBC # BLD: 3.1 E12/L (ref 3.5–5.5)
SODIUM BLD-SCNC: 140 MMOL/L (ref 132–146)
TOTAL PROTEIN: 6.7 G/DL (ref 6.4–8.3)
WBC # BLD: 3.8 E9/L (ref 4.5–11.5)

## 2022-01-10 PROCEDURE — 83735 ASSAY OF MAGNESIUM: CPT

## 2022-01-10 PROCEDURE — 86304 IMMUNOASSAY TUMOR CA 125: CPT

## 2022-01-10 PROCEDURE — 6360000002 HC RX W HCPCS: Performed by: NURSE PRACTITIONER

## 2022-01-10 PROCEDURE — 36415 COLL VENOUS BLD VENIPUNCTURE: CPT

## 2022-01-10 PROCEDURE — 80053 COMPREHEN METABOLIC PANEL: CPT

## 2022-01-10 PROCEDURE — 36591 DRAW BLOOD OFF VENOUS DEVICE: CPT

## 2022-01-10 PROCEDURE — 2580000003 HC RX 258: Performed by: NURSE PRACTITIONER

## 2022-01-10 PROCEDURE — 85025 COMPLETE CBC W/AUTO DIFF WBC: CPT

## 2022-01-10 RX ORDER — HEPARIN SODIUM (PORCINE) LOCK FLUSH IV SOLN 100 UNIT/ML 100 UNIT/ML
500 SOLUTION INTRAVENOUS PRN
Status: CANCELLED | OUTPATIENT
Start: 2022-01-10

## 2022-01-10 RX ORDER — SODIUM CHLORIDE 0.9 % (FLUSH) 0.9 %
10 SYRINGE (ML) INJECTION PRN
Status: DISCONTINUED | OUTPATIENT
Start: 2022-01-10 | End: 2022-01-11 | Stop reason: HOSPADM

## 2022-01-10 RX ORDER — SODIUM CHLORIDE 0.9 % (FLUSH) 0.9 %
20 SYRINGE (ML) INJECTION PRN
Status: CANCELLED | OUTPATIENT
Start: 2022-01-10

## 2022-01-10 RX ORDER — HEPARIN SODIUM (PORCINE) LOCK FLUSH IV SOLN 100 UNIT/ML 100 UNIT/ML
500 SOLUTION INTRAVENOUS PRN
Status: DISCONTINUED | OUTPATIENT
Start: 2022-01-10 | End: 2022-01-11 | Stop reason: HOSPADM

## 2022-01-10 RX ORDER — SODIUM CHLORIDE 0.9 % (FLUSH) 0.9 %
20 SYRINGE (ML) INJECTION PRN
Status: DISCONTINUED | OUTPATIENT
Start: 2022-01-10 | End: 2022-01-11 | Stop reason: HOSPADM

## 2022-01-10 RX ORDER — SODIUM CHLORIDE 0.9 % (FLUSH) 0.9 %
10 SYRINGE (ML) INJECTION PRN
Status: CANCELLED | OUTPATIENT
Start: 2022-01-10

## 2022-01-10 RX ADMIN — SODIUM CHLORIDE, PRESERVATIVE FREE 10 ML: 5 INJECTION INTRAVENOUS at 13:48

## 2022-01-10 RX ADMIN — SODIUM CHLORIDE, PRESERVATIVE FREE 20 ML: 5 INJECTION INTRAVENOUS at 13:49

## 2022-01-10 RX ADMIN — HEPARIN SODIUM (PORCINE) LOCK FLUSH IV SOLN 100 UNIT/ML 500 UNITS: 100 SOLUTION at 13:50

## 2022-01-10 NOTE — PLAN OF CARE
Problem: Falls - Risk of:  Goal: Will remain free from falls  Description: Will remain free from falls  4/30/2021 1726 by Pablo Barcenas RN  Outcome: Met This Shift     Problem: Falls - Risk of:  Goal: Absence of physical injury  Description: Absence of physical injury  4/30/2021 1726 by Pablo Barcenas RN  Outcome: Met This Shift     Problem: Pain - Acute:  Goal: Pain level will decrease  Description: Pain level will decrease  Outcome: Met This Shift Subsequent Stages Histo Method Verbiage: Using a similar technique to that described above, a thin layer of tissue was removed from all areas where tumor was visible on the previous stage.  The tissue was again oriented, mapped, dyed, and processed as above.

## 2022-01-18 ENCOUNTER — HOSPITAL ENCOUNTER (OUTPATIENT)
Dept: INFUSION THERAPY | Age: 82
Setting detail: INFUSION SERIES
Discharge: HOME | End: 2022-01-18
Payer: MEDICARE

## 2022-01-18 VITALS
TEMPERATURE: 97 F | OXYGEN SATURATION: 100 % | HEIGHT: 60 IN | SYSTOLIC BLOOD PRESSURE: 169 MMHG | HEART RATE: 90 BPM | WEIGHT: 182 LBS | BODY MASS INDEX: 35.73 KG/M2 | DIASTOLIC BLOOD PRESSURE: 58 MMHG | RESPIRATION RATE: 18 BRPM

## 2022-01-18 DIAGNOSIS — C55 MALIGNANT NEOPLASM OF UTERUS, UNSPECIFIED SITE (HCC): Primary | ICD-10-CM

## 2022-01-18 LAB
BASOPHILS ABSOLUTE: 0.03 E9/L (ref 0–0.2)
BASOPHILS RELATIVE PERCENT: 1.2 % (ref 0–2)
EOSINOPHILS ABSOLUTE: 0.07 E9/L (ref 0.05–0.5)
EOSINOPHILS RELATIVE PERCENT: 2.8 % (ref 0–6)
HCT VFR BLD CALC: 31.2 % (ref 34–48)
HEMOGLOBIN: 10.2 G/DL (ref 11.5–15.5)
IMMATURE GRANULOCYTES #: 0.02 E9/L
IMMATURE GRANULOCYTES %: 0.8 % (ref 0–5)
LYMPHOCYTES ABSOLUTE: 0.7 E9/L (ref 1.5–4)
LYMPHOCYTES RELATIVE PERCENT: 28 % (ref 20–42)
MCH RBC QN AUTO: 33.6 PG (ref 26–35)
MCHC RBC AUTO-ENTMCNC: 32.7 % (ref 32–34.5)
MCV RBC AUTO: 102.6 FL (ref 80–99.9)
MONOCYTES ABSOLUTE: 0.3 E9/L (ref 0.1–0.95)
MONOCYTES RELATIVE PERCENT: 12 % (ref 2–12)
NEUTROPHILS ABSOLUTE: 1.38 E9/L (ref 1.8–7.3)
NEUTROPHILS RELATIVE PERCENT: 55.2 % (ref 43–80)
PDW BLD-RTO: 14.4 FL (ref 11.5–15)
PLATELET # BLD: 141 E9/L (ref 130–450)
PMV BLD AUTO: 9.9 FL (ref 7–12)
RBC # BLD: 3.04 E12/L (ref 3.5–5.5)
WBC # BLD: 2.5 E9/L (ref 4.5–11.5)

## 2022-01-18 PROCEDURE — 2580000003 HC RX 258: Performed by: NURSE PRACTITIONER

## 2022-01-18 PROCEDURE — 36415 COLL VENOUS BLD VENIPUNCTURE: CPT

## 2022-01-18 PROCEDURE — 36591 DRAW BLOOD OFF VENOUS DEVICE: CPT

## 2022-01-18 PROCEDURE — 85025 COMPLETE CBC W/AUTO DIFF WBC: CPT

## 2022-01-18 PROCEDURE — 6360000002 HC RX W HCPCS: Performed by: NURSE PRACTITIONER

## 2022-01-18 RX ORDER — EVEROLIMUS 10 MG/1
10 TABLET ORAL DAILY
COMMUNITY
End: 2022-02-21

## 2022-01-18 RX ORDER — SODIUM CHLORIDE 0.9 % (FLUSH) 0.9 %
10 SYRINGE (ML) INJECTION PRN
Status: DISCONTINUED | OUTPATIENT
Start: 2022-01-18 | End: 2022-01-19 | Stop reason: HOSPADM

## 2022-01-18 RX ORDER — SODIUM CHLORIDE 0.9 % (FLUSH) 0.9 %
20 SYRINGE (ML) INJECTION PRN
Status: CANCELLED | OUTPATIENT
Start: 2022-01-18

## 2022-01-18 RX ORDER — LETROZOLE 2.5 MG/1
2.5 TABLET, FILM COATED ORAL DAILY
COMMUNITY
End: 2022-07-18

## 2022-01-18 RX ORDER — HEPARIN SODIUM (PORCINE) LOCK FLUSH IV SOLN 100 UNIT/ML 100 UNIT/ML
500 SOLUTION INTRAVENOUS PRN
Status: CANCELLED | OUTPATIENT
Start: 2022-01-18

## 2022-01-18 RX ORDER — SODIUM CHLORIDE 0.9 % (FLUSH) 0.9 %
10 SYRINGE (ML) INJECTION PRN
Status: CANCELLED | OUTPATIENT
Start: 2022-01-18

## 2022-01-18 RX ORDER — HEPARIN SODIUM (PORCINE) LOCK FLUSH IV SOLN 100 UNIT/ML 100 UNIT/ML
500 SOLUTION INTRAVENOUS PRN
Status: DISCONTINUED | OUTPATIENT
Start: 2022-01-18 | End: 2022-01-19 | Stop reason: HOSPADM

## 2022-01-18 RX ADMIN — SODIUM CHLORIDE, PRESERVATIVE FREE 10 ML: 5 INJECTION INTRAVENOUS at 10:03

## 2022-01-18 RX ADMIN — SODIUM CHLORIDE, PRESERVATIVE FREE 10 ML: 5 INJECTION INTRAVENOUS at 09:59

## 2022-01-18 RX ADMIN — Medication 500 UNITS: at 10:04

## 2022-01-18 RX ADMIN — SODIUM CHLORIDE, PRESERVATIVE FREE 10 ML: 5 INJECTION INTRAVENOUS at 10:02

## 2022-01-24 ENCOUNTER — HOSPITAL ENCOUNTER (OUTPATIENT)
Dept: INFUSION THERAPY | Age: 82
Setting detail: INFUSION SERIES
Discharge: HOME | End: 2022-01-24
Payer: MEDICARE

## 2022-01-24 VITALS
WEIGHT: 182 LBS | OXYGEN SATURATION: 100 % | TEMPERATURE: 98.1 F | BODY MASS INDEX: 35.73 KG/M2 | HEART RATE: 85 BPM | HEIGHT: 60 IN | RESPIRATION RATE: 18 BRPM | DIASTOLIC BLOOD PRESSURE: 65 MMHG | SYSTOLIC BLOOD PRESSURE: 146 MMHG

## 2022-01-24 DIAGNOSIS — C55 MALIGNANT NEOPLASM OF UTERUS, UNSPECIFIED SITE (HCC): Primary | ICD-10-CM

## 2022-01-24 LAB
BASOPHILS ABSOLUTE: 0.01 E9/L (ref 0–0.2)
BASOPHILS RELATIVE PERCENT: 0.4 % (ref 0–2)
EOSINOPHILS ABSOLUTE: 0.08 E9/L (ref 0.05–0.5)
EOSINOPHILS RELATIVE PERCENT: 3.3 % (ref 0–6)
HCT VFR BLD CALC: 31.2 % (ref 34–48)
HEMOGLOBIN: 10.1 G/DL (ref 11.5–15.5)
IMMATURE GRANULOCYTES #: 0.01 E9/L
IMMATURE GRANULOCYTES %: 0.4 % (ref 0–5)
LYMPHOCYTES ABSOLUTE: 0.78 E9/L (ref 1.5–4)
LYMPHOCYTES RELATIVE PERCENT: 32.1 % (ref 20–42)
MCH RBC QN AUTO: 32.9 PG (ref 26–35)
MCHC RBC AUTO-ENTMCNC: 32.4 % (ref 32–34.5)
MCV RBC AUTO: 101.6 FL (ref 80–99.9)
MONOCYTES ABSOLUTE: 0.36 E9/L (ref 0.1–0.95)
MONOCYTES RELATIVE PERCENT: 14.8 % (ref 2–12)
NEUTROPHILS ABSOLUTE: 1.19 E9/L (ref 1.8–7.3)
NEUTROPHILS RELATIVE PERCENT: 49 % (ref 43–80)
PDW BLD-RTO: 13.7 FL (ref 11.5–15)
PLATELET # BLD: 78 E9/L (ref 130–450)
PLATELET CONFIRMATION: NORMAL
PMV BLD AUTO: 10.8 FL (ref 7–12)
RBC # BLD: 3.07 E12/L (ref 3.5–5.5)
WBC # BLD: 2.4 E9/L (ref 4.5–11.5)

## 2022-01-24 PROCEDURE — 2580000003 HC RX 258: Performed by: NURSE PRACTITIONER

## 2022-01-24 PROCEDURE — 6360000002 HC RX W HCPCS: Performed by: NURSE PRACTITIONER

## 2022-01-24 PROCEDURE — 36415 COLL VENOUS BLD VENIPUNCTURE: CPT

## 2022-01-24 PROCEDURE — 36591 DRAW BLOOD OFF VENOUS DEVICE: CPT

## 2022-01-24 PROCEDURE — 85025 COMPLETE CBC W/AUTO DIFF WBC: CPT

## 2022-01-24 RX ORDER — SODIUM CHLORIDE 0.9 % (FLUSH) 0.9 %
20 SYRINGE (ML) INJECTION PRN
Status: DISCONTINUED | OUTPATIENT
Start: 2022-01-24 | End: 2022-01-25 | Stop reason: HOSPADM

## 2022-01-24 RX ORDER — SODIUM CHLORIDE 0.9 % (FLUSH) 0.9 %
10 SYRINGE (ML) INJECTION PRN
Status: CANCELLED | OUTPATIENT
Start: 2022-01-24

## 2022-01-24 RX ORDER — SODIUM CHLORIDE 0.9 % (FLUSH) 0.9 %
20 SYRINGE (ML) INJECTION PRN
Status: CANCELLED | OUTPATIENT
Start: 2022-01-24

## 2022-01-24 RX ORDER — HEPARIN SODIUM (PORCINE) LOCK FLUSH IV SOLN 100 UNIT/ML 100 UNIT/ML
500 SOLUTION INTRAVENOUS PRN
Status: DISCONTINUED | OUTPATIENT
Start: 2022-01-24 | End: 2022-01-25 | Stop reason: HOSPADM

## 2022-01-24 RX ORDER — HEPARIN SODIUM (PORCINE) LOCK FLUSH IV SOLN 100 UNIT/ML 100 UNIT/ML
500 SOLUTION INTRAVENOUS PRN
Status: CANCELLED | OUTPATIENT
Start: 2022-01-24

## 2022-01-24 RX ORDER — SODIUM CHLORIDE 0.9 % (FLUSH) 0.9 %
10 SYRINGE (ML) INJECTION PRN
Status: DISCONTINUED | OUTPATIENT
Start: 2022-01-24 | End: 2022-01-25 | Stop reason: HOSPADM

## 2022-01-24 RX ADMIN — Medication 500 UNITS: at 13:55

## 2022-01-24 RX ADMIN — SODIUM CHLORIDE, PRESERVATIVE FREE 20 ML: 5 INJECTION INTRAVENOUS at 13:54

## 2022-01-24 RX ADMIN — SODIUM CHLORIDE, PRESERVATIVE FREE 10 ML: 5 INJECTION INTRAVENOUS at 13:51

## 2022-01-24 ASSESSMENT — PAIN SCALES - GENERAL: PAINLEVEL_OUTOF10: 0

## 2022-01-31 ENCOUNTER — HOSPITAL ENCOUNTER (OUTPATIENT)
Dept: INFUSION THERAPY | Age: 82
Setting detail: INFUSION SERIES
Discharge: HOME | End: 2022-01-31
Payer: MEDICARE

## 2022-01-31 VITALS
OXYGEN SATURATION: 100 % | HEART RATE: 92 BPM | TEMPERATURE: 98.3 F | SYSTOLIC BLOOD PRESSURE: 169 MMHG | RESPIRATION RATE: 18 BRPM | DIASTOLIC BLOOD PRESSURE: 67 MMHG

## 2022-01-31 DIAGNOSIS — C55 MALIGNANT NEOPLASM OF UTERUS, UNSPECIFIED SITE (HCC): Primary | ICD-10-CM

## 2022-01-31 LAB
BASOPHILS ABSOLUTE: 0.01 E9/L (ref 0–0.2)
BASOPHILS RELATIVE PERCENT: 0.4 % (ref 0–2)
EOSINOPHILS ABSOLUTE: 0.07 E9/L (ref 0.05–0.5)
EOSINOPHILS RELATIVE PERCENT: 2.6 % (ref 0–6)
HCT VFR BLD CALC: 30.2 % (ref 34–48)
HEMOGLOBIN: 10.1 G/DL (ref 11.5–15.5)
IMMATURE GRANULOCYTES #: 0.01 E9/L
IMMATURE GRANULOCYTES %: 0.4 % (ref 0–5)
LYMPHOCYTES ABSOLUTE: 0.89 E9/L (ref 1.5–4)
LYMPHOCYTES RELATIVE PERCENT: 32.5 % (ref 20–42)
MCH RBC QN AUTO: 33.1 PG (ref 26–35)
MCHC RBC AUTO-ENTMCNC: 33.4 % (ref 32–34.5)
MCV RBC AUTO: 99 FL (ref 80–99.9)
MONOCYTES ABSOLUTE: 0.31 E9/L (ref 0.1–0.95)
MONOCYTES RELATIVE PERCENT: 11.3 % (ref 2–12)
NEUTROPHILS ABSOLUTE: 1.45 E9/L (ref 1.8–7.3)
NEUTROPHILS RELATIVE PERCENT: 52.8 % (ref 43–80)
PDW BLD-RTO: 13.4 FL (ref 11.5–15)
PLATELET # BLD: 53 E9/L (ref 130–450)
PLATELET CONFIRMATION: NORMAL
PMV BLD AUTO: 10.7 FL (ref 7–12)
RBC # BLD: 3.05 E12/L (ref 3.5–5.5)
WBC # BLD: 2.7 E9/L (ref 4.5–11.5)

## 2022-01-31 PROCEDURE — 2580000003 HC RX 258: Performed by: NURSE PRACTITIONER

## 2022-01-31 PROCEDURE — 36415 COLL VENOUS BLD VENIPUNCTURE: CPT

## 2022-01-31 PROCEDURE — 85025 COMPLETE CBC W/AUTO DIFF WBC: CPT

## 2022-01-31 PROCEDURE — 6360000002 HC RX W HCPCS: Performed by: NURSE PRACTITIONER

## 2022-01-31 PROCEDURE — 36591 DRAW BLOOD OFF VENOUS DEVICE: CPT

## 2022-01-31 RX ORDER — SODIUM CHLORIDE 0.9 % (FLUSH) 0.9 %
20 SYRINGE (ML) INJECTION PRN
Status: DISCONTINUED | OUTPATIENT
Start: 2022-01-31 | End: 2022-02-01 | Stop reason: HOSPADM

## 2022-01-31 RX ORDER — SODIUM CHLORIDE 0.9 % (FLUSH) 0.9 %
10 SYRINGE (ML) INJECTION PRN
Status: CANCELLED | OUTPATIENT
Start: 2022-01-31

## 2022-01-31 RX ORDER — SODIUM CHLORIDE 0.9 % (FLUSH) 0.9 %
20 SYRINGE (ML) INJECTION PRN
Status: CANCELLED | OUTPATIENT
Start: 2022-01-31

## 2022-01-31 RX ORDER — SODIUM CHLORIDE 0.9 % (FLUSH) 0.9 %
10 SYRINGE (ML) INJECTION PRN
Status: DISCONTINUED | OUTPATIENT
Start: 2022-01-31 | End: 2022-02-01 | Stop reason: HOSPADM

## 2022-01-31 RX ORDER — HEPARIN SODIUM (PORCINE) LOCK FLUSH IV SOLN 100 UNIT/ML 100 UNIT/ML
500 SOLUTION INTRAVENOUS PRN
Status: DISCONTINUED | OUTPATIENT
Start: 2022-01-31 | End: 2022-02-01 | Stop reason: HOSPADM

## 2022-01-31 RX ORDER — HEPARIN SODIUM (PORCINE) LOCK FLUSH IV SOLN 100 UNIT/ML 100 UNIT/ML
500 SOLUTION INTRAVENOUS PRN
Status: CANCELLED | OUTPATIENT
Start: 2022-01-31

## 2022-01-31 RX ADMIN — SODIUM CHLORIDE, PRESERVATIVE FREE 20 ML: 5 INJECTION INTRAVENOUS at 13:38

## 2022-01-31 RX ADMIN — SODIUM CHLORIDE, PRESERVATIVE FREE 10 ML: 5 INJECTION INTRAVENOUS at 13:34

## 2022-01-31 RX ADMIN — Medication 500 UNITS: at 13:39

## 2022-01-31 NOTE — PROGRESS NOTES
Patient tolerated port flush well. Venipuncture through port completed. Therapy plan reviewed with patient. Verbalizes understanding. Declines copy of AVS and any medication information, verbalizes good knowledge of current plan, and has no signs or symptoms to report at this time. Next appointment made.

## 2022-02-08 ENCOUNTER — HOSPITAL ENCOUNTER (OUTPATIENT)
Dept: INFUSION THERAPY | Age: 82
Setting detail: INFUSION SERIES
Discharge: HOME | End: 2022-02-08
Payer: MEDICARE

## 2022-02-08 VITALS
TEMPERATURE: 98.2 F | HEIGHT: 60 IN | OXYGEN SATURATION: 100 % | WEIGHT: 182 LBS | RESPIRATION RATE: 18 BRPM | SYSTOLIC BLOOD PRESSURE: 132 MMHG | HEART RATE: 95 BPM | BODY MASS INDEX: 35.73 KG/M2 | DIASTOLIC BLOOD PRESSURE: 64 MMHG

## 2022-02-08 DIAGNOSIS — C55 MALIGNANT NEOPLASM OF UTERUS, UNSPECIFIED SITE (HCC): Primary | ICD-10-CM

## 2022-02-08 LAB
BASOPHILS ABSOLUTE: 0.02 E9/L (ref 0–0.2)
BASOPHILS RELATIVE PERCENT: 0.7 % (ref 0–2)
EOSINOPHILS ABSOLUTE: 0.06 E9/L (ref 0.05–0.5)
EOSINOPHILS RELATIVE PERCENT: 2.2 % (ref 0–6)
HCT VFR BLD CALC: 30.2 % (ref 34–48)
HEMOGLOBIN: 10.1 G/DL (ref 11.5–15.5)
IMMATURE GRANULOCYTES #: 0.01 E9/L
IMMATURE GRANULOCYTES %: 0.4 % (ref 0–5)
LYMPHOCYTES ABSOLUTE: 0.8 E9/L (ref 1.5–4)
LYMPHOCYTES RELATIVE PERCENT: 29.5 % (ref 20–42)
MCH RBC QN AUTO: 32.7 PG (ref 26–35)
MCHC RBC AUTO-ENTMCNC: 33.4 % (ref 32–34.5)
MCV RBC AUTO: 97.7 FL (ref 80–99.9)
MONOCYTES ABSOLUTE: 0.28 E9/L (ref 0.1–0.95)
MONOCYTES RELATIVE PERCENT: 10.3 % (ref 2–12)
NEUTROPHILS ABSOLUTE: 1.54 E9/L (ref 1.8–7.3)
NEUTROPHILS RELATIVE PERCENT: 56.9 % (ref 43–80)
PDW BLD-RTO: 13.2 FL (ref 11.5–15)
PLATELET # BLD: 59 E9/L (ref 130–450)
PLATELET CONFIRMATION: NORMAL
PMV BLD AUTO: 11.3 FL (ref 7–12)
RBC # BLD: 3.09 E12/L (ref 3.5–5.5)
WBC # BLD: 2.7 E9/L (ref 4.5–11.5)

## 2022-02-08 PROCEDURE — 36591 DRAW BLOOD OFF VENOUS DEVICE: CPT

## 2022-02-08 PROCEDURE — 6360000002 HC RX W HCPCS: Performed by: NURSE PRACTITIONER

## 2022-02-08 PROCEDURE — 2580000003 HC RX 258: Performed by: NURSE PRACTITIONER

## 2022-02-08 PROCEDURE — 36415 COLL VENOUS BLD VENIPUNCTURE: CPT

## 2022-02-08 PROCEDURE — 85025 COMPLETE CBC W/AUTO DIFF WBC: CPT

## 2022-02-08 RX ORDER — SODIUM CHLORIDE 0.9 % (FLUSH) 0.9 %
10 SYRINGE (ML) INJECTION PRN
Status: CANCELLED | OUTPATIENT
Start: 2022-02-08

## 2022-02-08 RX ORDER — SODIUM CHLORIDE 0.9 % (FLUSH) 0.9 %
10 SYRINGE (ML) INJECTION PRN
Status: DISCONTINUED | OUTPATIENT
Start: 2022-02-08 | End: 2022-02-09 | Stop reason: HOSPADM

## 2022-02-08 RX ORDER — SODIUM CHLORIDE 0.9 % (FLUSH) 0.9 %
20 SYRINGE (ML) INJECTION PRN
Status: DISCONTINUED | OUTPATIENT
Start: 2022-02-08 | End: 2022-02-09 | Stop reason: HOSPADM

## 2022-02-08 RX ORDER — SODIUM CHLORIDE 0.9 % (FLUSH) 0.9 %
20 SYRINGE (ML) INJECTION PRN
Status: CANCELLED | OUTPATIENT
Start: 2022-02-08

## 2022-02-08 RX ORDER — HEPARIN SODIUM (PORCINE) LOCK FLUSH IV SOLN 100 UNIT/ML 100 UNIT/ML
500 SOLUTION INTRAVENOUS PRN
Status: DISCONTINUED | OUTPATIENT
Start: 2022-02-08 | End: 2022-02-09 | Stop reason: HOSPADM

## 2022-02-08 RX ORDER — HEPARIN SODIUM (PORCINE) LOCK FLUSH IV SOLN 100 UNIT/ML 100 UNIT/ML
500 SOLUTION INTRAVENOUS PRN
Status: CANCELLED | OUTPATIENT
Start: 2022-02-08

## 2022-02-08 RX ADMIN — SODIUM CHLORIDE, PRESERVATIVE FREE 20 ML: 5 INJECTION INTRAVENOUS at 13:42

## 2022-02-08 RX ADMIN — SODIUM CHLORIDE, PRESERVATIVE FREE 10 ML: 5 INJECTION INTRAVENOUS at 13:41

## 2022-02-08 RX ADMIN — Medication 500 UNITS: at 13:42

## 2022-02-08 ASSESSMENT — PAIN SCALES - GENERAL: PAINLEVEL_OUTOF10: 0

## 2022-02-11 ENCOUNTER — OFFICE VISIT (OUTPATIENT)
Dept: PRIMARY CARE CLINIC | Age: 82
End: 2022-02-11
Payer: MEDICARE

## 2022-02-11 VITALS
HEART RATE: 68 BPM | HEIGHT: 60 IN | WEIGHT: 189 LBS | TEMPERATURE: 97.9 F | OXYGEN SATURATION: 99 % | BODY MASS INDEX: 37.11 KG/M2 | DIASTOLIC BLOOD PRESSURE: 72 MMHG | SYSTOLIC BLOOD PRESSURE: 136 MMHG

## 2022-02-11 DIAGNOSIS — C55 MALIGNANT NEOPLASM OF UTERUS, UNSPECIFIED SITE (HCC): Primary | ICD-10-CM

## 2022-02-11 DIAGNOSIS — E78.2 MIXED HYPERLIPIDEMIA: ICD-10-CM

## 2022-02-11 DIAGNOSIS — N18.32 STAGE 3B CHRONIC KIDNEY DISEASE (HCC): ICD-10-CM

## 2022-02-11 DIAGNOSIS — I10 PRIMARY HYPERTENSION: ICD-10-CM

## 2022-02-11 DIAGNOSIS — H61.23 HEARING LOSS DUE TO CERUMEN IMPACTION, BILATERAL: ICD-10-CM

## 2022-02-11 DIAGNOSIS — E66.01 SEVERE OBESITY (BMI 35.0-39.9) WITH COMORBIDITY (HCC): ICD-10-CM

## 2022-02-11 DIAGNOSIS — E55.9 VITAMIN D DEFICIENCY: ICD-10-CM

## 2022-02-11 DIAGNOSIS — M19.91 PRIMARY OSTEOARTHRITIS, UNSPECIFIED SITE: ICD-10-CM

## 2022-02-11 DIAGNOSIS — C77.5: ICD-10-CM

## 2022-02-11 PROCEDURE — G8399 PT W/DXA RESULTS DOCUMENT: HCPCS | Performed by: FAMILY MEDICINE

## 2022-02-11 PROCEDURE — G8427 DOCREV CUR MEDS BY ELIG CLIN: HCPCS | Performed by: FAMILY MEDICINE

## 2022-02-11 PROCEDURE — 99213 OFFICE O/P EST LOW 20 MIN: CPT | Performed by: FAMILY MEDICINE

## 2022-02-11 PROCEDURE — 4040F PNEUMOC VAC/ADMIN/RCVD: CPT | Performed by: FAMILY MEDICINE

## 2022-02-11 PROCEDURE — 1090F PRES/ABSN URINE INCON ASSESS: CPT | Performed by: FAMILY MEDICINE

## 2022-02-11 PROCEDURE — G8417 CALC BMI ABV UP PARAM F/U: HCPCS | Performed by: FAMILY MEDICINE

## 2022-02-11 PROCEDURE — 1123F ACP DISCUSS/DSCN MKR DOCD: CPT | Performed by: FAMILY MEDICINE

## 2022-02-11 PROCEDURE — G8482 FLU IMMUNIZE ORDER/ADMIN: HCPCS | Performed by: FAMILY MEDICINE

## 2022-02-11 PROCEDURE — 69210 REMOVE IMPACTED EAR WAX UNI: CPT | Performed by: FAMILY MEDICINE

## 2022-02-11 PROCEDURE — 1036F TOBACCO NON-USER: CPT | Performed by: FAMILY MEDICINE

## 2022-02-11 RX ORDER — EZETIMIBE 10 MG/1
10 TABLET ORAL
Qty: 90 TABLET | Refills: 3 | Status: SHIPPED
Start: 2022-02-11 | End: 2022-02-15 | Stop reason: SDUPTHER

## 2022-02-11 RX ORDER — FOLIC ACID 1 MG/1
1 TABLET ORAL DAILY
Qty: 90 TABLET | Refills: 3 | Status: SHIPPED | OUTPATIENT
Start: 2022-02-11 | End: 2022-04-25

## 2022-02-11 RX ORDER — PYRIDOXINE HCL (VITAMIN B6) 100 MG
100 TABLET ORAL DAILY
COMMUNITY

## 2022-02-11 RX ORDER — MAGNESIUM GLUCONATE 27 MG(500)
500 TABLET ORAL 2 TIMES DAILY PRN
COMMUNITY

## 2022-02-11 ASSESSMENT — ENCOUNTER SYMPTOMS
PHOTOPHOBIA: 0
BACK PAIN: 0
ABDOMINAL PAIN: 0
VOMITING: 0
CONSTIPATION: 0
NAUSEA: 0
DIARRHEA: 0
BLOOD IN STOOL: 0
SHORTNESS OF BREATH: 0
SORE THROAT: 0
COUGH: 0

## 2022-02-11 NOTE — PROGRESS NOTES
Jennifer Banegas (:  1940) is a 80 y.o. female,Established patient, here for evaluation of the following chief complaint(s):  6 Month Follow-Up and Ear Fullness         ASSESSMENT/PLAN:  1. Malignant neoplasm of uterus, unspecified site ([facility] [address]  2. 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 Stage 3b chronic kidney disease ([facility] [address]  4. Primary osteoarthritis, unspecified site  5. Vitamin D deficiency  6. Primary hypertension  7. Mixed hyperlipidemia  -     ezetimibe (ZETIA) 10 MG tablet; Take 1 tablet by mouth Daily with supper, Disp-90 tablet, R-3Requesting 1 year supplyNormal  8. Severe obesity (BMI 35.0-39. 9) with comorbidity [facility])  It appears that the billing issue that she had with her last annual wellness was an incorrect code. Encounter was addended and recoded as subsequent annual wellness visit. This will need to be refilled appropriately since she does not get stuck for the cost.  Medications refilled today. Advised her to use Debrox weekly to decrease on the issues with her hearing and cerumen impaction. Patient voiced understanding. See her back in 6 months or sooner. She will continue to follow with specialist as indicated. No follow-ups on file. Subjective   SUBJECTIVE/OBJECTIVE:  HPI  Patient presents today for 6-month follow-up of chronic issues and for medication refills. Patient states she is doing well for the most part. Continues to follow with oncology for continued treatment of her carcinoma. She states that she is tolerating that well. Her main issue has been 2 weeks of increased stress due to a plumbing leak in her house. She states that they live on a slab that have to have all of the concrete broken up for line replacement. Currently the work is done and they are cleaning up and making adjustments. Other than that she states she is doing well. No other concerns or complaints other than some right-sided ear pain and decreased hearing.       Review of Systems Constitutional: Negative for chills and fever. HENT: Negative for congestion, hearing loss, nosebleeds and sore throat. Eyes: Negative for photophobia. Respiratory: Negative for cough and shortness of breath. Cardiovascular: Negative for chest pain, palpitations and leg swelling. Gastrointestinal: Negative for abdominal pain, blood in stool, constipation, diarrhea, nausea and vomiting. Endocrine: Negative for polydipsia. Genitourinary: Negative for dysuria, frequency, hematuria, pelvic pain and urgency. Musculoskeletal: Positive for arthralgias and gait problem. Negative for back pain and myalgias. Skin: Negative. Neurological: Negative for dizziness, tremors, weakness and headaches. Hematological: Does not bruise/bleed easily. Psychiatric/Behavioral: Positive for sleep disturbance. Negative for hallucinations, self-injury and suicidal ideas. The patient is nervous/anxious. All other systems reviewed and are negative.          Current Outpatient Medications:     pyridoxine (B-6) 100 MG tablet, Take 100 mg by mouth daily, Disp: , Rfl:     magnesium gluconate (MAGONATE) 500 MG tablet, Take 500 mg by mouth 2 times daily as needed, Disp: , Rfl:     ezetimibe (ZETIA) 10 MG tablet, Take 1 tablet by mouth Daily with supper, Disp: 90 tablet, Rfl: 3    folic acid (FOLVITE) 1 MG tablet, Take 1 tablet by mouth daily, Disp: 90 tablet, Rfl: 3    everolimus (AFINITOR) 10 MG TABS chemo tablet, Take 10 mg by mouth daily, Disp: , Rfl:     letrozole (FEMARA) 2.5 MG tablet, Take 2.5 mg by mouth daily, Disp: , Rfl:     metFORMIN (GLUCOPHAGE) 500 MG tablet, Take 500 mg by mouth 2 times daily (with meals), Disp: , Rfl:     potassium chloride (KLOR-CON M) 10 MEQ extended release tablet, Take 10 mEq by mouth 2 times daily, Disp: , Rfl:     acetaminophen (TYLENOL) 325 MG tablet, Take 2 tablets by mouth every 6 hours, Disp: 120 tablet, Rfl: 3    sennosides-docusate sodium (SENOKOT-S) 8.6-50 MG tablet, Take 1 tablet by mouth 2 times daily, Disp: 30 tablet, Rfl: 0    Polyethyl Glycol-Propyl Glycol (SYSTANE OP), Apply to eye 2 times daily , Disp: , Rfl:     Sod Fluoride-Potassium Nitrate (PREVIDENT 5000 SENSITIVE) 1.1-5 % PSTE, 2 times daily, Disp: , Rfl:     doxazosin (CARDURA) 2 MG tablet, Take 2 mg by mouth nightly, Disp: , Rfl:     lactobacillus (CULTURELLE) CAPS capsule, Take 1 capsule by mouth daily, Disp: 30 capsule, Rfl: 0    vitamin D (CHOLECALCIFEROL) 1000 UNIT TABS tablet, Take 1,000 Units by mouth daily , Disp: , Rfl:     lidocaine-prilocaine (EMLA) 2.5-2.5 % cream, SANGEETA TOPICALLY AA PRN, Disp: , Rfl: 3    bumetanide (BUMEX) 1 MG tablet, Take 1 mg by mouth three times a week mon wed fri, Disp: , Rfl:     Biotin 5000 MCG CAPS, Take 1 capsule by mouth daily , Disp: , Rfl:     vitamin B-12 (CYANOCOBALAMIN) 1000 MCG tablet, Take 1,000 mcg by mouth daily , Disp: , Rfl:     fish oil-omega-3 fatty acids 1000 MG capsule, Take 1 g by mouth daily STOP PREOP MED, Disp: , Rfl:     dextrose 5 % SOLN 250 mL with DOXOrubicin liposomal 2 MG/ML INJ 30 mg/m2, Infuse intravenously once monthly, Disp: , Rfl:    Patient Active Problem List   Diagnosis    Malignant [facility]oplasm of uterus ([facility] [address]    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 9) with comorbidity [facility])     Past Medical History:   Diagnosis Date    Acute kidney injury ([address]     hx of    Acute renal failure syndrome ([facility] [address] 7/9/2020    Arthritis     Chronic kidney disease, stage 3 (moderate) 11/29/2019    dt Keytruda    Dry eye     GERD (gastroesophageal reflux disease)     Council (hard of hearing)     aides x 2    Hypertension     Hypomagnesemia     hx of / takes Mag supplement    Malignant neoplasm of uterus ([facility] [address] 11/21/2014    Mixed hyperlipidemia 5/24/2019    SOB (shortness of breath) on exertion     feels is dt after effect of Keytruda    Tooth sensitivity     Uterine cancer ([facility] [address] 2012    metatstaic, treated with surgery, chemo x2 , radiation x 2, Holly Curl therapy x 2,andAvastin therapy    Vitamin D deficiency      Past Surgical History:   Procedure Laterality Date    APPENDECTOMY  1950    BREAST SURGERY  4767,8922, 2002    benign  lumps    DILATION AND CURETTAGE OF UTERUS  3/20/12    HYSTERECTOMY  4/12/12    INSERTION / REMOVAL / REPLACEMENT VENOUS ACCESS CATHETER N/A 2/22/2019    MEDI PORT INSERTION performed by Lemuel Mcburney, MD at [place] [address]  2012    pelvic nodes    PORT SURGERY Left 1/14/2021    REVISION OF MEDIPORT - LEFT [facility] Ravindra Cooper [facility]    TOTAL KNEE ARTHROPLASTY Right 3/4/2020    RIGHT KNEE TOTAL ARTHROPLASTY ROBOTIC ANGIE performed by Javier Garcias MD at [address] [place] [address] Left 4/28/2021    LEFT TOTAL KNEE ROBOTIC ASSISTED ARTHROPLASTY performed by Javier Garcias MD at [facility] OR     Social History     Socioeconomic History    Marital status:      Spouse name: Not on file    Number of children: Not on file    Years of education: Not on file    Highest education level: Not on file   Occupational History    Not on file   Tobacco Use    Smoking status: Never Smoker    Smokeless tobacco: Never Used   Vaping Use    Vaping Use: Never used   Substance and Sexual Activity    Alcohol use: No    Drug use: Never    Sexual activity: Not on file   Other Topics Concern    Not on file   Social History Narrative    Not on file Social Determinants of Health     Financial Resource Strain:     Difficulty of Paying Living Expenses: Not on file   Food Insecurity:     Worried About Running Out of Food in the Last Year: Not on file    Hosea of Food in the Last Year: Not on file   Transportation Needs:     Lack of Transportation (Medical): Not on file    Lack of Transportation (Non-Medical): Not on file   Physical Activity:     Days of Exercise per Week: Not on file    Minutes of Exercise per Session: Not on file   Stress:     Feeling of Stress : Not on file   Social Connections:     Frequency of Communication with Friends and Family: Not on file    Frequency of Social Gatherings with Friends and Family: Not on file    Attends Faith Services: Not on file    Active Member of [address] [place] [address] [employer] or Organizations: Not on file    Attends Club or Organization Meetings: Not on file    Marital Status: Not on file   Intimate Partner Violence:     Fear of Current or Ex-Partner: Not on file    Emotionally Abused: Not on file    Physically Abused: Not on file    Sexually Abused: Not on file   Housing Stability:     Unable to Pay for Housing in the Last Year: Not on file    Number of [place] in the Last Year: Not on file    Unstable Housing in the Last Year: Not on file     Family History   Family history unknown: Yes      There are no preventive care reminders to display for this patient. There are no preventive care reminders to display for this patient. There are no preventive care reminders to display for this patient.    Health Maintenance Due   Topic    Pneumococcal 65+ yrs at Risk Vaccine (2 of 2 - PCV13)      Health Maintenance   Topic Date Due    Pneumococcal 65+ yrs at Risk Vaccine (2 of 2 - PCV13) 11/29/2020    COVID-19 Vaccine (4 - Booster for Garcia Peter series) 01/16/2022    Depression Screen  08/06/2022    Annual Wellness Visit (AWV)  08/07/2022    Potassium monitoring  01/10/2023    Creatinine monitoring  01/10/2023  DTaP/Tdap/Td vaccine (2 - Td or Tdap) 07/17/2027    Flu vaccine  Completed    Shingles Vaccine  Completed    Hepatitis A vaccine  Aged Out    Hepatitis B vaccine  Aged Out    Hib vaccine  Aged Out    Meningococcal (ACWY) vaccine  Aged Out      There are no preventive care reminders to display for this patient. There are no preventive care reminders to display for this patient. /72   Pulse 68   Temp 97.9 °F (36.6 °C)   Ht 5' (1.524 m)   Wt 189 lb (85.7 kg)   LMP  (LMP Unknown)   SpO2 99%   BMI 36.91 kg/m²     Objective   Physical Exam  Vitals reviewed. HENT:      Head: Normocephalic and atraumatic. Right Ear: External ear normal. There is impacted cerumen. Left Ear: External ear normal. There is impacted cerumen. Ears:      Comments: Impacted cerumen removed bilaterally by nursing staff via irrigation and curettage. Removed fully. Mild bulging of the tympanic membranes bilaterally. Patient tolerated the procedure well without issue. Eyes:      General: No scleral icterus. Conjunctiva/sclera: Conjunctivae normal.      Pupils: Pupils are equal, round, and reactive to light. Neck:      Thyroid: No thyromegaly. Cardiovascular:      Rate and Rhythm: Normal rate and regular rhythm. Heart sounds: Normal heart sounds. No murmur heard. Pulmonary:      Effort: Pulmonary effort is normal.      Breath sounds: Normal breath sounds. No rales. Abdominal:      General: Bowel sounds are normal. There is no distension. Palpations: Abdomen is soft. Tenderness: There is no abdominal tenderness. Musculoskeletal:         General: Swelling and tenderness present. Cervical back: Neck supple. Comments: Without a walker today. Still has compression socks on bilateral lower extremities. Movement much improved since knee replacement   Lymphadenopathy:      Cervical: No cervical adenopathy. Skin:     General: Skin is warm and dry.       Findings: No erythema or rash. Neurological:      Mental Status: She is alert and oriented to person, place, and time. Cranial Nerves: No cranial nerve deficit. Psychiatric:         Behavior: Behavior is cooperative. Judgment: Judgment normal.                  An electronic signature was used to authenticate this note.     --Karlene Pinon, DO

## 2022-02-14 ENCOUNTER — HOSPITAL ENCOUNTER (OUTPATIENT)
Dept: INFUSION THERAPY | Age: 82
Setting detail: INFUSION SERIES
Discharge: HOME | End: 2022-02-14
Payer: MEDICARE

## 2022-02-14 VITALS
SYSTOLIC BLOOD PRESSURE: 149 MMHG | OXYGEN SATURATION: 100 % | RESPIRATION RATE: 18 BRPM | HEART RATE: 80 BPM | TEMPERATURE: 97.5 F | WEIGHT: 189 LBS | BODY MASS INDEX: 37.11 KG/M2 | HEIGHT: 60 IN | DIASTOLIC BLOOD PRESSURE: 77 MMHG

## 2022-02-14 DIAGNOSIS — C55 MALIGNANT NEOPLASM OF UTERUS, UNSPECIFIED SITE (HCC): Primary | ICD-10-CM

## 2022-02-14 LAB
ACANTHOCYTES: ABNORMAL
ALBUMIN SERPL-MCNC: 4.1 G/DL (ref 3.5–5.2)
ALP BLD-CCNC: 103 U/L (ref 35–104)
ALT SERPL-CCNC: 25 U/L (ref 0–32)
ANION GAP SERPL CALCULATED.3IONS-SCNC: 15 MMOL/L (ref 7–16)
AST SERPL-CCNC: 30 U/L (ref 0–31)
BASOPHILS ABSOLUTE: 0.01 E9/L (ref 0–0.2)
BASOPHILS RELATIVE PERCENT: 0.4 % (ref 0–2)
BILIRUB SERPL-MCNC: 0.3 MG/DL (ref 0–1.2)
BUN BLDV-MCNC: 45 MG/DL (ref 6–23)
CA 125: 17.7 U/ML (ref 0–35)
CALCIUM SERPL-MCNC: 9 MG/DL (ref 8.6–10.2)
CHLORIDE BLD-SCNC: 104 MMOL/L (ref 98–107)
CO2: 22 MMOL/L (ref 22–29)
CREAT SERPL-MCNC: 1.5 MG/DL (ref 0.5–1)
EOSINOPHILS ABSOLUTE: 0.07 E9/L (ref 0.05–0.5)
EOSINOPHILS RELATIVE PERCENT: 2.5 % (ref 0–6)
GFR AFRICAN AMERICAN: 40
GFR NON-AFRICAN AMERICAN: 33 ML/MIN/1.73
GLUCOSE BLD-MCNC: 100 MG/DL (ref 74–99)
HCT VFR BLD CALC: 29.8 % (ref 34–48)
HEMOGLOBIN: 9.9 G/DL (ref 11.5–15.5)
IMMATURE GRANULOCYTES #: 0.01 E9/L
IMMATURE GRANULOCYTES %: 0.4 % (ref 0–5)
LYMPHOCYTES ABSOLUTE: 0.7 E9/L (ref 1.5–4)
LYMPHOCYTES RELATIVE PERCENT: 25.1 % (ref 20–42)
MAGNESIUM: 1.5 MG/DL (ref 1.6–2.6)
MCH RBC QN AUTO: 32 PG (ref 26–35)
MCHC RBC AUTO-ENTMCNC: 33.2 % (ref 32–34.5)
MCV RBC AUTO: 96.4 FL (ref 80–99.9)
MONOCYTES ABSOLUTE: 0.37 E9/L (ref 0.1–0.95)
MONOCYTES RELATIVE PERCENT: 13.3 % (ref 2–12)
NEUTROPHILS ABSOLUTE: 1.63 E9/L (ref 1.8–7.3)
NEUTROPHILS RELATIVE PERCENT: 58.3 % (ref 43–80)
PDW BLD-RTO: 13.3 FL (ref 11.5–15)
PLATELET # BLD: 65 E9/L (ref 130–450)
PLATELET CONFIRMATION: NORMAL
PMV BLD AUTO: 11.2 FL (ref 7–12)
POIKILOCYTES: ABNORMAL
POTASSIUM SERPL-SCNC: 3.9 MMOL/L (ref 3.5–5)
RBC # BLD: 3.09 E12/L (ref 3.5–5.5)
SODIUM BLD-SCNC: 141 MMOL/L (ref 132–146)
TOTAL PROTEIN: 6.6 G/DL (ref 6.4–8.3)
WBC # BLD: 2.8 E9/L (ref 4.5–11.5)

## 2022-02-14 PROCEDURE — 83735 ASSAY OF MAGNESIUM: CPT

## 2022-02-14 PROCEDURE — 86304 IMMUNOASSAY TUMOR CA 125: CPT

## 2022-02-14 PROCEDURE — 80053 COMPREHEN METABOLIC PANEL: CPT

## 2022-02-14 PROCEDURE — 6360000002 HC RX W HCPCS: Performed by: NURSE PRACTITIONER

## 2022-02-14 PROCEDURE — 36415 COLL VENOUS BLD VENIPUNCTURE: CPT

## 2022-02-14 PROCEDURE — 2580000003 HC RX 258: Performed by: NURSE PRACTITIONER

## 2022-02-14 PROCEDURE — 85025 COMPLETE CBC W/AUTO DIFF WBC: CPT

## 2022-02-14 PROCEDURE — 36591 DRAW BLOOD OFF VENOUS DEVICE: CPT

## 2022-02-14 RX ORDER — HEPARIN SODIUM (PORCINE) LOCK FLUSH IV SOLN 100 UNIT/ML 100 UNIT/ML
500 SOLUTION INTRAVENOUS PRN
Status: CANCELLED | OUTPATIENT
Start: 2022-02-14

## 2022-02-14 RX ORDER — HEPARIN SODIUM (PORCINE) LOCK FLUSH IV SOLN 100 UNIT/ML 100 UNIT/ML
500 SOLUTION INTRAVENOUS PRN
Status: DISCONTINUED | OUTPATIENT
Start: 2022-02-14 | End: 2022-02-15 | Stop reason: HOSPADM

## 2022-02-14 RX ORDER — SODIUM CHLORIDE 0.9 % (FLUSH) 0.9 %
10 SYRINGE (ML) INJECTION PRN
Status: CANCELLED | OUTPATIENT
Start: 2022-02-14

## 2022-02-14 RX ORDER — SODIUM CHLORIDE 0.9 % (FLUSH) 0.9 %
10 SYRINGE (ML) INJECTION PRN
Status: DISCONTINUED | OUTPATIENT
Start: 2022-02-14 | End: 2022-02-15 | Stop reason: HOSPADM

## 2022-02-14 RX ORDER — SODIUM CHLORIDE 0.9 % (FLUSH) 0.9 %
20 SYRINGE (ML) INJECTION PRN
Status: CANCELLED | OUTPATIENT
Start: 2022-02-14

## 2022-02-14 RX ADMIN — SODIUM CHLORIDE, PRESERVATIVE FREE 10 ML: 5 INJECTION INTRAVENOUS at 13:50

## 2022-02-14 RX ADMIN — SODIUM CHLORIDE, PRESERVATIVE FREE 10 ML: 5 INJECTION INTRAVENOUS at 13:51

## 2022-02-14 RX ADMIN — Medication 500 UNITS: at 13:52

## 2022-02-14 RX ADMIN — SODIUM CHLORIDE, PRESERVATIVE FREE 10 ML: 5 INJECTION INTRAVENOUS at 13:49

## 2022-02-15 DIAGNOSIS — E78.2 MIXED HYPERLIPIDEMIA: ICD-10-CM

## 2022-02-15 RX ORDER — EZETIMIBE 10 MG/1
10 TABLET ORAL
Qty: 90 TABLET | Refills: 3 | Status: SHIPPED
Start: 2022-02-15 | End: 2022-08-08

## 2022-02-15 NOTE — TELEPHONE ENCOUNTER
Patient was seen on 02/11/2022 and medication was just renewed. Patient has a 90 day supply with 3 refills. Looks like it was sent to [facility]. Spoke with patient and she original wanted it sent to [facility] in service. I will have medication pending.

## 2022-02-15 NOTE — TELEPHONE ENCOUNTER
----- Message from aMry Jo Alvarenga sent at 2/14/2022  4:43 PM EST -----  Subject: Refill Request    QUESTIONS  Name of Medication? ezetimibe (ZETIA) 10 MG tablet  Patient-reported dosage and instructions? 10 mg tablet / 1 a day  How many days do you have left? 0  Preferred Pharmacy? [facility] phone number (if available)? [phone]  Additional Information for Provider? 90 day supply sent to mail order. this was sent to [facility] instead but she needs it mail order. please  ---------------------------------------------------------------------------  --------------  CALL BACK INFO  What is the best way for the office to contact you? OK to leave message on   voicemail  Preferred Call Back Phone Number?  [phone]

## 2022-02-15 NOTE — TELEPHONE ENCOUNTER
Last Appointment:  2/11/2022  Future Appointments   Date Time Provider Tia Madeline   2/21/2022 10:00 AM [facility] RM 1 [facility]   2/28/2022  1:30 PM [facility] RM 1 [facility]   3/7/2022  1:30 PM [facility] RM 4 [facility]   3/14/2022  1:30 PM [facility] RM 1 [facility]   3/21/2022  1:30 PM [facility] RM 6 [facility]   3/28/2022  1:30 PM [facility] RM 2 [facility]   4/4/2022  1:30 PM [facility] RM 8 [facility]   4/11/2022  1:30 PM [facility] RM 5 [facility]   4/18/2022  1:30 PM [facility] RM 7 [facility]   4/25/2022  1:30 PM [facility] RM 7 [facility]   8/19/2022  1:00 PM DO LILIAM Rosales [facility]

## 2022-02-21 ENCOUNTER — HOSPITAL ENCOUNTER (OUTPATIENT)
Dept: INFUSION THERAPY | Age: 82
Setting detail: INFUSION SERIES
Discharge: HOME | End: 2022-02-21
Payer: MEDICARE

## 2022-02-21 VITALS
HEART RATE: 97 BPM | RESPIRATION RATE: 18 BRPM | WEIGHT: 189 LBS | BODY MASS INDEX: 37.11 KG/M2 | TEMPERATURE: 98.2 F | SYSTOLIC BLOOD PRESSURE: 136 MMHG | OXYGEN SATURATION: 98 % | DIASTOLIC BLOOD PRESSURE: 59 MMHG | HEIGHT: 60 IN

## 2022-02-21 DIAGNOSIS — C55 MALIGNANT NEOPLASM OF UTERUS, UNSPECIFIED SITE (HCC): Primary | ICD-10-CM

## 2022-02-21 LAB
BASOPHILS ABSOLUTE: 0.01 E9/L (ref 0–0.2)
BASOPHILS RELATIVE PERCENT: 0.4 % (ref 0–2)
EOSINOPHILS ABSOLUTE: 0.05 E9/L (ref 0.05–0.5)
EOSINOPHILS RELATIVE PERCENT: 2.1 % (ref 0–6)
HCT VFR BLD CALC: 27.8 % (ref 34–48)
HEMOGLOBIN: 9 G/DL (ref 11.5–15.5)
IMMATURE GRANULOCYTES #: 0.01 E9/L
IMMATURE GRANULOCYTES %: 0.4 % (ref 0–5)
LYMPHOCYTES ABSOLUTE: 0.49 E9/L (ref 1.5–4)
LYMPHOCYTES RELATIVE PERCENT: 20.9 % (ref 20–42)
MCH RBC QN AUTO: 31.5 PG (ref 26–35)
MCHC RBC AUTO-ENTMCNC: 32.4 % (ref 32–34.5)
MCV RBC AUTO: 97.2 FL (ref 80–99.9)
MONOCYTES ABSOLUTE: 0.34 E9/L (ref 0.1–0.95)
MONOCYTES RELATIVE PERCENT: 14.5 % (ref 2–12)
NEUTROPHILS ABSOLUTE: 1.44 E9/L (ref 1.8–7.3)
NEUTROPHILS RELATIVE PERCENT: 61.7 % (ref 43–80)
OVALOCYTES: ABNORMAL
PDW BLD-RTO: 13.5 FL (ref 11.5–15)
PLATELET # BLD: 65 E9/L (ref 130–450)
PLATELET CONFIRMATION: NORMAL
PMV BLD AUTO: 10.7 FL (ref 7–12)
POIKILOCYTES: ABNORMAL
POLYCHROMASIA: ABNORMAL
RBC # BLD: 2.86 E12/L (ref 3.5–5.5)
WBC # BLD: 2.3 E9/L (ref 4.5–11.5)

## 2022-02-21 PROCEDURE — 2580000003 HC RX 258: Performed by: NURSE PRACTITIONER

## 2022-02-21 PROCEDURE — 85025 COMPLETE CBC W/AUTO DIFF WBC: CPT

## 2022-02-21 PROCEDURE — 36591 DRAW BLOOD OFF VENOUS DEVICE: CPT

## 2022-02-21 PROCEDURE — 36415 COLL VENOUS BLD VENIPUNCTURE: CPT

## 2022-02-21 PROCEDURE — 6360000002 HC RX W HCPCS: Performed by: NURSE PRACTITIONER

## 2022-02-21 RX ORDER — HEPARIN SODIUM (PORCINE) LOCK FLUSH IV SOLN 100 UNIT/ML 100 UNIT/ML
500 SOLUTION INTRAVENOUS PRN
Status: CANCELLED | OUTPATIENT
Start: 2022-02-21

## 2022-02-21 RX ORDER — SODIUM CHLORIDE 0.9 % (FLUSH) 0.9 %
10 SYRINGE (ML) INJECTION PRN
Status: CANCELLED | OUTPATIENT
Start: 2022-02-21

## 2022-02-21 RX ORDER — SODIUM CHLORIDE 0.9 % (FLUSH) 0.9 %
20 SYRINGE (ML) INJECTION PRN
Status: CANCELLED | OUTPATIENT
Start: 2022-02-21

## 2022-02-21 RX ORDER — SODIUM CHLORIDE 0.9 % (FLUSH) 0.9 %
10 SYRINGE (ML) INJECTION PRN
Status: DISCONTINUED | OUTPATIENT
Start: 2022-02-21 | End: 2022-02-22 | Stop reason: HOSPADM

## 2022-02-21 RX ORDER — SODIUM CHLORIDE 0.9 % (FLUSH) 0.9 %
20 SYRINGE (ML) INJECTION PRN
Status: DISCONTINUED | OUTPATIENT
Start: 2022-02-21 | End: 2022-02-22 | Stop reason: HOSPADM

## 2022-02-21 RX ORDER — HEPARIN SODIUM (PORCINE) LOCK FLUSH IV SOLN 100 UNIT/ML 100 UNIT/ML
500 SOLUTION INTRAVENOUS PRN
Status: DISCONTINUED | OUTPATIENT
Start: 2022-02-21 | End: 2022-02-22 | Stop reason: HOSPADM

## 2022-02-21 RX ADMIN — SODIUM CHLORIDE, PRESERVATIVE FREE 10 ML: 5 INJECTION INTRAVENOUS at 10:28

## 2022-02-21 RX ADMIN — SODIUM CHLORIDE, PRESERVATIVE FREE 10 ML: 5 INJECTION INTRAVENOUS at 10:27

## 2022-02-21 RX ADMIN — Medication 500 UNITS: at 10:28

## 2022-02-21 ASSESSMENT — PAIN SCALES - GENERAL: PAINLEVEL_OUTOF10: 0

## 2022-02-28 ENCOUNTER — HOSPITAL ENCOUNTER (OUTPATIENT)
Dept: INFUSION THERAPY | Age: 82
Setting detail: INFUSION SERIES
Discharge: HOME | End: 2022-02-28
Payer: MEDICARE

## 2022-02-28 VITALS
DIASTOLIC BLOOD PRESSURE: 60 MMHG | SYSTOLIC BLOOD PRESSURE: 138 MMHG | HEIGHT: 60 IN | BODY MASS INDEX: 37.11 KG/M2 | OXYGEN SATURATION: 99 % | TEMPERATURE: 97 F | HEART RATE: 85 BPM | WEIGHT: 189 LBS | RESPIRATION RATE: 16 BRPM

## 2022-02-28 DIAGNOSIS — C55 MALIGNANT NEOPLASM OF UTERUS, UNSPECIFIED SITE (HCC): Primary | ICD-10-CM

## 2022-02-28 LAB
BASOPHILS ABSOLUTE: 0.02 E9/L (ref 0–0.2)
BASOPHILS RELATIVE PERCENT: 0.5 % (ref 0–2)
EOSINOPHILS ABSOLUTE: 0.08 E9/L (ref 0.05–0.5)
EOSINOPHILS RELATIVE PERCENT: 1.9 % (ref 0–6)
HCT VFR BLD CALC: 28.4 % (ref 34–48)
HEMOGLOBIN: 9 G/DL (ref 11.5–15.5)
IMMATURE GRANULOCYTES #: 0.03 E9/L
IMMATURE GRANULOCYTES %: 0.7 % (ref 0–5)
LYMPHOCYTES ABSOLUTE: 0.78 E9/L (ref 1.5–4)
LYMPHOCYTES RELATIVE PERCENT: 18.3 % (ref 20–42)
MCH RBC QN AUTO: 31.3 PG (ref 26–35)
MCHC RBC AUTO-ENTMCNC: 31.7 % (ref 32–34.5)
MCV RBC AUTO: 98.6 FL (ref 80–99.9)
MONOCYTES ABSOLUTE: 0.57 E9/L (ref 0.1–0.95)
MONOCYTES RELATIVE PERCENT: 13.3 % (ref 2–12)
NEUTROPHILS ABSOLUTE: 2.79 E9/L (ref 1.8–7.3)
NEUTROPHILS RELATIVE PERCENT: 65.3 % (ref 43–80)
PDW BLD-RTO: 14.1 FL (ref 11.5–15)
PLATELET # BLD: 134 E9/L (ref 130–450)
PMV BLD AUTO: 9.9 FL (ref 7–12)
RBC # BLD: 2.88 E12/L (ref 3.5–5.5)
WBC # BLD: 4.3 E9/L (ref 4.5–11.5)

## 2022-02-28 PROCEDURE — 36415 COLL VENOUS BLD VENIPUNCTURE: CPT

## 2022-02-28 PROCEDURE — 85025 COMPLETE CBC W/AUTO DIFF WBC: CPT

## 2022-02-28 PROCEDURE — 6360000002 HC RX W HCPCS: Performed by: NURSE PRACTITIONER

## 2022-02-28 PROCEDURE — 2580000003 HC RX 258: Performed by: NURSE PRACTITIONER

## 2022-02-28 PROCEDURE — 36591 DRAW BLOOD OFF VENOUS DEVICE: CPT

## 2022-02-28 RX ORDER — SODIUM CHLORIDE 0.9 % (FLUSH) 0.9 %
10 SYRINGE (ML) INJECTION PRN
Status: CANCELLED | OUTPATIENT
Start: 2022-02-28

## 2022-02-28 RX ORDER — HEPARIN SODIUM (PORCINE) LOCK FLUSH IV SOLN 100 UNIT/ML 100 UNIT/ML
500 SOLUTION INTRAVENOUS PRN
Status: CANCELLED | OUTPATIENT
Start: 2022-02-28

## 2022-02-28 RX ORDER — SODIUM CHLORIDE 0.9 % (FLUSH) 0.9 %
20 SYRINGE (ML) INJECTION PRN
Status: DISCONTINUED | OUTPATIENT
Start: 2022-02-28 | End: 2022-03-01 | Stop reason: HOSPADM

## 2022-02-28 RX ORDER — HEPARIN SODIUM (PORCINE) LOCK FLUSH IV SOLN 100 UNIT/ML 100 UNIT/ML
500 SOLUTION INTRAVENOUS PRN
Status: DISCONTINUED | OUTPATIENT
Start: 2022-02-28 | End: 2022-03-01 | Stop reason: HOSPADM

## 2022-02-28 RX ORDER — SODIUM CHLORIDE 0.9 % (FLUSH) 0.9 %
20 SYRINGE (ML) INJECTION PRN
Status: CANCELLED | OUTPATIENT
Start: 2022-02-28

## 2022-02-28 RX ORDER — SODIUM CHLORIDE 0.9 % (FLUSH) 0.9 %
10 SYRINGE (ML) INJECTION PRN
Status: DISCONTINUED | OUTPATIENT
Start: 2022-02-28 | End: 2022-03-01 | Stop reason: HOSPADM

## 2022-02-28 RX ADMIN — HEPARIN 500 UNITS: 100 SYRINGE at 13:47

## 2022-02-28 RX ADMIN — SODIUM CHLORIDE, PRESERVATIVE FREE 10 ML: 5 INJECTION INTRAVENOUS at 13:45

## 2022-02-28 RX ADMIN — SODIUM CHLORIDE, PRESERVATIVE FREE 20 ML: 5 INJECTION INTRAVENOUS at 13:47

## 2022-03-07 ENCOUNTER — HOSPITAL ENCOUNTER (OUTPATIENT)
Dept: INFUSION THERAPY | Age: 82
Setting detail: INFUSION SERIES
Discharge: HOME | End: 2022-03-07
Payer: MEDICARE

## 2022-03-07 VITALS
WEIGHT: 189 LBS | BODY MASS INDEX: 37.11 KG/M2 | TEMPERATURE: 96.8 F | HEIGHT: 60 IN | SYSTOLIC BLOOD PRESSURE: 144 MMHG | RESPIRATION RATE: 18 BRPM | DIASTOLIC BLOOD PRESSURE: 64 MMHG | HEART RATE: 80 BPM | OXYGEN SATURATION: 100 %

## 2022-03-07 DIAGNOSIS — C55 MALIGNANT NEOPLASM OF UTERUS, UNSPECIFIED SITE (HCC): Primary | ICD-10-CM

## 2022-03-07 LAB
BASOPHILS ABSOLUTE: 0.03 E9/L (ref 0–0.2)
BASOPHILS RELATIVE PERCENT: 0.7 % (ref 0–2)
EOSINOPHILS ABSOLUTE: 0.14 E9/L (ref 0.05–0.5)
EOSINOPHILS RELATIVE PERCENT: 3.2 % (ref 0–6)
HCT VFR BLD CALC: 28.4 % (ref 34–48)
HEMOGLOBIN: 9 G/DL (ref 11.5–15.5)
IMMATURE GRANULOCYTES #: 0.02 E9/L
IMMATURE GRANULOCYTES %: 0.5 % (ref 0–5)
LYMPHOCYTES ABSOLUTE: 0.78 E9/L (ref 1.5–4)
LYMPHOCYTES RELATIVE PERCENT: 17.9 % (ref 20–42)
MCH RBC QN AUTO: 31.4 PG (ref 26–35)
MCHC RBC AUTO-ENTMCNC: 31.7 % (ref 32–34.5)
MCV RBC AUTO: 99 FL (ref 80–99.9)
MONOCYTES ABSOLUTE: 0.51 E9/L (ref 0.1–0.95)
MONOCYTES RELATIVE PERCENT: 11.7 % (ref 2–12)
NEUTROPHILS ABSOLUTE: 2.87 E9/L (ref 1.8–7.3)
NEUTROPHILS RELATIVE PERCENT: 66 % (ref 43–80)
PDW BLD-RTO: 14.7 FL (ref 11.5–15)
PLATELET # BLD: 177 E9/L (ref 130–450)
PMV BLD AUTO: 9.6 FL (ref 7–12)
RBC # BLD: 2.87 E12/L (ref 3.5–5.5)
WBC # BLD: 4.4 E9/L (ref 4.5–11.5)

## 2022-03-07 PROCEDURE — 85025 COMPLETE CBC W/AUTO DIFF WBC: CPT

## 2022-03-07 PROCEDURE — 36415 COLL VENOUS BLD VENIPUNCTURE: CPT

## 2022-03-07 PROCEDURE — 36591 DRAW BLOOD OFF VENOUS DEVICE: CPT

## 2022-03-07 PROCEDURE — 6360000002 HC RX W HCPCS: Performed by: NURSE PRACTITIONER

## 2022-03-07 PROCEDURE — 2580000003 HC RX 258: Performed by: NURSE PRACTITIONER

## 2022-03-07 RX ORDER — SODIUM CHLORIDE 0.9 % (FLUSH) 0.9 %
20 SYRINGE (ML) INJECTION PRN
Status: CANCELLED | OUTPATIENT
Start: 2022-03-07

## 2022-03-07 RX ORDER — HEPARIN SODIUM (PORCINE) LOCK FLUSH IV SOLN 100 UNIT/ML 100 UNIT/ML
500 SOLUTION INTRAVENOUS PRN
Status: DISCONTINUED | OUTPATIENT
Start: 2022-03-07 | End: 2022-03-08 | Stop reason: HOSPADM

## 2022-03-07 RX ORDER — SODIUM CHLORIDE 0.9 % (FLUSH) 0.9 %
10 SYRINGE (ML) INJECTION PRN
Status: CANCELLED | OUTPATIENT
Start: 2022-03-07

## 2022-03-07 RX ORDER — SODIUM CHLORIDE 0.9 % (FLUSH) 0.9 %
10 SYRINGE (ML) INJECTION PRN
Status: DISCONTINUED | OUTPATIENT
Start: 2022-03-07 | End: 2022-03-08 | Stop reason: HOSPADM

## 2022-03-07 RX ORDER — SODIUM CHLORIDE 0.9 % (FLUSH) 0.9 %
20 SYRINGE (ML) INJECTION PRN
Status: DISCONTINUED | OUTPATIENT
Start: 2022-03-07 | End: 2022-03-08 | Stop reason: HOSPADM

## 2022-03-07 RX ORDER — HEPARIN SODIUM (PORCINE) LOCK FLUSH IV SOLN 100 UNIT/ML 100 UNIT/ML
500 SOLUTION INTRAVENOUS PRN
Status: CANCELLED | OUTPATIENT
Start: 2022-03-07

## 2022-03-07 RX ADMIN — SODIUM CHLORIDE, PRESERVATIVE FREE 10 ML: 5 INJECTION INTRAVENOUS at 13:33

## 2022-03-07 RX ADMIN — HEPARIN 500 UNITS: 100 SYRINGE at 13:36

## 2022-03-07 RX ADMIN — SODIUM CHLORIDE, PRESERVATIVE FREE 10 ML: 5 INJECTION INTRAVENOUS at 13:35

## 2022-03-11 ENCOUNTER — TELEPHONE (OUTPATIENT)
Dept: PRIMARY CARE CLINIC | Age: 82
End: 2022-03-11

## 2022-03-11 DIAGNOSIS — Z96.651 S/P TOTAL KNEE ARTHROPLASTY, RIGHT: Primary | ICD-10-CM

## 2022-03-11 DIAGNOSIS — M19.90 ARTHRITIS: ICD-10-CM

## 2022-03-11 NOTE — TELEPHONE ENCOUNTER
Patient needing new handicap placard renewal. Patient would like this mailed to her. Patient went to oncologist in Jan and was started on 3 new medications. Went back in Feb and was told to stop 1 and continue on the 2 others. Pharmacy filled her one prescription wrong. Should have been on  Letrozole 2.5mg tablet once a day, but the pharmacy filled Levetiracetam 250mg. Patient did let her oncology doctor know, but was told to notify PCP because she was on the wrong medication for 7 weeks and unsure if it would interact or have interacted with her current medications.

## 2022-03-14 ENCOUNTER — HOSPITAL ENCOUNTER (OUTPATIENT)
Dept: INFUSION THERAPY | Age: 82
Setting detail: INFUSION SERIES
Discharge: HOME | End: 2022-03-14
Payer: MEDICARE

## 2022-03-14 VITALS
OXYGEN SATURATION: 100 % | TEMPERATURE: 97.1 F | SYSTOLIC BLOOD PRESSURE: 144 MMHG | HEIGHT: 60 IN | BODY MASS INDEX: 37.11 KG/M2 | DIASTOLIC BLOOD PRESSURE: 71 MMHG | HEART RATE: 77 BPM | WEIGHT: 189 LBS | RESPIRATION RATE: 18 BRPM

## 2022-03-14 DIAGNOSIS — C55 MALIGNANT NEOPLASM OF UTERUS, UNSPECIFIED SITE (HCC): Primary | ICD-10-CM

## 2022-03-14 LAB
ALBUMIN SERPL-MCNC: 4 G/DL (ref 3.5–5.2)
ALP BLD-CCNC: 112 U/L (ref 35–104)
ALT SERPL-CCNC: 13 U/L (ref 0–32)
ANION GAP SERPL CALCULATED.3IONS-SCNC: 12 MMOL/L (ref 7–16)
AST SERPL-CCNC: 19 U/L (ref 0–31)
BASOPHILS ABSOLUTE: 0.02 E9/L (ref 0–0.2)
BASOPHILS RELATIVE PERCENT: 0.5 % (ref 0–2)
BILIRUB SERPL-MCNC: 0.4 MG/DL (ref 0–1.2)
BUN BLDV-MCNC: 33 MG/DL (ref 6–23)
CA 125: 20.7 U/ML (ref 0–35)
CALCIUM SERPL-MCNC: 9.4 MG/DL (ref 8.6–10.2)
CHLORIDE BLD-SCNC: 102 MMOL/L (ref 98–107)
CO2: 24 MMOL/L (ref 22–29)
CREAT SERPL-MCNC: 1.5 MG/DL (ref 0.5–1)
EOSINOPHILS ABSOLUTE: 0.12 E9/L (ref 0.05–0.5)
EOSINOPHILS RELATIVE PERCENT: 2.8 % (ref 0–6)
GFR AFRICAN AMERICAN: 40
GFR NON-AFRICAN AMERICAN: 33 ML/MIN/1.73
GLUCOSE BLD-MCNC: 114 MG/DL (ref 74–99)
HCT VFR BLD CALC: 29.6 % (ref 34–48)
HEMOGLOBIN: 9.4 G/DL (ref 11.5–15.5)
IMMATURE GRANULOCYTES #: 0.02 E9/L
IMMATURE GRANULOCYTES %: 0.5 % (ref 0–5)
LYMPHOCYTES ABSOLUTE: 0.77 E9/L (ref 1.5–4)
LYMPHOCYTES RELATIVE PERCENT: 17.9 % (ref 20–42)
MAGNESIUM: 1.7 MG/DL (ref 1.6–2.6)
MCH RBC QN AUTO: 31.6 PG (ref 26–35)
MCHC RBC AUTO-ENTMCNC: 31.8 % (ref 32–34.5)
MCV RBC AUTO: 99.7 FL (ref 80–99.9)
MONOCYTES ABSOLUTE: 0.41 E9/L (ref 0.1–0.95)
MONOCYTES RELATIVE PERCENT: 9.5 % (ref 2–12)
NEUTROPHILS ABSOLUTE: 2.96 E9/L (ref 1.8–7.3)
NEUTROPHILS RELATIVE PERCENT: 68.8 % (ref 43–80)
PDW BLD-RTO: 15.6 FL (ref 11.5–15)
PLATELET # BLD: 166 E9/L (ref 130–450)
PMV BLD AUTO: 9.4 FL (ref 7–12)
POTASSIUM SERPL-SCNC: 4.3 MMOL/L (ref 3.5–5)
RBC # BLD: 2.97 E12/L (ref 3.5–5.5)
SODIUM BLD-SCNC: 138 MMOL/L (ref 132–146)
TOTAL PROTEIN: 6.7 G/DL (ref 6.4–8.3)
WBC # BLD: 4.3 E9/L (ref 4.5–11.5)

## 2022-03-14 PROCEDURE — 80053 COMPREHEN METABOLIC PANEL: CPT

## 2022-03-14 PROCEDURE — 85025 COMPLETE CBC W/AUTO DIFF WBC: CPT

## 2022-03-14 PROCEDURE — 2580000003 HC RX 258: Performed by: NURSE PRACTITIONER

## 2022-03-14 PROCEDURE — 36591 DRAW BLOOD OFF VENOUS DEVICE: CPT

## 2022-03-14 PROCEDURE — 86304 IMMUNOASSAY TUMOR CA 125: CPT

## 2022-03-14 PROCEDURE — 83735 ASSAY OF MAGNESIUM: CPT

## 2022-03-14 PROCEDURE — 6360000002 HC RX W HCPCS: Performed by: NURSE PRACTITIONER

## 2022-03-14 RX ORDER — SODIUM CHLORIDE 0.9 % (FLUSH) 0.9 %
20 SYRINGE (ML) INJECTION PRN
Status: DISCONTINUED | OUTPATIENT
Start: 2022-03-14 | End: 2022-03-15 | Stop reason: HOSPADM

## 2022-03-14 RX ORDER — SODIUM CHLORIDE 0.9 % (FLUSH) 0.9 %
10 SYRINGE (ML) INJECTION PRN
Status: CANCELLED | OUTPATIENT
Start: 2022-03-14

## 2022-03-14 RX ORDER — HEPARIN SODIUM (PORCINE) LOCK FLUSH IV SOLN 100 UNIT/ML 100 UNIT/ML
500 SOLUTION INTRAVENOUS PRN
Status: DISCONTINUED | OUTPATIENT
Start: 2022-03-14 | End: 2022-03-15 | Stop reason: HOSPADM

## 2022-03-14 RX ORDER — SODIUM CHLORIDE 0.9 % (FLUSH) 0.9 %
10 SYRINGE (ML) INJECTION PRN
Status: DISCONTINUED | OUTPATIENT
Start: 2022-03-14 | End: 2022-03-15 | Stop reason: HOSPADM

## 2022-03-14 RX ORDER — HEPARIN SODIUM (PORCINE) LOCK FLUSH IV SOLN 100 UNIT/ML 100 UNIT/ML
500 SOLUTION INTRAVENOUS PRN
Status: CANCELLED | OUTPATIENT
Start: 2022-03-14

## 2022-03-14 RX ORDER — SODIUM CHLORIDE 0.9 % (FLUSH) 0.9 %
20 SYRINGE (ML) INJECTION PRN
Status: CANCELLED | OUTPATIENT
Start: 2022-03-14

## 2022-03-14 RX ADMIN — HEPARIN 500 UNITS: 100 SYRINGE at 13:44

## 2022-03-14 RX ADMIN — SODIUM CHLORIDE, PRESERVATIVE FREE 20 ML: 5 INJECTION INTRAVENOUS at 13:44

## 2022-03-14 RX ADMIN — SODIUM CHLORIDE, PRESERVATIVE FREE 10 ML: 5 INJECTION INTRAVENOUS at 13:42

## 2022-03-14 ASSESSMENT — PAIN SCALES - GENERAL: PAINLEVEL_OUTOF10: 0

## 2022-03-14 NOTE — PROGRESS NOTES
Tolerated port flush well. Reviewed therapy plan, offered education material and/or discharge material, verbalizes good knowledge of current plan patient verbalizes understanding, and has no signs or symptoms to report at this time. Patient discharged. Patient alert and oriented x3. No distress noted. Vital signs stable. Patient denies any new or worsening pain. Patient denies any needs. All questions answered. Next appointment scheduled.  Declines copy of AVS

## 2022-03-21 ENCOUNTER — HOSPITAL ENCOUNTER (OUTPATIENT)
Dept: INFUSION THERAPY | Age: 82
Setting detail: INFUSION SERIES
Discharge: HOME | End: 2022-03-21
Payer: MEDICARE

## 2022-03-21 VITALS
DIASTOLIC BLOOD PRESSURE: 66 MMHG | WEIGHT: 189 LBS | RESPIRATION RATE: 16 BRPM | HEART RATE: 86 BPM | OXYGEN SATURATION: 100 % | TEMPERATURE: 97.9 F | SYSTOLIC BLOOD PRESSURE: 146 MMHG | BODY MASS INDEX: 36.91 KG/M2

## 2022-03-21 DIAGNOSIS — C55 MALIGNANT NEOPLASM OF UTERUS, UNSPECIFIED SITE (HCC): Primary | ICD-10-CM

## 2022-03-21 LAB
BASOPHILS ABSOLUTE: 0.02 E9/L (ref 0–0.2)
BASOPHILS RELATIVE PERCENT: 0.4 % (ref 0–2)
EOSINOPHILS ABSOLUTE: 0.09 E9/L (ref 0.05–0.5)
EOSINOPHILS RELATIVE PERCENT: 1.9 % (ref 0–6)
HCT VFR BLD CALC: 29.3 % (ref 34–48)
HEMOGLOBIN: 9.5 G/DL (ref 11.5–15.5)
IMMATURE GRANULOCYTES #: 0.02 E9/L
IMMATURE GRANULOCYTES %: 0.4 % (ref 0–5)
LYMPHOCYTES ABSOLUTE: 0.75 E9/L (ref 1.5–4)
LYMPHOCYTES RELATIVE PERCENT: 16.2 % (ref 20–42)
MCH RBC QN AUTO: 32.4 PG (ref 26–35)
MCHC RBC AUTO-ENTMCNC: 32.4 % (ref 32–34.5)
MCV RBC AUTO: 100 FL (ref 80–99.9)
MONOCYTES ABSOLUTE: 0.46 E9/L (ref 0.1–0.95)
MONOCYTES RELATIVE PERCENT: 10 % (ref 2–12)
NEUTROPHILS ABSOLUTE: 3.28 E9/L (ref 1.8–7.3)
NEUTROPHILS RELATIVE PERCENT: 71.1 % (ref 43–80)
PDW BLD-RTO: 16 FL (ref 11.5–15)
PLATELET # BLD: 174 E9/L (ref 130–450)
PMV BLD AUTO: 9.5 FL (ref 7–12)
RBC # BLD: 2.93 E12/L (ref 3.5–5.5)
WBC # BLD: 4.6 E9/L (ref 4.5–11.5)

## 2022-03-21 PROCEDURE — 36591 DRAW BLOOD OFF VENOUS DEVICE: CPT

## 2022-03-21 PROCEDURE — 85025 COMPLETE CBC W/AUTO DIFF WBC: CPT

## 2022-03-21 PROCEDURE — 2580000003 HC RX 258: Performed by: NURSE PRACTITIONER

## 2022-03-21 PROCEDURE — 36415 COLL VENOUS BLD VENIPUNCTURE: CPT

## 2022-03-21 PROCEDURE — 6360000002 HC RX W HCPCS: Performed by: NURSE PRACTITIONER

## 2022-03-21 RX ORDER — SODIUM CHLORIDE 0.9 % (FLUSH) 0.9 %
20 SYRINGE (ML) INJECTION PRN
Status: DISCONTINUED | OUTPATIENT
Start: 2022-03-21 | End: 2022-03-22 | Stop reason: HOSPADM

## 2022-03-21 RX ORDER — HEPARIN SODIUM (PORCINE) LOCK FLUSH IV SOLN 100 UNIT/ML 100 UNIT/ML
500 SOLUTION INTRAVENOUS PRN
Status: DISCONTINUED | OUTPATIENT
Start: 2022-03-21 | End: 2022-03-22 | Stop reason: HOSPADM

## 2022-03-21 RX ORDER — HEPARIN SODIUM (PORCINE) LOCK FLUSH IV SOLN 100 UNIT/ML 100 UNIT/ML
500 SOLUTION INTRAVENOUS PRN
Status: CANCELLED | OUTPATIENT
Start: 2022-03-21

## 2022-03-21 RX ORDER — SODIUM CHLORIDE 0.9 % (FLUSH) 0.9 %
20 SYRINGE (ML) INJECTION PRN
Status: CANCELLED | OUTPATIENT
Start: 2022-03-21

## 2022-03-21 RX ORDER — SODIUM CHLORIDE 0.9 % (FLUSH) 0.9 %
10 SYRINGE (ML) INJECTION PRN
Status: DISCONTINUED | OUTPATIENT
Start: 2022-03-21 | End: 2022-03-22 | Stop reason: HOSPADM

## 2022-03-21 RX ORDER — SODIUM CHLORIDE 0.9 % (FLUSH) 0.9 %
10 SYRINGE (ML) INJECTION PRN
Status: CANCELLED | OUTPATIENT
Start: 2022-03-21

## 2022-03-21 RX ADMIN — SODIUM CHLORIDE, PRESERVATIVE FREE 10 ML: 5 INJECTION INTRAVENOUS at 13:53

## 2022-03-21 RX ADMIN — SODIUM CHLORIDE, PRESERVATIVE FREE 10 ML: 5 INJECTION INTRAVENOUS at 13:54

## 2022-03-21 RX ADMIN — Medication 500 UNITS: at 13:56

## 2022-03-21 RX ADMIN — SODIUM CHLORIDE, PRESERVATIVE FREE 10 ML: 5 INJECTION INTRAVENOUS at 13:55

## 2022-03-21 ASSESSMENT — PAIN DESCRIPTION - DESCRIPTORS: DESCRIPTORS: ACHING;DISCOMFORT

## 2022-03-21 ASSESSMENT — PAIN DESCRIPTION - FREQUENCY: FREQUENCY: INTERMITTENT

## 2022-03-21 ASSESSMENT — PAIN DESCRIPTION - ONSET: ONSET: ON-GOING

## 2022-03-28 ENCOUNTER — HOSPITAL ENCOUNTER (OUTPATIENT)
Dept: INFUSION THERAPY | Age: 82
Setting detail: INFUSION SERIES
Discharge: HOME | End: 2022-03-28
Payer: MEDICARE

## 2022-03-28 VITALS
DIASTOLIC BLOOD PRESSURE: 62 MMHG | SYSTOLIC BLOOD PRESSURE: 142 MMHG | WEIGHT: 179 LBS | RESPIRATION RATE: 18 BRPM | OXYGEN SATURATION: 100 % | TEMPERATURE: 96.2 F | HEART RATE: 75 BPM | BODY MASS INDEX: 35.14 KG/M2 | HEIGHT: 60 IN

## 2022-03-28 DIAGNOSIS — C55 MALIGNANT NEOPLASM OF UTERUS, UNSPECIFIED SITE (HCC): Primary | ICD-10-CM

## 2022-03-28 LAB
BASOPHILS ABSOLUTE: 0.02 E9/L (ref 0–0.2)
BASOPHILS RELATIVE PERCENT: 0.6 % (ref 0–2)
EOSINOPHILS ABSOLUTE: 0.1 E9/L (ref 0.05–0.5)
EOSINOPHILS RELATIVE PERCENT: 3.2 % (ref 0–6)
HCT VFR BLD CALC: 31.2 % (ref 34–48)
HEMOGLOBIN: 9.6 G/DL (ref 11.5–15.5)
IMMATURE GRANULOCYTES #: 0.01 E9/L
IMMATURE GRANULOCYTES %: 0.3 % (ref 0–5)
LYMPHOCYTES ABSOLUTE: 0.8 E9/L (ref 1.5–4)
LYMPHOCYTES RELATIVE PERCENT: 25.5 % (ref 20–42)
MCH RBC QN AUTO: 30.8 PG (ref 26–35)
MCHC RBC AUTO-ENTMCNC: 30.8 % (ref 32–34.5)
MCV RBC AUTO: 100 FL (ref 80–99.9)
MONOCYTES ABSOLUTE: 0.31 E9/L (ref 0.1–0.95)
MONOCYTES RELATIVE PERCENT: 9.9 % (ref 2–12)
NEUTROPHILS ABSOLUTE: 1.9 E9/L (ref 1.8–7.3)
NEUTROPHILS RELATIVE PERCENT: 60.5 % (ref 43–80)
PDW BLD-RTO: 15.8 FL (ref 11.5–15)
PLATELET # BLD: 156 E9/L (ref 130–450)
PMV BLD AUTO: 9.5 FL (ref 7–12)
RBC # BLD: 3.12 E12/L (ref 3.5–5.5)
WBC # BLD: 3.1 E9/L (ref 4.5–11.5)

## 2022-03-28 PROCEDURE — 36591 DRAW BLOOD OFF VENOUS DEVICE: CPT

## 2022-03-28 PROCEDURE — 6360000002 HC RX W HCPCS: Performed by: NURSE PRACTITIONER

## 2022-03-28 PROCEDURE — 2580000003 HC RX 258: Performed by: NURSE PRACTITIONER

## 2022-03-28 PROCEDURE — 85025 COMPLETE CBC W/AUTO DIFF WBC: CPT

## 2022-03-28 RX ORDER — SODIUM CHLORIDE 0.9 % (FLUSH) 0.9 %
10 SYRINGE (ML) INJECTION PRN
Status: DISCONTINUED | OUTPATIENT
Start: 2022-03-28 | End: 2022-03-29 | Stop reason: HOSPADM

## 2022-03-28 RX ORDER — SODIUM CHLORIDE 0.9 % (FLUSH) 0.9 %
10 SYRINGE (ML) INJECTION PRN
Status: CANCELLED | OUTPATIENT
Start: 2022-03-28

## 2022-03-28 RX ORDER — HEPARIN SODIUM (PORCINE) LOCK FLUSH IV SOLN 100 UNIT/ML 100 UNIT/ML
500 SOLUTION INTRAVENOUS PRN
Status: CANCELLED | OUTPATIENT
Start: 2022-03-28

## 2022-03-28 RX ORDER — SODIUM CHLORIDE 0.9 % (FLUSH) 0.9 %
20 SYRINGE (ML) INJECTION PRN
Status: DISCONTINUED | OUTPATIENT
Start: 2022-03-28 | End: 2022-03-29 | Stop reason: HOSPADM

## 2022-03-28 RX ORDER — SODIUM CHLORIDE 0.9 % (FLUSH) 0.9 %
20 SYRINGE (ML) INJECTION PRN
Status: CANCELLED | OUTPATIENT
Start: 2022-03-28

## 2022-03-28 RX ORDER — EVEROLIMUS 5 MG/1
5 TABLET ORAL DAILY
COMMUNITY
End: 2022-07-18

## 2022-03-28 RX ORDER — HEPARIN SODIUM (PORCINE) LOCK FLUSH IV SOLN 100 UNIT/ML 100 UNIT/ML
500 SOLUTION INTRAVENOUS PRN
Status: DISCONTINUED | OUTPATIENT
Start: 2022-03-28 | End: 2022-03-29 | Stop reason: HOSPADM

## 2022-03-28 RX ADMIN — SODIUM CHLORIDE, PRESERVATIVE FREE 10 ML: 5 INJECTION INTRAVENOUS at 13:48

## 2022-03-28 RX ADMIN — SODIUM CHLORIDE, PRESERVATIVE FREE 20 ML: 5 INJECTION INTRAVENOUS at 13:50

## 2022-03-28 RX ADMIN — HEPARIN 500 UNITS: 100 SYRINGE at 13:51

## 2022-03-28 NOTE — PROGRESS NOTES
Tolerated port flush well. Blood work drawn through port. Reviewed therapy plan, offered education material and/or discharge material, verbalizes good knowledge of current plan patient verbalizes understanding, and has no signs or symptoms to report at this time. Patient discharged. Patient alert and oriented x3. No distress noted. Vital signs stable. Patient denies any new or worsening pain. Patient denies any needs. All questions answered. Next appointment scheduled.  Declines copy of AVS.

## 2022-04-04 ENCOUNTER — HOSPITAL ENCOUNTER (OUTPATIENT)
Dept: INFUSION THERAPY | Age: 82
Setting detail: INFUSION SERIES
Discharge: HOME | End: 2022-04-04
Payer: MEDICARE

## 2022-04-04 VITALS
OXYGEN SATURATION: 98 % | SYSTOLIC BLOOD PRESSURE: 138 MMHG | RESPIRATION RATE: 18 BRPM | DIASTOLIC BLOOD PRESSURE: 65 MMHG | HEART RATE: 84 BPM | TEMPERATURE: 98.4 F

## 2022-04-04 DIAGNOSIS — C55 MALIGNANT NEOPLASM OF UTERUS, UNSPECIFIED SITE (HCC): Primary | ICD-10-CM

## 2022-04-04 LAB
BASOPHILS ABSOLUTE: 0 E9/L (ref 0–0.2)
BASOPHILS RELATIVE PERCENT: 0 % (ref 0–2)
EOSINOPHILS ABSOLUTE: 0 E9/L (ref 0.05–0.5)
EOSINOPHILS RELATIVE PERCENT: 0 % (ref 0–6)
HCT VFR BLD CALC: 30.9 % (ref 34–48)
HEMOGLOBIN: 9.9 G/DL (ref 11.5–15.5)
LYMPHOCYTES ABSOLUTE: 0.46 E9/L (ref 1.5–4)
LYMPHOCYTES RELATIVE PERCENT: 21 % (ref 20–42)
MCH RBC QN AUTO: 31.4 PG (ref 26–35)
MCHC RBC AUTO-ENTMCNC: 32 % (ref 32–34.5)
MCV RBC AUTO: 98.1 FL (ref 80–99.9)
MONOCYTES ABSOLUTE: 0.18 E9/L (ref 0.1–0.95)
MONOCYTES RELATIVE PERCENT: 8 % (ref 2–12)
NEUTROPHILS ABSOLUTE: 1.56 E9/L (ref 1.8–7.3)
NEUTROPHILS RELATIVE PERCENT: 71 % (ref 43–80)
PDW BLD-RTO: 14.8 FL (ref 11.5–15)
PLATELET # BLD: 93 E9/L (ref 130–450)
PLATELET CONFIRMATION: NORMAL
PMV BLD AUTO: 10.1 FL (ref 7–12)
RBC # BLD: 3.15 E12/L (ref 3.5–5.5)
WBC # BLD: 2.2 E9/L (ref 4.5–11.5)

## 2022-04-04 PROCEDURE — 36415 COLL VENOUS BLD VENIPUNCTURE: CPT

## 2022-04-04 PROCEDURE — 2580000003 HC RX 258: Performed by: NURSE PRACTITIONER

## 2022-04-04 PROCEDURE — 36591 DRAW BLOOD OFF VENOUS DEVICE: CPT

## 2022-04-04 PROCEDURE — 85025 COMPLETE CBC W/AUTO DIFF WBC: CPT

## 2022-04-04 PROCEDURE — 6360000002 HC RX W HCPCS: Performed by: NURSE PRACTITIONER

## 2022-04-04 RX ORDER — SODIUM CHLORIDE 0.9 % (FLUSH) 0.9 %
20 SYRINGE (ML) INJECTION PRN
Status: DISCONTINUED | OUTPATIENT
Start: 2022-04-04 | End: 2022-04-05 | Stop reason: HOSPADM

## 2022-04-04 RX ORDER — SODIUM CHLORIDE 0.9 % (FLUSH) 0.9 %
20 SYRINGE (ML) INJECTION PRN
Status: CANCELLED | OUTPATIENT
Start: 2022-04-04

## 2022-04-04 RX ORDER — HEPARIN SODIUM (PORCINE) LOCK FLUSH IV SOLN 100 UNIT/ML 100 UNIT/ML
500 SOLUTION INTRAVENOUS PRN
Status: CANCELLED | OUTPATIENT
Start: 2022-04-04

## 2022-04-04 RX ORDER — HEPARIN SODIUM (PORCINE) LOCK FLUSH IV SOLN 100 UNIT/ML 100 UNIT/ML
500 SOLUTION INTRAVENOUS PRN
Status: DISCONTINUED | OUTPATIENT
Start: 2022-04-04 | End: 2022-04-05 | Stop reason: HOSPADM

## 2022-04-04 RX ORDER — SODIUM CHLORIDE 0.9 % (FLUSH) 0.9 %
10 SYRINGE (ML) INJECTION PRN
Status: CANCELLED | OUTPATIENT
Start: 2022-04-04

## 2022-04-04 RX ORDER — SODIUM CHLORIDE 0.9 % (FLUSH) 0.9 %
10 SYRINGE (ML) INJECTION PRN
Status: DISCONTINUED | OUTPATIENT
Start: 2022-04-04 | End: 2022-04-05 | Stop reason: HOSPADM

## 2022-04-04 RX ADMIN — Medication 10 ML: at 13:39

## 2022-04-04 RX ADMIN — Medication 20 ML: at 13:40

## 2022-04-04 RX ADMIN — HEPARIN 500 UNITS: 100 SYRINGE at 13:41

## 2022-04-11 ENCOUNTER — HOSPITAL ENCOUNTER (OUTPATIENT)
Dept: INFUSION THERAPY | Age: 82
Setting detail: INFUSION SERIES
Discharge: HOME | End: 2022-04-11
Payer: MEDICARE

## 2022-04-11 VITALS
TEMPERATURE: 97.5 F | HEART RATE: 88 BPM | DIASTOLIC BLOOD PRESSURE: 67 MMHG | RESPIRATION RATE: 18 BRPM | BODY MASS INDEX: 35.14 KG/M2 | WEIGHT: 179 LBS | OXYGEN SATURATION: 98 % | HEIGHT: 60 IN | SYSTOLIC BLOOD PRESSURE: 147 MMHG

## 2022-04-11 DIAGNOSIS — C55 MALIGNANT NEOPLASM OF UTERUS, UNSPECIFIED SITE (HCC): Primary | ICD-10-CM

## 2022-04-11 LAB
ALBUMIN SERPL-MCNC: 3.9 G/DL (ref 3.5–5.2)
ALP BLD-CCNC: 103 U/L (ref 35–104)
ALT SERPL-CCNC: 15 U/L (ref 0–32)
ANION GAP SERPL CALCULATED.3IONS-SCNC: 13 MMOL/L (ref 7–16)
AST SERPL-CCNC: 23 U/L (ref 0–31)
BASOPHILS ABSOLUTE: 0.01 E9/L (ref 0–0.2)
BASOPHILS RELATIVE PERCENT: 0.4 % (ref 0–2)
BILIRUB SERPL-MCNC: 0.3 MG/DL (ref 0–1.2)
BUN BLDV-MCNC: 40 MG/DL (ref 6–23)
CA 125: 18 U/ML (ref 0–35)
CALCIUM SERPL-MCNC: 9.3 MG/DL (ref 8.6–10.2)
CHLORIDE BLD-SCNC: 104 MMOL/L (ref 98–107)
CO2: 23 MMOL/L (ref 22–29)
CREAT SERPL-MCNC: 1.5 MG/DL (ref 0.5–1)
EOSINOPHILS ABSOLUTE: 0.07 E9/L (ref 0.05–0.5)
EOSINOPHILS RELATIVE PERCENT: 2.7 % (ref 0–6)
GFR AFRICAN AMERICAN: 40
GFR NON-AFRICAN AMERICAN: 33 ML/MIN/1.73
GLUCOSE BLD-MCNC: 103 MG/DL (ref 74–99)
HCT VFR BLD CALC: 29.8 % (ref 34–48)
HEMOGLOBIN: 9.8 G/DL (ref 11.5–15.5)
IMMATURE GRANULOCYTES #: 0.01 E9/L
IMMATURE GRANULOCYTES %: 0.4 % (ref 0–5)
LYMPHOCYTES ABSOLUTE: 0.73 E9/L (ref 1.5–4)
LYMPHOCYTES RELATIVE PERCENT: 27.8 % (ref 20–42)
MAGNESIUM: 1.7 MG/DL (ref 1.6–2.6)
MCH RBC QN AUTO: 31.5 PG (ref 26–35)
MCHC RBC AUTO-ENTMCNC: 32.9 % (ref 32–34.5)
MCV RBC AUTO: 95.8 FL (ref 80–99.9)
MONOCYTES ABSOLUTE: 0.3 E9/L (ref 0.1–0.95)
MONOCYTES RELATIVE PERCENT: 11.4 % (ref 2–12)
NEUTROPHILS ABSOLUTE: 1.51 E9/L (ref 1.8–7.3)
NEUTROPHILS RELATIVE PERCENT: 57.3 % (ref 43–80)
PDW BLD-RTO: 14.2 FL (ref 11.5–15)
PLATELET # BLD: 85 E9/L (ref 130–450)
PLATELET CONFIRMATION: NORMAL
PMV BLD AUTO: 10.5 FL (ref 7–12)
POTASSIUM SERPL-SCNC: 4.2 MMOL/L (ref 3.5–5)
RBC # BLD: 3.11 E12/L (ref 3.5–5.5)
SODIUM BLD-SCNC: 140 MMOL/L (ref 132–146)
TOTAL PROTEIN: 6.7 G/DL (ref 6.4–8.3)
WBC # BLD: 2.6 E9/L (ref 4.5–11.5)

## 2022-04-11 PROCEDURE — 2580000003 HC RX 258: Performed by: NURSE PRACTITIONER

## 2022-04-11 PROCEDURE — 36415 COLL VENOUS BLD VENIPUNCTURE: CPT

## 2022-04-11 PROCEDURE — 86304 IMMUNOASSAY TUMOR CA 125: CPT

## 2022-04-11 PROCEDURE — 6360000002 HC RX W HCPCS: Performed by: NURSE PRACTITIONER

## 2022-04-11 PROCEDURE — 36591 DRAW BLOOD OFF VENOUS DEVICE: CPT

## 2022-04-11 PROCEDURE — 85025 COMPLETE CBC W/AUTO DIFF WBC: CPT

## 2022-04-11 PROCEDURE — 80053 COMPREHEN METABOLIC PANEL: CPT

## 2022-04-11 PROCEDURE — 83735 ASSAY OF MAGNESIUM: CPT

## 2022-04-11 RX ORDER — SODIUM CHLORIDE 0.9 % (FLUSH) 0.9 %
10 SYRINGE (ML) INJECTION PRN
Status: DISCONTINUED | OUTPATIENT
Start: 2022-04-11 | End: 2022-04-12 | Stop reason: HOSPADM

## 2022-04-11 RX ORDER — SODIUM CHLORIDE 0.9 % (FLUSH) 0.9 %
10 SYRINGE (ML) INJECTION PRN
Status: CANCELLED | OUTPATIENT
Start: 2022-04-11

## 2022-04-11 RX ORDER — SODIUM CHLORIDE 0.9 % (FLUSH) 0.9 %
20 SYRINGE (ML) INJECTION PRN
Status: CANCELLED | OUTPATIENT
Start: 2022-04-11

## 2022-04-11 RX ORDER — HEPARIN SODIUM (PORCINE) LOCK FLUSH IV SOLN 100 UNIT/ML 100 UNIT/ML
500 SOLUTION INTRAVENOUS PRN
Status: CANCELLED | OUTPATIENT
Start: 2022-04-11

## 2022-04-11 RX ORDER — HEPARIN SODIUM (PORCINE) LOCK FLUSH IV SOLN 100 UNIT/ML 100 UNIT/ML
500 SOLUTION INTRAVENOUS PRN
Status: DISCONTINUED | OUTPATIENT
Start: 2022-04-11 | End: 2022-04-12 | Stop reason: HOSPADM

## 2022-04-11 RX ADMIN — SODIUM CHLORIDE, PRESERVATIVE FREE 10 ML: 5 INJECTION INTRAVENOUS at 13:40

## 2022-04-11 RX ADMIN — SODIUM CHLORIDE, PRESERVATIVE FREE 10 ML: 5 INJECTION INTRAVENOUS at 13:39

## 2022-04-11 RX ADMIN — SODIUM CHLORIDE, PRESERVATIVE FREE 10 ML: 5 INJECTION INTRAVENOUS at 13:37

## 2022-04-11 RX ADMIN — HEPARIN 500 UNITS: 100 SYRINGE at 13:40

## 2022-04-11 NOTE — PROGRESS NOTES
Tolerated port flush well. Blood drawn through port. Reviewed therapy plan, offered education material and/or discharge material, verbalizes good knowledge of current plan patient verbalizes understanding, and has no signs or symptoms to report at this time. Patient discharged. Patient alert and oriented x3. No distress noted. Vital signs stable. Patient denies any new or worsening pain. Patient denies any needs. All questions answered. Next appointment scheduled.  Declines copy of AVS.

## 2022-04-18 ENCOUNTER — HOSPITAL ENCOUNTER (OUTPATIENT)
Dept: INFUSION THERAPY | Age: 82
Setting detail: INFUSION SERIES
Discharge: HOME | End: 2022-04-18
Payer: MEDICARE

## 2022-04-18 VITALS
DIASTOLIC BLOOD PRESSURE: 69 MMHG | TEMPERATURE: 97.9 F | HEART RATE: 83 BPM | OXYGEN SATURATION: 100 % | SYSTOLIC BLOOD PRESSURE: 149 MMHG | RESPIRATION RATE: 16 BRPM

## 2022-04-18 DIAGNOSIS — C55 MALIGNANT NEOPLASM OF UTERUS, UNSPECIFIED SITE (HCC): Primary | ICD-10-CM

## 2022-04-18 LAB
BASOPHILS ABSOLUTE: 0.01 E9/L (ref 0–0.2)
BASOPHILS RELATIVE PERCENT: 0.4 % (ref 0–2)
EOSINOPHILS ABSOLUTE: 0.07 E9/L (ref 0.05–0.5)
EOSINOPHILS RELATIVE PERCENT: 2.6 % (ref 0–6)
HCT VFR BLD CALC: 30.6 % (ref 34–48)
HEMOGLOBIN: 9.9 G/DL (ref 11.5–15.5)
IMMATURE GRANULOCYTES #: 0.01 E9/L
IMMATURE GRANULOCYTES %: 0.4 % (ref 0–5)
LYMPHOCYTES ABSOLUTE: 0.68 E9/L (ref 1.5–4)
LYMPHOCYTES RELATIVE PERCENT: 25.2 % (ref 20–42)
MCH RBC QN AUTO: 30.9 PG (ref 26–35)
MCHC RBC AUTO-ENTMCNC: 32.4 % (ref 32–34.5)
MCV RBC AUTO: 95.6 FL (ref 80–99.9)
MONOCYTES ABSOLUTE: 0.36 E9/L (ref 0.1–0.95)
MONOCYTES RELATIVE PERCENT: 13.3 % (ref 2–12)
NEUTROPHILS ABSOLUTE: 1.57 E9/L (ref 1.8–7.3)
NEUTROPHILS RELATIVE PERCENT: 58.1 % (ref 43–80)
OVALOCYTES: ABNORMAL
PDW BLD-RTO: 13.9 FL (ref 11.5–15)
PLATELET # BLD: 99 E9/L (ref 130–450)
PLATELET CONFIRMATION: NORMAL
PMV BLD AUTO: 10.8 FL (ref 7–12)
POIKILOCYTES: ABNORMAL
RBC # BLD: 3.2 E12/L (ref 3.5–5.5)
WBC # BLD: 2.7 E9/L (ref 4.5–11.5)

## 2022-04-18 PROCEDURE — 2580000003 HC RX 258: Performed by: NURSE PRACTITIONER

## 2022-04-18 PROCEDURE — 85025 COMPLETE CBC W/AUTO DIFF WBC: CPT

## 2022-04-18 PROCEDURE — 36415 COLL VENOUS BLD VENIPUNCTURE: CPT

## 2022-04-18 PROCEDURE — 6360000002 HC RX W HCPCS: Performed by: NURSE PRACTITIONER

## 2022-04-18 PROCEDURE — 36591 DRAW BLOOD OFF VENOUS DEVICE: CPT

## 2022-04-18 RX ORDER — HEPARIN SODIUM (PORCINE) LOCK FLUSH IV SOLN 100 UNIT/ML 100 UNIT/ML
500 SOLUTION INTRAVENOUS PRN
Status: CANCELLED | OUTPATIENT
Start: 2022-04-18

## 2022-04-18 RX ORDER — HEPARIN SODIUM (PORCINE) LOCK FLUSH IV SOLN 100 UNIT/ML 100 UNIT/ML
500 SOLUTION INTRAVENOUS PRN
Status: DISCONTINUED | OUTPATIENT
Start: 2022-04-18 | End: 2022-04-19 | Stop reason: HOSPADM

## 2022-04-18 RX ORDER — SODIUM CHLORIDE 0.9 % (FLUSH) 0.9 %
10 SYRINGE (ML) INJECTION PRN
Status: CANCELLED | OUTPATIENT
Start: 2022-04-18

## 2022-04-18 RX ORDER — SODIUM CHLORIDE 0.9 % (FLUSH) 0.9 %
20 SYRINGE (ML) INJECTION PRN
Status: CANCELLED | OUTPATIENT
Start: 2022-04-18

## 2022-04-18 RX ORDER — SODIUM CHLORIDE 0.9 % (FLUSH) 0.9 %
10 SYRINGE (ML) INJECTION PRN
Status: DISCONTINUED | OUTPATIENT
Start: 2022-04-18 | End: 2022-04-19 | Stop reason: HOSPADM

## 2022-04-18 RX ADMIN — HEPARIN 500 UNITS: 100 SYRINGE at 13:33

## 2022-04-18 RX ADMIN — SODIUM CHLORIDE, PRESERVATIVE FREE 10 ML: 5 INJECTION INTRAVENOUS at 13:33

## 2022-04-18 RX ADMIN — SODIUM CHLORIDE, PRESERVATIVE FREE 10 ML: 5 INJECTION INTRAVENOUS at 13:31

## 2022-04-18 ASSESSMENT — PAIN SCALES - GENERAL: PAINLEVEL_OUTOF10: 0

## 2022-04-18 NOTE — PROGRESS NOTES
Tolerated port flush and lab draw well. Reviewed therapy plan, offered education material and/or discharge material, verbalizes good knowledge of current plan patient verbalizes understanding, and has no signs or symptoms to report at this time. Patient discharged. Patient alert and oriented x3. No distress noted. Vital signs stable. Patient denies any new or worsening pain. Patient denies any needs. All questions answered. Next appointment scheduled.

## 2022-04-25 ENCOUNTER — HOSPITAL ENCOUNTER (OUTPATIENT)
Dept: INFUSION THERAPY | Age: 82
Setting detail: INFUSION SERIES
Discharge: HOME | End: 2022-04-25
Payer: MEDICARE

## 2022-04-25 VITALS
DIASTOLIC BLOOD PRESSURE: 61 MMHG | OXYGEN SATURATION: 97 % | SYSTOLIC BLOOD PRESSURE: 133 MMHG | TEMPERATURE: 98.8 F | WEIGHT: 179 LBS | BODY MASS INDEX: 35.14 KG/M2 | HEIGHT: 60 IN | HEART RATE: 96 BPM | RESPIRATION RATE: 18 BRPM

## 2022-04-25 DIAGNOSIS — C55 MALIGNANT NEOPLASM OF UTERUS, UNSPECIFIED SITE (HCC): Primary | ICD-10-CM

## 2022-04-25 LAB
BASOPHILS ABSOLUTE: 0.01 E9/L (ref 0–0.2)
BASOPHILS RELATIVE PERCENT: 0.4 % (ref 0–2)
EOSINOPHILS ABSOLUTE: 0.05 E9/L (ref 0.05–0.5)
EOSINOPHILS RELATIVE PERCENT: 1.9 % (ref 0–6)
HCT VFR BLD CALC: 28.9 % (ref 34–48)
HEMOGLOBIN: 9.4 G/DL (ref 11.5–15.5)
IMMATURE GRANULOCYTES #: 0.01 E9/L
IMMATURE GRANULOCYTES %: 0.4 % (ref 0–5)
LYMPHOCYTES ABSOLUTE: 0.63 E9/L (ref 1.5–4)
LYMPHOCYTES RELATIVE PERCENT: 24.3 % (ref 20–42)
MCH RBC QN AUTO: 30.5 PG (ref 26–35)
MCHC RBC AUTO-ENTMCNC: 32.5 % (ref 32–34.5)
MCV RBC AUTO: 93.8 FL (ref 80–99.9)
MONOCYTES ABSOLUTE: 0.36 E9/L (ref 0.1–0.95)
MONOCYTES RELATIVE PERCENT: 13.9 % (ref 2–12)
NEUTROPHILS ABSOLUTE: 1.53 E9/L (ref 1.8–7.3)
NEUTROPHILS RELATIVE PERCENT: 59.1 % (ref 43–80)
PDW BLD-RTO: 13.9 FL (ref 11.5–15)
PLATELET # BLD: 92 E9/L (ref 130–450)
PLATELET CONFIRMATION: NORMAL
PMV BLD AUTO: 10.5 FL (ref 7–12)
RBC # BLD: 3.08 E12/L (ref 3.5–5.5)
WBC # BLD: 2.6 E9/L (ref 4.5–11.5)

## 2022-04-25 PROCEDURE — 6360000002 HC RX W HCPCS: Performed by: NURSE PRACTITIONER

## 2022-04-25 PROCEDURE — 36415 COLL VENOUS BLD VENIPUNCTURE: CPT

## 2022-04-25 PROCEDURE — 85025 COMPLETE CBC W/AUTO DIFF WBC: CPT

## 2022-04-25 PROCEDURE — 36591 DRAW BLOOD OFF VENOUS DEVICE: CPT

## 2022-04-25 PROCEDURE — 2580000003 HC RX 258: Performed by: NURSE PRACTITIONER

## 2022-04-25 RX ORDER — HEPARIN SODIUM (PORCINE) LOCK FLUSH IV SOLN 100 UNIT/ML 100 UNIT/ML
500 SOLUTION INTRAVENOUS PRN
Status: DISCONTINUED | OUTPATIENT
Start: 2022-04-25 | End: 2022-04-26 | Stop reason: HOSPADM

## 2022-04-25 RX ORDER — SODIUM CHLORIDE 0.9 % (FLUSH) 0.9 %
10 SYRINGE (ML) INJECTION PRN
Status: DISCONTINUED | OUTPATIENT
Start: 2022-04-25 | End: 2022-04-26 | Stop reason: HOSPADM

## 2022-04-25 RX ORDER — SODIUM CHLORIDE 0.9 % (FLUSH) 0.9 %
20 SYRINGE (ML) INJECTION PRN
Status: CANCELLED | OUTPATIENT
Start: 2022-04-25

## 2022-04-25 RX ORDER — HEPARIN SODIUM (PORCINE) LOCK FLUSH IV SOLN 100 UNIT/ML 100 UNIT/ML
500 SOLUTION INTRAVENOUS PRN
Status: CANCELLED | OUTPATIENT
Start: 2022-04-25

## 2022-04-25 RX ORDER — SODIUM CHLORIDE 0.9 % (FLUSH) 0.9 %
20 SYRINGE (ML) INJECTION PRN
Status: DISCONTINUED | OUTPATIENT
Start: 2022-04-25 | End: 2022-04-26 | Stop reason: HOSPADM

## 2022-04-25 RX ORDER — FOLIC ACID 1 MG/1
TABLET ORAL
Qty: 90 TABLET | Refills: 5 | Status: SHIPPED | OUTPATIENT
Start: 2022-04-25

## 2022-04-25 RX ORDER — SODIUM CHLORIDE 0.9 % (FLUSH) 0.9 %
10 SYRINGE (ML) INJECTION PRN
Status: CANCELLED | OUTPATIENT
Start: 2022-04-25

## 2022-04-25 RX ADMIN — SODIUM CHLORIDE, PRESERVATIVE FREE 10 ML: 5 INJECTION INTRAVENOUS at 13:37

## 2022-04-25 RX ADMIN — HEPARIN 500 UNITS: 100 SYRINGE at 13:41

## 2022-04-25 RX ADMIN — SODIUM CHLORIDE, PRESERVATIVE FREE 10 ML: 5 INJECTION INTRAVENOUS at 13:39

## 2022-04-25 RX ADMIN — SODIUM CHLORIDE, PRESERVATIVE FREE 10 ML: 5 INJECTION INTRAVENOUS at 13:40

## 2022-05-02 ENCOUNTER — HOSPITAL ENCOUNTER (OUTPATIENT)
Dept: INFUSION THERAPY | Age: 82
Setting detail: INFUSION SERIES
Discharge: HOME | End: 2022-05-02
Payer: MEDICARE

## 2022-05-02 VITALS
WEIGHT: 179 LBS | TEMPERATURE: 97.5 F | HEIGHT: 60 IN | BODY MASS INDEX: 35.14 KG/M2 | SYSTOLIC BLOOD PRESSURE: 154 MMHG | RESPIRATION RATE: 18 BRPM | HEART RATE: 91 BPM | DIASTOLIC BLOOD PRESSURE: 66 MMHG | OXYGEN SATURATION: 100 %

## 2022-05-02 DIAGNOSIS — C55 MALIGNANT NEOPLASM OF UTERUS, UNSPECIFIED SITE (HCC): Primary | ICD-10-CM

## 2022-05-02 LAB
BASOPHILS ABSOLUTE: 0.02 E9/L (ref 0–0.2)
BASOPHILS RELATIVE PERCENT: 0.6 % (ref 0–2)
EOSINOPHILS ABSOLUTE: 0.09 E9/L (ref 0.05–0.5)
EOSINOPHILS RELATIVE PERCENT: 2.9 % (ref 0–6)
HCT VFR BLD CALC: 29.1 % (ref 34–48)
HEMOGLOBIN: 9.5 G/DL (ref 11.5–15.5)
IMMATURE GRANULOCYTES #: 0.01 E9/L
IMMATURE GRANULOCYTES %: 0.3 % (ref 0–5)
LYMPHOCYTES ABSOLUTE: 0.77 E9/L (ref 1.5–4)
LYMPHOCYTES RELATIVE PERCENT: 24.9 % (ref 20–42)
MCH RBC QN AUTO: 30.6 PG (ref 26–35)
MCHC RBC AUTO-ENTMCNC: 32.6 % (ref 32–34.5)
MCV RBC AUTO: 93.9 FL (ref 80–99.9)
MONOCYTES ABSOLUTE: 0.36 E9/L (ref 0.1–0.95)
MONOCYTES RELATIVE PERCENT: 11.7 % (ref 2–12)
NEUTROPHILS ABSOLUTE: 1.84 E9/L (ref 1.8–7.3)
NEUTROPHILS RELATIVE PERCENT: 59.6 % (ref 43–80)
PDW BLD-RTO: 13.8 FL (ref 11.5–15)
PLATELET # BLD: 96 E9/L (ref 130–450)
PLATELET CONFIRMATION: NORMAL
PMV BLD AUTO: 10.7 FL (ref 7–12)
RBC # BLD: 3.1 E12/L (ref 3.5–5.5)
WBC # BLD: 3.1 E9/L (ref 4.5–11.5)

## 2022-05-02 PROCEDURE — 36591 DRAW BLOOD OFF VENOUS DEVICE: CPT

## 2022-05-02 PROCEDURE — 36415 COLL VENOUS BLD VENIPUNCTURE: CPT

## 2022-05-02 PROCEDURE — 6360000002 HC RX W HCPCS: Performed by: NURSE PRACTITIONER

## 2022-05-02 PROCEDURE — 2580000003 HC RX 258: Performed by: NURSE PRACTITIONER

## 2022-05-02 PROCEDURE — 85025 COMPLETE CBC W/AUTO DIFF WBC: CPT

## 2022-05-02 RX ORDER — HEPARIN SODIUM (PORCINE) LOCK FLUSH IV SOLN 100 UNIT/ML 100 UNIT/ML
500 SOLUTION INTRAVENOUS PRN
Status: CANCELLED | OUTPATIENT
Start: 2022-05-02

## 2022-05-02 RX ORDER — SODIUM CHLORIDE 0.9 % (FLUSH) 0.9 %
20 SYRINGE (ML) INJECTION PRN
Status: CANCELLED | OUTPATIENT
Start: 2022-05-02

## 2022-05-02 RX ORDER — HEPARIN SODIUM (PORCINE) LOCK FLUSH IV SOLN 100 UNIT/ML 100 UNIT/ML
500 SOLUTION INTRAVENOUS PRN
Status: DISCONTINUED | OUTPATIENT
Start: 2022-05-02 | End: 2022-05-03 | Stop reason: HOSPADM

## 2022-05-02 RX ORDER — SODIUM CHLORIDE 0.9 % (FLUSH) 0.9 %
20 SYRINGE (ML) INJECTION PRN
Status: DISCONTINUED | OUTPATIENT
Start: 2022-05-02 | End: 2022-05-03 | Stop reason: HOSPADM

## 2022-05-02 RX ORDER — SODIUM CHLORIDE 0.9 % (FLUSH) 0.9 %
10 SYRINGE (ML) INJECTION PRN
Status: DISCONTINUED | OUTPATIENT
Start: 2022-05-02 | End: 2022-05-03 | Stop reason: HOSPADM

## 2022-05-02 RX ORDER — SODIUM CHLORIDE 0.9 % (FLUSH) 0.9 %
10 SYRINGE (ML) INJECTION PRN
Status: CANCELLED | OUTPATIENT
Start: 2022-05-02

## 2022-05-02 RX ADMIN — SODIUM CHLORIDE, PRESERVATIVE FREE 20 ML: 5 INJECTION INTRAVENOUS at 13:30

## 2022-05-02 RX ADMIN — SODIUM CHLORIDE, PRESERVATIVE FREE 10 ML: 5 INJECTION INTRAVENOUS at 13:28

## 2022-05-02 RX ADMIN — HEPARIN 500 UNITS: 100 SYRINGE at 13:30

## 2022-05-09 ENCOUNTER — HOSPITAL ENCOUNTER (OUTPATIENT)
Dept: INFUSION THERAPY | Age: 82
Setting detail: INFUSION SERIES
Discharge: HOME | End: 2022-05-09
Payer: MEDICARE

## 2022-05-09 VITALS
DIASTOLIC BLOOD PRESSURE: 58 MMHG | RESPIRATION RATE: 18 BRPM | SYSTOLIC BLOOD PRESSURE: 134 MMHG | HEART RATE: 94 BPM | WEIGHT: 179 LBS | BODY MASS INDEX: 35.14 KG/M2 | HEIGHT: 60 IN | OXYGEN SATURATION: 98 % | TEMPERATURE: 98.5 F

## 2022-05-09 DIAGNOSIS — C55 MALIGNANT NEOPLASM OF UTERUS, UNSPECIFIED SITE (HCC): Primary | ICD-10-CM

## 2022-05-09 LAB
ALBUMIN SERPL-MCNC: 4 G/DL (ref 3.5–5.2)
ALP BLD-CCNC: 107 U/L (ref 35–104)
ALT SERPL-CCNC: 19 U/L (ref 0–32)
ANION GAP SERPL CALCULATED.3IONS-SCNC: 12 MMOL/L (ref 7–16)
AST SERPL-CCNC: 27 U/L (ref 0–31)
BASOPHILS ABSOLUTE: 0.02 E9/L (ref 0–0.2)
BASOPHILS RELATIVE PERCENT: 0.6 % (ref 0–2)
BILIRUB SERPL-MCNC: 0.3 MG/DL (ref 0–1.2)
BUN BLDV-MCNC: 38 MG/DL (ref 6–23)
CA 125: 20 U/ML (ref 0–35)
CALCIUM SERPL-MCNC: 9.4 MG/DL (ref 8.6–10.2)
CHLORIDE BLD-SCNC: 104 MMOL/L (ref 98–107)
CO2: 23 MMOL/L (ref 22–29)
CREAT SERPL-MCNC: 1.4 MG/DL (ref 0.5–1)
EOSINOPHILS ABSOLUTE: 0.11 E9/L (ref 0.05–0.5)
EOSINOPHILS RELATIVE PERCENT: 3.4 % (ref 0–6)
GFR AFRICAN AMERICAN: 44
GFR NON-AFRICAN AMERICAN: 36 ML/MIN/1.73
GLUCOSE BLD-MCNC: 110 MG/DL (ref 74–99)
HCT VFR BLD CALC: 28.7 % (ref 34–48)
HEMOGLOBIN: 9.3 G/DL (ref 11.5–15.5)
IMMATURE GRANULOCYTES #: 0.02 E9/L
IMMATURE GRANULOCYTES %: 0.6 % (ref 0–5)
LYMPHOCYTES ABSOLUTE: 0.65 E9/L (ref 1.5–4)
LYMPHOCYTES RELATIVE PERCENT: 20.2 % (ref 20–42)
MAGNESIUM: 1.6 MG/DL (ref 1.6–2.6)
MCH RBC QN AUTO: 30.2 PG (ref 26–35)
MCHC RBC AUTO-ENTMCNC: 32.4 % (ref 32–34.5)
MCV RBC AUTO: 93.2 FL (ref 80–99.9)
MONOCYTES ABSOLUTE: 0.36 E9/L (ref 0.1–0.95)
MONOCYTES RELATIVE PERCENT: 11.2 % (ref 2–12)
NEUTROPHILS ABSOLUTE: 2.05 E9/L (ref 1.8–7.3)
NEUTROPHILS RELATIVE PERCENT: 64 % (ref 43–80)
PDW BLD-RTO: 13.7 FL (ref 11.5–15)
PLATELET # BLD: 103 E9/L (ref 130–450)
PMV BLD AUTO: 10.6 FL (ref 7–12)
POTASSIUM SERPL-SCNC: 4.2 MMOL/L (ref 3.5–5)
RBC # BLD: 3.08 E12/L (ref 3.5–5.5)
SODIUM BLD-SCNC: 139 MMOL/L (ref 132–146)
TOTAL PROTEIN: 7 G/DL (ref 6.4–8.3)
WBC # BLD: 3.2 E9/L (ref 4.5–11.5)

## 2022-05-09 PROCEDURE — 83735 ASSAY OF MAGNESIUM: CPT

## 2022-05-09 PROCEDURE — 36415 COLL VENOUS BLD VENIPUNCTURE: CPT

## 2022-05-09 PROCEDURE — 85025 COMPLETE CBC W/AUTO DIFF WBC: CPT

## 2022-05-09 PROCEDURE — 2580000003 HC RX 258: Performed by: NURSE PRACTITIONER

## 2022-05-09 PROCEDURE — 80053 COMPREHEN METABOLIC PANEL: CPT

## 2022-05-09 PROCEDURE — 36591 DRAW BLOOD OFF VENOUS DEVICE: CPT

## 2022-05-09 PROCEDURE — 6360000002 HC RX W HCPCS: Performed by: NURSE PRACTITIONER

## 2022-05-09 PROCEDURE — 86304 IMMUNOASSAY TUMOR CA 125: CPT

## 2022-05-09 RX ORDER — HEPARIN SODIUM (PORCINE) LOCK FLUSH IV SOLN 100 UNIT/ML 100 UNIT/ML
500 SOLUTION INTRAVENOUS PRN
Status: CANCELLED | OUTPATIENT
Start: 2022-05-09

## 2022-05-09 RX ORDER — SODIUM CHLORIDE 0.9 % (FLUSH) 0.9 %
10 SYRINGE (ML) INJECTION PRN
Status: DISCONTINUED | OUTPATIENT
Start: 2022-05-09 | End: 2022-05-10 | Stop reason: HOSPADM

## 2022-05-09 RX ORDER — HEPARIN SODIUM (PORCINE) LOCK FLUSH IV SOLN 100 UNIT/ML 100 UNIT/ML
500 SOLUTION INTRAVENOUS PRN
Status: DISCONTINUED | OUTPATIENT
Start: 2022-05-09 | End: 2022-05-10 | Stop reason: HOSPADM

## 2022-05-09 RX ORDER — SODIUM CHLORIDE 0.9 % (FLUSH) 0.9 %
20 SYRINGE (ML) INJECTION PRN
Status: DISCONTINUED | OUTPATIENT
Start: 2022-05-09 | End: 2022-05-10 | Stop reason: HOSPADM

## 2022-05-09 RX ORDER — SODIUM CHLORIDE 0.9 % (FLUSH) 0.9 %
20 SYRINGE (ML) INJECTION PRN
Status: CANCELLED | OUTPATIENT
Start: 2022-05-09

## 2022-05-09 RX ORDER — SODIUM CHLORIDE 0.9 % (FLUSH) 0.9 %
10 SYRINGE (ML) INJECTION PRN
Status: CANCELLED | OUTPATIENT
Start: 2022-05-09

## 2022-05-09 RX ADMIN — Medication 500 UNITS: at 13:41

## 2022-05-09 RX ADMIN — SODIUM CHLORIDE, PRESERVATIVE FREE 20 ML: 5 INJECTION INTRAVENOUS at 13:40

## 2022-05-09 RX ADMIN — SODIUM CHLORIDE, PRESERVATIVE FREE 10 ML: 5 INJECTION INTRAVENOUS at 13:39

## 2022-05-09 NOTE — PROGRESS NOTES
Tolerated port flush well. Reviewed therapy plan, offered education material and/or discharge material, verbalizes good knowledge of current plan patient verbalizes understanding, and has no signs or symptoms to report at this time. Patient discharged. Patient alert and oriented x3. No distress noted. Vital signs stable. Patient denies any new or worsening pain. Patient denies any needs. All questions answered. Next appointment scheduled.  declines copy of AVS.

## 2022-05-23 ENCOUNTER — HOSPITAL ENCOUNTER (OUTPATIENT)
Dept: INFUSION THERAPY | Age: 82
Setting detail: INFUSION SERIES
Discharge: HOME | End: 2022-05-23
Payer: MEDICARE

## 2022-05-23 VITALS
SYSTOLIC BLOOD PRESSURE: 147 MMHG | DIASTOLIC BLOOD PRESSURE: 65 MMHG | TEMPERATURE: 97.1 F | OXYGEN SATURATION: 97 % | HEART RATE: 90 BPM | RESPIRATION RATE: 18 BRPM

## 2022-05-23 DIAGNOSIS — C55 MALIGNANT NEOPLASM OF UTERUS, UNSPECIFIED SITE (HCC): Primary | ICD-10-CM

## 2022-05-23 LAB
BASOPHILS ABSOLUTE: 0.02 E9/L (ref 0–0.2)
BASOPHILS RELATIVE PERCENT: 0.7 % (ref 0–2)
EOSINOPHILS ABSOLUTE: 0.13 E9/L (ref 0.05–0.5)
EOSINOPHILS RELATIVE PERCENT: 4.3 % (ref 0–6)
HCT VFR BLD CALC: 28.7 % (ref 34–48)
HEMOGLOBIN: 9.2 G/DL (ref 11.5–15.5)
IMMATURE GRANULOCYTES #: 0.01 E9/L
IMMATURE GRANULOCYTES %: 0.3 % (ref 0–5)
LYMPHOCYTES ABSOLUTE: 0.68 E9/L (ref 1.5–4)
LYMPHOCYTES RELATIVE PERCENT: 22.4 % (ref 20–42)
MCH RBC QN AUTO: 29.8 PG (ref 26–35)
MCHC RBC AUTO-ENTMCNC: 32.1 % (ref 32–34.5)
MCV RBC AUTO: 92.9 FL (ref 80–99.9)
MONOCYTES ABSOLUTE: 0.37 E9/L (ref 0.1–0.95)
MONOCYTES RELATIVE PERCENT: 12.2 % (ref 2–12)
NEUTROPHILS ABSOLUTE: 1.83 E9/L (ref 1.8–7.3)
NEUTROPHILS RELATIVE PERCENT: 60.1 % (ref 43–80)
PDW BLD-RTO: 13.6 FL (ref 11.5–15)
PLATELET # BLD: 104 E9/L (ref 130–450)
PMV BLD AUTO: 10.6 FL (ref 7–12)
RBC # BLD: 3.09 E12/L (ref 3.5–5.5)
WBC # BLD: 3 E9/L (ref 4.5–11.5)

## 2022-05-23 PROCEDURE — 36415 COLL VENOUS BLD VENIPUNCTURE: CPT

## 2022-05-23 PROCEDURE — 36591 DRAW BLOOD OFF VENOUS DEVICE: CPT

## 2022-05-23 PROCEDURE — 2580000003 HC RX 258: Performed by: NURSE PRACTITIONER

## 2022-05-23 PROCEDURE — 6360000002 HC RX W HCPCS: Performed by: NURSE PRACTITIONER

## 2022-05-23 PROCEDURE — 85025 COMPLETE CBC W/AUTO DIFF WBC: CPT

## 2022-05-23 RX ORDER — SODIUM CHLORIDE 0.9 % (FLUSH) 0.9 %
20 SYRINGE (ML) INJECTION PRN
Status: CANCELLED | OUTPATIENT
Start: 2022-05-23

## 2022-05-23 RX ORDER — HEPARIN SODIUM (PORCINE) LOCK FLUSH IV SOLN 100 UNIT/ML 100 UNIT/ML
500 SOLUTION INTRAVENOUS PRN
Status: CANCELLED | OUTPATIENT
Start: 2022-05-23

## 2022-05-23 RX ORDER — SODIUM CHLORIDE 0.9 % (FLUSH) 0.9 %
10 SYRINGE (ML) INJECTION PRN
Status: CANCELLED | OUTPATIENT
Start: 2022-05-23

## 2022-05-23 RX ORDER — HEPARIN SODIUM (PORCINE) LOCK FLUSH IV SOLN 100 UNIT/ML 100 UNIT/ML
500 SOLUTION INTRAVENOUS PRN
Status: DISCONTINUED | OUTPATIENT
Start: 2022-05-23 | End: 2022-05-24 | Stop reason: HOSPADM

## 2022-05-23 RX ORDER — SODIUM CHLORIDE 0.9 % (FLUSH) 0.9 %
10 SYRINGE (ML) INJECTION PRN
Status: DISCONTINUED | OUTPATIENT
Start: 2022-05-23 | End: 2022-05-24 | Stop reason: HOSPADM

## 2022-05-23 RX ADMIN — SODIUM CHLORIDE, PRESERVATIVE FREE 10 ML: 5 INJECTION INTRAVENOUS at 13:49

## 2022-05-23 RX ADMIN — Medication 500 UNITS: at 13:49

## 2022-05-23 RX ADMIN — SODIUM CHLORIDE, PRESERVATIVE FREE 10 ML: 5 INJECTION INTRAVENOUS at 13:47

## 2022-05-23 NOTE — PROGRESS NOTES
Tolerated port flush/lab draw well. Reviewed therapy plan, offered education material and/or discharge material, verbalizes good knowledge of current plan patient verbalizes understanding, and has no signs or symptoms to report at this time. Patient discharged. Patient alert and oriented x3. No distress noted. Vital signs stable. Patient denies any new or worsening pain. Patient denies any needs. All questions answered. Next appointment scheduled.  Declines copy of AVS.

## 2022-06-06 ENCOUNTER — HOSPITAL ENCOUNTER (OUTPATIENT)
Dept: INFUSION THERAPY | Age: 82
Setting detail: INFUSION SERIES
Discharge: HOME | End: 2022-06-06
Payer: MEDICARE

## 2022-06-06 VITALS
OXYGEN SATURATION: 100 % | WEIGHT: 179 LBS | TEMPERATURE: 97 F | HEART RATE: 91 BPM | HEIGHT: 60 IN | RESPIRATION RATE: 20 BRPM | BODY MASS INDEX: 35.14 KG/M2 | DIASTOLIC BLOOD PRESSURE: 55 MMHG | SYSTOLIC BLOOD PRESSURE: 147 MMHG

## 2022-06-06 LAB
ALBUMIN SERPL-MCNC: 3.9 G/DL (ref 3.5–5.2)
ALP BLD-CCNC: 99 U/L (ref 35–104)
ALT SERPL-CCNC: 20 U/L (ref 0–32)
ANION GAP SERPL CALCULATED.3IONS-SCNC: 14 MMOL/L (ref 7–16)
AST SERPL-CCNC: 28 U/L (ref 0–31)
BASOPHILS ABSOLUTE: 0.01 E9/L (ref 0–0.2)
BASOPHILS RELATIVE PERCENT: 0.3 % (ref 0–2)
BILIRUB SERPL-MCNC: 0.2 MG/DL (ref 0–1.2)
BUN BLDV-MCNC: 35 MG/DL (ref 6–23)
CA 125: 19.6 U/ML (ref 0–35)
CALCIUM SERPL-MCNC: 9.2 MG/DL (ref 8.6–10.2)
CHLORIDE BLD-SCNC: 101 MMOL/L (ref 98–107)
CO2: 23 MMOL/L (ref 22–29)
CREAT SERPL-MCNC: 1.6 MG/DL (ref 0.5–1)
EOSINOPHILS ABSOLUTE: 0.1 E9/L (ref 0.05–0.5)
EOSINOPHILS RELATIVE PERCENT: 3.3 % (ref 0–6)
GFR AFRICAN AMERICAN: 37
GFR NON-AFRICAN AMERICAN: 31 ML/MIN/1.73
GLUCOSE BLD-MCNC: 112 MG/DL (ref 74–99)
HCT VFR BLD CALC: 27.9 % (ref 34–48)
HEMOGLOBIN: 9.1 G/DL (ref 11.5–15.5)
IMMATURE GRANULOCYTES #: 0.01 E9/L
IMMATURE GRANULOCYTES %: 0.3 % (ref 0–5)
LYMPHOCYTES ABSOLUTE: 0.7 E9/L (ref 1.5–4)
LYMPHOCYTES RELATIVE PERCENT: 23.4 % (ref 20–42)
MAGNESIUM: 1.7 MG/DL (ref 1.6–2.6)
MCH RBC QN AUTO: 29.8 PG (ref 26–35)
MCHC RBC AUTO-ENTMCNC: 32.6 % (ref 32–34.5)
MCV RBC AUTO: 91.5 FL (ref 80–99.9)
MONOCYTES ABSOLUTE: 0.4 E9/L (ref 0.1–0.95)
MONOCYTES RELATIVE PERCENT: 13.4 % (ref 2–12)
NEUTROPHILS ABSOLUTE: 1.77 E9/L (ref 1.8–7.3)
NEUTROPHILS RELATIVE PERCENT: 59.3 % (ref 43–80)
PDW BLD-RTO: 13.3 FL (ref 11.5–15)
PLATELET # BLD: 97 E9/L (ref 130–450)
PLATELET CONFIRMATION: NORMAL
PMV BLD AUTO: 10.5 FL (ref 7–12)
POTASSIUM SERPL-SCNC: 3.9 MMOL/L (ref 3.5–5)
RBC # BLD: 3.05 E12/L (ref 3.5–5.5)
SODIUM BLD-SCNC: 138 MMOL/L (ref 132–146)
TOTAL PROTEIN: 6.8 G/DL (ref 6.4–8.3)
WBC # BLD: 3 E9/L (ref 4.5–11.5)

## 2022-06-06 PROCEDURE — 80053 COMPREHEN METABOLIC PANEL: CPT

## 2022-06-06 PROCEDURE — 86304 IMMUNOASSAY TUMOR CA 125: CPT

## 2022-06-06 PROCEDURE — 36591 DRAW BLOOD OFF VENOUS DEVICE: CPT

## 2022-06-06 PROCEDURE — 6360000002 HC RX W HCPCS: Performed by: OBSTETRICS & GYNECOLOGY

## 2022-06-06 PROCEDURE — 36415 COLL VENOUS BLD VENIPUNCTURE: CPT

## 2022-06-06 PROCEDURE — 2580000003 HC RX 258: Performed by: OBSTETRICS & GYNECOLOGY

## 2022-06-06 PROCEDURE — 85025 COMPLETE CBC W/AUTO DIFF WBC: CPT

## 2022-06-06 PROCEDURE — 83735 ASSAY OF MAGNESIUM: CPT

## 2022-06-06 RX ORDER — SODIUM CHLORIDE 0.9 % (FLUSH) 0.9 %
5 SYRINGE (ML) INJECTION PRN
Status: DISCONTINUED | OUTPATIENT
Start: 2022-06-06 | End: 2022-06-07 | Stop reason: HOSPADM

## 2022-06-06 RX ORDER — SODIUM CHLORIDE 0.9 % (FLUSH) 0.9 %
5-40 SYRINGE (ML) INJECTION 2 TIMES DAILY
Status: DISCONTINUED | OUTPATIENT
Start: 2022-06-06 | End: 2022-06-07 | Stop reason: HOSPADM

## 2022-06-06 RX ORDER — HEPARIN SODIUM (PORCINE) LOCK FLUSH IV SOLN 100 UNIT/ML 100 UNIT/ML
500 SOLUTION INTRAVENOUS PRN
Status: DISCONTINUED | OUTPATIENT
Start: 2022-06-06 | End: 2022-06-07 | Stop reason: HOSPADM

## 2022-06-06 RX ADMIN — SODIUM CHLORIDE, PRESERVATIVE FREE 10 ML: 5 INJECTION INTRAVENOUS at 13:52

## 2022-06-06 RX ADMIN — Medication 500 UNITS: at 13:55

## 2022-06-06 RX ADMIN — SODIUM CHLORIDE, PRESERVATIVE FREE 10 ML: 5 INJECTION INTRAVENOUS at 13:54

## 2022-06-15 RX ORDER — SODIUM CHLORIDE 0.9 % (FLUSH) 0.9 %
10 SYRINGE (ML) INJECTION PRN
Status: CANCELLED | OUTPATIENT
Start: 2022-06-15

## 2022-06-15 RX ORDER — HEPARIN SODIUM (PORCINE) LOCK FLUSH IV SOLN 100 UNIT/ML 100 UNIT/ML
500 SOLUTION INTRAVENOUS PRN
Status: CANCELLED | OUTPATIENT
Start: 2022-06-15

## 2022-06-20 ENCOUNTER — HOSPITAL ENCOUNTER (OUTPATIENT)
Dept: INFUSION THERAPY | Age: 82
Setting detail: INFUSION SERIES
Discharge: HOME | End: 2022-06-20
Payer: MEDICARE

## 2022-06-20 VITALS
BODY MASS INDEX: 35.14 KG/M2 | SYSTOLIC BLOOD PRESSURE: 127 MMHG | RESPIRATION RATE: 18 BRPM | WEIGHT: 179 LBS | TEMPERATURE: 97.7 F | OXYGEN SATURATION: 98 % | DIASTOLIC BLOOD PRESSURE: 58 MMHG | HEART RATE: 92 BPM | HEIGHT: 60 IN

## 2022-06-20 DIAGNOSIS — C55 MALIGNANT NEOPLASM OF UTERUS, UNSPECIFIED SITE (HCC): Primary | ICD-10-CM

## 2022-06-20 LAB
BASOPHILS ABSOLUTE: 0.02 E9/L (ref 0–0.2)
BASOPHILS RELATIVE PERCENT: 0.6 % (ref 0–2)
EOSINOPHILS ABSOLUTE: 0.09 E9/L (ref 0.05–0.5)
EOSINOPHILS RELATIVE PERCENT: 2.7 % (ref 0–6)
HCT VFR BLD CALC: 27.6 % (ref 34–48)
HEMOGLOBIN: 8.8 G/DL (ref 11.5–15.5)
IMMATURE GRANULOCYTES #: 0.02 E9/L
IMMATURE GRANULOCYTES %: 0.6 % (ref 0–5)
LYMPHOCYTES ABSOLUTE: 0.61 E9/L (ref 1.5–4)
LYMPHOCYTES RELATIVE PERCENT: 18.5 % (ref 20–42)
MCH RBC QN AUTO: 29.1 PG (ref 26–35)
MCHC RBC AUTO-ENTMCNC: 31.9 % (ref 32–34.5)
MCV RBC AUTO: 91.4 FL (ref 80–99.9)
MONOCYTES ABSOLUTE: 0.39 E9/L (ref 0.1–0.95)
MONOCYTES RELATIVE PERCENT: 11.9 % (ref 2–12)
NEUTROPHILS ABSOLUTE: 2.16 E9/L (ref 1.8–7.3)
NEUTROPHILS RELATIVE PERCENT: 65.7 % (ref 43–80)
PDW BLD-RTO: 13.8 FL (ref 11.5–15)
PLATELET # BLD: 105 E9/L (ref 130–450)
PMV BLD AUTO: 10.7 FL (ref 7–12)
RBC # BLD: 3.02 E12/L (ref 3.5–5.5)
WBC # BLD: 3.3 E9/L (ref 4.5–11.5)

## 2022-06-20 PROCEDURE — 85025 COMPLETE CBC W/AUTO DIFF WBC: CPT

## 2022-06-20 PROCEDURE — 36415 COLL VENOUS BLD VENIPUNCTURE: CPT

## 2022-06-20 PROCEDURE — 2580000003 HC RX 258: Performed by: OBSTETRICS & GYNECOLOGY

## 2022-06-20 PROCEDURE — 6360000002 HC RX W HCPCS: Performed by: OBSTETRICS & GYNECOLOGY

## 2022-06-20 PROCEDURE — 36591 DRAW BLOOD OFF VENOUS DEVICE: CPT

## 2022-06-20 RX ORDER — HEPARIN SODIUM (PORCINE) LOCK FLUSH IV SOLN 100 UNIT/ML 100 UNIT/ML
500 SOLUTION INTRAVENOUS PRN
Status: CANCELLED | OUTPATIENT
Start: 2022-06-20

## 2022-06-20 RX ORDER — HEPARIN SODIUM (PORCINE) LOCK FLUSH IV SOLN 100 UNIT/ML 100 UNIT/ML
500 SOLUTION INTRAVENOUS PRN
Status: DISCONTINUED | OUTPATIENT
Start: 2022-06-20 | End: 2022-06-21 | Stop reason: HOSPADM

## 2022-06-20 RX ORDER — SODIUM CHLORIDE 0.9 % (FLUSH) 0.9 %
10 SYRINGE (ML) INJECTION PRN
Status: CANCELLED | OUTPATIENT
Start: 2022-06-20

## 2022-06-20 RX ORDER — SODIUM CHLORIDE 0.9 % (FLUSH) 0.9 %
10 SYRINGE (ML) INJECTION PRN
Status: DISCONTINUED | OUTPATIENT
Start: 2022-06-20 | End: 2022-06-21 | Stop reason: HOSPADM

## 2022-06-20 RX ADMIN — HEPARIN 500 UNITS: 100 SYRINGE at 14:00

## 2022-06-20 RX ADMIN — SODIUM CHLORIDE, PRESERVATIVE FREE 10 ML: 5 INJECTION INTRAVENOUS at 13:58

## 2022-06-20 RX ADMIN — SODIUM CHLORIDE, PRESERVATIVE FREE 10 ML: 5 INJECTION INTRAVENOUS at 13:59

## 2022-06-20 ASSESSMENT — PAIN SCALES - GENERAL: PAINLEVEL_OUTOF10: 0

## 2022-06-20 NOTE — PROGRESS NOTES
Tolerated port flush and lab draw well. Reviewed therapy plan, offered education material and/or discharge material, verbalizes good knowledge of current plan patient verbalizes understanding, and has no signs or symptoms to report at this time. Patient discharged. Patient alert and oriented x3. No distress noted. Vital signs stable. Patient denies any new or worsening pain. Patient denies any needs. All questions answered. Next appointment scheduled.  declines copy of AVS.

## 2022-07-05 ENCOUNTER — HOSPITAL ENCOUNTER (OUTPATIENT)
Dept: INFUSION THERAPY | Age: 82
Setting detail: INFUSION SERIES
Discharge: HOME | End: 2022-07-05
Payer: MEDICARE

## 2022-07-05 VITALS
TEMPERATURE: 97.3 F | OXYGEN SATURATION: 100 % | HEART RATE: 103 BPM | RESPIRATION RATE: 18 BRPM | SYSTOLIC BLOOD PRESSURE: 141 MMHG | DIASTOLIC BLOOD PRESSURE: 68 MMHG

## 2022-07-05 DIAGNOSIS — C55 MALIGNANT NEOPLASM OF UTERUS, UNSPECIFIED SITE (HCC): Primary | ICD-10-CM

## 2022-07-05 LAB
ALBUMIN SERPL-MCNC: 3.6 G/DL (ref 3.5–5.2)
ALP BLD-CCNC: 95 U/L (ref 35–104)
ALT SERPL-CCNC: 16 U/L (ref 0–32)
ANION GAP SERPL CALCULATED.3IONS-SCNC: 16 MMOL/L (ref 7–16)
AST SERPL-CCNC: 26 U/L (ref 0–31)
BASOPHILS ABSOLUTE: 0.02 E9/L (ref 0–0.2)
BASOPHILS RELATIVE PERCENT: 0.6 % (ref 0–2)
BILIRUB SERPL-MCNC: 0.3 MG/DL (ref 0–1.2)
BUN BLDV-MCNC: 35 MG/DL (ref 6–23)
CA 125: 22.5 U/ML (ref 0–35)
CALCIUM SERPL-MCNC: 9.2 MG/DL (ref 8.6–10.2)
CHLORIDE BLD-SCNC: 105 MMOL/L (ref 98–107)
CO2: 20 MMOL/L (ref 22–29)
CREAT SERPL-MCNC: 1.4 MG/DL (ref 0.5–1)
EOSINOPHILS ABSOLUTE: 0.07 E9/L (ref 0.05–0.5)
EOSINOPHILS RELATIVE PERCENT: 2.2 % (ref 0–6)
GFR AFRICAN AMERICAN: 44
GFR NON-AFRICAN AMERICAN: 36 ML/MIN/1.73
GLUCOSE BLD-MCNC: 108 MG/DL (ref 74–99)
HCT VFR BLD CALC: 24.8 % (ref 34–48)
HEMOGLOBIN: 7.9 G/DL (ref 11.5–15.5)
IMMATURE GRANULOCYTES #: 0.01 E9/L
IMMATURE GRANULOCYTES %: 0.3 % (ref 0–5)
LYMPHOCYTES ABSOLUTE: 0.62 E9/L (ref 1.5–4)
LYMPHOCYTES RELATIVE PERCENT: 19.5 % (ref 20–42)
MAGNESIUM: 1.5 MG/DL (ref 1.6–2.6)
MCH RBC QN AUTO: 29 PG (ref 26–35)
MCHC RBC AUTO-ENTMCNC: 31.9 % (ref 32–34.5)
MCV RBC AUTO: 91.2 FL (ref 80–99.9)
MONOCYTES ABSOLUTE: 0.32 E9/L (ref 0.1–0.95)
MONOCYTES RELATIVE PERCENT: 10.1 % (ref 2–12)
NEUTROPHILS ABSOLUTE: 2.14 E9/L (ref 1.8–7.3)
NEUTROPHILS RELATIVE PERCENT: 67.3 % (ref 43–80)
PDW BLD-RTO: 14 FL (ref 11.5–15)
PLATELET # BLD: 97 E9/L (ref 130–450)
PLATELET CONFIRMATION: NORMAL
PMV BLD AUTO: 10.6 FL (ref 7–12)
POTASSIUM SERPL-SCNC: 3.9 MMOL/L (ref 3.5–5)
RBC # BLD: 2.72 E12/L (ref 3.5–5.5)
SODIUM BLD-SCNC: 141 MMOL/L (ref 132–146)
TOTAL PROTEIN: 6.7 G/DL (ref 6.4–8.3)
WBC # BLD: 3.2 E9/L (ref 4.5–11.5)

## 2022-07-05 PROCEDURE — 83735 ASSAY OF MAGNESIUM: CPT

## 2022-07-05 PROCEDURE — 86304 IMMUNOASSAY TUMOR CA 125: CPT

## 2022-07-05 PROCEDURE — 80053 COMPREHEN METABOLIC PANEL: CPT

## 2022-07-05 PROCEDURE — 36415 COLL VENOUS BLD VENIPUNCTURE: CPT

## 2022-07-05 PROCEDURE — 6360000002 HC RX W HCPCS: Performed by: OBSTETRICS & GYNECOLOGY

## 2022-07-05 PROCEDURE — 2580000003 HC RX 258: Performed by: OBSTETRICS & GYNECOLOGY

## 2022-07-05 PROCEDURE — 36591 DRAW BLOOD OFF VENOUS DEVICE: CPT

## 2022-07-05 PROCEDURE — 85025 COMPLETE CBC W/AUTO DIFF WBC: CPT

## 2022-07-05 RX ORDER — HEPARIN SODIUM (PORCINE) LOCK FLUSH IV SOLN 100 UNIT/ML 100 UNIT/ML
500 SOLUTION INTRAVENOUS PRN
Status: CANCELLED | OUTPATIENT
Start: 2022-07-05

## 2022-07-05 RX ORDER — SODIUM CHLORIDE 0.9 % (FLUSH) 0.9 %
10 SYRINGE (ML) INJECTION PRN
Status: CANCELLED | OUTPATIENT
Start: 2022-07-05

## 2022-07-05 RX ORDER — HEPARIN SODIUM (PORCINE) LOCK FLUSH IV SOLN 100 UNIT/ML 100 UNIT/ML
500 SOLUTION INTRAVENOUS PRN
Status: DISCONTINUED | OUTPATIENT
Start: 2022-07-05 | End: 2022-07-06 | Stop reason: HOSPADM

## 2022-07-05 RX ORDER — SODIUM CHLORIDE 0.9 % (FLUSH) 0.9 %
10 SYRINGE (ML) INJECTION PRN
Status: DISCONTINUED | OUTPATIENT
Start: 2022-07-05 | End: 2022-07-06 | Stop reason: HOSPADM

## 2022-07-05 RX ADMIN — Medication 500 UNITS: at 13:46

## 2022-07-05 RX ADMIN — SODIUM CHLORIDE, PRESERVATIVE FREE 10 ML: 5 INJECTION INTRAVENOUS at 13:37

## 2022-07-05 RX ADMIN — SODIUM CHLORIDE, PRESERVATIVE FREE 10 ML: 5 INJECTION INTRAVENOUS at 13:45

## 2022-07-18 ENCOUNTER — HOSPITAL ENCOUNTER (OUTPATIENT)
Dept: INFUSION THERAPY | Age: 82
Setting detail: INFUSION SERIES
Discharge: HOME | End: 2022-07-18
Payer: MEDICARE

## 2022-07-18 VITALS
OXYGEN SATURATION: 100 % | RESPIRATION RATE: 18 BRPM | SYSTOLIC BLOOD PRESSURE: 144 MMHG | DIASTOLIC BLOOD PRESSURE: 58 MMHG | TEMPERATURE: 97 F | HEART RATE: 79 BPM

## 2022-07-18 DIAGNOSIS — C55 MALIGNANT NEOPLASM OF UTERUS, UNSPECIFIED SITE (HCC): Primary | ICD-10-CM

## 2022-07-18 LAB
BASOPHILS ABSOLUTE: 0.05 E9/L (ref 0–0.2)
BASOPHILS RELATIVE PERCENT: 0.9 % (ref 0–2)
EOSINOPHILS ABSOLUTE: 0.1 E9/L (ref 0.05–0.5)
EOSINOPHILS RELATIVE PERCENT: 1.7 % (ref 0–6)
HCT VFR BLD CALC: 23.9 % (ref 34–48)
HEMOGLOBIN: 7.7 G/DL (ref 11.5–15.5)
IMMATURE GRANULOCYTES #: 0.07 E9/L
IMMATURE GRANULOCYTES %: 1.2 % (ref 0–5)
LYMPHOCYTES ABSOLUTE: 0.77 E9/L (ref 1.5–4)
LYMPHOCYTES RELATIVE PERCENT: 13.3 % (ref 20–42)
MCH RBC QN AUTO: 28.8 PG (ref 26–35)
MCHC RBC AUTO-ENTMCNC: 32.2 % (ref 32–34.5)
MCV RBC AUTO: 89.5 FL (ref 80–99.9)
MONOCYTES ABSOLUTE: 0.65 E9/L (ref 0.1–0.95)
MONOCYTES RELATIVE PERCENT: 11.2 % (ref 2–12)
NEUTROPHILS ABSOLUTE: 4.15 E9/L (ref 1.8–7.3)
NEUTROPHILS RELATIVE PERCENT: 71.7 % (ref 43–80)
PDW BLD-RTO: 15.2 FL (ref 11.5–15)
PLATELET # BLD: 204 E9/L (ref 130–450)
PMV BLD AUTO: 9.5 FL (ref 7–12)
RBC # BLD: 2.67 E12/L (ref 3.5–5.5)
WBC # BLD: 5.8 E9/L (ref 4.5–11.5)

## 2022-07-18 PROCEDURE — 36415 COLL VENOUS BLD VENIPUNCTURE: CPT

## 2022-07-18 PROCEDURE — 85025 COMPLETE CBC W/AUTO DIFF WBC: CPT

## 2022-07-18 PROCEDURE — 2580000003 HC RX 258: Performed by: OBSTETRICS & GYNECOLOGY

## 2022-07-18 PROCEDURE — 6360000002 HC RX W HCPCS: Performed by: OBSTETRICS & GYNECOLOGY

## 2022-07-18 PROCEDURE — 36591 DRAW BLOOD OFF VENOUS DEVICE: CPT

## 2022-07-18 RX ORDER — HEPARIN SODIUM (PORCINE) LOCK FLUSH IV SOLN 100 UNIT/ML 100 UNIT/ML
500 SOLUTION INTRAVENOUS PRN
Status: DISCONTINUED | OUTPATIENT
Start: 2022-07-18 | End: 2022-07-19 | Stop reason: HOSPADM

## 2022-07-18 RX ORDER — SODIUM CHLORIDE 0.9 % (FLUSH) 0.9 %
10 SYRINGE (ML) INJECTION PRN
Status: CANCELLED | OUTPATIENT
Start: 2022-07-18

## 2022-07-18 RX ORDER — HEPARIN SODIUM (PORCINE) LOCK FLUSH IV SOLN 100 UNIT/ML 100 UNIT/ML
500 SOLUTION INTRAVENOUS PRN
Status: CANCELLED | OUTPATIENT
Start: 2022-07-18

## 2022-07-18 RX ORDER — SODIUM CHLORIDE 0.9 % (FLUSH) 0.9 %
10 SYRINGE (ML) INJECTION PRN
Status: DISCONTINUED | OUTPATIENT
Start: 2022-07-18 | End: 2022-07-19 | Stop reason: HOSPADM

## 2022-07-18 RX ADMIN — SODIUM CHLORIDE, PRESERVATIVE FREE 10 ML: 5 INJECTION INTRAVENOUS at 13:50

## 2022-07-18 RX ADMIN — SODIUM CHLORIDE, PRESERVATIVE FREE 10 ML: 5 INJECTION INTRAVENOUS at 13:49

## 2022-07-18 RX ADMIN — HEPARIN 500 UNITS: 100 SYRINGE at 13:51

## 2022-08-07 DIAGNOSIS — E78.2 MIXED HYPERLIPIDEMIA: ICD-10-CM

## 2022-08-08 RX ORDER — EZETIMIBE 10 MG/1
10 TABLET ORAL
Qty: 90 TABLET | Refills: 3 | Status: SHIPPED | OUTPATIENT
Start: 2022-08-08

## 2022-08-15 ENCOUNTER — HOSPITAL ENCOUNTER (OUTPATIENT)
Dept: INFUSION THERAPY | Age: 82
Setting detail: INFUSION SERIES
Discharge: HOME | End: 2022-08-15
Payer: MEDICARE

## 2022-08-15 VITALS
HEIGHT: 60 IN | DIASTOLIC BLOOD PRESSURE: 65 MMHG | HEART RATE: 67 BPM | SYSTOLIC BLOOD PRESSURE: 132 MMHG | TEMPERATURE: 96.7 F | RESPIRATION RATE: 18 BRPM | OXYGEN SATURATION: 100 % | WEIGHT: 175 LBS | BODY MASS INDEX: 34.36 KG/M2

## 2022-08-15 DIAGNOSIS — C55 MALIGNANT NEOPLASM OF UTERUS, UNSPECIFIED SITE (HCC): Primary | ICD-10-CM

## 2022-08-15 LAB
ALBUMIN SERPL-MCNC: 3.6 G/DL (ref 3.5–5.2)
ALP BLD-CCNC: 99 U/L (ref 35–104)
ALT SERPL-CCNC: 9 U/L (ref 0–32)
ANION GAP SERPL CALCULATED.3IONS-SCNC: 13 MMOL/L (ref 7–16)
AST SERPL-CCNC: 19 U/L (ref 0–31)
BASOPHILS ABSOLUTE: 0.01 E9/L (ref 0–0.2)
BASOPHILS RELATIVE PERCENT: 0.3 % (ref 0–2)
BILIRUB SERPL-MCNC: 0.3 MG/DL (ref 0–1.2)
BUN BLDV-MCNC: 34 MG/DL (ref 6–23)
CA 125: 40 U/ML (ref 0–35)
CALCIUM SERPL-MCNC: 9.4 MG/DL (ref 8.6–10.2)
CHLORIDE BLD-SCNC: 103 MMOL/L (ref 98–107)
CO2: 24 MMOL/L (ref 22–29)
CREAT SERPL-MCNC: 1.5 MG/DL (ref 0.5–1)
EOSINOPHILS ABSOLUTE: 0.08 E9/L (ref 0.05–0.5)
EOSINOPHILS RELATIVE PERCENT: 2.6 % (ref 0–6)
GFR AFRICAN AMERICAN: 40
GFR NON-AFRICAN AMERICAN: 33 ML/MIN/1.73
GLUCOSE BLD-MCNC: 80 MG/DL (ref 74–99)
HCT VFR BLD CALC: 27.1 % (ref 34–48)
HEMOGLOBIN: 8.6 G/DL (ref 11.5–15.5)
IMMATURE GRANULOCYTES #: 0.01 E9/L
IMMATURE GRANULOCYTES %: 0.3 % (ref 0–5)
LYMPHOCYTES ABSOLUTE: 0.82 E9/L (ref 1.5–4)
LYMPHOCYTES RELATIVE PERCENT: 27.2 % (ref 20–42)
MAGNESIUM: 2.1 MG/DL (ref 1.6–2.6)
MCH RBC QN AUTO: 30.7 PG (ref 26–35)
MCHC RBC AUTO-ENTMCNC: 31.7 % (ref 32–34.5)
MCV RBC AUTO: 96.8 FL (ref 80–99.9)
MONOCYTES ABSOLUTE: 0.38 E9/L (ref 0.1–0.95)
MONOCYTES RELATIVE PERCENT: 12.6 % (ref 2–12)
NEUTROPHILS ABSOLUTE: 1.72 E9/L (ref 1.8–7.3)
NEUTROPHILS RELATIVE PERCENT: 57 % (ref 43–80)
PDW BLD-RTO: 20 FL (ref 11.5–15)
PLATELET # BLD: 115 E9/L (ref 130–450)
PMV BLD AUTO: 9.9 FL (ref 7–12)
POTASSIUM SERPL-SCNC: 4.1 MMOL/L (ref 3.5–5)
RBC # BLD: 2.8 E12/L (ref 3.5–5.5)
SODIUM BLD-SCNC: 140 MMOL/L (ref 132–146)
TOTAL PROTEIN: 6.8 G/DL (ref 6.4–8.3)
WBC # BLD: 3 E9/L (ref 4.5–11.5)

## 2022-08-15 PROCEDURE — 36415 COLL VENOUS BLD VENIPUNCTURE: CPT

## 2022-08-15 PROCEDURE — 86304 IMMUNOASSAY TUMOR CA 125: CPT

## 2022-08-15 PROCEDURE — 80053 COMPREHEN METABOLIC PANEL: CPT

## 2022-08-15 PROCEDURE — 85025 COMPLETE CBC W/AUTO DIFF WBC: CPT

## 2022-08-15 PROCEDURE — 83735 ASSAY OF MAGNESIUM: CPT

## 2022-08-15 PROCEDURE — 36591 DRAW BLOOD OFF VENOUS DEVICE: CPT

## 2022-08-15 PROCEDURE — 6360000002 HC RX W HCPCS: Performed by: OBSTETRICS & GYNECOLOGY

## 2022-08-15 PROCEDURE — 2580000003 HC RX 258: Performed by: OBSTETRICS & GYNECOLOGY

## 2022-08-15 RX ORDER — HEPARIN SODIUM (PORCINE) LOCK FLUSH IV SOLN 100 UNIT/ML 100 UNIT/ML
500 SOLUTION INTRAVENOUS PRN
Status: CANCELLED | OUTPATIENT
Start: 2022-08-15

## 2022-08-15 RX ORDER — SODIUM CHLORIDE 0.9 % (FLUSH) 0.9 %
10 SYRINGE (ML) INJECTION PRN
Status: CANCELLED | OUTPATIENT
Start: 2022-08-15

## 2022-08-15 RX ORDER — HEPARIN SODIUM (PORCINE) LOCK FLUSH IV SOLN 100 UNIT/ML 100 UNIT/ML
500 SOLUTION INTRAVENOUS PRN
Status: DISCONTINUED | OUTPATIENT
Start: 2022-08-15 | End: 2022-08-16 | Stop reason: HOSPADM

## 2022-08-15 RX ORDER — SODIUM CHLORIDE 0.9 % (FLUSH) 0.9 %
10 SYRINGE (ML) INJECTION PRN
Status: DISCONTINUED | OUTPATIENT
Start: 2022-08-15 | End: 2022-08-16 | Stop reason: HOSPADM

## 2022-08-15 RX ADMIN — SODIUM CHLORIDE, PRESERVATIVE FREE 10 ML: 5 INJECTION INTRAVENOUS at 13:40

## 2022-08-15 RX ADMIN — HEPARIN 500 UNITS: 100 SYRINGE at 13:42

## 2022-08-15 RX ADMIN — SODIUM CHLORIDE, PRESERVATIVE FREE 10 ML: 5 INJECTION INTRAVENOUS at 13:42

## 2022-08-15 ASSESSMENT — PAIN SCALES - GENERAL: PAINLEVEL_OUTOF10: 0

## 2022-08-19 ENCOUNTER — OFFICE VISIT (OUTPATIENT)
Dept: PRIMARY CARE CLINIC | Age: 82
End: 2022-08-19
Payer: MEDICARE

## 2022-08-19 VITALS
RESPIRATION RATE: 18 BRPM | WEIGHT: 175 LBS | BODY MASS INDEX: 34.36 KG/M2 | OXYGEN SATURATION: 99 % | DIASTOLIC BLOOD PRESSURE: 82 MMHG | SYSTOLIC BLOOD PRESSURE: 124 MMHG | HEART RATE: 77 BPM | HEIGHT: 60 IN | TEMPERATURE: 97.6 F

## 2022-08-19 VITALS
SYSTOLIC BLOOD PRESSURE: 124 MMHG | BODY MASS INDEX: 34.36 KG/M2 | HEART RATE: 77 BPM | DIASTOLIC BLOOD PRESSURE: 82 MMHG | HEIGHT: 60 IN | WEIGHT: 175 LBS | TEMPERATURE: 97.6 F | RESPIRATION RATE: 18 BRPM

## 2022-08-19 DIAGNOSIS — E66.01 SEVERE OBESITY (BMI 35.0-39.9) WITH COMORBIDITY (HCC): ICD-10-CM

## 2022-08-19 DIAGNOSIS — Z00.00 MEDICARE ANNUAL WELLNESS VISIT, SUBSEQUENT: Primary | ICD-10-CM

## 2022-08-19 DIAGNOSIS — C55 MALIGNANT NEOPLASM OF UTERUS, UNSPECIFIED SITE (HCC): Primary | ICD-10-CM

## 2022-08-19 DIAGNOSIS — N18.32 STAGE 3B CHRONIC KIDNEY DISEASE (HCC): ICD-10-CM

## 2022-08-19 PROBLEM — N17.9 AKI (ACUTE KIDNEY INJURY) (HCC): Status: RESOLVED | Noted: 2019-02-08 | Resolved: 2022-08-19

## 2022-08-19 PROCEDURE — G8417 CALC BMI ABV UP PARAM F/U: HCPCS | Performed by: FAMILY MEDICINE

## 2022-08-19 PROCEDURE — G0439 PPPS, SUBSEQ VISIT: HCPCS | Performed by: FAMILY MEDICINE

## 2022-08-19 PROCEDURE — 1090F PRES/ABSN URINE INCON ASSESS: CPT | Performed by: FAMILY MEDICINE

## 2022-08-19 PROCEDURE — G8399 PT W/DXA RESULTS DOCUMENT: HCPCS | Performed by: FAMILY MEDICINE

## 2022-08-19 PROCEDURE — 99212 OFFICE O/P EST SF 10 MIN: CPT | Performed by: FAMILY MEDICINE

## 2022-08-19 PROCEDURE — 1123F ACP DISCUSS/DSCN MKR DOCD: CPT | Performed by: FAMILY MEDICINE

## 2022-08-19 PROCEDURE — G8427 DOCREV CUR MEDS BY ELIG CLIN: HCPCS | Performed by: FAMILY MEDICINE

## 2022-08-19 PROCEDURE — 1036F TOBACCO NON-USER: CPT | Performed by: FAMILY MEDICINE

## 2022-08-19 ASSESSMENT — PATIENT HEALTH QUESTIONNAIRE - PHQ9
SUM OF ALL RESPONSES TO PHQ QUESTIONS 1-9: 0
2. FEELING DOWN, DEPRESSED OR HOPELESS: 0
1. LITTLE INTEREST OR PLEASURE IN DOING THINGS: 0
SUM OF ALL RESPONSES TO PHQ9 QUESTIONS 1 & 2: 0
SUM OF ALL RESPONSES TO PHQ QUESTIONS 1-9: 0

## 2022-08-19 ASSESSMENT — ENCOUNTER SYMPTOMS
SHORTNESS OF BREATH: 0
SORE THROAT: 0
CONSTIPATION: 0
NAUSEA: 0
COUGH: 0
VOMITING: 0
ABDOMINAL PAIN: 0
DIARRHEA: 0
BLOOD IN STOOL: 0
BACK PAIN: 0
PHOTOPHOBIA: 0

## 2022-08-19 ASSESSMENT — LIFESTYLE VARIABLES
HOW OFTEN DO YOU HAVE A DRINK CONTAINING ALCOHOL: NEVER
HOW MANY STANDARD DRINKS CONTAINING ALCOHOL DO YOU HAVE ON A TYPICAL DAY: PATIENT DOES NOT DRINK

## 2022-08-19 NOTE — PROGRESS NOTES
Khanh Pinon,    folic acid (FOLVITE) 1 MG tablet TAKE 1 TABLET BY MOUTH EVERY DAY  Khanh Pinon DO   Handicap Placard MISC by Does not apply route Patient 5 years  Khanh Pinon DO   pyridoxine (B-6) 100 MG tablet Take 100 mg by mouth daily  Historical Provider, MD   magnesium gluconate (MAGONATE) 500 MG tablet Take 500 mg by mouth 2 times daily as needed  Historical Provider, MD   dextrose 5 % SOLN 250 mL with DOXOrubicin liposomal 2 MG/ML INJ 30 mg/m2 Infuse intravenously once monthly  Historical Provider, MD   potassium chloride (KLOR-CON M) 10 MEQ extended release tablet Take 10 mEq by mouth 2 times daily  Historical Provider, MD   acetaminophen (TYLENOL) 325 MG tablet Take 2 tablets by mouth every 6 hours  Satya Jose MD   sennosides-docusate sodium (SENOKOT-S) 8.6-50 MG tablet Take 1 tablet by mouth 2 times daily  Satya Jose MD   Polyethyl Glycol-Propyl Glycol (SYSTANE OP) Apply to eye 2 times daily   Historical Provider, MD   Sod Fluoride-Potassium Nitrate (PREVIDENT 5000 SENSITIVE) 1.1-5 % PSTE 2 times daily  Historical Provider, MD   doxazosin (CARDURA) 2 MG tablet Take 2 mg by mouth nightly  Historical Provider, MD   lactobacillus (CULTURELLE) CAPS capsule Take 1 capsule by mouth daily  Rebel Stafford DO   vitamin D (CHOLECALCIFEROL) 1000 UNIT TABS tablet Take 1,000 Units by mouth daily   Historical Provider, MD   lidocaine-prilocaine (EMLA) 2.5-2.5 % cream SANGEETA TOPICALLY AA PRN  Historical Provider, MD   bumetanide (BUMEX) 1 MG tablet Take 1 mg by mouth three times a week mon wed fri  Historical Provider, MD   Biotin 5000 MCG CAPS Take 1 capsule by mouth daily   Historical Provider, MD   vitamin B-12 (CYANOCOBALAMIN) 1000 MCG tablet Take 1,000 mcg by mouth daily   Historical Provider, MD   fish oil-omega-3 fatty acids 1000 MG capsule Take 1 g by mouth daily STOP PREOP MED  Historical Provider, MD Randle (Including outside providers/suppliers regularly involved in providing care): Patient Care Team:  Clemente Arvizu DO as PCP - General (Family Medicine)  Clemente Arvizu DO as PCP - [facility] Empaneled Provider  Gisele Reese MD as Consulting Physician (Obstetrics & Gynecology)     Reviewed and updated this visit:  Tobacco  Allergies  Meds  Problems  Med Hx  Surg Hx  Soc Hx  Fam Hx

## 2022-08-19 NOTE — PROGRESS NOTES
Tashia Jane (:  1940) is a 80 y.o. female,Established patient, here for evaluation of the following chief complaint(s):  6 Month Follow-Up, Hypertension, and Otalgia         ASSESSMENT/PLAN:  1. Malignant neoplasm of uterus, unspecified site ([facility]  2. Severe obesity (BMI 35.0-39. 9) with comorbidity ([facility]  3. Stage 3b chronic kidney disease ([facility] [facility]  At this time patient is undergoing new round of carboplatin chemotherapy. Currently the patient she does have 2 spots in the pelvis and one spot near her stomach which are concerning. She does have follow-up imaging in the middle of next month with fourth round at the end of next month. Recent labs reviewed while patient was in office. No follow-ups on file. Subjective   SUBJECTIVE/OBJECTIVE:  HPI  As noted above patient does have recurrence of her previous cancer. Undergoing carboplatin chemotherapy at this time. Last round was on Wednesday of this week and she states that she is feeling a little under the weather. Denies any other concerns or complaints at this time. Last imaging from end of  at [facility]  Mesentery/peritoneum: Peritoneal carcinomatosis with reference implants   below. -2.8 x 2.3 cm midline ventral omental implant (6:52), previously 3.0 x   2.4 cm. This broadly abuts an adjacent small bowel loop, with possible   invasion but without obstruction. -6.7 x 5.3 cm left mid abdominal mass (6:60), grossly invading the   overlying left rectus abdominis muscle and closely abutting multiple   small bowel loops.   This previously measured 5.4 x 4.5 cm.   -2.1 x 1.9 cm right pelvic sidewall implant (6:84), previously 2.1 x 2.0   cm.   -1.9 x 1.8 cm right mid abdominal implant adjacent to the sigmoid colon   abutting multiple right lower quadrant small bowel loops (6:51),   previously 1.8 x 1.6 cm.   -Increased fluid and/or thickening along the left pelvic sidewall, for   example now measuring up to 1.4 cm in maximum thickness (6:82),   previously up to 0.6 cm (6:85 from the prior CT). Similar, there is   subjectively slightly increased fluid and/or thickening along the right   pelvis, just medial and posterior to the right pelvic sidewall implant   (6:84). Review of Systems   Constitutional:  Negative for chills and fever. HENT:  Negative for congestion, ear pain, hearing loss, nosebleeds and sore throat. Eyes:  Negative for photophobia. Respiratory:  Negative for cough and shortness of breath. Cardiovascular:  Negative for chest pain, palpitations and leg swelling. Gastrointestinal:  Negative for abdominal pain, blood in stool, constipation, diarrhea, nausea and vomiting. Endocrine: Negative for polydipsia. Genitourinary:  Negative for dysuria, frequency, hematuria, pelvic pain and urgency. Musculoskeletal:  Positive for arthralgias and gait problem. Negative for back pain and myalgias. Skin: Negative. Neurological:  Negative for dizziness, tremors, weakness and headaches. Hematological:  Does not bruise/bleed easily. Psychiatric/Behavioral:  Positive for sleep disturbance. Negative for hallucinations, self-injury and suicidal ideas. The patient is nervous/anxious. All other systems reviewed and are negative.        Current Outpatient Medications:     sodium chloride 0.9 % SOLN 150 mL with CARBOplatin 450 MG/45ML SOLN 240.5 mg, Infuse 240.5 mg intravenously Every 3 weeks, Disp: , Rfl:     ezetimibe (ZETIA) 10 MG tablet, TAKE 1 TABLET BY MOUTH  DAILY WITH SUPPER, Disp: 90 tablet, Rfl: 3    folic acid (FOLVITE) 1 MG tablet, TAKE 1 TABLET BY MOUTH EVERY DAY, Disp: 90 tablet, Rfl: 5    Handicap Placard MISC, by Does not apply route Patient 5 years, Disp: 1 each, Rfl: 0    pyridoxine (B-6) 100 MG tablet, Take 100 mg by mouth daily, Disp: , Rfl:     magnesium gluconate (MAGONATE) 500 MG tablet, Take 500 mg by mouth 2 times daily as needed, Disp: , Rfl:     dextrose 5 % SOLN 250 mL with DOXOrubicin liposomal 2 MG/ML INJ 30 mg/m2, Infuse intravenously once monthly, Disp: , Rfl:     potassium chloride (KLOR-CON M) 10 MEQ extended release tablet, Take 10 mEq by mouth 2 times daily, Disp: , Rfl:     acetaminophen (TYLENOL) 325 MG tablet, Take 2 tablets by mouth every 6 hours, Disp: 120 tablet, Rfl: 3    sennosides-docusate sodium (SENOKOT-S) 8.6-50 MG tablet, Take 1 tablet by mouth 2 times daily, Disp: 30 tablet, Rfl: 0    Polyethyl Glycol-Propyl Glycol (SYSTANE OP), Apply to eye 2 times daily , Disp: , Rfl:     Sod Fluoride-Potassium Nitrate (PREVIDENT 5000 SENSITIVE) 1.1-5 % PSTE, 2 times daily, Disp: , Rfl:     doxazosin (CARDURA) 2 MG tablet, Take 2 mg by mouth nightly, Disp: , Rfl:     lactobacillus (CULTURELLE) CAPS capsule, Take 1 capsule by mouth daily, Disp: 30 capsule, Rfl: 0    vitamin D (CHOLECALCIFEROL) 1000 UNIT TABS tablet, Take 1,000 Units by mouth daily , Disp: , Rfl:     lidocaine-prilocaine (EMLA) 2.5-2.5 % cream, SANGEETA TOPICALLY AA PRN, Disp: , Rfl: 3    bumetanide (BUMEX) 1 MG tablet, Take 1 mg by mouth three times a week mon wed fri, Disp: , Rfl:     Biotin 5000 MCG CAPS, Take 1 capsule by mouth daily , Disp: , Rfl:     vitamin B-12 (CYANOCOBALAMIN) 1000 MCG tablet, Take 1,000 mcg by mouth daily , Disp: , Rfl:     fish oil-omega-3 fatty acids 1000 MG capsule, Take 1 g by mouth daily STOP PREOP MED, Disp: , Rfl:    Patient Active Problem List   Diagnosis    Malignant neoplasm of uterus ([address]    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 9) with comorbidity ([facility]     Past Medical History:   Diagnosis Date    Acute kidney injury ([facility] [address]     hx of    Acute renal failure syndrome ([address] 7/9/2020    Arthritis     Chronic kidney disease, stage 3 (moderate) 11/29/2019    dt Keytruda    Dry eye     GERD (gastroesophageal reflux disease)     [place] (hard of hearing)     aides x 2    Hypertension     Hypomagnesemia     hx of / takes Mag supplement    Malignant neoplasm of uterus ([address] 11/21/2014    Mixed hyperlipidemia 5/24/2019    SOB (shortness of breath) on exertion     feels is dt after effect of Keytruda    Tooth sensitivity     Uterine cancer ([facility] [address] 2012    metatstaic, treated with surgery, chemo x2 , radiation x 2, Ketroda therapy x 2,andAvastin therapy    Vitamin D deficiency      Past Surgical History:   Procedure Laterality Date    APPENDECTOMY  1950    BREAST SURGERY  6681,2828, 2002    benign  lumps    DILATION AND CURETTAGE OF UTERUS  3/20/12    HYSTERECTOMY (CERVIX STATUS UNKNOWN)  4/12/12    INSERTION / REMOVAL / REPLACEMENT VENOUS ACCESS CATHETER N/A 2/22/2019    MEDI PORT INSERTION performed by Danitza Lord MD at [address] [place] [address]  2012    pelvic nodes    PORT SURGERY Left 1/14/2021    REVISION OF MEDIPORT - LEFT performed by Cheryl Burris MD at [facility] 52  child    TOTAL KNEE ARTHROPLASTY Right 3/4/2020    RIGHT KNEE TOTAL ARTHROPLASTY ROBOTIC ANGIE performed by Yung Swift MD at [facility] Left 4/28/2021    LEFT TOTAL KNEE ROBOTIC ASSISTED ARTHROPLASTY performed by Yung Swift MD at [facility] OR     Social History     Socioeconomic History    Marital status:      Spouse name: Not on file    Number of children: Not on file    Years of education: Not on file    Highest education level: Not on file   Occupational History    Not on file   Tobacco Use    Smoking status: Never    Smokeless tobacco: Never   Vaping Use    Vaping Use: Never used   Substance and Sexual Activity Alcohol use: No    Drug use: Never    Sexual activity: Not on file   Other Topics Concern    Not on file   Social History Narrative    Not on file     Social Determinants of Health     Financial Resource Strain: Not on file   Food Insecurity: Not on file   Transportation Needs: Not on file   Physical Activity: Insufficiently Active    Days of Exercise per Week: 3 days    Minutes of Exercise per Session: 20 min   Stress: Not on file   Social Connections: Not on file   Intimate Partner Violence: Not on file   Housing Stability: Not on file     Family History   Family history unknown: Yes      There are no preventive care reminders to display for this patient. There are no preventive care reminders to display for this patient. There are no preventive care reminders to display for this patient. Health Maintenance Due   Topic    Pneumococcal 65+ years Vaccine (3 - PCV)      Health Maintenance   Topic Date Due    Pneumococcal 65+ years Vaccine (3 - PCV) 11/29/2020    COVID-19 Vaccine (5 - Booster for Pfizer series) 08/05/2022    Depression Screen  08/06/2022    Annual Wellness Visit (AWV)  08/07/2022    Flu vaccine (1) 09/01/2022    DTaP/Tdap/Td vaccine (2 - Td or Tdap) 07/17/2027    Shingles vaccine  Completed    Hepatitis A vaccine  Aged Out    Hepatitis B vaccine  Aged Out    Hib vaccine  Aged Out    Meningococcal (ACWY) vaccine  Aged Out      There are no preventive care reminders to display for this patient. There are no preventive care reminders to display for this patient. /82   Pulse 77   Temp 97.6 °F (36.4 °C)   Resp 18   Ht 5' (1.524 m)   Wt 175 lb (79.4 kg)   LMP  (LMP Unknown)   BMI 34.18 kg/m²     Objective   Physical Exam  Vitals reviewed. HENT:      Head: Normocephalic and atraumatic. Right Ear: External ear normal. There is no impacted cerumen. Left Ear: External ear normal. There is no impacted cerumen.       Ears:      Comments: Impacted cerumen removed bilaterally by nursing staff via irrigation and curettage. Removed fully. Mild bulging of the tympanic membranes bilaterally. Patient tolerated the procedure well without issue. Eyes:      General: No scleral icterus. Conjunctiva/sclera: Conjunctivae normal.      Pupils: Pupils are equal, round, and reactive to light. Neck:      Thyroid: No thyromegaly. Cardiovascular:      Rate and Rhythm: Normal rate and regular rhythm. Heart sounds: Normal heart sounds. No murmur heard. Pulmonary:      Effort: Pulmonary effort is normal.      Breath sounds: Normal breath sounds. No rales. Abdominal:      General: Bowel sounds are normal. There is no distension. Palpations: Abdomen is soft. Tenderness: There is no abdominal tenderness. Musculoskeletal:         General: Swelling and tenderness present. Cervical back: Neck supple. Comments: Without a walker today. Still has compression socks on bilateral lower extremities. Movement much improved since knee replacement   Lymphadenopathy:      Cervical: No cervical adenopathy. Skin:     General: Skin is warm and dry. Findings: No erythema or rash. Neurological:      Mental Status: She is alert and oriented to person, place, and time. Cranial Nerves: No cranial nerve deficit. Psychiatric:         Behavior: Behavior is cooperative. Judgment: Judgment normal.                An electronic signature was used to authenticate this note.     --Arsalan Pinon, DO

## 2022-08-19 NOTE — PATIENT INSTRUCTIONS
Personalized Preventive Plan for Kathleen Capone - 8/19/2022  Medicare offers a range of preventive health benefits. Some of the tests and screenings are paid in full while other may be subject to a deductible, co-insurance, and/or copay. Some of these benefits include a comprehensive review of your medical history including lifestyle, illnesses that may run in your family, and various assessments and screenings as appropriate. After reviewing your medical record and screening and assessments performed today your provider may have ordered immunizations, labs, imaging, and/or referrals for you. A list of these orders (if applicable) as well as your Preventive Care list are included within your After Visit Summary for your review. Other Preventive Recommendations:    A preventive eye exam performed by an eye specialist is recommended every 1-2 years to screen for glaucoma; cataracts, macular degeneration, and other eye disorders. A preventive dental visit is recommended every 6 months. Try to get at least 150 minutes of exercise per week or 10,000 steps per day on a pedometer . Order or download the FREE \"Exercise & Physical Activity: Your Everyday Guide\" from [employer]. Call 9-532.796.7864 or search [employer] online. You need 9700-3425 mg of calcium and 8588-6366 IU of vitamin D per day. It is possible to meet your calcium requirement with diet alone, but a vitamin D supplement is usually necessary to meet this goal.  When exposed to the sun, use a sunscreen that protects against both UVA and UVB radiation with an SPF of 30 or greater. Reapply every 2 to 3 hours or after sweating, drying off with a towel, or swimming. Always wear a seat belt when traveling in a car. Always wear a helmet when riding a bicycle or motorcycle.

## 2022-09-06 ENCOUNTER — HOSPITAL ENCOUNTER (OUTPATIENT)
Dept: INFUSION THERAPY | Age: 82
Setting detail: INFUSION SERIES
Discharge: HOME | End: 2022-09-06
Payer: MEDICARE

## 2022-09-06 VITALS
SYSTOLIC BLOOD PRESSURE: 157 MMHG | BODY MASS INDEX: 34.36 KG/M2 | RESPIRATION RATE: 18 BRPM | WEIGHT: 175 LBS | HEART RATE: 88 BPM | DIASTOLIC BLOOD PRESSURE: 56 MMHG | HEIGHT: 60 IN | OXYGEN SATURATION: 99 % | TEMPERATURE: 97.8 F

## 2022-09-06 DIAGNOSIS — C55 MALIGNANT NEOPLASM OF UTERUS, UNSPECIFIED SITE (HCC): Primary | ICD-10-CM

## 2022-09-06 LAB
ACANTHOCYTES: ABNORMAL
ALBUMIN SERPL-MCNC: 3.8 G/DL (ref 3.5–5.2)
ALP BLD-CCNC: 96 U/L (ref 35–104)
ALT SERPL-CCNC: 10 U/L (ref 0–32)
ANION GAP SERPL CALCULATED.3IONS-SCNC: 10 MMOL/L (ref 7–16)
ANISOCYTOSIS: ABNORMAL
AST SERPL-CCNC: 23 U/L (ref 0–31)
BASOPHILS ABSOLUTE: 0.02 E9/L (ref 0–0.2)
BASOPHILS RELATIVE PERCENT: 0.9 % (ref 0–2)
BILIRUB SERPL-MCNC: 0.3 MG/DL (ref 0–1.2)
BUN BLDV-MCNC: 28 MG/DL (ref 6–23)
CA 125: 29.2 U/ML (ref 0–35)
CALCIUM SERPL-MCNC: 9.2 MG/DL (ref 8.6–10.2)
CHLORIDE BLD-SCNC: 103 MMOL/L (ref 98–107)
CO2: 25 MMOL/L (ref 22–29)
CREAT SERPL-MCNC: 1.6 MG/DL (ref 0.5–1)
EOSINOPHILS ABSOLUTE: 0.02 E9/L (ref 0.05–0.5)
EOSINOPHILS RELATIVE PERCENT: 0.9 % (ref 0–6)
GFR AFRICAN AMERICAN: 37
GFR NON-AFRICAN AMERICAN: 31 ML/MIN/1.73
GLUCOSE BLD-MCNC: 125 MG/DL (ref 74–99)
HCT VFR BLD CALC: 25.8 % (ref 34–48)
HEMOGLOBIN: 8.2 G/DL (ref 11.5–15.5)
IMMATURE GRANULOCYTES #: 0.01 E9/L
IMMATURE GRANULOCYTES %: 0.5 % (ref 0–5)
LYMPHOCYTES ABSOLUTE: 0.75 E9/L (ref 1.5–4)
LYMPHOCYTES RELATIVE PERCENT: 34.1 % (ref 20–42)
MAGNESIUM: 2 MG/DL (ref 1.6–2.6)
MCH RBC QN AUTO: 32.5 PG (ref 26–35)
MCHC RBC AUTO-ENTMCNC: 31.8 % (ref 32–34.5)
MCV RBC AUTO: 102.4 FL (ref 80–99.9)
MONOCYTES ABSOLUTE: 0.37 E9/L (ref 0.1–0.95)
MONOCYTES RELATIVE PERCENT: 16.8 % (ref 2–12)
NEUTROPHILS ABSOLUTE: 1.03 E9/L (ref 1.8–7.3)
NEUTROPHILS RELATIVE PERCENT: 46.8 % (ref 43–80)
OVALOCYTES: ABNORMAL
PDW BLD-RTO: 21.9 FL (ref 11.5–15)
PLATELET # BLD: 89 E9/L (ref 130–450)
PLATELET CONFIRMATION: NORMAL
PMV BLD AUTO: 10.1 FL (ref 7–12)
POIKILOCYTES: ABNORMAL
POTASSIUM SERPL-SCNC: 4.1 MMOL/L (ref 3.5–5)
RBC # BLD: 2.52 E12/L (ref 3.5–5.5)
SODIUM BLD-SCNC: 138 MMOL/L (ref 132–146)
TOTAL PROTEIN: 6.7 G/DL (ref 6.4–8.3)
WBC # BLD: 2.2 E9/L (ref 4.5–11.5)

## 2022-09-06 PROCEDURE — 36415 COLL VENOUS BLD VENIPUNCTURE: CPT

## 2022-09-06 PROCEDURE — 2580000003 HC RX 258: Performed by: OBSTETRICS & GYNECOLOGY

## 2022-09-06 PROCEDURE — 85025 COMPLETE CBC W/AUTO DIFF WBC: CPT

## 2022-09-06 PROCEDURE — 36591 DRAW BLOOD OFF VENOUS DEVICE: CPT

## 2022-09-06 PROCEDURE — 6360000002 HC RX W HCPCS: Performed by: OBSTETRICS & GYNECOLOGY

## 2022-09-06 PROCEDURE — 83735 ASSAY OF MAGNESIUM: CPT

## 2022-09-06 PROCEDURE — 80053 COMPREHEN METABOLIC PANEL: CPT

## 2022-09-06 PROCEDURE — 86304 IMMUNOASSAY TUMOR CA 125: CPT

## 2022-09-06 RX ORDER — HEPARIN SODIUM (PORCINE) LOCK FLUSH IV SOLN 100 UNIT/ML 100 UNIT/ML
500 SOLUTION INTRAVENOUS PRN
Status: CANCELLED | OUTPATIENT
Start: 2022-09-06

## 2022-09-06 RX ORDER — HEPARIN SODIUM (PORCINE) LOCK FLUSH IV SOLN 100 UNIT/ML 100 UNIT/ML
500 SOLUTION INTRAVENOUS PRN
Status: DISCONTINUED | OUTPATIENT
Start: 2022-09-06 | End: 2022-09-07 | Stop reason: HOSPADM

## 2022-09-06 RX ORDER — SODIUM CHLORIDE 0.9 % (FLUSH) 0.9 %
10 SYRINGE (ML) INJECTION PRN
Status: DISCONTINUED | OUTPATIENT
Start: 2022-09-06 | End: 2022-09-07 | Stop reason: HOSPADM

## 2022-09-06 RX ORDER — SODIUM CHLORIDE 0.9 % (FLUSH) 0.9 %
10 SYRINGE (ML) INJECTION PRN
Status: CANCELLED | OUTPATIENT
Start: 2022-09-06

## 2022-09-06 RX ADMIN — Medication 500 UNITS: at 13:42

## 2022-09-06 RX ADMIN — SODIUM CHLORIDE, PRESERVATIVE FREE 10 ML: 5 INJECTION INTRAVENOUS at 13:41

## 2022-09-06 RX ADMIN — SODIUM CHLORIDE, PRESERVATIVE FREE 10 ML: 5 INJECTION INTRAVENOUS at 13:39

## 2022-09-12 ENCOUNTER — HOSPITAL ENCOUNTER (OUTPATIENT)
Dept: INFUSION THERAPY | Age: 82
Setting detail: INFUSION SERIES
Discharge: HOME | End: 2022-09-12
Payer: MEDICARE

## 2022-09-12 VITALS
DIASTOLIC BLOOD PRESSURE: 96 MMHG | HEART RATE: 88 BPM | OXYGEN SATURATION: 96 % | TEMPERATURE: 97.6 F | RESPIRATION RATE: 18 BRPM | SYSTOLIC BLOOD PRESSURE: 143 MMHG

## 2022-09-12 DIAGNOSIS — C55 MALIGNANT NEOPLASM OF UTERUS, UNSPECIFIED SITE (HCC): Primary | ICD-10-CM

## 2022-09-12 LAB
ANISOCYTOSIS: ABNORMAL
BASOPHILS ABSOLUTE: 0.01 E9/L (ref 0–0.2)
BASOPHILS RELATIVE PERCENT: 0.4 % (ref 0–2)
EOSINOPHILS ABSOLUTE: 0.03 E9/L (ref 0.05–0.5)
EOSINOPHILS RELATIVE PERCENT: 1.1 % (ref 0–6)
HCT VFR BLD CALC: 26.1 % (ref 34–48)
HEMOGLOBIN: 8.4 G/DL (ref 11.5–15.5)
IMMATURE GRANULOCYTES #: 0.01 E9/L
IMMATURE GRANULOCYTES %: 0.4 % (ref 0–5)
LYMPHOCYTES ABSOLUTE: 0.79 E9/L (ref 1.5–4)
LYMPHOCYTES RELATIVE PERCENT: 28.8 % (ref 20–42)
MCH RBC QN AUTO: 33.5 PG (ref 26–35)
MCHC RBC AUTO-ENTMCNC: 32.2 % (ref 32–34.5)
MCV RBC AUTO: 104 FL (ref 80–99.9)
MONOCYTES ABSOLUTE: 0.35 E9/L (ref 0.1–0.95)
MONOCYTES RELATIVE PERCENT: 12.8 % (ref 2–12)
NEUTROPHILS ABSOLUTE: 1.55 E9/L (ref 1.8–7.3)
NEUTROPHILS RELATIVE PERCENT: 56.5 % (ref 43–80)
PDW BLD-RTO: 22 FL (ref 11.5–15)
PLATELET # BLD: 85 E9/L (ref 130–450)
PLATELET CONFIRMATION: NORMAL
PMV BLD AUTO: 10.3 FL (ref 7–12)
RBC # BLD: 2.51 E12/L (ref 3.5–5.5)
WBC # BLD: 2.7 E9/L (ref 4.5–11.5)

## 2022-09-12 PROCEDURE — 2580000003 HC RX 258: Performed by: OBSTETRICS & GYNECOLOGY

## 2022-09-12 PROCEDURE — 85025 COMPLETE CBC W/AUTO DIFF WBC: CPT

## 2022-09-12 PROCEDURE — 36591 DRAW BLOOD OFF VENOUS DEVICE: CPT

## 2022-09-12 PROCEDURE — 6360000002 HC RX W HCPCS: Performed by: OBSTETRICS & GYNECOLOGY

## 2022-09-12 PROCEDURE — 36415 COLL VENOUS BLD VENIPUNCTURE: CPT

## 2022-09-12 RX ORDER — HEPARIN SODIUM (PORCINE) LOCK FLUSH IV SOLN 100 UNIT/ML 100 UNIT/ML
500 SOLUTION INTRAVENOUS PRN
Status: CANCELLED | OUTPATIENT
Start: 2022-09-12

## 2022-09-12 RX ORDER — SODIUM CHLORIDE 0.9 % (FLUSH) 0.9 %
10 SYRINGE (ML) INJECTION PRN
Status: CANCELLED | OUTPATIENT
Start: 2022-09-12

## 2022-09-12 RX ORDER — HEPARIN SODIUM (PORCINE) LOCK FLUSH IV SOLN 100 UNIT/ML 100 UNIT/ML
500 SOLUTION INTRAVENOUS PRN
Status: DISCONTINUED | OUTPATIENT
Start: 2022-09-12 | End: 2022-09-13 | Stop reason: HOSPADM

## 2022-09-12 RX ORDER — SODIUM CHLORIDE 0.9 % (FLUSH) 0.9 %
10 SYRINGE (ML) INJECTION PRN
Status: DISCONTINUED | OUTPATIENT
Start: 2022-09-12 | End: 2022-09-13 | Stop reason: HOSPADM

## 2022-09-12 RX ADMIN — Medication 500 UNITS: at 13:52

## 2022-09-12 RX ADMIN — Medication 10 ML: at 13:51

## 2022-09-12 RX ADMIN — Medication 10 ML: at 13:49

## 2022-09-12 ASSESSMENT — PAIN SCALES - WONG BAKER: WONGBAKER_NUMERICALRESPONSE: 0

## 2022-09-12 ASSESSMENT — PAIN DESCRIPTION - PAIN TYPE: TYPE: ACUTE PAIN

## 2022-09-12 ASSESSMENT — PAIN DESCRIPTION - FREQUENCY: FREQUENCY: INTERMITTENT

## 2022-09-15 ENCOUNTER — HOSPITAL ENCOUNTER (OUTPATIENT)
Dept: INFUSION THERAPY | Age: 82
Setting detail: INFUSION SERIES
Discharge: HOME | End: 2022-09-15
Payer: MEDICARE

## 2022-09-15 VITALS
SYSTOLIC BLOOD PRESSURE: 141 MMHG | DIASTOLIC BLOOD PRESSURE: 63 MMHG | TEMPERATURE: 97 F | RESPIRATION RATE: 18 BRPM | HEART RATE: 82 BPM

## 2022-09-15 DIAGNOSIS — C55 MALIGNANT NEOPLASM OF UTERUS, UNSPECIFIED SITE (HCC): Primary | ICD-10-CM

## 2022-09-15 LAB
ANISOCYTOSIS: ABNORMAL
ATYPICAL LYMPHOCYTE RELATIVE PERCENT: 3 % (ref 0–4)
BASOPHILS ABSOLUTE: 0.02 E9/L (ref 0–0.2)
BASOPHILS RELATIVE PERCENT: 1 % (ref 0–2)
EOSINOPHILS ABSOLUTE: 0 E9/L (ref 0.05–0.5)
EOSINOPHILS RELATIVE PERCENT: 0 % (ref 0–6)
HCT VFR BLD CALC: 26.4 % (ref 34–48)
HEMOGLOBIN: 8.3 G/DL (ref 11.5–15.5)
LYMPHOCYTES ABSOLUTE: 0.53 E9/L (ref 1.5–4)
LYMPHOCYTES RELATIVE PERCENT: 19 % (ref 20–42)
MCH RBC QN AUTO: 32.8 PG (ref 26–35)
MCHC RBC AUTO-ENTMCNC: 31.4 % (ref 32–34.5)
MCV RBC AUTO: 104.3 FL (ref 80–99.9)
MONOCYTES ABSOLUTE: 0.24 E9/L (ref 0.1–0.95)
MONOCYTES RELATIVE PERCENT: 10 % (ref 2–12)
NEUTROPHILS ABSOLUTE: 1.61 E9/L (ref 1.8–7.3)
NEUTROPHILS RELATIVE PERCENT: 67 % (ref 43–80)
OVALOCYTES: ABNORMAL
PDW BLD-RTO: 21.9 FL (ref 11.5–15)
PLATELET # BLD: 95 E9/L (ref 130–450)
PLATELET CONFIRMATION: NORMAL
PMV BLD AUTO: 10.3 FL (ref 7–12)
POIKILOCYTES: ABNORMAL
RBC # BLD: 2.53 E12/L (ref 3.5–5.5)
SCHISTOCYTES: ABNORMAL
WBC # BLD: 2.4 E9/L (ref 4.5–11.5)

## 2022-09-15 PROCEDURE — 96523 IRRIG DRUG DELIVERY DEVICE: CPT

## 2022-09-15 PROCEDURE — 85025 COMPLETE CBC W/AUTO DIFF WBC: CPT

## 2022-09-15 PROCEDURE — 2580000003 HC RX 258: Performed by: OBSTETRICS & GYNECOLOGY

## 2022-09-15 PROCEDURE — 6360000002 HC RX W HCPCS: Performed by: OBSTETRICS & GYNECOLOGY

## 2022-09-15 PROCEDURE — 36591 DRAW BLOOD OFF VENOUS DEVICE: CPT

## 2022-09-15 RX ORDER — HEPARIN SODIUM (PORCINE) LOCK FLUSH IV SOLN 100 UNIT/ML 100 UNIT/ML
500 SOLUTION INTRAVENOUS PRN
Status: CANCELLED | OUTPATIENT
Start: 2022-09-15

## 2022-09-15 RX ORDER — SODIUM CHLORIDE 9 MG/ML
10 INJECTION INTRAVENOUS PRN
Status: DISCONTINUED | OUTPATIENT
Start: 2022-09-15 | End: 2022-09-16 | Stop reason: HOSPADM

## 2022-09-15 RX ORDER — HEPARIN SODIUM (PORCINE) LOCK FLUSH IV SOLN 100 UNIT/ML 100 UNIT/ML
500 SOLUTION INTRAVENOUS PRN
Status: DISCONTINUED | OUTPATIENT
Start: 2022-09-15 | End: 2022-09-16 | Stop reason: HOSPADM

## 2022-09-15 RX ORDER — SODIUM CHLORIDE 0.9 % (FLUSH) 0.9 %
10 SYRINGE (ML) INJECTION PRN
Status: CANCELLED | OUTPATIENT
Start: 2022-09-15

## 2022-09-15 RX ADMIN — SODIUM CHLORIDE, PRESERVATIVE FREE 10 ML: 5 INJECTION INTRAVENOUS at 11:31

## 2022-09-15 RX ADMIN — SODIUM CHLORIDE, PRESERVATIVE FREE 10 ML: 5 INJECTION INTRAVENOUS at 11:32

## 2022-09-15 RX ADMIN — Medication 500 UNITS: at 11:31

## 2022-09-15 ASSESSMENT — PAIN DESCRIPTION - DESCRIPTORS: DESCRIPTORS: ACHING

## 2022-09-15 ASSESSMENT — PAIN SCALES - WONG BAKER: WONGBAKER_NUMERICALRESPONSE: 0

## 2022-09-15 ASSESSMENT — PAIN DESCRIPTION - LOCATION: LOCATION: KNEE

## 2022-09-15 ASSESSMENT — PAIN DESCRIPTION - ORIENTATION: ORIENTATION: RIGHT

## 2022-09-21 ENCOUNTER — HOSPITAL ENCOUNTER (OUTPATIENT)
Dept: INFUSION THERAPY | Age: 82
Setting detail: INFUSION SERIES
Discharge: HOME | End: 2022-09-21
Payer: MEDICARE

## 2022-09-21 VITALS
HEIGHT: 60 IN | WEIGHT: 175 LBS | RESPIRATION RATE: 18 BRPM | DIASTOLIC BLOOD PRESSURE: 65 MMHG | SYSTOLIC BLOOD PRESSURE: 143 MMHG | HEART RATE: 72 BPM | TEMPERATURE: 97.5 F | OXYGEN SATURATION: 97 % | BODY MASS INDEX: 34.36 KG/M2

## 2022-09-21 DIAGNOSIS — C55 MALIGNANT NEOPLASM OF UTERUS, UNSPECIFIED SITE (HCC): Primary | ICD-10-CM

## 2022-09-21 LAB
ANISOCYTOSIS: ABNORMAL
BASOPHILS ABSOLUTE: 0.01 E9/L (ref 0–0.2)
BASOPHILS RELATIVE PERCENT: 0.4 % (ref 0–2)
EOSINOPHILS ABSOLUTE: 0.05 E9/L (ref 0.05–0.5)
EOSINOPHILS RELATIVE PERCENT: 1.9 % (ref 0–6)
HCT VFR BLD CALC: 25.5 % (ref 34–48)
HEMOGLOBIN: 8.2 G/DL (ref 11.5–15.5)
IMMATURE GRANULOCYTES #: 0.02 E9/L
IMMATURE GRANULOCYTES %: 0.8 % (ref 0–5)
LYMPHOCYTES ABSOLUTE: 0.89 E9/L (ref 1.5–4)
LYMPHOCYTES RELATIVE PERCENT: 34.6 % (ref 20–42)
MCH RBC QN AUTO: 33.2 PG (ref 26–35)
MCHC RBC AUTO-ENTMCNC: 32.2 % (ref 32–34.5)
MCV RBC AUTO: 103.2 FL (ref 80–99.9)
MONOCYTES ABSOLUTE: 0.35 E9/L (ref 0.1–0.95)
MONOCYTES RELATIVE PERCENT: 13.6 % (ref 2–12)
NEUTROPHILS ABSOLUTE: 1.25 E9/L (ref 1.8–7.3)
NEUTROPHILS RELATIVE PERCENT: 48.7 % (ref 43–80)
OVALOCYTES: ABNORMAL
PDW BLD-RTO: 21.2 FL (ref 11.5–15)
PLATELET # BLD: 142 E9/L (ref 130–450)
PMV BLD AUTO: 10.1 FL (ref 7–12)
POIKILOCYTES: ABNORMAL
POLYCHROMASIA: ABNORMAL
RBC # BLD: 2.47 E12/L (ref 3.5–5.5)
SCHISTOCYTES: ABNORMAL
WBC # BLD: 2.6 E9/L (ref 4.5–11.5)

## 2022-09-21 PROCEDURE — 2580000003 HC RX 258: Performed by: OBSTETRICS & GYNECOLOGY

## 2022-09-21 PROCEDURE — 36592 COLLECT BLOOD FROM PICC: CPT

## 2022-09-21 PROCEDURE — 36415 COLL VENOUS BLD VENIPUNCTURE: CPT

## 2022-09-21 PROCEDURE — 85025 COMPLETE CBC W/AUTO DIFF WBC: CPT

## 2022-09-21 PROCEDURE — 36591 DRAW BLOOD OFF VENOUS DEVICE: CPT

## 2022-09-21 PROCEDURE — 6360000002 HC RX W HCPCS: Performed by: OBSTETRICS & GYNECOLOGY

## 2022-09-21 RX ORDER — HEPARIN SODIUM (PORCINE) LOCK FLUSH IV SOLN 100 UNIT/ML 100 UNIT/ML
500 SOLUTION INTRAVENOUS PRN
Status: CANCELLED | OUTPATIENT
Start: 2022-09-21

## 2022-09-21 RX ORDER — SODIUM CHLORIDE 0.9 % (FLUSH) 0.9 %
10 SYRINGE (ML) INJECTION PRN
Status: CANCELLED | OUTPATIENT
Start: 2022-09-21

## 2022-09-21 RX ORDER — SODIUM CHLORIDE 9 MG/ML
10 INJECTION INTRAVENOUS PRN
Status: DISCONTINUED | OUTPATIENT
Start: 2022-09-21 | End: 2022-09-22 | Stop reason: HOSPADM

## 2022-09-21 RX ORDER — HEPARIN SODIUM (PORCINE) LOCK FLUSH IV SOLN 100 UNIT/ML 100 UNIT/ML
500 SOLUTION INTRAVENOUS PRN
Status: DISCONTINUED | OUTPATIENT
Start: 2022-09-21 | End: 2022-09-22 | Stop reason: HOSPADM

## 2022-09-21 RX ADMIN — SODIUM CHLORIDE 20 ML: 9 INJECTION, SOLUTION INTRAMUSCULAR; INTRAVENOUS; SUBCUTANEOUS at 13:44

## 2022-09-21 RX ADMIN — Medication 500 UNITS: at 13:44

## 2022-09-21 RX ADMIN — SODIUM CHLORIDE 10 ML: 9 INJECTION, SOLUTION INTRAMUSCULAR; INTRAVENOUS; SUBCUTANEOUS at 13:43

## 2022-09-28 ENCOUNTER — HOSPITAL ENCOUNTER (OUTPATIENT)
Dept: INFUSION THERAPY | Age: 82
Setting detail: INFUSION SERIES
Discharge: HOME | End: 2022-09-28
Payer: MEDICARE

## 2022-09-28 VITALS
BODY MASS INDEX: 34.36 KG/M2 | RESPIRATION RATE: 18 BRPM | SYSTOLIC BLOOD PRESSURE: 151 MMHG | HEART RATE: 84 BPM | OXYGEN SATURATION: 99 % | WEIGHT: 175 LBS | TEMPERATURE: 97.6 F | DIASTOLIC BLOOD PRESSURE: 67 MMHG | HEIGHT: 60 IN

## 2022-09-28 DIAGNOSIS — C55 MALIGNANT NEOPLASM OF UTERUS, UNSPECIFIED SITE (HCC): Primary | ICD-10-CM

## 2022-09-28 LAB
ANISOCYTOSIS: ABNORMAL
BASOPHILS ABSOLUTE: 0.02 E9/L (ref 0–0.2)
BASOPHILS RELATIVE PERCENT: 0.6 % (ref 0–2)
EOSINOPHILS ABSOLUTE: 0.03 E9/L (ref 0.05–0.5)
EOSINOPHILS RELATIVE PERCENT: 0.9 % (ref 0–6)
HCT VFR BLD CALC: 27.2 % (ref 34–48)
HEMOGLOBIN: 8.7 G/DL (ref 11.5–15.5)
IMMATURE GRANULOCYTES #: 0.03 E9/L
IMMATURE GRANULOCYTES %: 0.9 % (ref 0–5)
LYMPHOCYTES ABSOLUTE: 0.97 E9/L (ref 1.5–4)
LYMPHOCYTES RELATIVE PERCENT: 30.1 % (ref 20–42)
MCH RBC QN AUTO: 33.6 PG (ref 26–35)
MCHC RBC AUTO-ENTMCNC: 32 % (ref 32–34.5)
MCV RBC AUTO: 105 FL (ref 80–99.9)
MONOCYTES ABSOLUTE: 0.46 E9/L (ref 0.1–0.95)
MONOCYTES RELATIVE PERCENT: 14.3 % (ref 2–12)
NEUTROPHILS ABSOLUTE: 1.71 E9/L (ref 1.8–7.3)
NEUTROPHILS RELATIVE PERCENT: 53.2 % (ref 43–80)
OVALOCYTES: ABNORMAL
PDW BLD-RTO: 21 FL (ref 11.5–15)
PLATELET # BLD: 142 E9/L (ref 130–450)
PMV BLD AUTO: 9.8 FL (ref 7–12)
POLYCHROMASIA: ABNORMAL
RBC # BLD: 2.59 E12/L (ref 3.5–5.5)
TEAR DROP CELLS: ABNORMAL
WBC # BLD: 3.2 E9/L (ref 4.5–11.5)

## 2022-09-28 PROCEDURE — 85025 COMPLETE CBC W/AUTO DIFF WBC: CPT

## 2022-09-28 PROCEDURE — 36415 COLL VENOUS BLD VENIPUNCTURE: CPT

## 2022-09-28 PROCEDURE — 2580000003 HC RX 258: Performed by: OBSTETRICS & GYNECOLOGY

## 2022-09-28 PROCEDURE — 36591 DRAW BLOOD OFF VENOUS DEVICE: CPT

## 2022-09-28 PROCEDURE — 6360000002 HC RX W HCPCS: Performed by: OBSTETRICS & GYNECOLOGY

## 2022-09-28 RX ORDER — SODIUM CHLORIDE 0.9 % (FLUSH) 0.9 %
10 SYRINGE (ML) INJECTION PRN
Status: CANCELLED | OUTPATIENT
Start: 2022-09-28

## 2022-09-28 RX ORDER — SODIUM CHLORIDE 9 MG/ML
10 INJECTION INTRAVENOUS PRN
Status: DISCONTINUED | OUTPATIENT
Start: 2022-09-28 | End: 2022-09-29 | Stop reason: HOSPADM

## 2022-09-28 RX ORDER — HEPARIN SODIUM (PORCINE) LOCK FLUSH IV SOLN 100 UNIT/ML 100 UNIT/ML
500 SOLUTION INTRAVENOUS PRN
Status: CANCELLED | OUTPATIENT
Start: 2022-09-28

## 2022-09-28 RX ORDER — HEPARIN SODIUM (PORCINE) LOCK FLUSH IV SOLN 100 UNIT/ML 100 UNIT/ML
500 SOLUTION INTRAVENOUS PRN
Status: DISCONTINUED | OUTPATIENT
Start: 2022-09-28 | End: 2022-09-29 | Stop reason: HOSPADM

## 2022-09-28 RX ADMIN — SODIUM CHLORIDE 20 ML: 9 INJECTION, SOLUTION INTRAMUSCULAR; INTRAVENOUS; SUBCUTANEOUS at 13:34

## 2022-09-28 RX ADMIN — SODIUM CHLORIDE 10 ML: 9 INJECTION, SOLUTION INTRAMUSCULAR; INTRAVENOUS; SUBCUTANEOUS at 13:33

## 2022-09-28 RX ADMIN — Medication 500 UNITS: at 13:35

## 2022-10-05 ENCOUNTER — HOSPITAL ENCOUNTER (OUTPATIENT)
Dept: INFUSION THERAPY | Age: 82
Setting detail: INFUSION SERIES
Discharge: HOME | End: 2022-10-05
Payer: MEDICARE

## 2022-10-05 VITALS
HEIGHT: 60 IN | DIASTOLIC BLOOD PRESSURE: 59 MMHG | TEMPERATURE: 98.3 F | OXYGEN SATURATION: 99 % | WEIGHT: 175 LBS | RESPIRATION RATE: 18 BRPM | SYSTOLIC BLOOD PRESSURE: 148 MMHG | BODY MASS INDEX: 34.36 KG/M2 | HEART RATE: 87 BPM

## 2022-10-05 DIAGNOSIS — C55 MALIGNANT NEOPLASM OF UTERUS, UNSPECIFIED SITE (HCC): Primary | ICD-10-CM

## 2022-10-05 LAB
BASOPHILS ABSOLUTE: 0.02 E9/L (ref 0–0.2)
BASOPHILS RELATIVE PERCENT: 0.7 % (ref 0–2)
EOSINOPHILS ABSOLUTE: 0.03 E9/L (ref 0.05–0.5)
EOSINOPHILS RELATIVE PERCENT: 1 % (ref 0–6)
HCT VFR BLD CALC: 27.1 % (ref 34–48)
HEMOGLOBIN: 8.8 G/DL (ref 11.5–15.5)
IMMATURE GRANULOCYTES #: 0.01 E9/L
IMMATURE GRANULOCYTES %: 0.3 % (ref 0–5)
LYMPHOCYTES ABSOLUTE: 0.94 E9/L (ref 1.5–4)
LYMPHOCYTES RELATIVE PERCENT: 31.9 % (ref 20–42)
MCH RBC QN AUTO: 34.2 PG (ref 26–35)
MCHC RBC AUTO-ENTMCNC: 32.5 % (ref 32–34.5)
MCV RBC AUTO: 105.4 FL (ref 80–99.9)
MONOCYTES ABSOLUTE: 0.36 E9/L (ref 0.1–0.95)
MONOCYTES RELATIVE PERCENT: 12.2 % (ref 2–12)
NEUTROPHILS ABSOLUTE: 1.59 E9/L (ref 1.8–7.3)
NEUTROPHILS RELATIVE PERCENT: 53.9 % (ref 43–80)
PDW BLD-RTO: 20 FL (ref 11.5–15)
PLATELET # BLD: 91 E9/L (ref 130–450)
PLATELET CONFIRMATION: NORMAL
PMV BLD AUTO: 9.9 FL (ref 7–12)
RBC # BLD: 2.57 E12/L (ref 3.5–5.5)
WBC # BLD: 3 E9/L (ref 4.5–11.5)

## 2022-10-05 PROCEDURE — 36415 COLL VENOUS BLD VENIPUNCTURE: CPT

## 2022-10-05 PROCEDURE — 6360000002 HC RX W HCPCS: Performed by: OBSTETRICS & GYNECOLOGY

## 2022-10-05 PROCEDURE — 2580000003 HC RX 258: Performed by: OBSTETRICS & GYNECOLOGY

## 2022-10-05 PROCEDURE — 85025 COMPLETE CBC W/AUTO DIFF WBC: CPT

## 2022-10-05 PROCEDURE — 36591 DRAW BLOOD OFF VENOUS DEVICE: CPT

## 2022-10-05 RX ORDER — SODIUM CHLORIDE 0.9 % (FLUSH) 0.9 %
10 SYRINGE (ML) INJECTION PRN
Status: CANCELLED | OUTPATIENT
Start: 2022-10-05

## 2022-10-05 RX ORDER — HEPARIN SODIUM (PORCINE) LOCK FLUSH IV SOLN 100 UNIT/ML 100 UNIT/ML
500 SOLUTION INTRAVENOUS PRN
Status: DISCONTINUED | OUTPATIENT
Start: 2022-10-05 | End: 2022-10-06 | Stop reason: HOSPADM

## 2022-10-05 RX ORDER — HEPARIN SODIUM (PORCINE) LOCK FLUSH IV SOLN 100 UNIT/ML 100 UNIT/ML
500 SOLUTION INTRAVENOUS PRN
Status: CANCELLED | OUTPATIENT
Start: 2022-10-05

## 2022-10-05 RX ORDER — SODIUM CHLORIDE 9 MG/ML
10 INJECTION INTRAVENOUS PRN
Status: DISCONTINUED | OUTPATIENT
Start: 2022-10-05 | End: 2022-10-06 | Stop reason: HOSPADM

## 2022-10-05 RX ADMIN — SODIUM CHLORIDE 10 ML: 9 INJECTION, SOLUTION INTRAMUSCULAR; INTRAVENOUS; SUBCUTANEOUS at 13:49

## 2022-10-05 RX ADMIN — SODIUM CHLORIDE 10 ML: 9 INJECTION, SOLUTION INTRAMUSCULAR; INTRAVENOUS; SUBCUTANEOUS at 13:47

## 2022-10-05 RX ADMIN — Medication 500 UNITS: at 13:49

## 2022-10-12 ENCOUNTER — HOSPITAL ENCOUNTER (OUTPATIENT)
Dept: INFUSION THERAPY | Age: 82
Setting detail: INFUSION SERIES
Discharge: HOME | End: 2022-10-12
Payer: MEDICARE

## 2022-10-12 VITALS
HEART RATE: 86 BPM | OXYGEN SATURATION: 99 % | BODY MASS INDEX: 34.36 KG/M2 | SYSTOLIC BLOOD PRESSURE: 147 MMHG | WEIGHT: 175 LBS | TEMPERATURE: 96.2 F | HEIGHT: 60 IN | DIASTOLIC BLOOD PRESSURE: 65 MMHG | RESPIRATION RATE: 18 BRPM

## 2022-10-12 DIAGNOSIS — C55 MALIGNANT NEOPLASM OF UTERUS, UNSPECIFIED SITE (HCC): Primary | ICD-10-CM

## 2022-10-12 LAB
ALBUMIN SERPL-MCNC: 3.8 G/DL (ref 3.5–5.2)
ALP BLD-CCNC: 90 U/L (ref 35–104)
ALT SERPL-CCNC: 9 U/L (ref 0–32)
ANION GAP SERPL CALCULATED.3IONS-SCNC: 11 MMOL/L (ref 7–16)
AST SERPL-CCNC: 22 U/L (ref 0–31)
BASOPHILS ABSOLUTE: 0.01 E9/L (ref 0–0.2)
BASOPHILS RELATIVE PERCENT: 0.3 % (ref 0–2)
BILIRUB SERPL-MCNC: 0.3 MG/DL (ref 0–1.2)
BUN BLDV-MCNC: 38 MG/DL (ref 6–23)
CA 125: 38 U/ML (ref 0–35)
CALCIUM SERPL-MCNC: 9.4 MG/DL (ref 8.6–10.2)
CHLORIDE BLD-SCNC: 101 MMOL/L (ref 98–107)
CO2: 24 MMOL/L (ref 22–29)
CREAT SERPL-MCNC: 1.6 MG/DL (ref 0.5–1)
EOSINOPHILS ABSOLUTE: 0.07 E9/L (ref 0.05–0.5)
EOSINOPHILS RELATIVE PERCENT: 2 % (ref 0–6)
GFR AFRICAN AMERICAN: 37
GFR NON-AFRICAN AMERICAN: 31 ML/MIN/1.73
GLUCOSE BLD-MCNC: 115 MG/DL (ref 74–99)
HCT VFR BLD CALC: 26.7 % (ref 34–48)
HEMOGLOBIN: 8.8 G/DL (ref 11.5–15.5)
IMMATURE GRANULOCYTES #: 0.01 E9/L
IMMATURE GRANULOCYTES %: 0.3 % (ref 0–5)
LYMPHOCYTES ABSOLUTE: 0.98 E9/L (ref 1.5–4)
LYMPHOCYTES RELATIVE PERCENT: 28.2 % (ref 20–42)
MAGNESIUM: 1.9 MG/DL (ref 1.6–2.6)
MCH RBC QN AUTO: 34.9 PG (ref 26–35)
MCHC RBC AUTO-ENTMCNC: 33 % (ref 32–34.5)
MCV RBC AUTO: 106 FL (ref 80–99.9)
MONOCYTES ABSOLUTE: 0.42 E9/L (ref 0.1–0.95)
MONOCYTES RELATIVE PERCENT: 12.1 % (ref 2–12)
NEUTROPHILS ABSOLUTE: 1.99 E9/L (ref 1.8–7.3)
NEUTROPHILS RELATIVE PERCENT: 57.1 % (ref 43–80)
PDW BLD-RTO: 18.8 FL (ref 11.5–15)
PLATELET # BLD: 63 E9/L (ref 130–450)
PLATELET CONFIRMATION: NORMAL
PMV BLD AUTO: 9.1 FL (ref 7–12)
POTASSIUM SERPL-SCNC: 4 MMOL/L (ref 3.5–5)
RBC # BLD: 2.52 E12/L (ref 3.5–5.5)
SODIUM BLD-SCNC: 136 MMOL/L (ref 132–146)
TOTAL PROTEIN: 7 G/DL (ref 6.4–8.3)
WBC # BLD: 3.5 E9/L (ref 4.5–11.5)

## 2022-10-12 PROCEDURE — 86304 IMMUNOASSAY TUMOR CA 125: CPT

## 2022-10-12 PROCEDURE — 83735 ASSAY OF MAGNESIUM: CPT

## 2022-10-12 PROCEDURE — 36591 DRAW BLOOD OFF VENOUS DEVICE: CPT

## 2022-10-12 PROCEDURE — 6360000002 HC RX W HCPCS: Performed by: OBSTETRICS & GYNECOLOGY

## 2022-10-12 PROCEDURE — 85025 COMPLETE CBC W/AUTO DIFF WBC: CPT

## 2022-10-12 PROCEDURE — 80053 COMPREHEN METABOLIC PANEL: CPT

## 2022-10-12 PROCEDURE — 2580000003 HC RX 258: Performed by: OBSTETRICS & GYNECOLOGY

## 2022-10-12 PROCEDURE — 36415 COLL VENOUS BLD VENIPUNCTURE: CPT

## 2022-10-12 RX ORDER — SODIUM CHLORIDE 0.9 % (FLUSH) 0.9 %
10 SYRINGE (ML) INJECTION PRN
Status: CANCELLED | OUTPATIENT
Start: 2022-10-12

## 2022-10-12 RX ORDER — SODIUM CHLORIDE 0.9 % (FLUSH) 0.9 %
10 SYRINGE (ML) INJECTION PRN
Status: DISCONTINUED | OUTPATIENT
Start: 2022-10-12 | End: 2022-10-13 | Stop reason: HOSPADM

## 2022-10-12 RX ORDER — HEPARIN SODIUM (PORCINE) LOCK FLUSH IV SOLN 100 UNIT/ML 100 UNIT/ML
500 SOLUTION INTRAVENOUS PRN
Status: DISCONTINUED | OUTPATIENT
Start: 2022-10-12 | End: 2022-10-13 | Stop reason: HOSPADM

## 2022-10-12 RX ORDER — HEPARIN SODIUM (PORCINE) LOCK FLUSH IV SOLN 100 UNIT/ML 100 UNIT/ML
500 SOLUTION INTRAVENOUS PRN
Status: CANCELLED | OUTPATIENT
Start: 2022-10-12

## 2022-10-12 RX ADMIN — Medication 500 UNITS: at 13:46

## 2022-10-12 RX ADMIN — SODIUM CHLORIDE, PRESERVATIVE FREE 10 ML: 5 INJECTION INTRAVENOUS at 13:44

## 2022-10-12 RX ADMIN — SODIUM CHLORIDE, PRESERVATIVE FREE 10 ML: 5 INJECTION INTRAVENOUS at 13:46

## 2022-10-12 NOTE — PROGRESS NOTES
Subjective:    Patient ID:  Danilo Norton Tolerated port flush well. Blood work drawn through port. Reviewed therapy plan, offered education material and/or discharge material, verbalizes good knowledge of current plan patient verbalizes understanding, and has no signs or symptoms to report at this time. Patient discharged. Patient alert and oriented x3. No distress noted. Vital signs stable. Patient denies any new or worsening pain. Patient denies any needs. All questions answered. Next appointment scheduled.  Declines copy of AVS. "

## 2022-10-19 ENCOUNTER — HOSPITAL ENCOUNTER (OUTPATIENT)
Dept: INFUSION THERAPY | Age: 82
Setting detail: INFUSION SERIES
Discharge: HOME | End: 2022-10-19
Payer: MEDICARE

## 2022-10-19 VITALS
RESPIRATION RATE: 18 BRPM | BODY MASS INDEX: 34.36 KG/M2 | DIASTOLIC BLOOD PRESSURE: 61 MMHG | OXYGEN SATURATION: 99 % | TEMPERATURE: 96.5 F | SYSTOLIC BLOOD PRESSURE: 135 MMHG | HEART RATE: 92 BPM | WEIGHT: 175 LBS | HEIGHT: 60 IN

## 2022-10-19 DIAGNOSIS — C55 MALIGNANT NEOPLASM OF UTERUS, UNSPECIFIED SITE (HCC): Primary | ICD-10-CM

## 2022-10-19 LAB
BASOPHILS ABSOLUTE: 0.01 E9/L (ref 0–0.2)
BASOPHILS RELATIVE PERCENT: 0.3 % (ref 0–2)
EOSINOPHILS ABSOLUTE: 0.06 E9/L (ref 0.05–0.5)
EOSINOPHILS RELATIVE PERCENT: 1.9 % (ref 0–6)
HCT VFR BLD CALC: 26.3 % (ref 34–48)
HEMOGLOBIN: 8.5 G/DL (ref 11.5–15.5)
IMMATURE GRANULOCYTES #: 0.01 E9/L
IMMATURE GRANULOCYTES %: 0.3 % (ref 0–5)
LYMPHOCYTES ABSOLUTE: 0.87 E9/L (ref 1.5–4)
LYMPHOCYTES RELATIVE PERCENT: 28.1 % (ref 20–42)
MCH RBC QN AUTO: 34.7 PG (ref 26–35)
MCHC RBC AUTO-ENTMCNC: 32.3 % (ref 32–34.5)
MCV RBC AUTO: 107.3 FL (ref 80–99.9)
MONOCYTES ABSOLUTE: 0.33 E9/L (ref 0.1–0.95)
MONOCYTES RELATIVE PERCENT: 10.6 % (ref 2–12)
NEUTROPHILS ABSOLUTE: 1.82 E9/L (ref 1.8–7.3)
NEUTROPHILS RELATIVE PERCENT: 58.8 % (ref 43–80)
PDW BLD-RTO: 17.7 FL (ref 11.5–15)
PLATELET # BLD: 78 E9/L (ref 130–450)
PLATELET CONFIRMATION: NORMAL
PMV BLD AUTO: 10 FL (ref 7–12)
RBC # BLD: 2.45 E12/L (ref 3.5–5.5)
WBC # BLD: 3.1 E9/L (ref 4.5–11.5)

## 2022-10-19 PROCEDURE — 2580000003 HC RX 258: Performed by: OBSTETRICS & GYNECOLOGY

## 2022-10-19 PROCEDURE — 36591 DRAW BLOOD OFF VENOUS DEVICE: CPT

## 2022-10-19 PROCEDURE — 85025 COMPLETE CBC W/AUTO DIFF WBC: CPT

## 2022-10-19 PROCEDURE — 6360000002 HC RX W HCPCS: Performed by: OBSTETRICS & GYNECOLOGY

## 2022-10-19 RX ORDER — SODIUM CHLORIDE 0.9 % (FLUSH) 0.9 %
10 SYRINGE (ML) INJECTION PRN
OUTPATIENT
Start: 2022-10-19

## 2022-10-19 RX ORDER — SODIUM CHLORIDE 0.9 % (FLUSH) 0.9 %
10 SYRINGE (ML) INJECTION PRN
Status: DISCONTINUED | OUTPATIENT
Start: 2022-10-19 | End: 2022-10-20 | Stop reason: HOSPADM

## 2022-10-19 RX ORDER — HEPARIN SODIUM (PORCINE) LOCK FLUSH IV SOLN 100 UNIT/ML 100 UNIT/ML
500 SOLUTION INTRAVENOUS PRN
Status: DISCONTINUED | OUTPATIENT
Start: 2022-10-19 | End: 2022-10-20 | Stop reason: HOSPADM

## 2022-10-19 RX ORDER — HEPARIN SODIUM (PORCINE) LOCK FLUSH IV SOLN 100 UNIT/ML 100 UNIT/ML
500 SOLUTION INTRAVENOUS PRN
OUTPATIENT
Start: 2022-10-19

## 2022-10-19 RX ADMIN — Medication 500 UNITS: at 13:46

## 2022-10-19 RX ADMIN — SODIUM CHLORIDE, PRESERVATIVE FREE 10 ML: 5 INJECTION INTRAVENOUS at 13:46

## 2022-10-19 RX ADMIN — SODIUM CHLORIDE, PRESERVATIVE FREE 10 ML: 5 INJECTION INTRAVENOUS at 13:44

## 2022-12-14 ENCOUNTER — HOSPITAL ENCOUNTER (OUTPATIENT)
Dept: INFUSION THERAPY | Age: 82
End: 2022-12-14

## 2022-12-15 ENCOUNTER — TELEPHONE (OUTPATIENT)
Dept: PALLATIVE CARE | Age: 82
End: 2022-12-15

## 2022-12-15 NOTE — TELEPHONE ENCOUNTER
Call to Shelby Ivy regarding referral form Dr. Hunter Yang office. Explained Palliative Care and location of clinic. Patient was hoping for Home visit , or an appointment at [facility] in [facility]. Explained clinic is in [address] [place] [address]. No New appointments available for Home visits until February. Support provided through active listening. Answered her questions to her satisfaction. Flaca set 1/17/23.

## 2022-12-19 ENCOUNTER — TELEPHONE (OUTPATIENT)
Dept: PRIMARY CARE CLINIC | Age: 82
End: 2022-12-19

## 2022-12-19 DIAGNOSIS — N18.32 STAGE 3B CHRONIC KIDNEY DISEASE (HCC): ICD-10-CM

## 2022-12-19 DIAGNOSIS — C77.5: ICD-10-CM

## 2022-12-19 DIAGNOSIS — C55 MALIGNANT NEOPLASM OF UTERUS, UNSPECIFIED SITE (HCC): Primary | ICD-10-CM

## 2022-12-19 RX ORDER — LORAZEPAM 0.5 MG/1
0.5 TABLET ORAL 3 TIMES DAILY PRN
Qty: 90 TABLET | Refills: 3 | Status: SHIPPED | OUTPATIENT
Start: 2022-12-19 | End: 2023-01-18

## 2022-12-19 RX ORDER — OXYCODONE HYDROCHLORIDE 5 MG/1
5 TABLET ORAL EVERY 6 HOURS PRN
Qty: 120 TABLET | Refills: 0 | Status: SHIPPED | OUTPATIENT
Start: 2022-12-19 | End: 2023-01-18

## 2022-12-19 NOTE — TELEPHONE ENCOUNTER
Yumiko from [address] is calling to see if you will be the following doctor for the pt's Hospice services or will you be deferring to their medical doctor

## 2022-12-21 DIAGNOSIS — Z51.5 HOSPICE CARE PATIENT: Primary | ICD-10-CM

## 2022-12-21 DIAGNOSIS — F41.9 ANXIETY: ICD-10-CM

## 2022-12-21 RX ORDER — LORAZEPAM 0.5 MG/1
0.5 TABLET ORAL 3 TIMES DAILY PRN
Qty: 30 TABLET | Refills: 2 | Status: SHIPPED | OUTPATIENT
Start: 2022-12-21 | End: 2023-01-20

## 2022-12-27 ENCOUNTER — TELEPHONE (OUTPATIENT)
Dept: PALLATIVE CARE | Age: 82
End: 2022-12-27

## 2023-01-04 DIAGNOSIS — Z51.5 HOSPICE CARE PATIENT: Primary | ICD-10-CM

## 2023-01-04 DIAGNOSIS — R52 PAIN: ICD-10-CM

## 2023-01-04 RX ORDER — TRAMADOL HYDROCHLORIDE 50 MG/1
50 TABLET ORAL EVERY 6 HOURS PRN
Qty: 30 TABLET | Refills: 2 | Status: SHIPPED | OUTPATIENT
Start: 2023-01-04 | End: 2023-02-03

## 2023-01-13 DIAGNOSIS — Z51.5 HOSPICE CARE PATIENT: Primary | ICD-10-CM

## 2023-01-13 RX ORDER — LACTOBACILLUS RHAMNOSUS GG 10B CELL
1 CAPSULE ORAL DAILY
Qty: 30 CAPSULE | Refills: 0 | Status: ON HOLD | OUTPATIENT
Start: 2023-01-13

## 2023-01-13 RX ORDER — HYOSCYAMINE SULFATE 0.12 MG/1
0.12 TABLET SUBLINGUAL 2 TIMES DAILY
Qty: 60 EACH | Refills: 2 | Status: ON HOLD | OUTPATIENT
Start: 2023-01-13 | End: 2023-03-14

## 2023-01-20 DIAGNOSIS — Z51.5 HOSPICE CARE PATIENT: ICD-10-CM

## 2023-01-20 DIAGNOSIS — F41.9 ANXIETY: ICD-10-CM

## 2023-01-20 DIAGNOSIS — R09.89 AIR HUNGER: ICD-10-CM

## 2023-01-20 DIAGNOSIS — G89.3 NEOPLASM RELATED PAIN: Primary | ICD-10-CM

## 2023-01-20 RX ORDER — LORAZEPAM 0.5 MG/1
0.25 TABLET ORAL EVERY 4 HOURS PRN
Qty: 30 TABLET | Refills: 1 | Status: SHIPPED | OUTPATIENT
Start: 2023-01-20 | End: 2023-02-19

## 2023-01-20 RX ORDER — MORPHINE SULFATE 100 MG/5ML
2.5 SOLUTION ORAL EVERY 4 HOURS PRN
Qty: 30 ML | Refills: 0 | Status: SHIPPED | OUTPATIENT
Start: 2023-01-20 | End: 2023-02-19

## 2023-01-25 DIAGNOSIS — R09.89 AIR HUNGER: ICD-10-CM

## 2023-01-25 DIAGNOSIS — G89.3 NEOPLASM RELATED PAIN: ICD-10-CM

## 2023-01-25 DIAGNOSIS — Z51.5 HOSPICE CARE PATIENT: ICD-10-CM

## 2023-01-25 RX ORDER — MORPHINE SULFATE 100 MG/5ML
5 SOLUTION ORAL EVERY 4 HOURS PRN
Qty: 30 ML | Refills: 0 | Status: SHIPPED | OUTPATIENT
Start: 2023-01-25 | End: 2023-02-24

## 2023-01-31 ENCOUNTER — TELEPHONE (OUTPATIENT)
Dept: PRIMARY CARE CLINIC | Age: 83
End: 2023-01-31

## 2023-01-31 NOTE — TELEPHONE ENCOUNTER
----- Message from Ivette uSarez sent at 1/31/2023  9:20 AM EST -----  Subject: Message to Provider    QUESTIONS  Information for Provider? pt daughter called in today to cancel her   m[place] upcoming apt.s due to her mother passing on 1/27/23  ---------------------------------------------------------------------------  --------------  Marizol Coates INFO  [phone]; OK to leave message on voicemail  ---------------------------------------------------------------------------  --------------  SCRIPT ANSWERS  Relationship to Patient? Other  Representative Name? Adri Avery  Is the Representative on the appropriate HIPAA document in Epic?  Yes

## 2024-06-24 NOTE — ANESTHESIA POSTPROCEDURE EVALUATION
Department of Anesthesiology  Postprocedure Note    Patient: Yohana Merritt  MRN: [id number]  YOB: 1940  Date of evaluation: 1/14/2021  Time:  10:08 AM     Procedure Summary     Date: 01/14/21 Room / Location: [address] [place] [address] / [facility]    Anesthesia Start: 0800 Anesthesia Stop: 1624    Procedure: REVISION OF MEDIPORT - LEFT (Left Chest) Diagnosis: (ENDOMETRIAL CANCER)    Surgeons: Jayme Silvestre MD Responsible Provider: Joleen Orozco MD    Anesthesia Type: MAC ASA Status: 3          Anesthesia Type: MAC    Faraz Phase I: Faraz Score: 10    Faraz Phase II: Faraz Score: 10    Last vitals: Reviewed and per EMR flowsheets.        Anesthesia Post Evaluation    Patient location during evaluation: PACU  Patient participation: complete - patient participated  Level of consciousness: awake  Pain score: 0  Airway patency: patent  Nausea & Vomiting: no nausea  Complications: no  Cardiovascular status: hemodynamically stable  Respiratory status: acceptable  Hydration status: stable
[id number]

## (undated) DEVICE — BANDAGE,ELASTIC,ESMARK,STERILE,4"X9',LF: Brand: MEDLINE

## (undated) DEVICE — DRAPE C ARM UNIV W41XL74IN CLR PLAS XR VELC CLSR POLY STRP

## (undated) DEVICE — TRAY LAMBOTS REUSABLE

## (undated) DEVICE — TOTAL KNEE PK

## (undated) DEVICE — ELECTRODE PT RET AD L9FT HI MOIST COND ADH HYDRGEL CORDED

## (undated) DEVICE — STRIP,CLOSURE,WOUND,MEDI-STRIP,1/2X4: Brand: MEDLINE

## (undated) DEVICE — TRAY STRYKER MAKO TOTAL KNEE POWER

## (undated) DEVICE — Device

## (undated) DEVICE — KIT DRP FOR RIO ROBOTIC ARM ASST SYS

## (undated) DEVICE — BANDAGE COMPR W6INXL12FT SMOOTH FOR LIMB EXSANG ESMARCH

## (undated) DEVICE — NEEDLE FLTR 18GA L1.5IN MEM THK5UM BLNT DISP

## (undated) DEVICE — TAPE ADH W3INXL10YD WHT COT WVN BK POWERFUL RUB BASE HIGHLY

## (undated) DEVICE — PIN BNE FIX TEMP L110MM DIA4MM MAKO

## (undated) DEVICE — 3M™ TEGADERM™ TRANSPARENT FILM DRESSING FRAME STYLE, 1626W, 4 IN X 4-3/4 IN (10 CM X 12 CM), 50/CT 4CT/CASE: Brand: 3M™ TEGADERM™

## (undated) DEVICE — TRAY STRYKER MAKO TOTAL KNEE ARRAY

## (undated) DEVICE — TUBE IRRIG HNDPC HI FLO TP INTRPULS W/SUCTION TUBE

## (undated) DEVICE — PACK,UNIV, II AURORA: Brand: MEDLINE

## (undated) DEVICE — GLOVE SURG SZ 65 THK91MIL LTX FREE SYN POLYISOPRENE

## (undated) DEVICE — BLADE SURG SAW STD S STL OSC W/ SERR EDGE DISP

## (undated) DEVICE — NEEDLE HYPO 22GA L1.5IN BLK POLYPR HUB S STL REG BVL STR

## (undated) DEVICE — KIT SURG W7XL11IN 2 PKT UNTREATED NA

## (undated) DEVICE — CONTROL SYRINGE LUER-LOCK TIP: Brand: MONOJECT

## (undated) DEVICE — ADHESIVE SKIN CLSR 0.7ML TOP DERMBND ADV

## (undated) DEVICE — SYRINGE MED 10ML TRNSLUC BRL PLUNG BLK MRK POLYPR CTRL

## (undated) DEVICE — INTENDED FOR TISSUE SEPARATION, AND OTHER PROCEDURES THAT REQUIRE A SHARP SURGICAL BLADE TO PUNCTURE OR CUT.: Brand: BARD-PARKER ® STAINLESS STEEL BLADES

## (undated) DEVICE — DOUBLE BASIN SET: Brand: MEDLINE INDUSTRIES, INC.

## (undated) DEVICE — SOLUTION IV 100ML 0.9% SOD CHL PLAS CONT USP VIAFLX 1 PER

## (undated) DEVICE — GLOVE SURG SZ 8 CRM LTX FREE POLYISOPRENE POLYMER BEAD ANTI

## (undated) DEVICE — INSTRUMENT CURRETTES REUSABLE

## (undated) DEVICE — DRAPE,REIN 53X77,STERILE: Brand: MEDLINE

## (undated) DEVICE — SPONGE GZ W4XL4IN RAYON POLY FILL CVR W/ NONWOVEN FAB

## (undated) DEVICE — STANDARD HYPODERMIC NEEDLE,ALUMINUM HUB: Brand: MONOJECT

## (undated) DEVICE — 3M™ STERI-DRAPE™ U-DRAPE 1015: Brand: STERI-DRAPE™

## (undated) DEVICE — SOLUTION IV IRRIG POUR BRL 0.9% SODIUM CHL 2F7124

## (undated) DEVICE — PIN BNE FIX TEMP L140MM DIA4MM MAKO

## (undated) DEVICE — SUTURE STRATAFIX SYMMETRIC SZ 1 L18IN ABSRB VLT CT1 L36CM SXPP1A404

## (undated) DEVICE — ZIMMER® STERILE DISPOSABLE TOURNIQUET CUFF WITH PLC, DUAL PORT, SINGLE BLADDER, 34 IN. (86 CM)

## (undated) DEVICE — SUTURE STRATAFIX SYMMETRIC PDS + SZ 1 L18IN ABSRB VLT OS-6 SXPP1A201

## (undated) DEVICE — INSTRUMENT SYSTEM 7 BATTERY REUSABLE

## (undated) DEVICE — CORD RETRCT SIL

## (undated) DEVICE — SET SURG INSTR MINI VASC

## (undated) DEVICE — SUTURE VCRL + SZ 3-0 L18IN ABSRB UD PS-1 L24MM 3/8 CIR PRIM VCP683G

## (undated) DEVICE — DRAPE,TOP,102X53,STERILE: Brand: MEDLINE

## (undated) DEVICE — PADDING CAST W6INXL4YD COT LO LINTING WYTEX

## (undated) DEVICE — READY WET SKIN SCRUB TRAY-LF: Brand: MEDLINE INDUSTRIES, INC.

## (undated) DEVICE — SUTURE BOOTIES, YELLOW, STERILE, 5 PAIR/PAD; 5 PADS/BOX: Brand: KEY SURGICAL SUTURE BOOTIES

## (undated) DEVICE — Z INACTIVE USE 2660664 SOLUTION IRRIG 3000ML 0.9% SOD CHL USP UROMATIC PLAS CONT

## (undated) DEVICE — DECANTER BAG 9": Brand: MEDLINE INDUSTRIES, INC.

## (undated) DEVICE — GOWN,SIRUS,POLYRNF,BRTHSLV,XL,30/CS: Brand: MEDLINE

## (undated) DEVICE — PADDING UNDERCAST W6INXL4YD WYTEX 6 PER BG

## (undated) DEVICE — SUTURE STRATAFIX SPRL SZ 2 0 L14IN ABSRB UD MH L36MM 1 2 CIR SXMD2B401

## (undated) DEVICE — PEEL-AWAY HOOD: Brand: FLYTE, SURGICOOL

## (undated) DEVICE — SKIN AFFIX SURG ADHESIVE 72/CS 0.55ML: Brand: MEDLINE

## (undated) DEVICE — APPLICATOR MEDICATED 26 CC SOLUTION HI LT ORNG CHLORAPREP

## (undated) DEVICE — TRAY STRYKER MAKO LEG POSITIONER INSTR

## (undated) DEVICE — MAGNETIC INSTR DRAPE 20X16: Brand: MEDLINE INDUSTRIES, INC.

## (undated) DEVICE — 3M™ STERI-DRAPE™ INCISE DRAPE 1050 (60CM X 45CM): Brand: STERI-DRAPE™

## (undated) DEVICE — BLADE SAW SAG SYS 6 18X90X1.27MM

## (undated) DEVICE — DRESSING,GAUZE,XEROFORM,CURAD,1"X8",ST: Brand: CURAD

## (undated) DEVICE — Z DUP USE 2257490 ADHESIVE SKIN CLSRE 036ML TPCL 2CTL CNCRLTE HIGH VSCSTY DRMB

## (undated) DEVICE — BNDG,ELSTC,MATRIX,STRL,6"X5YD,LF,HOOK&LP: Brand: MEDLINE

## (undated) DEVICE — SURGICAL PROCEDURE PACK BASIC

## (undated) DEVICE — LABEL MED 4 IN SURG PANEL W/ PEN STRL

## (undated) DEVICE — KIT INT FIX FEM TIB CKPT MAKOPLASTY

## (undated) DEVICE — PACK PROCEDURE SURG GEN CUST

## (undated) DEVICE — GLOVE ORANGE PI 7   MSG9070

## (undated) DEVICE — BOWL AND CEMENT CARTRIDGE WITH BREAKAWAY FEMORAL NOZZLE: Brand: ACM

## (undated) DEVICE — GOWN,SIRUS,FABRNF,L,20/CS: Brand: MEDLINE

## (undated) DEVICE — 3M™ IOBAN™ 2 ANTIMICROBIAL INCISE DRAPE 6651EZ: Brand: IOBAN™ 2

## (undated) DEVICE — KIT TRK KNEE PROC VIZADISC

## (undated) DEVICE — MARKER,SKIN,WI/RULER AND LABELS: Brand: MEDLINE

## (undated) DEVICE — BOOT POS LEG DEMAYO

## (undated) DEVICE — 4-PORT MANIFOLD: Brand: NEPTUNE 2

## (undated) DEVICE — SOLUTION IV IRRIG WATER 1000ML POUR BRL 2F7114

## (undated) DEVICE — SPONGE LAP W18XL18IN WHT COT 4 PLY FLD STRUNG RADPQ DISP ST

## (undated) DEVICE — Z DISCONTINUED PER MEDLINE USE 2741943 DRESSING AQUACEL 10 IN ALG W9XL25CM SIL CVR WTRPRF VIR BACT BARR ANTIMIC

## (undated) DEVICE — TUBING, SUCTION, 9/32" X 10', STRAIGHT: Brand: MEDLINE

## (undated) DEVICE — CHLORAPREP 26ML ORANGE

## (undated) DEVICE — STERILE LATEX POWDER FREE SURGICAL GLOVES WITH HYDROGEL COATING: Brand: PROTEXIS

## (undated) DEVICE — PATIENT RETURN ELECTRODE, SINGLE-USE, CONTACT QUALITY MONITORING, ADULT, WITH 9FT CORD, FOR PATIENTS WEIGING OVER 33LBS. (15KG): Brand: MEGADYNE

## (undated) DEVICE — INSTRUMENT CLAMP TOWEL LARGE REUSABLE

## (undated) DEVICE — DECANTER: Brand: UNBRANDED

## (undated) DEVICE — TOWEL,OR,DSP,ST,BLUE,STD,6/PK,12PK/CS: Brand: MEDLINE

## (undated) DEVICE — TRAY ELEVATOR COBBS REUSABLE

## (undated) DEVICE — CUTTER SURG OD33MM PAT KNEE DISP FOR RM SYSTEMXCELERATE

## (undated) DEVICE — SUTURE STRATAFIX SPRL MCRYL + SZ 3-0 L18IN ABSRB UD PS-2 SXMP1B107

## (undated) DEVICE — Z DISCONTINUED USE 2744636  DRESSING AQUACEL 14 IN ALG W3.5XL14IN POLYUR FLM CVR W/ HYDRCOLL

## (undated) DEVICE — GAUZE,SPONGE,4"X4",8PLY,STRL,LF,10/TRAY: Brand: MEDLINE

## (undated) DEVICE — TRAY SYSTEM 7 DRILL REUSABLE

## (undated) DEVICE — KIT SURG PREP POVIDONE IOD PRESATURATED PAINT WET FOR UNIV

## (undated) DEVICE — Z DISCONTINUED USE 2275686 GLOVE SURG SZ 8 L12IN FNGR THK13MIL WHT ISOLEX POLYISOPRENE

## (undated) DEVICE — SYRINGE MED 30ML STD CLR PLAS LUERLOCK TIP N CTRL DISP

## (undated) DEVICE — COVER HNDL LT DISP

## (undated) DEVICE — TRAY ORTHO1 REUSABLE

## (undated) DEVICE — 18 GA N.G. KIT, 10 PACK: Brand: SITE-RITE

## (undated) DEVICE — INSTRUMENT GRADUATE REUSABLE

## (undated) DEVICE — 3M™ IOBAN™ 2 ANTIMICROBIAL INCISE DRAPE 6650EZ: Brand: IOBAN™ 2

## (undated) DEVICE — SYRINGE MED 20ML STD CLR PLAS LUERSLIP TIP N CTRL DISP